# Patient Record
Sex: FEMALE | Race: WHITE | Employment: OTHER | URBAN - METROPOLITAN AREA
[De-identification: names, ages, dates, MRNs, and addresses within clinical notes are randomized per-mention and may not be internally consistent; named-entity substitution may affect disease eponyms.]

---

## 2017-01-10 RX ORDER — APIXABAN 2.5 MG/1
TABLET, FILM COATED ORAL
Qty: 60 TAB | Refills: 3 | Status: SHIPPED | OUTPATIENT
Start: 2017-01-10 | End: 2017-05-11 | Stop reason: SDUPTHER

## 2017-01-16 ENCOUNTER — OFFICE VISIT (OUTPATIENT)
Dept: INTERNAL MEDICINE CLINIC | Age: 72
End: 2017-01-16

## 2017-01-16 VITALS
HEIGHT: 68 IN | HEART RATE: 86 BPM | TEMPERATURE: 97.8 F | BODY MASS INDEX: 20.16 KG/M2 | WEIGHT: 133 LBS | SYSTOLIC BLOOD PRESSURE: 144 MMHG | OXYGEN SATURATION: 99 % | DIASTOLIC BLOOD PRESSURE: 64 MMHG

## 2017-01-16 DIAGNOSIS — F32.A DEPRESSION, UNSPECIFIED DEPRESSION TYPE: ICD-10-CM

## 2017-01-16 DIAGNOSIS — R09.89 BRUIT OF RIGHT CAROTID ARTERY: ICD-10-CM

## 2017-01-16 DIAGNOSIS — N18.4 CKD STAGE 4 SECONDARY TO HYPERTENSION (HCC): ICD-10-CM

## 2017-01-16 DIAGNOSIS — I10 ESSENTIAL HYPERTENSION: Primary | ICD-10-CM

## 2017-01-16 DIAGNOSIS — I12.9 CKD STAGE 4 SECONDARY TO HYPERTENSION (HCC): ICD-10-CM

## 2017-01-16 NOTE — PROGRESS NOTES
HISTORY OF PRESENT ILLNESS  Ashtyn Romero is a 66 y.o. female. HPI     F/u CKD 4/5, htn hld anemia, hx dvt/PE requiring chronic anticoagulation with clarence  Sees Dr Letha Garcia for renal dz  Last creatinine down to 3.2 and potassium wnl. No cp sob or swelling   Getting back to her usual self since surgery for colon perforation.      Patient Active Problem List    Diagnosis Date Noted    Electrolyte abnormality 03/04/2016    ALAYNA (acute kidney injury) ([address] 01/22/2016    Acute on chronic renal failure ([address] 01/22/2016     Class: Acute    Generalized rash 01/22/2016     Class: Present on Admission    Heme positive stool 01/22/2016     Class: Present on Admission    Hypertension, uncontrolled 12/21/2015    Pericarditis with effusion 12/18/2015    Diarrhea 12/17/2015     Class: Present on Admission    Moderate protein-calorie malnutrition ([address] 12/17/2015     Class: Chronic    History of ovarian cancer 12/16/2015     Class: Present on Admission    Pericardial effusion 12/16/2015     Class: Present on Admission    History of pulmonary embolism 11/16/2015    HTN (hypertension) 10/07/2015     Class: Chronic    History of peritonitis 10/07/2015     Class: Present on Admission    Physical debility 10/07/2015     Class: Present on Admission    Chronic anticoagulation 10/07/2015     Class: Chronic    SIRS (systemic inflammatory response syndrome) ([address] 09/14/2015    Anemia 08/21/2015    Acute renal failure with lesion of tubular necrosis (HCC) 08/03/2015    Small bowel perforation (HCC) 08/01/2015    Protein malnutrition ([address] 07/30/2015    Acute pancreatitis 07/29/2015    E-coli UTI 07/28/2015    Continuous severe abdominal pain 07/28/2015    Enteritis 07/28/2015    Adrenal hemorrhage ([address] 07/28/2015    Chronic renal insufficiency, stage IV (severe) ([address] 07/28/2015    Sepsis ([address] 07/28/2015    Abdominal pain 07/24/2015     Current Outpatient Prescriptions   Medication Sig Dispense Refill    ELIQUIS 2.5 mg tablet TAKE 1 TABLET BY MOUTH TWO (2) TIMES A DAY. 60 Tab 3    ondansetron (ZOFRAN ODT) 4 mg disintegrating tablet TAKE 1 TABLET BY MOUTH EVERY EIGHT (8) HOURS AS NEEDED FOR NAUSEA. 30 Tab 3    cloNIDine HCl (CATAPRES) 0.1 mg tablet TAKE 1 TABLET EVERY 12 HOURS 60 Tab 11    buPROPion (WELLBUTRIN) 100 mg tablet Take  by mouth two (2) times a day.  iron, carbonyl (FEOSOL) 45 mg tab Take 1 Tab by mouth two (2) times a day.  magnesium oxide (MAG-OX) 400 mg tablet Take 800 mg by mouth two (2) times a day. Indications: HYPOMAGNESEMIA      acetaminophen (TYLENOL) 500 mg tablet Take 1,000 mg by mouth every six (6) hours as needed for Pain.  calcitRIOL (ROCALTROL) 0.5 mcg capsule TAKE 1 TABLET EVERY DAY (Patient taking differently: daily (with lunch). ) 30 Cap 6    famotidine (PEPCID) 20 mg tablet TAKE 1 TABLET EVERY DAY 30 Tab 11    gabapentin (NEURONTIN) 100 mg capsule TAKE 1 CAPSULE AT BEDTIME 30 Cap 6    amLODIPine (NORVASC) 10 mg tablet Take 1 Tab by mouth daily. 30 Tab 11    diphenhydrAMINE-zinc acetate 2%-0.1% (BENADRYL EXTRA STRENGTH) 2-0.1 % topical cream Apply  to affected area two (2) times daily as needed for Itching. Dispense 1 tube 30 g 0    B.infantis-B.ani-B.long-B.bifi 10-15 mg TbEC Take 1 Tab by mouth daily.  multivitamin (ONE A DAY) tablet Take 1 Tab by mouth every morning.  sodium bicarbonate 650 mg tablet Take 650 mg by mouth two (2) times a day.  atorvastatin (LIPITOR) 40 mg tablet Take 40 mg by mouth nightly.  HYDROcodone-acetaminophen (NORCO) 7.5-325 mg per tablet Take 1 Tab by mouth every six (6) hours as needed for Pain. Max Daily Amount: 4 Tabs. 20 Tab 0    diphenoxylate-atropine (LOMOTIL) 2.5-0.025 mg per tablet TAKE 1 TABLET TWICE A DAY IF NEEDED FOR DIARRHEA OR CRAMPING 60 Tab 5    HYDROcodone-acetaminophen (NORCO) 5-325 mg per tablet Take 1 Tab by mouth every four (4) hours as needed.       calcium-vitamin D (OYSTER SHELL) 500 mg(1,250mg) -200 unit per tablet Take 1 Tab by mouth three (3) times daily. 90 Tab 0    cetirizine (ZYRTEC) 10 mg tablet Take 10 mg by mouth daily. Allergies   Allergen Reactions    Citrus And Derivatives Anaphylaxis     Patient and her  reported    Penicillin G Anaphylaxis    Orange Juice Not Reported This Time    Sulfa (Sulfonamide Antibiotics) Other (comments)     \"Dont remember\"    Vancomycin Hives      Lab Results  Component Value Date/Time   Hemoglobin A1c 5.8 08/22/2015 04:52 AM   Glucose 72 03/11/2016 03:48 AM   Glucose (POC) 76 08/01/2016 09:56 AM   Glucose (POC) 82 01/24/2016 08:47 AM   Microalbumin/Creat ratio (mg/g creat) 4648 03/07/2016 02:36 PM   Microalbumin,urine random 198.00 03/07/2016 02:36 PM   Creatinine (POC) 4.1 08/01/2016 09:56 AM   Creatinine 1.76 03/11/2016 03:48 AM      Lab Results  Component Value Date/Time   Triglyceride 219 07/30/2015 11:16 AM       Lab Results  Component Value Date/Time   GFR est AA 35 03/11/2016 03:48 AM   GFR est non-AA 28 03/11/2016 03:48 AM   Creatinine (POC) 4.1 08/01/2016 09:56 AM   Creatinine 1.76 03/11/2016 03:48 AM   BUN 12 03/11/2016 03:48 AM   BUN (POC) 65 08/01/2016 09:56 AM   Sodium (POC) 138 08/01/2016 09:56 AM   Sodium 143 03/11/2016 03:48 AM   Potassium 3.8 03/11/2016 03:48 AM   Potassium (POC) 5.7 08/01/2016 09:56 AM   Chloride (POC) 112 08/01/2016 09:56 AM   Chloride 110 03/11/2016 03:48 AM   CO2 25 03/11/2016 03:48 AM         ROS    Physical Exam   Constitutional: She appears well-developed and well-nourished. Appears stated age   Neck:   Carotid bruit on right> left   Cardiovascular: Normal rate, regular rhythm and normal heart sounds. Exam reveals no gallop and no friction rub. No murmur heard. Pulmonary/Chest: Effort normal and breath sounds normal. No respiratory distress. She has no wheezes. Abdominal: Soft. Bowel sounds are normal.   Musculoskeletal: She exhibits no edema. Neurological: She is alert.    Psychiatric: She has a normal mood and affect. Nursing note and vitals reviewed. ASSESSMENT and PLAN  Prudence Presume was seen today for hypertension. Diagnoses and all orders for this visit:    Essential hypertension   Reasonable control    Bruit of right carotid artery  -     DUPLEX CAROTID BILATERAL; Future    CKD stage 4 secondary to hypertension ([facility] [address]   Close f/u Dr. Letha britton    Depression, unspecified depression type   Controlled on wellbutrin--continue     Preventive   Pt has f/u Dr Anuj Liu for ovarian cancer hx, will get mammogram order from Dr Anuj Liu and will discuss dexa scan   Advised shingles vaccine  Follow-up Disposition:  Return in about 6 months (around 7/16/2017) for htn ckd .

## 2017-01-16 NOTE — ACP (ADVANCE CARE PLANNING)
initial discussion,  is SDM. They have a daughter who is a  and can help complete AMD forms.  Offered appt with [facility] for assistance if needed

## 2017-01-16 NOTE — MR AVS SNAPSHOT
Visit Information Date & Time Provider Department Dept. Phone Encounter #  
 1/16/2017  1:30 PM Citlalli Kaye, [address]  Follow-up Instructions Return in about 6 months (around 7/16/2017) for htn ckd . Upcoming Health Maintenance Date Due DTaP/Tdap/Td series (1 - Tdap) 10/6/1965 BREAST CANCER SCRN MAMMOGRAM 10/6/1994 ZOSTER VACCINE AGE 60> 10/6/2004 GLAUCOMA SCREENING Q2Y 10/6/2009 OSTEOPOROSIS SCREENING (DEXA) 10/6/2009 MEDICARE YEARLY EXAM 10/6/2009 FOBT Q 1 YEAR AGE 50-75 3/6/2017 Allergies as of 1/16/2017  Review Complete On: 1/16/2017 By: Milad Flores LPN Severity Noted Reaction Type Reactions Citrus And Derivatives High 08/24/2015    Anaphylaxis Patient and her  reported Penicillin G High 10/19/2014    Anaphylaxis [place] Juice  08/31/2015    Not Reported This Time  
 Sulfa (Sulfonamide Antibiotics)  07/23/2015    Other (comments) \"Dont remember\" Vancomycin  01/22/2016    Hives Current Immunizations  Reviewed on 3/10/2016 Name Date Influenza High Dose Vaccine PF 9/15/2016 Influenza Vaccine 10/20/2015 Pneumococcal Conjugate (PCV-13) 10/20/2015 Pneumococcal Polysaccharide (PPSV-23) 11/10/2012 Pneumococcal Vaccine (Unspecified Type) 1/1/2015 Not reviewed this visit You Were Diagnosed With   
  
 Codes Comments Essential hypertension    -  Primary ICD-10-CM: I10 
ICD-9-CM: 401.9 Bruit of right carotid artery     ICD-10-CM: R09.89 ICD-9-CM: 785.9 CKD stage 4 secondary to hypertension ([facility] [address]     ICD-10-CM: I12.9, N18.4 ICD-9-CM: 403.90, 585.4 Depression, unspecified depression type     ICD-10-CM: F32.9 ICD-9-CM: 198 Vitals BP Pulse Temp Height(growth percentile) Weight(growth percentile) SpO2  
 144/64 (BP 1 Location: Left arm, BP Patient Position: Sitting) 86 97.8 °F (36.6 °C) (Oral) 5' 8\" (1.727 m) 133 lb (60.3 kg) 99% BMI OB Status Smoking Status 20.22 kg/m2 Hysterectomy Current Every Day Smoker BMI and BSA Data Body Mass Index Body Surface Area  
 20.22 kg/m 2 1.7 m 2 Preferred Pharmacy [facility] Name [facility] [phone] Your Updated Medication List  
  
   
This list is accurate as of: 1/16/17  2:10 PM.  Always use your most recent med list.  
  
  
  
  
 acetaminophen 500 mg tablet Commonly known as:  TYLENOL Take 1,000 mg by mouth every six (6) hours as needed for Pain. amLODIPine 10 mg tablet Commonly known as:  [place] Take 1 Tab by mouth daily. atorvastatin 40 mg tablet Commonly known as:  LIPITOR Take 40 mg by mouth nightly. B.infantis-B.ani-B.long-B.bifi 10-15 mg Tbec Take 1 Tab by mouth daily. buPROPion 100 mg tablet Commonly known as:  [facility] Take  by mouth two (2) times a day. calcitRIOL 0.5 mcg capsule Commonly known as:  ROCALTROL  
TAKE 1 TABLET EVERY DAY  
  
 calcium-vitamin D 500 mg(1,250mg) -200 unit per tablet Commonly known as:  OYSTER SHELL Take 1 Tab by mouth three (3) times daily. cetirizine 10 mg tablet Commonly known as:  ZYRTEC Take 10 mg by mouth daily. cloNIDine HCl 0.1 mg tablet Commonly known as:  CATAPRES  
TAKE 1 TABLET EVERY 12 HOURS  
  
 diphenhydrAMINE-zinc acetate 2%-0.1% 2-0.1 % topical cream  
Commonly known as:  BENADRYL EXTRA STRENGTH Apply  to affected area two (2) times daily as needed for Itching. Dispense 1 tube  
  
 diphenoxylate-atropine 2.5-0.025 mg per tablet Commonly known as:  LOMOTIL  
TAKE 1 TABLET TWICE A DAY IF NEEDED FOR DIARRHEA OR CRAMPING  
  
 ELIQUIS 2.5 mg tablet Generic drug:  apixaban TAKE 1 TABLET BY MOUTH TWO (2) TIMES A DAY. famotidine 20 mg tablet Commonly known as:  PEPCID  
TAKE 1 TABLET EVERY DAY  
  
 gabapentin 100 mg capsule Commonly known as:  NEURONTIN  
TAKE 1 CAPSULE AT BEDTIME  
  
 * HYDROcodone-acetaminophen 5-325 mg per tablet Commonly known as:  Bertram Pock Take 1 Tab by mouth every four (4) hours as needed. * HYDROcodone-acetaminophen 7.5-325 mg per tablet Commonly known as:  Kayleena Pock Take 1 Tab by mouth every six (6) hours as needed for Pain. Max Daily Amount: 4 Tabs. iron, carbonyl 45 mg Tab Commonly known as:  Luis Salen Take 1 Tab by mouth two (2) times a day. magnesium oxide 400 mg tablet Commonly known as:  MAG-OX Take 800 mg by mouth two (2) times a day. Indications: HYPOMAGNESEMIA  
  
 multivitamin tablet Commonly known as:  ONE A DAY Take 1 Tab by mouth every morning. ondansetron 4 mg disintegrating tablet Commonly known as:  ZOFRAN ODT  
TAKE 1 TABLET BY MOUTH EVERY EIGHT (8) HOURS AS NEEDED FOR NAUSEA.  
  
 sodium bicarbonate 650 mg tablet Take 650 mg by mouth two (2) times a day. * Notice: This list has 2 medication(s) that are the same as other medications prescribed for you. Read the directions carefully, and ask your doctor or other care provider to review them with you. Follow-up Instructions Return in about 6 months (around 7/16/2017) for htn ckd . To-Do List   
 01/23/2017 Imaging:  DUPLEX CAROTID BILATERAL Introducing [facility]! Dear Melissa Sams: 
Thank you for requesting a [employer] account. Our records indicate that you already have an active [employer] account. You can access your account anytime at [email or web address] Did you know that you can access your hospital and ER discharge instructions at any time in [employer]? You can also review all of your test results from your hospital stay or ER visit. Additional Information If you have questions, please visit the Frequently Asked Questions section of the [employer] website at [email or web address] Remember, [employer] is NOT to be used for urgent needs. For medical emergencies, dial 911. Now available from your iPhone and Android! Please provide this summary of care documentation to your next provider. Your primary care clinician is listed as Yenny CHEN. If you have any questions after today's visit, please call [phone].

## 2017-01-18 ENCOUNTER — HOSPITAL ENCOUNTER (OUTPATIENT)
Dept: VASCULAR SURGERY | Age: 72
Discharge: HOME | End: 2017-01-18
Attending: INTERNAL MEDICINE
Payer: MEDICARE

## 2017-01-18 DIAGNOSIS — R09.89 BRUIT OF RIGHT CAROTID ARTERY: ICD-10-CM

## 2017-01-18 PROCEDURE — 93880 EXTRACRANIAL BILAT STUDY: CPT

## 2017-01-18 NOTE — PROGRESS NOTES
[facility]  Preliminary Report:  Carotid Duplex Scan    Right:  Mild plaque noted in the right carotid system. Right ICA velocities suggest less than 50% diameter reduction. Right vertebral artery flow is antegrade. Left:  Severe plaque noted in the left carotid system. Left ICA velocities suggest greater than or equal to 70% diameter reduction. (PICA peak systolic velocity = 783 cm/s, end diastolic velocity = 70 cm/s)  Left vertebral artery flow is antegrade. Final report to follow.

## 2017-01-18 NOTE — PROCEDURES
[facility]  *** FINAL REPORT ***    Name: Onel Quiroz  MRN: [id number]    Outpatient  : 06 Oct 1945  HIS Order #: [id number]  [id number] [place] [facility] Visit #: [id number]  Date: 2017    TYPE OF TEST: Cerebrovascular Duplex    REASON FOR TEST  Carotid bruit (right hemisphere)    Right Carotid:-             Proximal               Mid                 Distal  cm/s  Systolic  Diastolic  Systolic  Diastolic  Systolic  Diastolic  CCA:     56.2                                      74.0  Bulb:  ECA:     96.0  ICA:     71.0                 74.0                 59.0  ICA/CCA:  1.0    ICA Stenosis: <50%    Right Vertebral:-  Finding: Antegrade  Sys:       63.0  Joan:    Right Subclavian: Normal    Left Carotid:-            Proximal                Mid                 Distal  cm/s  Systolic  Diastolic  Systolic  Diastolic  Systolic  Diastolic  CCA:     54.0                                      66.0  Bulb:  ECA:     83.0  ICA:    274.0      70.0      133.0      28.0       88.0  ICA/CCA:  4.2    ICA Stenosis: 70% or greater    Left Vertebral:-  Finding: Antegrade  Sys:      137.0  Joan:    Left Subclavian: Normal    INTERPRETATION/FINDINGS  PROCEDURE:  Carotid Duplex Examination using B-mode, color and  spectral Doppler of the extracranial cerebrovascular arteries. 1. <50% stenosis in the right internal carotid artery. 2. 70% or greater stenosis in the left internal carotid artery. 3. No significant stenosis in the external carotid arteries  bilaterally. 4. Antegrade flow in both vertebral arteries. 5. Normal flow in both subclavian arteries. Plaque Morphology:  1. Calcific plaque in the bulb and right ICA. 2. Calcific plaque in the bulb and left ICA. ADDITIONAL COMMENTS    I have personally reviewed the data relevant to the interpretation of  this  study.     TECHNOLOGIST: Coco Hayes RVT  Signed: 2017 01:42 PM    PHYSICIAN: Claudene Cobia, MD  Signed: 2017 12:54 PM

## 2017-01-19 DIAGNOSIS — I65.23 BILATERAL CAROTID ARTERY STENOSIS: Primary | ICD-10-CM

## 2017-02-10 RX ORDER — ONDANSETRON 4 MG/1
TABLET, FILM COATED ORAL
Qty: 30 TAB | Refills: 3 | Status: SHIPPED | OUTPATIENT
Start: 2017-02-10 | End: 2017-05-01 | Stop reason: CLARIF

## 2017-03-29 ENCOUNTER — APPOINTMENT (OUTPATIENT)
Dept: GENERAL RADIOLOGY | Age: 72
DRG: 057 | End: 2017-03-29
Attending: EMERGENCY MEDICINE
Payer: MEDICARE

## 2017-03-29 ENCOUNTER — APPOINTMENT (OUTPATIENT)
Dept: CT IMAGING | Age: 72
DRG: 057 | End: 2017-03-29
Attending: FAMILY MEDICINE
Payer: MEDICARE

## 2017-03-29 ENCOUNTER — HOSPITAL ENCOUNTER (INPATIENT)
Age: 72
LOS: 1 days | Discharge: HOME | DRG: 057 | End: 2017-03-31
Attending: EMERGENCY MEDICINE | Admitting: INTERNAL MEDICINE
Payer: MEDICARE

## 2017-03-29 ENCOUNTER — APPOINTMENT (OUTPATIENT)
Dept: CT IMAGING | Age: 72
DRG: 057 | End: 2017-03-29
Attending: EMERGENCY MEDICINE
Payer: MEDICARE

## 2017-03-29 DIAGNOSIS — E87.5 ACUTE HYPERKALEMIA: ICD-10-CM

## 2017-03-29 DIAGNOSIS — R56.9 CONVULSIONS, UNSPECIFIED CONVULSION TYPE (HCC): ICD-10-CM

## 2017-03-29 DIAGNOSIS — I67.89 CEREBRAL MICROVASCULAR DISEASE: ICD-10-CM

## 2017-03-29 DIAGNOSIS — R42 DIZZINESS: ICD-10-CM

## 2017-03-29 DIAGNOSIS — R93.0 ABNORMAL MRI OF HEAD: ICD-10-CM

## 2017-03-29 DIAGNOSIS — F02.80 DEMENTIA OF THE ALZHEIMER'S TYPE WITH LATE ONSET WITHOUT BEHAVIORAL DISTURBANCE (HCC): ICD-10-CM

## 2017-03-29 DIAGNOSIS — I65.23 STENOSIS OF BOTH INTERNAL CAROTID ARTERIES: ICD-10-CM

## 2017-03-29 DIAGNOSIS — R41.82 ALTERED MENTAL STATUS, UNSPECIFIED: ICD-10-CM

## 2017-03-29 DIAGNOSIS — I65.23 BILATERAL CAROTID ARTERY STENOSIS: ICD-10-CM

## 2017-03-29 DIAGNOSIS — R47.02 EXPRESSIVE DYSPHASIA: ICD-10-CM

## 2017-03-29 DIAGNOSIS — G93.40 ACUTE ENCEPHALOPATHY: ICD-10-CM

## 2017-03-29 DIAGNOSIS — N17.9 ACUTE KIDNEY INJURY (HCC): Primary | ICD-10-CM

## 2017-03-29 DIAGNOSIS — G30.1 DEMENTIA OF THE ALZHEIMER'S TYPE WITH LATE ONSET WITHOUT BEHAVIORAL DISTURBANCE (HCC): ICD-10-CM

## 2017-03-29 DIAGNOSIS — I65.22 STENOSIS OF LEFT CAROTID ARTERY WITHOUT CEREBRAL INFARCTION: ICD-10-CM

## 2017-03-29 LAB
ALBUMIN SERPL BCP-MCNC: 3.1 G/DL (ref 3.5–5)
ALBUMIN/GLOB SERPL: 0.7 {RATIO} (ref 1.1–2.2)
ALP SERPL-CCNC: 63 U/L (ref 45–117)
ALT SERPL-CCNC: 14 U/L (ref 12–78)
ANION GAP BLD CALC-SCNC: 8 MMOL/L (ref 5–15)
APPEARANCE UR: ABNORMAL
AST SERPL W P-5'-P-CCNC: 12 U/L (ref 15–37)
BACTERIA URNS QL MICRO: ABNORMAL /HPF
BASOPHILS # BLD AUTO: 0.1 K/UL (ref 0–0.1)
BASOPHILS # BLD: 1 % (ref 0–1)
BILIRUB SERPL-MCNC: 0.3 MG/DL (ref 0.2–1)
BILIRUB UR QL: NEGATIVE
BUN SERPL-MCNC: 73 MG/DL (ref 6–20)
BUN/CREAT SERPL: 13 (ref 12–20)
CALCIUM SERPL-MCNC: 10.5 MG/DL (ref 8.5–10.1)
CHLORIDE SERPL-SCNC: 107 MMOL/L (ref 97–108)
CO2 SERPL-SCNC: 26 MMOL/L (ref 21–32)
COLOR UR: ABNORMAL
CREAT SERPL-MCNC: 5.71 MG/DL (ref 0.55–1.02)
EOSINOPHIL # BLD: 0.3 K/UL (ref 0–0.4)
EOSINOPHIL NFR BLD: 2 % (ref 0–7)
EPITH CASTS URNS QL MICRO: ABNORMAL /LPF
ERYTHROCYTE [DISTWIDTH] IN BLOOD BY AUTOMATED COUNT: 15.2 % (ref 11.5–14.5)
GLOBULIN SER CALC-MCNC: 4.3 G/DL (ref 2–4)
GLUCOSE SERPL-MCNC: 105 MG/DL (ref 65–100)
GLUCOSE UR STRIP.AUTO-MCNC: NEGATIVE MG/DL
HCT VFR BLD AUTO: 34.8 % (ref 35–47)
HGB BLD-MCNC: 10.8 G/DL (ref 11.5–16)
HGB UR QL STRIP: ABNORMAL
KETONES UR QL STRIP.AUTO: NEGATIVE MG/DL
LEUKOCYTE ESTERASE UR QL STRIP.AUTO: ABNORMAL
LYMPHOCYTES # BLD AUTO: 15 % (ref 12–49)
LYMPHOCYTES # BLD: 1.9 K/UL (ref 0.8–3.5)
MAGNESIUM SERPL-MCNC: 3.2 MG/DL (ref 1.6–2.4)
MCH RBC QN AUTO: 28.1 PG (ref 26–34)
MCHC RBC AUTO-ENTMCNC: 31 G/DL (ref 30–36.5)
MCV RBC AUTO: 90.4 FL (ref 80–99)
MONOCYTES # BLD: 1.3 K/UL (ref 0–1)
MONOCYTES NFR BLD AUTO: 10 % (ref 5–13)
NEUTS SEG # BLD: 9.3 K/UL (ref 1.8–8)
NEUTS SEG NFR BLD AUTO: 72 % (ref 32–75)
NITRITE UR QL STRIP.AUTO: NEGATIVE
PH UR STRIP: 7 [PH] (ref 5–8)
PLATELET # BLD AUTO: 208 K/UL (ref 150–400)
POTASSIUM SERPL-SCNC: 5.7 MMOL/L (ref 3.5–5.1)
PROT SERPL-MCNC: 7.4 G/DL (ref 6.4–8.2)
PROT UR STRIP-MCNC: 100 MG/DL
RBC # BLD AUTO: 3.85 M/UL (ref 3.8–5.2)
RBC #/AREA URNS HPF: ABNORMAL /HPF (ref 0–5)
SODIUM SERPL-SCNC: 141 MMOL/L (ref 136–145)
SP GR UR REFRACTOMETRY: 1.02 (ref 1–1.03)
UROBILINOGEN UR QL STRIP.AUTO: 0.2 EU/DL (ref 0.2–1)
WBC # BLD AUTO: 12.8 K/UL (ref 3.6–11)
WBC URNS QL MICRO: ABNORMAL /HPF (ref 0–4)

## 2017-03-29 PROCEDURE — 99218 HC RM OBSERVATION: CPT

## 2017-03-29 PROCEDURE — 74011636637 HC RX REV CODE- 636/637: Performed by: EMERGENCY MEDICINE

## 2017-03-29 PROCEDURE — 81001 URINALYSIS AUTO W/SCOPE: CPT | Performed by: EMERGENCY MEDICINE

## 2017-03-29 PROCEDURE — 36415 COLL VENOUS BLD VENIPUNCTURE: CPT | Performed by: EMERGENCY MEDICINE

## 2017-03-29 PROCEDURE — 96367 TX/PROPH/DG ADDL SEQ IV INF: CPT

## 2017-03-29 PROCEDURE — 74176 CT ABD & PELVIS W/O CONTRAST: CPT

## 2017-03-29 PROCEDURE — 83735 ASSAY OF MAGNESIUM: CPT | Performed by: INTERNAL MEDICINE

## 2017-03-29 PROCEDURE — 65660000000 HC RM CCU STEPDOWN

## 2017-03-29 PROCEDURE — 93005 ELECTROCARDIOGRAM TRACING: CPT

## 2017-03-29 PROCEDURE — 74011000258 HC RX REV CODE- 258: Performed by: EMERGENCY MEDICINE

## 2017-03-29 PROCEDURE — 70450 CT HEAD/BRAIN W/O DYE: CPT

## 2017-03-29 PROCEDURE — 74011250637 HC RX REV CODE- 250/637: Performed by: FAMILY MEDICINE

## 2017-03-29 PROCEDURE — 71010 XR CHEST PORT: CPT

## 2017-03-29 PROCEDURE — 85025 COMPLETE CBC W/AUTO DIFF WBC: CPT | Performed by: EMERGENCY MEDICINE

## 2017-03-29 PROCEDURE — 74011250636 HC RX REV CODE- 250/636: Performed by: FAMILY MEDICINE

## 2017-03-29 PROCEDURE — 74011000250 HC RX REV CODE- 250: Performed by: EMERGENCY MEDICINE

## 2017-03-29 PROCEDURE — 96365 THER/PROPH/DIAG IV INF INIT: CPT

## 2017-03-29 PROCEDURE — 74011250636 HC RX REV CODE- 250/636: Performed by: EMERGENCY MEDICINE

## 2017-03-29 PROCEDURE — 80053 COMPREHEN METABOLIC PANEL: CPT | Performed by: EMERGENCY MEDICINE

## 2017-03-29 PROCEDURE — 96375 TX/PRO/DX INJ NEW DRUG ADDON: CPT

## 2017-03-29 PROCEDURE — 99284 EMERGENCY DEPT VISIT MOD MDM: CPT

## 2017-03-29 RX ORDER — SODIUM CHLORIDE 0.9 % (FLUSH) 0.9 %
5-10 SYRINGE (ML) INJECTION AS NEEDED
Status: DISCONTINUED | OUTPATIENT
Start: 2017-03-29 | End: 2017-03-31 | Stop reason: HOSPADM

## 2017-03-29 RX ORDER — LEVOFLOXACIN 5 MG/ML
500 INJECTION, SOLUTION INTRAVENOUS EVERY 24 HOURS
Status: DISCONTINUED | OUTPATIENT
Start: 2017-03-29 | End: 2017-03-29

## 2017-03-29 RX ORDER — FERROUS SULFATE, DRIED 160(50) MG
1 TABLET, EXTENDED RELEASE ORAL DAILY
COMMUNITY
End: 2017-03-31

## 2017-03-29 RX ORDER — HYDRALAZINE HYDROCHLORIDE 20 MG/ML
10 INJECTION INTRAMUSCULAR; INTRAVENOUS
Status: DISCONTINUED | OUTPATIENT
Start: 2017-03-29 | End: 2017-03-31 | Stop reason: HOSPADM

## 2017-03-29 RX ORDER — OXYBUTYNIN CHLORIDE 5 MG/1
5 TABLET ORAL 2 TIMES DAILY
Status: DISCONTINUED | OUTPATIENT
Start: 2017-03-29 | End: 2017-03-31 | Stop reason: HOSPADM

## 2017-03-29 RX ORDER — THERA TABS 400 MCG
1 TAB ORAL DAILY
Status: DISCONTINUED | OUTPATIENT
Start: 2017-03-30 | End: 2017-03-31 | Stop reason: HOSPADM

## 2017-03-29 RX ORDER — BUPROPION HYDROCHLORIDE 100 MG/1
100 TABLET ORAL 2 TIMES DAILY
Status: DISCONTINUED | OUTPATIENT
Start: 2017-03-29 | End: 2017-03-31 | Stop reason: HOSPADM

## 2017-03-29 RX ORDER — LANOLIN ALCOHOL/MO/W.PET/CERES
325 CREAM (GRAM) TOPICAL 2 TIMES DAILY
Status: DISCONTINUED | OUTPATIENT
Start: 2017-03-29 | End: 2017-03-31 | Stop reason: HOSPADM

## 2017-03-29 RX ORDER — LEVOFLOXACIN 5 MG/ML
500 INJECTION, SOLUTION INTRAVENOUS
Status: DISCONTINUED | OUTPATIENT
Start: 2017-03-31 | End: 2017-03-31 | Stop reason: HOSPADM

## 2017-03-29 RX ORDER — SODIUM POLYSTYRENE SULFONATE 15 G/60ML
15 SUSPENSION ORAL; RECTAL EVERY 6 HOURS
Status: DISCONTINUED | OUTPATIENT
Start: 2017-03-30 | End: 2017-03-30

## 2017-03-29 RX ORDER — OXYCODONE AND ACETAMINOPHEN 5; 325 MG/1; MG/1
1 TABLET ORAL
Status: DISCONTINUED | OUTPATIENT
Start: 2017-03-29 | End: 2017-03-31 | Stop reason: HOSPADM

## 2017-03-29 RX ORDER — GABAPENTIN 100 MG/1
100 CAPSULE ORAL 2 TIMES DAILY
COMMUNITY
End: 2019-01-01 | Stop reason: SDUPTHER

## 2017-03-29 RX ORDER — LEVOFLOXACIN 5 MG/ML
750 INJECTION, SOLUTION INTRAVENOUS ONCE
Status: COMPLETED | OUTPATIENT
Start: 2017-03-29 | End: 2017-03-29

## 2017-03-29 RX ORDER — CLONIDINE HYDROCHLORIDE 0.1 MG/1
0.1 TABLET ORAL 2 TIMES DAILY
Status: DISCONTINUED | OUTPATIENT
Start: 2017-03-29 | End: 2017-03-30

## 2017-03-29 RX ORDER — SODIUM CHLORIDE 9 MG/ML
50 INJECTION, SOLUTION INTRAVENOUS CONTINUOUS
Status: DISPENSED | OUTPATIENT
Start: 2017-03-29 | End: 2017-03-30

## 2017-03-29 RX ORDER — FAMOTIDINE 20 MG/1
20 TABLET, FILM COATED ORAL DAILY
Status: DISCONTINUED | OUTPATIENT
Start: 2017-03-30 | End: 2017-03-31 | Stop reason: HOSPADM

## 2017-03-29 RX ORDER — OXYBUTYNIN CHLORIDE 5 MG/1
5 TABLET ORAL 2 TIMES DAILY
COMMUNITY
End: 2019-01-01 | Stop reason: SDUPTHER

## 2017-03-29 RX ORDER — OXYCODONE AND ACETAMINOPHEN 5; 325 MG/1; MG/1
1 TABLET ORAL
COMMUNITY
End: 2017-05-01 | Stop reason: CLARIF

## 2017-03-29 RX ORDER — SODIUM CHLORIDE 0.9 % (FLUSH) 0.9 %
5-10 SYRINGE (ML) INJECTION EVERY 8 HOURS
Status: DISCONTINUED | OUTPATIENT
Start: 2017-03-29 | End: 2017-03-31 | Stop reason: HOSPADM

## 2017-03-29 RX ORDER — GUAIFENESIN 100 MG/5ML
81 LIQUID (ML) ORAL DAILY
Status: DISCONTINUED | OUTPATIENT
Start: 2017-03-30 | End: 2017-03-31 | Stop reason: HOSPADM

## 2017-03-29 RX ORDER — GABAPENTIN 100 MG/1
100 CAPSULE ORAL 2 TIMES DAILY
Status: DISCONTINUED | OUTPATIENT
Start: 2017-03-29 | End: 2017-03-31 | Stop reason: HOSPADM

## 2017-03-29 RX ORDER — LANOLIN ALCOHOL/MO/W.PET/CERES
800 CREAM (GRAM) TOPICAL 2 TIMES DAILY
Status: DISCONTINUED | OUTPATIENT
Start: 2017-03-29 | End: 2017-03-31 | Stop reason: HOSPADM

## 2017-03-29 RX ORDER — AMLODIPINE BESYLATE 5 MG/1
10 TABLET ORAL DAILY
Status: DISCONTINUED | OUTPATIENT
Start: 2017-03-30 | End: 2017-03-31 | Stop reason: HOSPADM

## 2017-03-29 RX ORDER — CALCIUM GLUCONATE 94 MG/ML
1 INJECTION, SOLUTION INTRAVENOUS
Status: DISCONTINUED | OUTPATIENT
Start: 2017-03-29 | End: 2017-03-29 | Stop reason: SDUPTHER

## 2017-03-29 RX ORDER — SODIUM POLYSTYRENE SULFONATE 15 G/60ML
15 SUSPENSION ORAL; RECTAL
Status: DISPENSED | OUTPATIENT
Start: 2017-03-30 | End: 2017-03-30

## 2017-03-29 RX ORDER — SODIUM BICARBONATE 650 MG/1
650 TABLET ORAL 2 TIMES DAILY
Status: DISCONTINUED | OUTPATIENT
Start: 2017-03-29 | End: 2017-03-31 | Stop reason: HOSPADM

## 2017-03-29 RX ORDER — DEXTROSE 50 % IN WATER (D50W) INTRAVENOUS SYRINGE
25
Status: COMPLETED | OUTPATIENT
Start: 2017-03-29 | End: 2017-03-29

## 2017-03-29 RX ORDER — ATORVASTATIN CALCIUM 40 MG/1
40 TABLET, FILM COATED ORAL
Status: DISCONTINUED | OUTPATIENT
Start: 2017-03-29 | End: 2017-03-30

## 2017-03-29 RX ORDER — CALCITRIOL 0.5 UG/1
0.5 CAPSULE ORAL
COMMUNITY
End: 2017-03-31

## 2017-03-29 RX ORDER — CALCITRIOL 0.25 UG/1
0.5 CAPSULE ORAL
Status: DISCONTINUED | OUTPATIENT
Start: 2017-03-29 | End: 2017-03-31

## 2017-03-29 RX ADMIN — DEXTROSE MONOHYDRATE 25 G: 25 INJECTION, SOLUTION INTRAVENOUS at 16:51

## 2017-03-29 RX ADMIN — CALCIUM GLUCONATE 1 G: 94 INJECTION, SOLUTION INTRAVENOUS at 18:11

## 2017-03-29 RX ADMIN — BUPROPION HYDROCHLORIDE 100 MG: 100 TABLET, FILM COATED ORAL at 20:57

## 2017-03-29 RX ADMIN — INSULIN HUMAN 10 UNITS: 100 INJECTION, SOLUTION PARENTERAL at 16:55

## 2017-03-29 RX ADMIN — Medication 10 ML: at 21:41

## 2017-03-29 RX ADMIN — CALCITRIOL 0.5 MCG: 0.25 CAPSULE, LIQUID FILLED ORAL at 23:00

## 2017-03-29 RX ADMIN — GABAPENTIN 100 MG: 100 CAPSULE ORAL at 20:56

## 2017-03-29 RX ADMIN — HYDRALAZINE HYDROCHLORIDE 10 MG: 20 INJECTION INTRAMUSCULAR; INTRAVENOUS at 20:57

## 2017-03-29 RX ADMIN — Medication 800 MG: at 20:57

## 2017-03-29 RX ADMIN — LEVOFLOXACIN 750 MG: 5 INJECTION, SOLUTION INTRAVENOUS at 19:30

## 2017-03-29 RX ADMIN — SODIUM CHLORIDE 1000 ML: 900 INJECTION, SOLUTION INTRAVENOUS at 16:49

## 2017-03-29 RX ADMIN — SODIUM BICARBONATE 650 MG: 650 TABLET, ORALLY DISINTEGRATING ORAL at 20:57

## 2017-03-29 RX ADMIN — CLONIDINE HYDROCHLORIDE 0.1 MG: 0.1 TABLET ORAL at 20:57

## 2017-03-29 RX ADMIN — ATORVASTATIN CALCIUM 40 MG: 40 TABLET, FILM COATED ORAL at 21:40

## 2017-03-29 RX ADMIN — SODIUM CHLORIDE 75 ML/HR: 900 INJECTION, SOLUTION INTRAVENOUS at 21:46

## 2017-03-29 RX ADMIN — Medication 325 MG: at 20:57

## 2017-03-29 RX ADMIN — OXYBUTYNIN CHLORIDE 5 MG: 5 TABLET ORAL at 23:00

## 2017-03-29 NOTE — IP AVS SNAPSHOT
[address] [facility] 
[phone] Patient: Andreina Peña MRN: [id number] :1945 You are allergic to the following Allergen Reactions Citrus And Derivatives Anaphylaxis Patient and her  reported Penicillin G Anaphylaxis [place] Juice Not Reported This Time  
    
 Sulfa (Sulfonamide Antibiotics) Other (comments) \"Dont remember\" Vancomycin Hives Recent Documentation Height Weight BMI OB Status Smoking Status 1.727 m 61.1 kg 20.48 kg/m2 Hysterectomy Current Every Day Smoker Emergency Contacts Name Discharge Info Relation Home Work Mobile DgVinny DISCHARGE CAREGIVER [3] Spouse [3] [phone] Malika Ricardo DISCHARGE CAREGIVER [3] Child [2] [phone] About your hospitalization You were admitted on:  2017 You last received care in the:  [facility] 3 NEUROSCIENCE TELEMETRY You were discharged on:  2017 Unit phone number:  [phone] Why you were hospitalized Your primary diagnosis was:  Not on File Your diagnoses also included:  Acute Renal Failure (Arf) (Hcc), Chronic Renal Insufficiency, Stage V (Hcc), Anemia Of Renal Disease, Abnormal Mri Of Head, Stenosis Of Both Internal Carotid Arteries, Cerebral Microvascular Disease, Convulsion Disorder (Hcc), Altered Mental Status, Unspecified, Acute Encephalopathy Providers Seen During Your Hospitalizations Provider Role Specialty Primary office phone Efe Thomas MD Attending Provider Emergency Medicine [phone] Rebecca Birch MD Attending Provider Hospitalist [phone] Angie Gasca MD Attending Provider Internal Medicine [phone] Camilla Guevara MD Attending Provider Internal Medicine [phone] Your Primary Care Physician (PCP) Primary Care Physician Office Phone Office Fax Ferny Crane [phone] Follow-up Information Follow up With Details Comments Contact Info [address]  You need to call and set up an appointment for out patient physical therapy with [address]. Bring the Out Patient Referral from signed by your doctor to the appointment. [address] 
[phone] Sher Delong MD   [address] [facility] [address] [facility] 
[phone] Omari Doll MD Schedule an appointment as soon as possible for a visit in 2 weeks can see another neurologist but you need follow up from hsopital visit [address] [facility] 
[phone] Saadia Barnes MD Call call and find out when he wants appt with you [address] [facility] Corbin [facility] [address] [place] [address] 
[phone] Current Discharge Medication List  
  
CONTINUE these medications which have CHANGED Dose & Instructions Dispensing Information Comments Morning Noon Evening Bedtime  
 atorvastatin 20 mg tablet Commonly known as:  LIPITOR What changed:   
- medication strength 
- how much to take Your last dose was: Your next dose is:    
   
   
 Dose:  20 mg Take 1 Tab by mouth nightly. Quantity:  30 Tab Refills:  0 CONTINUE these medications which have NOT CHANGED Dose & Instructions Dispensing Information Comments Morning Noon Evening Bedtime  
 acetaminophen 500 mg tablet Commonly known as:  TYLENOL Your last dose was: Your next dose is:    
   
   
 Dose:  1000 mg Take 1,000 mg by mouth every six (6) hours as needed for Pain. Refills:  0  
     
   
   
   
  
 amLODIPine 10 mg tablet Commonly known as:  Marley Mera Your last dose was:     
   
Your next dose is:    
   
   
 Dose:  10 mg  
 Take 1 Tab by mouth daily. Quantity:  30 Tab Refills:  11 buPROPion 100 mg tablet Commonly known as:  [facility] Your last dose was: Your next dose is:    
   
   
 Dose:  100 mg Take 100 mg by mouth two (2) times a day. Refills:  0  
     
   
   
   
  
 cloNIDine HCl 0.1 mg tablet Commonly known as:  CATAPRES Your last dose was: Your next dose is: TAKE 1 TABLET EVERY 12 HOURS Quantity:  60 Tab Refills:  11 Generic For:CATAPRES 0.1MG   N O T I C E    PRESCRIPTION PREVIOUSLY AUTHORIZED BY DOCTOR:NALINI GREEN [phone]  
    
   
   
   
  
 diphenoxylate-atropine 2.5-0.025 mg per tablet Commonly known as:  LOMOTIL Your last dose was: Your next dose is: TAKE 1 TABLET TWICE A DAY IF NEEDED FOR DIARRHEA OR CRAMPING Quantity:  60 Tab Refills:  5 Generic For:LOMOTIL 2.5MG  
    
   
   
   
  
 ELIQUIS 2.5 mg tablet Generic drug:  apixaban Your last dose was: Your next dose is: TAKE 1 TABLET BY MOUTH TWO (2) TIMES A DAY. Quantity:  60 Tab Refills:  3  
     
   
   
   
  
 famotidine 20 mg tablet Commonly known as:  PEPCID Your last dose was: Your next dose is: TAKE 1 TABLET EVERY DAY Quantity:  30 Tab Refills:  11 Generic For:PEPCID 20MG  
    
   
   
   
  
 gabapentin 100 mg capsule Commonly known as:  NEURONTIN Your last dose was: Your next dose is:    
   
   
 Dose:  100 mg Take 100 mg by mouth two (2) times a day. Refills:  0  
     
   
   
   
  
 iron, carbonyl 45 mg Tab Commonly known as:  Alexia Patron Your last dose was: Your next dose is:    
   
   
 Dose:  1 Tab Take 1 Tab by mouth two (2) times a day. Refills:  0  
     
   
   
   
  
 L. acidoph & paracasei- S therm- Bifido 8 billion cell Cap cap Commonly known as:  TY-Q/RISAQUAD Your last dose was: Your next dose is:    
   
   
 Dose:  1 Cap Take 1 Cap by mouth daily. Refills:  0  
     
   
   
   
  
 linaclotide 290 mcg Cap capsule Commonly known as:  Perfecto Trey Your last dose was: Your next dose is:    
   
   
 Dose:  290 mcg Take 290 mcg by mouth daily as needed. Refills:  0  
     
   
   
   
  
 magnesium oxide 400 mg tablet Commonly known as:  MAG-OX Your last dose was: Your next dose is:    
   
   
 Dose:  800 mg Take 800 mg by mouth two (2) times a day. Indications: HYPOMAGNESEMIA Refills:  0  
     
   
   
   
  
 multivitamin tablet Commonly known as:  ONE A DAY Your last dose was: Your next dose is:    
   
   
 Dose:  1 Tab Take 1 Tab by mouth every morning. Refills:  0  
     
   
   
   
  
 ondansetron hcl 4 mg tablet Commonly known as:  Mauri Kruse Your last dose was: Your next dose is: TAKE 1 TABLET BY MOUTH EVERY EIGHT (8) HOURS AS NEEDED FOR NAUSEA. Quantity:  30 Tab Refills:  3 Generic For:ZOFRAN 4MG oxybutynin 5 mg tablet Commonly known as:  MKNZDXWP Your last dose was: Your next dose is:    
   
   
 Dose:  5 mg Take 5 mg by mouth two (2) times a day. Refills:  0 PERCOCET 5-325 mg per tablet Generic drug:  oxyCODONE-acetaminophen Your last dose was: Your next dose is:    
   
   
 Dose:  1 Tab Take 1 Tab by mouth every four (4) hours as needed for Pain. Refills:  0  
     
   
   
   
  
 sodium bicarbonate 650 mg tablet Your last dose was: Your next dose is:    
   
   
 Dose:  650 mg Take 650 mg by mouth two (2) times a day. Refills:  0 VELTASSA 16.8 gram Pwpk Generic drug:  patiromer calcium sorbitex Your last dose was: Your next dose is:    
   
   
 Dose:  1 Package Take 1 Package by mouth every evening. Takes daily between 5926-6355 Refills:  0 STOP taking these medications   
 calcitRIOL 0.5 mcg capsule Commonly known as:  ROCALTROL  
   
  
 OYSTER SHELL CALCIUM-VIT D3 500 mg(1,250mg) -200 unit per tablet Generic drug:  calcium-vitamin D Where to Get Your Medications Information on where to get these meds will be given to you by the nurse or doctor. ! Ask your nurse or doctor about these medications  
  atorvastatin 20 mg tablet Discharge Instructions [employer] Activation Thank you for requesting access to [email or web address]. Please follow the instructions below to securely access and download your online medical record. mon.ki allows you to send messages to your doctor, view your test results, renew your prescriptions, schedule appointments, and more. How Do I Sign Up? 1. In your internet browser, go to [email or web address] 
2. Click on the First Time User? Click Here link in the Sign In box. You will be redirect to the New Member Sign Up page. 3. Enter your mon.ki Access Code exactly as it appears below. You will not need to use this code after youve completed the sign-up process. If you do not sign up before the expiration date, you must request a new code. mon.ki Access Code: Activation code not generated Current mon.ki Status: Active (This is the date your mon.ki access code will ) 4. Enter the last four digits of your Social Security Number (xxxx) and Date of Birth (mm/dd/yyyy) as indicated and click Submit. You will be taken to the next sign-up page. 5. Create a mon.ki ID. This will be your mon.ki login ID and cannot be changed, so think of one that is secure and easy to remember. 6. Create a mon.ki password. You can change your password at any time. 7. Enter your Password Reset Question and Answer. This can be used at a later time if you forget your password. 8. Enter your e-mail address. You will receive e-mail notification when new information is available in [address] 9. Click Sign Up. You can now view and download portions of your medical record. 10. Click the Download Summary menu link to download a portable copy of your medical information. Additional Information If you have questions, please visit the Frequently Asked Questions section of the [employer] website at [email or web address] Remember, [email or web address] is NOT to be used for urgent needs. For medical emergencies, dial 911. HOSPITALIST DISCHARGE INSTRUCTIONS 
 
NAME: Tyrone Krishnamurthy :  1945 MRN:  [id number] Date/Time:  3/31/2017 3:50 PM 
 
ADMIT DATE: 3/29/2017 DISCHARGE DATE: 3/31/2017 Attending Physician: Jared Solomon MD 
 
DISCHARGE DIAGNOSIS: 
?TIA vs dementia with overlay of stress 
 acute on chronic renal failure Hyperkalemia HTN 
hyperlipidemia MEDICATIONS: 
See above · It is important that you take the medication exactly as they are prescribed. · Keep your medication in the bottles provided by the pharmacist and keep a list of the medication names, dosages, and times to be taken in your wallet. · Do not take other medications without consulting your doctor. Pain Management: per above medications What to do at [facility] Recommended diet:  Cardiac Diet and Renal Diet Recommended activity: Activity as tolerated Follow Up: Follow-up Information Follow up With Details Comments Contact Info [address]  You need to call and set up an appointment for out patient physical therapy with [address]. Bring the Out Patient Referral from signed by your doctor to the appointment. [address] [facility] [address] [facility] 
[phone] Janee Hardy MD   [facility] [address] [facility] 
[phone] Dara Stone MD Schedule an appointment as soon as possible for a visit in 2 weeks can see another neurologist but you need follow up from hsopital visit [address] [place] 
[phone] Ashley Euceda MD Call call and find out when he wants appt with you [address] Kennedy Wang MD Ltd [address] 
[phone] The neurologist said his office will also set up an outpatient EEG. If you have questions regarding the hospital related prescriptions or hospital related issues please call [facility] at . You can always direct your questions to your primary care doctor if you are unable to reach your hospital physician; your PCP works as an extension of your hospital doctor just like your hospital doctor is an extension of your PCP for your time at [facility]. If you experience any of the following symptoms then please call your primary care physician or return to the emergency room if you cannot get hold of your doctor: 
Fever, chills, nausea, vomiting, diarrhea, change in mentation, falling, bleeding, shortness of breath Additional Instructions: 
 
 
Bring these papers with you to your follow up appointments. The papers will help your doctors be sure to continue the care plan from the hospital. 
 
 
 
 
 
 
Information obtained by : 
I understand that if any problems occur once I am at home I am to contact my physician. I understand and acknowledge receipt of the instructions indicated above. Physician's or R.N.'s Signature                                                                  Date/Time Patient or Representative Signature                                                          Da 
 
Discharge Orders None Lift Announcement We are excited to announce that we are making your provider's discharge notes available to you in Lift. You will see these notes when they are completed and signed by the physician that discharged you from your recent hospital stay. If you have any questions or concerns about any information you see in Lift, please call the Health Information Department where you were seen or reach out to your Primary Care Provider for more information about your plan of care. Introducing [facility]! Dear Adela Erickson: 
Thank you for requesting a Lift account. Our records indicate that you already have an active Lift account. You can access your account anytime at [email or web address] Did you know that you can access your hospital and ER discharge instructions at any time in Lift? You can also review all of your test results from your hospital stay or ER visit. Additional Information If you have questions, please visit the Frequently Asked Questions section of the Lift website at [email or web address] Remember, Lift is NOT to be used for urgent needs. For medical emergencies, dial 911. Now available from your iPhone and Android! General Information Please provide this summary of care documentation to your next provider. Patient Signature:  ____________________________________________________________ Date:  ____________________________________________________________  
  
[facility] Provider Signature:  ____________________________________________________________ Date:  ____________________________________________________________

## 2017-03-29 NOTE — IP AVS SNAPSHOT
Current Discharge Medication List  
  
CONTINUE these medications which have CHANGED Dose & Instructions Dispensing Information Comments Morning Noon Evening Bedtime  
 atorvastatin 20 mg tablet Commonly known as:  LIPITOR What changed:   
- medication strength 
- how much to take Your last dose was: Your next dose is:    
   
   
 Dose:  20 mg Take 1 Tab by mouth nightly. Quantity:  30 Tab Refills:  0 CONTINUE these medications which have NOT CHANGED Dose & Instructions Dispensing Information Comments Morning Noon Evening Bedtime  
 acetaminophen 500 mg tablet Commonly known as:  TYLENOL Your last dose was: Your next dose is:    
   
   
 Dose:  1000 mg Take 1,000 mg by mouth every six (6) hours as needed for Pain. Refills:  0  
     
   
   
   
  
 amLODIPine 10 mg tablet Commonly known as:  Keyona Parham Your last dose was: Your next dose is:    
   
   
 Dose:  10 mg Take 1 Tab by mouth daily. Quantity:  30 Tab Refills:  11 buPROPion 100 mg tablet Commonly known as:  [facility] Your last dose was: Your next dose is:    
   
   
 Dose:  100 mg Take 100 mg by mouth two (2) times a day. Refills:  0  
     
   
   
   
  
 cloNIDine HCl 0.1 mg tablet Commonly known as:  CATAPRES Your last dose was: Your next dose is: TAKE 1 TABLET EVERY 12 HOURS Quantity:  60 Tab Refills:  11 Generic For:CATAPRES 0.1MG   N O T I C E    PRESCRIPTION PREVIOUSLY AUTHORIZED BY DOCTOR:NALINI GREEN [phone]  
    
   
   
   
  
 diphenoxylate-atropine 2.5-0.025 mg per tablet Commonly known as:  LOMOTIL Your last dose was: Your next dose is: TAKE 1 TABLET TWICE A DAY IF NEEDED FOR DIARRHEA OR CRAMPING Quantity:  60 Tab Refills:  5 Generic For:LOMOTIL 2.5MG ELIQUIS 2.5 mg tablet Generic drug:  apixaban Your last dose was: Your next dose is: TAKE 1 TABLET BY MOUTH TWO (2) TIMES A DAY. Quantity:  60 Tab Refills:  3  
     
   
   
   
  
 famotidine 20 mg tablet Commonly known as:  PEPCID Your last dose was: Your next dose is: TAKE 1 TABLET EVERY DAY Quantity:  30 Tab Refills:  11 Generic For:PEPCID 20MG  
    
   
   
   
  
 gabapentin 100 mg capsule Commonly known as:  NEURONTIN Your last dose was: Your next dose is:    
   
   
 Dose:  100 mg Take 100 mg by mouth two (2) times a day. Refills:  0  
     
   
   
   
  
 iron, carbonyl 45 mg Tab Commonly known as:  Aliya Resendez Your last dose was: Your next dose is:    
   
   
 Dose:  1 Tab Take 1 Tab by mouth two (2) times a day. Refills:  0  
     
   
   
   
  
 L. acidoph & paracasei- S therm- Bifido 8 billion cell Cap cap Commonly known as:  TY-Q/RISAQUAD Your last dose was: Your next dose is:    
   
   
 Dose:  1 Cap Take 1 Cap by mouth daily. Refills:  0  
     
   
   
   
  
 linaclotide 290 mcg Cap capsule Commonly known as:  Angelito Daquan Your last dose was: Your next dose is:    
   
   
 Dose:  290 mcg Take 290 mcg by mouth daily as needed. Refills:  0  
     
   
   
   
  
 magnesium oxide 400 mg tablet Commonly known as:  MAG-OX Your last dose was: Your next dose is:    
   
   
 Dose:  800 mg Take 800 mg by mouth two (2) times a day. Indications: HYPOMAGNESEMIA Refills:  0  
     
   
   
   
  
 multivitamin tablet Commonly known as:  ONE A DAY Your last dose was: Your next dose is:    
   
   
 Dose:  1 Tab Take 1 Tab by mouth every morning. Refills:  0  
     
   
   
   
  
 ondansetron hcl 4 mg tablet Commonly known as:  Sandra Mccauley Your last dose was: Your next dose is: TAKE 1 TABLET BY MOUTH EVERY EIGHT (8) HOURS AS NEEDED FOR NAUSEA. Quantity:  30 Tab Refills:  3 Generic For:ZOFRAN 4MG oxybutynin 5 mg tablet Commonly known as:  AUMPBMOP Your last dose was: Your next dose is:    
   
   
 Dose:  5 mg Take 5 mg by mouth two (2) times a day. Refills:  0 PERCOCET 5-325 mg per tablet Generic drug:  oxyCODONE-acetaminophen Your last dose was: Your next dose is:    
   
   
 Dose:  1 Tab Take 1 Tab by mouth every four (4) hours as needed for Pain. Refills:  0  
     
   
   
   
  
 sodium bicarbonate 650 mg tablet Your last dose was: Your next dose is:    
   
   
 Dose:  650 mg Take 650 mg by mouth two (2) times a day. Refills:  0 VELTASSA 16.8 gram Pwpk Generic drug:  patiromer calcium sorbitex Your last dose was: Your next dose is:    
   
   
 Dose:  1 Package Take 1 Package by mouth every evening. Takes daily between 9333-7873 Refills:  0 STOP taking these medications   
 calcitRIOL 0.5 mcg capsule Commonly known as:  ROCALTROL  
   
  
 OYSTER SHELL CALCIUM-VIT D3 500 mg(1,250mg) -200 unit per tablet Generic drug:  calcium-vitamin D Where to Get Your Medications Information on where to get these meds will be given to you by the nurse or doctor. ! Ask your nurse or doctor about these medications  
  atorvastatin 20 mg tablet

## 2017-03-29 NOTE — ED NOTES
Assumed care of patient who presents to ED with c/o being off balance and feeling dizzy. She denies spinning of the room. Patient also denies c/o pain, SOB, nausea, vomiting. Observed lab result of K = 5.7. 12 lead EKG completed and given to Dr. Karina Marks. Patient placed on cardiac monitor showing SR 67 bpm. SBP elevated. Patient in NAD. Call bell in reach and  at side. Will continue to monitor.

## 2017-03-29 NOTE — PROGRESS NOTES
Pharmacy Automatic Renal Dosing Protocol - Antimicrobials      Indication for Antimicrobials: UTI    Current Regimen of Each Antimicrobial (Start Day & Day of Therapy):  Levofloxacin 750 mg IV then 500 mg IV q48h (start 3/29, Day 1)    Significant cultures: none currently    CAPD, Intermittent Hemodialysis or Renal Replacement Therapy: n/a  Paralysis, amputations, malnutrition: n/a  Recent Labs      17   1503   CREA  5.71*   BUN  73*   WBC  12.8*     Temp (24hrs), Av.5 °F (36.9 °C), Min:98.5 °F (36.9 °C), Max:98.5 °F (36.9 °C)    Creatinine Clearance (ml/min): 9    Impression/Plan: Levofloxacin 750 mg IV then 500 mg IV q48h is appropriate for renal function        Pharmacy will follow daily and adjust doses of monitored medications as appropriate for renal function and/or serum levels.     Thank you,  Jadon Dumas, PHARMD

## 2017-03-29 NOTE — ED NOTES
Spoke with Gail Jeong, RN, on [facility] and was told RN in shift change report and unable to take ED report. Discussed with ED Charge Nurse, Parker Colon.

## 2017-03-29 NOTE — ED TRIAGE NOTES
I'm dizzy and I can't form my words. When I try and walk, I go in different directions. \" Symptoms began yesterday but improved, then returned today. Patient says she is having memory problems today as well.

## 2017-03-29 NOTE — PROGRESS NOTES
Was informed that patient appears to have a UTI, in light of elevated white count and subjective weakness, will start empirical abx until Urine Cx available.

## 2017-03-29 NOTE — PROGRESS NOTES
Pharmacy Medication Reconciliation     Recommendations/Findings: The following amendments were made to the patient's active medication list on file at [address]:   1) Additions: veltassa, leena-q, linzess    2) Deletions: zyrtec    3) Changes: gabapentin (100 mg twice daily)    4) has taken all morning medication today before coming to ED    -Clarified PTA med list with medication list provided by . PTA medication list was corrected to the following:     Prior to Admission Medications   Prescriptions Last Dose Informant Patient Reported? Taking? ELIQUIS 2.5 mg tablet 3/29/2017 at Unknown time  No Yes   Sig: TAKE 1 TABLET BY MOUTH TWO (2) TIMES A DAY. L. acidoph & paracasei- S therm- Bifido (LEENA-Q/RISAQUAD) 8 billion cell cap cap 3/29/2017 at Unknown time  Yes Yes   Sig: Take 1 Cap by mouth daily. acetaminophen (TYLENOL) 500 mg tablet   Yes Yes   Sig: Take 1,000 mg by mouth every six (6) hours as needed for Pain. amLODIPine (NORVASC) 10 mg tablet 3/29/2017 at Unknown time  No Yes   Sig: Take 1 Tab by mouth daily. atorvastatin (LIPITOR) 40 mg tablet 3/28/2017 at Unknown time  Yes Yes   Sig: Take 40 mg by mouth nightly. buPROPion (WELLBUTRIN) 100 mg tablet 3/29/2017 at Unknown time  Yes Yes   Sig: Take 100 mg by mouth two (2) times a day. calcitRIOL (ROCALTROL) 0.5 mcg capsule 3/28/2017 at Unknown time  Yes Yes   Sig: Take 0.5 mcg by mouth nightly. calcium-vitamin D (OYSTER SHELL CALCIUM-VIT D3) 500 mg(1,250mg) -200 unit per tablet 3/29/2017 at Unknown time  Yes Yes   Sig: Take 1 Tab by mouth daily.    cloNIDine HCl (CATAPRES) 0.1 mg tablet 3/29/2017 at Unknown time  No Yes   Sig: TAKE 1 TABLET EVERY 12 HOURS   diphenoxylate-atropine (LOMOTIL) 2.5-0.025 mg per tablet   No Yes   Sig: TAKE 1 TABLET TWICE A DAY IF NEEDED FOR DIARRHEA OR CRAMPING   famotidine (PEPCID) 20 mg tablet 3/29/2017 at Unknown time  No Yes   Sig: TAKE 1 TABLET EVERY DAY   gabapentin (NEURONTIN) 100 mg capsule 3/29/2017 at Unknown time  Yes Yes   Sig: Take 100 mg by mouth two (2) times a day. iron, carbonyl (FEOSOL) 45 mg tab 3/29/2017 at Unknown time  Yes Yes   Sig: Take 1 Tab by mouth two (2) times a day. linaclotide (LINZESS) 290 mcg cap capsule   Yes Yes   Sig: Take 290 mcg by mouth daily as needed. magnesium oxide (MAG-OX) 400 mg tablet 3/29/2017 at Unknown time  Yes Yes   Sig: Take 800 mg by mouth two (2) times a day. Indications: HYPOMAGNESEMIA   multivitamin (ONE A DAY) tablet 3/29/2017 at Unknown time  Yes Yes   Sig: Take 1 Tab by mouth every morning. ondansetron hcl (ZOFRAN) 4 mg tablet   No Yes   Sig: TAKE 1 TABLET BY MOUTH EVERY EIGHT (8) HOURS AS NEEDED FOR NAUSEA. oxyCODONE-acetaminophen (PERCOCET) 5-325 mg per tablet   Yes Yes   Sig: Take 1 Tab by mouth every four (4) hours as needed for Pain. oxybutynin (DITROPAN) 5 mg tablet 3/29/2017 at Unknown time  Yes Yes   Sig: Take 5 mg by mouth two (2) times a day. patiromer calcium sorbitex (VELTASSA) 16.8 gram pwpk 3/28/2017 at Unknown time  Yes Yes   Sig: Take 1 Package by mouth every evening. Takes daily between 9103-4795   sodium bicarbonate 650 mg tablet 3/29/2017 at Unknown time  Yes Yes   Sig: Take 650 mg by mouth two (2) times a day.       Facility-Administered Medications: None        Thank you,  Niesha Simms, PHARMD

## 2017-03-29 NOTE — ED NOTES
TRANSFER - OUT REPORT:    Verbal report given to Derek Heart RN (name) on Jt Schooling  being transferred to [facility] for routine progression of care       Report consisted of patients Situation, Background, Assessment and   Recommendations(SBAR). Information from the following report(s) SBAR, ED Summary, Procedure Summary, Intake/Output, MAR, Recent Results and Cardiac Rhythm NSR was reviewed with the receiving nurse. Lines:   Peripheral IV 03/29/17 Left Antecubital (Active)   Site Assessment Clean, dry, & intact 3/29/2017  4:46 PM   Phlebitis Assessment 0 3/29/2017  4:46 PM   Infiltration Assessment 0 3/29/2017  4:46 PM   Dressing Status Clean, dry, & intact 3/29/2017  4:46 PM   Dressing Type Tape;Transparent 3/29/2017  4:46 PM   Hub Color/Line Status Pink;Flushed;Patent 3/29/2017  4:46 PM        Opportunity for questions and clarification was provided.       Patient transported with:   Monitor  Registered Nurse

## 2017-03-29 NOTE — H&P
[facility], [address]   HISTORY AND PHYSICAL       Name:  Elliott Rosa   MR#:  [id number]   :  1945   Account #:  [de-identified]        Date of Adm:  2017       ATTENDING PHYSICIAN: Remigio Grigsby MD     CHIEF COMPLAINT: Dizziness. HISTORY OF PRESENT ILLNESS: The patient is a 76-year-old   female with past medical history of chronic kidney disease, history of   hypertension, GERD, and thromboembolism, who presents to the   hospital with the above mentioned symptoms. The patient reports that   her symptoms started this morning. She went to bed and was okay. This morning got up and started having some dizziness and felt like   \"she was going to pass out. \" The  who was present at the   bedside also reports that the patient was \"stumbling\". The patient also   reports that she had some trouble speaking and felt as if \"the words   were stuck in my head\" and reports that she could not \"make proper   sentences\". She also had some slurred speech and garbled speech   and felt weak all over and reports that the symptoms has been getting   worse since then. The patient reports that she had some minimal   symptoms starting yesterday, but today morning, symptoms got much   worse and she knew that she had to come to the hospital. The patient   reports that she had similar symptoms in the past secondary to   electrolyte abnormalities and feels that it is the same thing. Regardless, the patient reports that she has some chronic left lower   extremity weakness after being diagnosed with sepsis in . The   patient reports that last week her hemoglobin was low secondary to   chronic kidney disease, and she was given transfusion. She also   reports that she is currently not on dialysis.  The patient reports that she   gets some chest pain on and off, especially after waking up from sleep,   but there is no shortness of breath associated with the same. The   patient also reports that there is no exertional chest pain or dyspnea,   no radiation to the chest pain, or no other exacerbating or alleviating   factors. The patient denies any headache, blurry vision, sore throat,   trouble swallowing, trouble with speech currently. Denies any shortness   of breath, cough, fever, chills, abdominal pain, constipation, diarrhea,   urinary symptoms, focal or generalized neurological weakness, recent   travels or sick contacts. PAST MEDICAL HISTORY: See above. HOME MEDICATIONS    1. Probiotic. 2. Ditropan 5 mg b.i.d.   3.   mg b.i.d.    4. Rocaltrol 1.5 mg nightly. 5 1 packet every evening. 6.  .    7. Percocet 5/325 q.4h. as needed. 8. Linzess 290 mcg capsule daily as needed. 9. Zofran 4 mg every 8 hours as needed for nausea. 10. Lomotil 1 tablet twice a day as needed. 11.   mg t.i.d.   12. Wellbutrin 100 mg b.i.d.   13. Iron 45 mg b.i.d. 14. Mag-Ox 800 mg b.i.d.   15. Pepcid 20 mg daily. 16. Amlodipine 10 mg daily. 17. Multivitamin 1 tablet daily. 18.  .   19. Lipitor 40 mg daily. SOCIAL HISTORY: Current everyday smoker, smokes  pack per   day. Occasional alcohol. Denies IV drug abuse. ALLERGIES   1. CITRUS AND DERIVATIVES. 2. ORANGE JUICE. 3. PENICILLIN. 4. SULFA. 5. VANCOMYCIN. REVIEW OF SYSTEMS: A 10-point review of systems was done,   which is essentially negative except for the symptoms as mentioned   above. PHYSICAL EXAMINATION   VITAL SIGNS: Temperature 98.5, pulse 101, respiratory rate 22, blood   pressure 185/55, pulse oximetry 96% room air. GENERAL: Alert and oriented x3, awake, no acute distress, resting in   bed, pleasant female, appears to be stated age. HEENT: Pupils equal and reactive to light. Dry mucous membranes. Tympanic membranes clear. NECK: NECK: Supple. CHEST: Clear to auscultation bilaterally. CORONARY: S1, S2 were heard. ABDOMEN: Soft.  Tender to palpation in the epigastric region as well   as the left lower quadrant mildly. No rebound, no guarding. Bowel   sounds were hypoactive. EXTREMITIES: No clubbing, no cyanosis, no edema. NEUROLOGIC: Alert and oriented x3, awake, pleasant mood and   affect. Sensory grossly within normal limits. DTR 2+/4. Strength 5/5. Cranial nerves 2-12 grossly intact. SKIN: Warm. LABORATORY DATA: White count 12, hemoglobin 10.8, hematocrit   34.8, platelets 288. Urine shows large leukocyte esterase, large   amount of blood, no ketones, negative for nitrites. Sodium ,   potassium 5.7, chloride 107, bicarbonate 26, anion gap 8, BUN 73,   creatinine 5.71, calcium 10.5, bilirubin total 0.3, albumin 3.1, ALT 14,   AST 12, alkaline phosphatase 63. CT of the head shows no acute   intracranial abnormality. X-ray of the chest shows no acute   abnormality. EKG shows normal sinus rhythm, left ventricular   hypertrophy. ASSESSMENT AND PLAN   1. Transient ischemic attack. The patient will be admitted on a neuro-  telemetry bed. Will get an MRI, MRA of the brain. Start the patient on   aspirin. Continue statin. Neurovascular checks. Physical therapy,   occupational therapy, speech consult, neurology consult in the   morning. Further intervention will be per hospital course. Any changes   in mental status, will mandate imaging and intervention. Will continue   to monitor.  echocardiogram has been ordered as well as hemoglobin   A1c and cardiac enzymes have been ordered. Further intervention will   be per hospital course. 2. Acute on chronic renal failure . The patient appears to be heading   towards dialysis. Nephrology consultation will be requested. Provide   very gentle intravenous hydration, closely trend creatinine. Renally   dose all medications. The patient is on Eliquis at this time, but her GFR   is extremely low, will hold the Eliquis dose tonight. Will reassess in the   morning .    3. . The patient was treated with insulin, D50, calcium gluconate in   the emergency room. Will provide Kayexalate and repeat potassium   level in 48 hours. Will continue to closely monitor. The patient will be   on telemetry. Further intervention will be per hospital course. 4. Hypertension, suboptimally controlled. Put the patient on p.r.n.   medication. Continue to monitor. 5. Gastrointestinal/deep venous thrombosis prophylaxis. The patient is   on Eliquis which is held for tonight. Will put the patient on sequential   compression devices.         Km Cheney MD MM / PATRIZIA   D:  03/29/2017   18:25   T:  03/29/2017   19:51   Job #:  [id number]

## 2017-03-29 NOTE — ED NOTES
Patient medicated with Insulin and Dextrose for hyperkalemia. Calcium Gluconate to come from [facility]. Dr. Nina Given sin to update patient and  on plan of care.

## 2017-03-29 NOTE — ED PROVIDER NOTES
HPI Comments: Nicole Landaverde is a 66 y.o. Female tobacco user (.25ppd) who has a h/o CKD, HTN, GERD and thromboembolus present with a rather convoluted history, to ED [facility] ED ambulatory with cc dizziness described at [facility] like i'm spinning and sometimes feels like i'm gonna pass out\" x yesterday morning. The patient also reports associated symptoms of gait problems, which she describes as \"difficulty walking in a straight line\", as well as slurred speech, garbled speech, and extremity weakness, all of which she states began yesterday morning after waking up and has become considerably worse since then. The patient describes her garbled speech as \"feels like I know what I want to say but I cant get it out\" The patient states that the last time she had symptoms similar to her current ones, she was diagnosed with electrolyte abnormity and dehydration. Specifically in regards to the patients h/o weakness, she states that she has had some left lower extremity weakness that has been chronic since being diagnosed with sepsis in January 2016. The patient further states that she had a blood transfusion last week, related to her CKD, she is not currently on dialysis. The patient notes that her creatinine typically runs around 4.0. She denies all other symptoms at this time, including any chest pain sob, fevers, chills, numbness/tingling, syncope, nausea, vomiting or diarrhea. PCP: Jan Sparks MD    There are no other complaints changes or physical findings at this time  Written by GOYO Denise as dictated by Soha Martínez MD.    The history is provided by the patient.         Past Medical History:   Diagnosis Date    Chronic kidney disease     Stage 5 (7/18/16 BUN 79, Creatnine 4.53, K 6) Dr. Edin Blair [phone]    GERD (gastroesophageal reflux disease)     well controlled with Rx     High cholesterol     Hyperkalemia     Hypertension     Hypomagnesemia     on daily Magnesium    Neuropathy bilateral feet    Ovarian cancer ([facility] 2013    Hysterectomy with chemo, no radiation:  Dr. Nam Duarte [facility]) 9/2015    blood clot in lung 9/2015    Thromboembolus ([facility] 2004    in kidney \"many years ago\"       Past Surgical History:   Procedure Laterality Date    ABDOMEN SURGERY [address] UNLISTED  7/31/15    Lap exploratory small bowel obstruction  (ICU)    ABDOMEN SURGERY [address] UNLISTED  8/2/15    Abdmonial washout with wound vac (ICU)    ABDOMEN SURGERY PROC UNLISTED  8/18/15    Abdominal washout fascial closure (ICU)    ABDOMEN SURGERY PROC UNLISTED  11/11/15    Lap takedown of ileostomy     COLONOSCOPY N/A 8/1/2016    COLONOSCOPY performed by Reddy Feliciano [facility] at [facility] HX APPENDECTOMY  approx 2005    HX CASI AND BSO  2013    due to ovarian cancer    HX TONSILLECTOMY  age 10         Family History:   Problem Relation Age of Onset    Other Mother      peptic ulcer    Alzheimer Father        Social History     Social History    Marital status:      Spouse name: N/A    Number of children: N/A    Years of education: N/A     Occupational History    Not on file. Social History Main Topics    Smoking status: Current Every Day Smoker     Packs/day: 0.25     Last attempt to quit: 1/11/2012    Smokeless tobacco: Never Used    Alcohol use Yes      Comment: 2 martini weekly as of 7/26/16    Drug use: No    Sexual activity: Not on file     Other Topics Concern    Not on file     Social History Narrative         ALLERGIES: Citrus and derivatives; Penicillin g; Orange juice; Sulfa (sulfonamide antibiotics); and Vancomycin    Review of Systems   Constitutional: Negative for chills and fever. HENT: Negative for congestion, ear pain, rhinorrhea, sore throat and trouble swallowing. Eyes: Negative for visual disturbance. Respiratory: Negative for cough, chest tightness and shortness of breath. Cardiovascular: Negative for chest pain and palpitations. Gastrointestinal: Negative for abdominal pain, blood in stool, constipation, diarrhea, nausea and vomiting. Genitourinary: Negative for decreased urine volume, difficulty urinating, dysuria and frequency. Musculoskeletal: Positive for gait problem. Negative for back pain and neck pain. Skin: Negative for color change and rash. Neurological: Positive for dizziness, speech difficulty and weakness. Negative for light-headedness, numbness and headaches. All other systems reviewed and are negative. Vitals:    03/29/17 1453   BP: 171/84   Pulse: 85   Resp: 20   Temp: 98.5 °F (36.9 °C)   SpO2: 98%   Weight: 61.1 kg (134 lb 11.2 oz)   Height: 5' 8\" (1.727 m)            Physical Exam   Constitutional: She is oriented to person, place, and time. Vital signs are normal. She appears well-developed and well-nourished. She does not appear ill. No distress. HENT:   Mouth/Throat: Oropharynx is clear and moist.   Eyes: Conjunctivae are normal.   Neck: Neck supple. Cardiovascular: Normal rate and regular rhythm. Pulmonary/Chest: Effort normal and breath sounds normal. No accessory muscle usage. No respiratory distress. Abdominal: Soft. She exhibits no distension. There is no tenderness. Lymphadenopathy:     She has no cervical adenopathy. Neurological: She is alert and oriented to person, place, and time. She has normal strength. No cranial nerve deficit or sensory deficit. Coordination normal.   Skin: Skin is warm and dry. Nursing note and vitals reviewed.        MDM  Number of Diagnoses or Management Options  Acute hyperkalemia:   Acute kidney injury [facility]):   Dizziness:   Expressive dysphasia:   Diagnosis management comments: Ddx: CVA, ICH, Renal failure, Metabolic abnormality, UTI         Amount and/or Complexity of Data Reviewed  Clinical lab tests: ordered and reviewed  Tests in the radiology section of CPT®: ordered and reviewed  Decide to obtain previous medical records or to obtain history from someone other than the patient: yes  Obtain history from someone other than the patient: yes  Review and summarize past medical records: yes  Discuss the patient with other providers: yes (Hospitalist)    Critical Care  Total time providing critical care: 30-74 minutes    Patient Progress  Patient progress: stable    CRITICAL CARE NOTE :    5:03 PM  I have spent 30-74 minutes of critical care time involved in lab review, consultations with specialist, family decision- making, bedside attention and documentation. During this entire length of time I was immediately available to the patient . Dudley Self MD      ED Course       Procedures    EKG interpretation: (Preliminary) 1612  Rhythm: normal sinus rhythm; and regular . Rate (approx.): 67; Axis: normal; P wave: normal; QRS interval: normal ; ST/T wave: normal; Left ventricular hypertrophy. CONSULT NOTE:   4:15 PM  Dudley Self MD spoke with Ronak Buck,   Specialty: Hospitalist  Discussed pt's hx, disposition, and available diagnostic and imaging results. Reviewed care plans. Consultant will evaluate pt for admission. Written by Laurita Chapman ED Scribe, as dictated by Dudley Self MD.    LABORATORY TESTS:  Recent Results (from the past 12 hour(s))   CBC WITH AUTOMATED DIFF    Collection Time: 03/29/17  3:03 PM   Result Value Ref Range    WBC 12.8 (H) 3.6 - 11.0 K/uL    RBC 3.85 3.80 - 5.20 M/uL    HGB 10.8 (L) 11.5 - 16.0 g/dL    HCT 34.8 (L) 35.0 - 47.0 %    MCV 90.4 80.0 - 99.0 FL    MCH 28.1 26.0 - 34.0 PG    MCHC 31.0 30.0 - 36.5 g/dL    RDW 15.2 (H) 11.5 - 14.5 %    PLATELET 041 135 - 650 K/uL    NEUTROPHILS 72 32 - 75 %    LYMPHOCYTES 15 12 - 49 %    MONOCYTES 10 5 - 13 %    EOSINOPHILS 2 0 - 7 %    BASOPHILS 1 0 - 1 %    ABS. NEUTROPHILS 9.3 (H) 1.8 - 8.0 K/UL    ABS. LYMPHOCYTES 1.9 0.8 - 3.5 K/UL    ABS. MONOCYTES 1.3 (H) 0.0 - 1.0 K/UL    ABS. EOSINOPHILS 0.3 0.0 - 0.4 K/UL    ABS.  BASOPHILS 0.1 0.0 - 0.1 K/UL   METABOLIC PANEL, COMPREHENSIVE    Collection Time: 03/29/17  3:03 PM   Result Value Ref Range    Sodium 141 136 - 145 mmol/L    Potassium 5.7 (H) 3.5 - 5.1 mmol/L    Chloride 107 97 - 108 mmol/L    CO2 26 21 - 32 mmol/L    Anion gap 8 5 - 15 mmol/L    Glucose 105 (H) 65 - 100 mg/dL    BUN 73 (H) 6 - 20 MG/DL    Creatinine 5.71 (H) 0.55 - 1.02 MG/DL    BUN/Creatinine ratio 13 12 - 20      GFR est AA 9 (L) >60 ml/min/1.73m2    GFR est non-AA 7 (L) >60 ml/min/1.73m2    Calcium 10.5 (H) 8.5 - 10.1 MG/DL    Bilirubin, total 0.3 0.2 - 1.0 MG/DL    ALT (SGPT) 14 12 - 78 U/L    AST (SGOT) 12 (L) 15 - 37 U/L    Alk. phosphatase 63 45 - 117 U/L    Protein, total 7.4 6.4 - 8.2 g/dL    Albumin 3.1 (L) 3.5 - 5.0 g/dL    Globulin 4.3 (H) 2.0 - 4.0 g/dL    A-G Ratio 0.7 (L) 1.1 - 2.2     EKG, 12 LEAD, INITIAL    Collection Time: 03/29/17  4:12 PM   Result Value Ref Range    Ventricular Rate 67 BPM    Atrial Rate 67 BPM    P-R Interval 160 ms    QRS Duration 94 ms    Q-T Interval 380 ms    QTC Calculation (Bezet) 401 ms    Calculated P Axis 56 degrees    Calculated R Axis 9 degrees    Calculated T Axis 45 degrees    Diagnosis       Normal sinus rhythm  Possible Left atrial enlargement  Left ventricular hypertrophy  Abnormal ECG  When compared with ECG of 04-MAR-2016 15:26,  Non-specific change in ST segment in Lateral leads  Nonspecific T wave abnormality no longer evident in Lateral leads  QT has shortened         IMAGING RESULTS:  CT Results  (Last 48 hours)               03/29/17 1523  CT HEAD WO CONT Final result    Impression:  IMPRESSION: No acute intracranial abnormality. Narrative:  HEAD CT WITHOUT CONTRAST: 3/29/2017 3:23 PM       INDICATION: Abnormal gait (ataxia). HISTORY (per electronic medical record): Dizziness, difficulty with word   finding. Gait problems. Memory problem. Symptoms began yesterday and improved,   then returned today. COMPARISON: 1/21/2016, 10/7/2015.        PROCEDURE: Axial images of the head were obtained without contrast. Coronal and   sagittal reformats were performed. CT dose reduction was achieved through use of   a standardized protocol tailored for this examination and automatic exposure   control for dose modulation. FINDINGS: The ventricles and sulci are appropriate in size and configuration for   age. No loss of gray-white differentiation to suggest late acute or early   subacute infarction. No mass effect or intracranial hemorrhage. MEDICATIONS GIVEN:  Medications   calcium gluconate injection 1 g (not administered)   dextrose (D50W) injection syrg 25 g (not administered)   insulin regular (NOVOLIN R, HUMULIN R) injection 10 Units (not administered)   sodium chloride 0.9 % bolus infusion 1,000 mL (1,000 mL IntraVENous New Bag 3/29/17 9232)       IMPRESSION:  1. Acute kidney injury ([address]    2. Acute hyperkalemia    3. Expressive dysphasia    4. Dizziness        PLAN:  1. Admit to hospitalist  2. Calcium, dextrose, insulin, fluids to decrease potassium levels    4:42 PM  Patient is being admitted to the hospital by Dr. Sabino Perez. The results of their tests and reasons for their admission have been discussed with the patient and/or available family. They convey agreement and understanding for the need to be admitted and for their admission diagnosis. This note is prepared by Solange Colvin acting as Scribe for Mike Gloria MD.    Mike Gloria MD: The Scribe's documentation has been prepared under my direction and personally reviewed by me in its entirety. I confirm that the note above accurately reflects all work, treatment, procedures, and medical decision making performed by me.

## 2017-03-30 ENCOUNTER — APPOINTMENT (OUTPATIENT)
Dept: MRI IMAGING | Age: 72
DRG: 057 | End: 2017-03-30
Attending: FAMILY MEDICINE
Payer: MEDICARE

## 2017-03-30 PROBLEM — R56.9 CONVULSION DISORDER (HCC): Status: ACTIVE | Noted: 2017-03-30

## 2017-03-30 PROBLEM — R41.82 ALTERED MENTAL STATUS, UNSPECIFIED: Status: ACTIVE | Noted: 2017-03-30

## 2017-03-30 PROBLEM — R93.0 ABNORMAL MRI OF HEAD: Status: ACTIVE | Noted: 2017-03-30

## 2017-03-30 PROBLEM — N17.9 ACUTE RENAL FAILURE (ARF) (HCC): Status: ACTIVE | Noted: 2017-03-30

## 2017-03-30 PROBLEM — I65.23 STENOSIS OF BOTH INTERNAL CAROTID ARTERIES: Status: ACTIVE | Noted: 2017-03-30

## 2017-03-30 PROBLEM — I67.89 CEREBRAL MICROVASCULAR DISEASE: Status: ACTIVE | Noted: 2017-03-30

## 2017-03-30 PROBLEM — G93.40 ACUTE ENCEPHALOPATHY: Status: ACTIVE | Noted: 2017-03-30

## 2017-03-30 LAB
ANION GAP BLD CALC-SCNC: 11 MMOL/L (ref 5–15)
ATRIAL RATE: 67 BPM
BUN SERPL-MCNC: 71 MG/DL (ref 6–20)
BUN/CREAT SERPL: 14 (ref 12–20)
CALCIUM SERPL-MCNC: 10.2 MG/DL (ref 8.5–10.1)
CALCULATED P AXIS, ECG09: 56 DEGREES
CALCULATED R AXIS, ECG10: 9 DEGREES
CALCULATED T AXIS, ECG11: 45 DEGREES
CHLORIDE SERPL-SCNC: 110 MMOL/L (ref 97–108)
CHOLEST SERPL-MCNC: 116 MG/DL
CO2 SERPL-SCNC: 22 MMOL/L (ref 21–32)
CREAT SERPL-MCNC: 5.01 MG/DL (ref 0.55–1.02)
DIAGNOSIS, 93000: NORMAL
EST. AVERAGE GLUCOSE BLD GHB EST-MCNC: 97 MG/DL
GLUCOSE BLD STRIP.AUTO-MCNC: 131 MG/DL (ref 65–100)
GLUCOSE BLD STRIP.AUTO-MCNC: 94 MG/DL (ref 65–100)
GLUCOSE SERPL-MCNC: 87 MG/DL (ref 65–100)
HBA1C MFR BLD: 5 % (ref 4.2–6.3)
HDLC SERPL-MCNC: 55 MG/DL
HDLC SERPL: 2.1 {RATIO} (ref 0–5)
LDLC SERPL CALC-MCNC: 31.2 MG/DL (ref 0–100)
LIPID PROFILE,FLP: NORMAL
P-R INTERVAL, ECG05: 160 MS
POTASSIUM SERPL-SCNC: 5.2 MMOL/L (ref 3.5–5.1)
Q-T INTERVAL, ECG07: 380 MS
QRS DURATION, ECG06: 94 MS
QTC CALCULATION (BEZET), ECG08: 401 MS
SERVICE CMNT-IMP: ABNORMAL
SERVICE CMNT-IMP: NORMAL
SODIUM SERPL-SCNC: 143 MMOL/L (ref 136–145)
TRIGL SERPL-MCNC: 149 MG/DL (ref ?–150)
TROPONIN I SERPL-MCNC: <0.04 NG/ML
VENTRICULAR RATE, ECG03: 67 BPM
VLDLC SERPL CALC-MCNC: 29.8 MG/DL

## 2017-03-30 PROCEDURE — 74011250637 HC RX REV CODE- 250/637: Performed by: INTERNAL MEDICINE

## 2017-03-30 PROCEDURE — 84484 ASSAY OF TROPONIN QUANT: CPT | Performed by: FAMILY MEDICINE

## 2017-03-30 PROCEDURE — 93306 TTE W/DOPPLER COMPLETE: CPT

## 2017-03-30 PROCEDURE — G8989 SELF CARE D/C STATUS: HCPCS | Performed by: OCCUPATIONAL THERAPIST

## 2017-03-30 PROCEDURE — G8988 SELF CARE GOAL STATUS: HCPCS | Performed by: OCCUPATIONAL THERAPIST

## 2017-03-30 PROCEDURE — 97165 OT EVAL LOW COMPLEX 30 MIN: CPT | Performed by: OCCUPATIONAL THERAPIST

## 2017-03-30 PROCEDURE — 97166 OT EVAL MOD COMPLEX 45 MIN: CPT | Performed by: OCCUPATIONAL THERAPIST

## 2017-03-30 PROCEDURE — 74011250637 HC RX REV CODE- 250/637: Performed by: FAMILY MEDICINE

## 2017-03-30 PROCEDURE — 74011250636 HC RX REV CODE- 250/636: Performed by: FAMILY MEDICINE

## 2017-03-30 PROCEDURE — 97161 PT EVAL LOW COMPLEX 20 MIN: CPT

## 2017-03-30 PROCEDURE — 65660000000 HC RM CCU STEPDOWN

## 2017-03-30 PROCEDURE — 74011250637 HC RX REV CODE- 250/637: Performed by: HOSPITALIST

## 2017-03-30 PROCEDURE — 36415 COLL VENOUS BLD VENIPUNCTURE: CPT | Performed by: FAMILY MEDICINE

## 2017-03-30 PROCEDURE — 74011000258 HC RX REV CODE- 258: Performed by: INTERNAL MEDICINE

## 2017-03-30 PROCEDURE — 80048 BASIC METABOLIC PNL TOTAL CA: CPT | Performed by: FAMILY MEDICINE

## 2017-03-30 PROCEDURE — 97116 GAIT TRAINING THERAPY: CPT

## 2017-03-30 PROCEDURE — 97530 THERAPEUTIC ACTIVITIES: CPT | Performed by: OCCUPATIONAL THERAPIST

## 2017-03-30 PROCEDURE — G8987 SELF CARE CURRENT STATUS: HCPCS | Performed by: OCCUPATIONAL THERAPIST

## 2017-03-30 PROCEDURE — 80061 LIPID PANEL: CPT | Performed by: FAMILY MEDICINE

## 2017-03-30 PROCEDURE — 83036 HEMOGLOBIN GLYCOSYLATED A1C: CPT | Performed by: FAMILY MEDICINE

## 2017-03-30 PROCEDURE — 82962 GLUCOSE BLOOD TEST: CPT

## 2017-03-30 PROCEDURE — 99218 HC RM OBSERVATION: CPT

## 2017-03-30 PROCEDURE — 97535 SELF CARE MNGMENT TRAINING: CPT | Performed by: OCCUPATIONAL THERAPIST

## 2017-03-30 PROCEDURE — 70551 MRI BRAIN STEM W/O DYE: CPT

## 2017-03-30 PROCEDURE — 74011250636 HC RX REV CODE- 250/636: Performed by: INTERNAL MEDICINE

## 2017-03-30 PROCEDURE — 70544 MR ANGIOGRAPHY HEAD W/O DYE: CPT

## 2017-03-30 RX ORDER — ACETAMINOPHEN 325 MG/1
650 TABLET ORAL
Status: DISCONTINUED | OUTPATIENT
Start: 2017-03-30 | End: 2017-03-31 | Stop reason: HOSPADM

## 2017-03-30 RX ORDER — ONDANSETRON 4 MG/1
4 TABLET, ORALLY DISINTEGRATING ORAL
Status: DISCONTINUED | OUTPATIENT
Start: 2017-03-30 | End: 2017-03-30

## 2017-03-30 RX ORDER — ATORVASTATIN CALCIUM 40 MG/1
80 TABLET, FILM COATED ORAL
Status: DISCONTINUED | OUTPATIENT
Start: 2017-03-30 | End: 2017-03-31

## 2017-03-30 RX ORDER — SODIUM POLYSTYRENE SULFONATE 15 G/60ML
15 SUSPENSION ORAL; RECTAL EVERY 6 HOURS
Status: COMPLETED | OUTPATIENT
Start: 2017-03-30 | End: 2017-03-30

## 2017-03-30 RX ORDER — LORAZEPAM 2 MG/ML
1-2 INJECTION INTRAMUSCULAR
Status: DISCONTINUED | OUTPATIENT
Start: 2017-03-30 | End: 2017-03-31 | Stop reason: HOSPADM

## 2017-03-30 RX ADMIN — Medication 800 MG: at 09:09

## 2017-03-30 RX ADMIN — ACETAMINOPHEN 650 MG: 325 TABLET, FILM COATED ORAL at 02:11

## 2017-03-30 RX ADMIN — OXYCODONE HYDROCHLORIDE AND ACETAMINOPHEN 1 TABLET: 5; 325 TABLET ORAL at 15:52

## 2017-03-30 RX ADMIN — HYDRALAZINE HYDROCHLORIDE 10 MG: 20 INJECTION INTRAMUSCULAR; INTRAVENOUS at 15:57

## 2017-03-30 RX ADMIN — Medication 10 ML: at 02:18

## 2017-03-30 RX ADMIN — OXYBUTYNIN CHLORIDE 5 MG: 5 TABLET ORAL at 23:10

## 2017-03-30 RX ADMIN — SODIUM POLYSTYRENE SULFONATE 15 G: 15 SUSPENSION ORAL; RECTAL at 00:46

## 2017-03-30 RX ADMIN — SODIUM BICARBONATE 650 MG: 650 TABLET, ORALLY DISINTEGRATING ORAL at 09:09

## 2017-03-30 RX ADMIN — SODIUM BICARBONATE 650 MG: 650 TABLET, ORALLY DISINTEGRATING ORAL at 23:11

## 2017-03-30 RX ADMIN — SODIUM POLYSTYRENE SULFONATE 15 G: 15 SUSPENSION ORAL; RECTAL at 07:33

## 2017-03-30 RX ADMIN — Medication 800 MG: at 23:11

## 2017-03-30 RX ADMIN — Medication 10 ML: at 15:52

## 2017-03-30 RX ADMIN — GABAPENTIN 100 MG: 100 CAPSULE ORAL at 09:09

## 2017-03-30 RX ADMIN — BUPROPION HYDROCHLORIDE 100 MG: 100 TABLET, FILM COATED ORAL at 09:09

## 2017-03-30 RX ADMIN — Medication 10 ML: at 21:06

## 2017-03-30 RX ADMIN — GABAPENTIN 100 MG: 100 CAPSULE ORAL at 23:11

## 2017-03-30 RX ADMIN — Medication 325 MG: at 09:09

## 2017-03-30 RX ADMIN — BUPROPION HYDROCHLORIDE 100 MG: 100 TABLET, FILM COATED ORAL at 23:11

## 2017-03-30 RX ADMIN — APIXABAN 2.5 MG: 2.5 TABLET, FILM COATED ORAL at 23:10

## 2017-03-30 RX ADMIN — THERA TABS 1 TABLET: TAB at 09:09

## 2017-03-30 RX ADMIN — PROMETHAZINE HYDROCHLORIDE 12.5 MG: 25 INJECTION, SOLUTION INTRAMUSCULAR; INTRAVENOUS at 00:00

## 2017-03-30 RX ADMIN — Medication 10 ML: at 15:57

## 2017-03-30 RX ADMIN — OXYBUTYNIN CHLORIDE 5 MG: 5 TABLET ORAL at 09:09

## 2017-03-30 RX ADMIN — ATORVASTATIN CALCIUM 80 MG: 40 TABLET, FILM COATED ORAL at 23:07

## 2017-03-30 RX ADMIN — ONDANSETRON 4 MG: 4 TABLET, ORALLY DISINTEGRATING ORAL at 16:55

## 2017-03-30 RX ADMIN — Medication 325 MG: at 23:11

## 2017-03-30 RX ADMIN — FAMOTIDINE 20 MG: 20 TABLET ORAL at 09:09

## 2017-03-30 RX ADMIN — OXYCODONE HYDROCHLORIDE AND ACETAMINOPHEN 1 TABLET: 5; 325 TABLET ORAL at 07:33

## 2017-03-30 RX ADMIN — CALCITRIOL 0.5 MCG: 0.25 CAPSULE, LIQUID FILLED ORAL at 23:11

## 2017-03-30 RX ADMIN — CLONIDINE HYDROCHLORIDE 0.1 MG: 0.1 TABLET ORAL at 09:10

## 2017-03-30 RX ADMIN — AMLODIPINE BESYLATE 10 MG: 5 TABLET ORAL at 09:09

## 2017-03-30 RX ADMIN — ASPIRIN 81 MG CHEWABLE TABLET 81 MG: 81 TABLET CHEWABLE at 09:08

## 2017-03-30 NOTE — PROGRESS NOTES
Bedside and Verbal shift change report given to Tay Jacobson RN (oncoming nurse) by BRIE Callejas (offgoing nurse). Report included the following information SBAR, Kardex, ED Summary, Intake/Output, MAR and Recent Results.     Zone Phone: [phone]        Significant changes during shift:  Went to MRI - unable to complete.   Developed N/V this evening.          Patient Information     Jeremy Cortes  66 y.o.  3/29/2017 3:48 PM by Patrick Park MD. Jeremy Cortes was admitted from Home     Problem List           Patient Active Problem List     Diagnosis Date Noted    Bilateral carotid artery stenosis 01/19/2017    Electrolyte abnormality 03/04/2016    ALAYNA (acute kidney injury) ([address] 01/22/2016    Acute on chronic renal failure ([address] 01/22/2016       Class: Acute    Generalized rash 01/22/2016       Class: Present on Admission    Heme positive stool 01/22/2016       Class: Present on Admission    Hypertension, uncontrolled 12/21/2015    Pericarditis with effusion 12/18/2015    Diarrhea 12/17/2015       Class: Present on Admission    Moderate protein-calorie malnutrition ([address] 12/17/2015       Class: Chronic    History of ovarian cancer 12/16/2015       Class: Present on Admission    Pericardial effusion 12/16/2015       Class: Present on Admission    History of pulmonary embolism 11/16/2015    HTN (hypertension) 10/07/2015       Class: Chronic    History of peritonitis 10/07/2015       Class: Present on Admission    Physical debility 10/07/2015       Class: Present on Admission    Chronic anticoagulation 10/07/2015       Class: Chronic    SIRS (systemic inflammatory response syndrome) ([address] 09/14/2015    Anemia 08/21/2015    Acute renal failure with lesion of tubular necrosis ([address] 08/03/2015    Small bowel perforation ([address] 08/01/2015    Protein malnutrition ([address] 07/30/2015    Acute pancreatitis 07/29/2015    E-coli UTI 07/28/2015    Continuous severe abdominal pain 07/28/2015    Enteritis 07/28/2015    [facility] 07/28/2015    Chronic renal insufficiency, stage IV (severe) ([facility] 07/28/2015    Sepsis ([facility] 07/28/2015    Abdominal pain 07/24/2015           Past Medical History:   Diagnosis Date    Chronic kidney disease       Stage 5 (7/18/16 BUN 79, Creatnine 4.53, K 6) Dr. Florina Bashir [phone]    GERD (gastroesophageal reflux disease)       well controlled with Rx     High cholesterol      Hyperkalemia      Hypertension      Hypomagnesemia       on daily Magnesium    Neuropathy       bilateral feet    Ovarian cancer ([facility] 2013     Hysterectomy with chemo, no radiation: Dr. Mick Neville [facility]) 9/2015     blood clot in lung 9/2015    Thromboembolus ([facility] 2004     in kidney \"many years ago\"            Core Measures:     CVA: Yes Yes  CHF:No No  PNA:No No     Post Op Surgical (If Applicable):      Number times ambulated in hallway past shift: none  Number of times OOB to chair past shift: none  NG Tube: No  Incentive Spirometer: No  Drains: No   Dressing Present: No  Flatus: No     Activity Status:     OOB to Chair No  Ambulated this shift Yes   Bed Rest No     DVT prophylaxis:     DVT prophylaxis Med- No  DVT prophylaxis SCD or JORDANA- No      Wounds: (If Applicable)     Wounds- No     Patient Safety:     Falls Score Total Score: 2  Safety Level_______  Bed Alarm On? No  Sitter? No     Plan for upcoming shift:  Will need to complete MRI/MRA tomorrow            Discharge Plan: In progress     Active Consults:  IP CONSULT TO HOSPITALIST  IP CONSULT TO NEUROLOGY

## 2017-03-30 NOTE — PROGRESS NOTES
Stroke Education provided to patient and the following topics were discussed    1. Patients personal risk factors for stroke are hypertension and diabetes mellitus    2. Warning signs of Stroke:        * Sudden numbness or weakness of the face, arm or leg, especially on one side of          The body            * Sudden confusion, trouble speaking or understanding        * Sudden trouble seeing in one or both eyes        * Sudden trouble walking, dizziness, loss of balance or coordination        * Sudden severe headache with no known cause      3. Importance of activation Emergency Medical Services ( 9-1-1 ) immediately if experience any warning signs of stroke. 4. Be sure and schedule a follow-up appointment with your primary care doctor or any specialists as instructed. 5. You must take medicine every day to treat your risk factors for stroke. Be sure to take your medicines exactly as your doctor tells you: no more, no less. Know what your medicines are for , what they do. Anti-thrombotics /anticoagulants can help prevent strokes. You are taking the following medicine(s) Aspirin    6. Smoking and second-hand smoke greatly increase your risk of stroke, cardiovascular disease and death. Smoking history current    7. Information provided was [facility] Stroke Education Binder    8. Documentation of teaching completed in Patient Education Activity and on Care Plan with teaching response noted?   yes

## 2017-03-30 NOTE — CONSULTS
Acute Kidney Insufficiency Consult    Subjective:     Monique Corado is a 66 y.o.  female who has been admitted to the hospital for neurological symptoms suggestive of a TIA. [facility] for acute renal disease as evidenced by increased serum creatinine . The patient has a h/o CRI stage V followed by Dr. Aury eDnise. Her creat has been as high as 5.5 but is usually about 4.5. She does have a h/o hyperkalemia for which she is on chronic Veltassa. Dr. Aury Denise would normally give EPO for anemia, but the pt's gyn-oncologist requested the ANAHI's not be used. So the patient is occasionally given a transfusion. She was given 2 units PRBC's about 2 weeks ago. She has actually been doing relatively well recently. Her Bun/creat in Dr. Alis Vincent office a few weeks ago was 68/4.6 and K 5.5. She has been having headaches recently and came to the hospital feeling \"off\"  --not like herself. Her Bun/creat was 73/5.7 yesterday and is 71/5.0 today. Her K was 5.7 yesterday and 5.2 today.         Patient Active Problem List    Diagnosis Date Noted    Anemia of renal disease 08/21/2015     Priority: 1 - One    Acute renal failure with lesion of tubular necrosis ([address] 08/03/2015     Priority: 1 - One    Chronic renal insufficiency, stage V ([address] 07/28/2015     Priority: 1 - One    Abdominal pain 07/24/2015     Priority: 1 - One    Protein malnutrition ([address] 07/30/2015     Priority: 2 - Two    Acute renal failure (ARF) ([address] 03/30/2017    Bilateral carotid artery stenosis 01/19/2017    Electrolyte abnormality 03/04/2016    ALAYNA (acute kidney injury) ([address] 01/22/2016    Acute on chronic renal failure ([address] 01/22/2016     Class: Acute    Generalized rash 01/22/2016     Class: Present on Admission    Heme positive stool 01/22/2016     Class: Present on Admission    Hypertension, uncontrolled 12/21/2015    Pericarditis with effusion 12/18/2015    Diarrhea 12/17/2015 Class: Present on Admission    Moderate protein-calorie malnutrition ([address] 12/17/2015     Class: Chronic    History of ovarian cancer 12/16/2015     Class: Present on Admission    Pericardial effusion 12/16/2015     Class: Present on Admission    History of pulmonary embolism 11/16/2015    HTN (hypertension) 10/07/2015     Class: Chronic    History of peritonitis 10/07/2015     Class: Present on Admission    Physical debility 10/07/2015     Class: Present on Admission    Chronic anticoagulation 10/07/2015     Class: Chronic    SIRS (systemic inflammatory response syndrome) ([address] 09/14/2015    Small bowel perforation (HCC) 08/01/2015    Acute pancreatitis 07/29/2015    E-coli UTI 07/28/2015    Continuous severe abdominal pain 07/28/2015    Enteritis 07/28/2015    Adrenal hemorrhage ([address] 07/28/2015    Sepsis ([address] 07/28/2015     Past Medical History:   Diagnosis Date    Chronic kidney disease     Stage 5 (7/18/16 BUN 79, Creatnine 4.53, K 6) Dr. Toni Stiles [phone]    GERD (gastroesophageal reflux disease)     well controlled with Rx     High cholesterol     Hyperkalemia     Hypertension     Hypomagnesemia     on daily Magnesium    Neuropathy     bilateral feet    Ovarian cancer ([address] 2013    Hysterectomy with chemo, no radiation:  Dr. Alessandra Styles [facility]) 9/2015    blood clot in lung 9/2015    Thromboembolus ([address] 2004    in kidney \"many years ago\"      Past Surgical History:   Procedure Laterality Date    ABDOMEN SURGERY PROC UNLISTED  7/31/15    Lap exploratory small bowel obstruction  (ICU)    ABDOMEN SURGERY [address] UNLISTED  8/2/15    Abdmonial washout with wound vac (ICU)    ABDOMEN SURGERY PROC UNLISTED  8/18/15    Abdominal washout fascial closure (ICU)    ABDOMEN SURGERY PROC UNLISTED  11/11/15    Lap takedown of ileostomy     COLONOSCOPY N/A 8/1/2016    COL[facility]ip [facility] HX APPENDECTOMY  approx 2005    HX CASI AND BSO  2013    due to ovarian cancer    HX TONSILLECTOMY  age 10     Family History   Problem Relation Age of Onset    Other Mother      peptic ulcer    Alzheimer Father       Social History   Substance Use Topics    Smoking status: Current Every Day Smoker     Packs/day: 0.25     Last attempt to quit: 1/11/2012    Smokeless tobacco: Never Used    Alcohol use Yes      Comment: 2 martini weekly as of 7/26/16      Allergies   Allergen Reactions    Citrus And Derivatives Anaphylaxis     Patient and her  reported    Penicillin G Anaphylaxis    Orange Juice Not Reported This Time    Sulfa (Sulfonamide Antibiotics) Other (comments)     \"Dont remember\"    Vancomycin Hives      Prior to Admission medications    Medication Sig Start Date End Date Taking? Authorizing Provider   calcium-vitamin D (OYSTER SHELL CALCIUM-VIT D3) 500 mg(1,250mg) -200 unit per tablet Take 1 Tab by mouth daily. Yes Historical Provider   RACHELLE acidoph & paracasei- S therm- Bifido (TY-Q/RISAQUAD) 8 billion cell cap cap Take 1 Cap by mouth daily. Yes Historical Provider   oxybutynin (DITROPAN) 5 mg tablet Take 5 mg by mouth two (2) times a day. Yes Historical Provider   gabapentin (NEURONTIN) 100 mg capsule Take 100 mg by mouth two (2) times a day. Yes Historical Provider   calcitRIOL (ROCALTROL) 0.5 mcg capsule Take 0.5 mcg by mouth nightly. Yes Historical Provider   patiromer calcium sorbitex (VELTASSA) 16.8 gram pwpk Take 1 Package by mouth every evening. Takes daily between 8722-4579   Yes Historical Provider   oxyCODONE-acetaminophen (PERCOCET) 5-325 mg per tablet Take 1 Tab by mouth every four (4) hours as needed for Pain. Yes Historical Provider   linaclotide (LINZESS) 290 mcg cap capsule Take 290 mcg by mouth daily as needed. Yes Historical Provider   ondansetron hcl (ZOFRAN) 4 mg tablet TAKE 1 TABLET BY MOUTH EVERY EIGHT (8) HOURS AS NEEDED FOR NAUSEA.  2/10/17  Yes MD VAL Ng 2.5 mg tablet TAKE 1 TABLET BY MOUTH TWO (2) TIMES A DAY. 1/10/17  Yes Wander Bourne MD   diphenoxylate-atropine (LOMOTIL) 2.5-0.025 mg per tablet TAKE 1 TABLET TWICE A DAY IF NEEDED FOR DIARRHEA OR CRAMPING 10/20/16  Yes Wander Bourne MD   cloNIDine HCl (CATAPRES) 0.1 mg tablet TAKE 1 TABLET EVERY 12 HOURS 9/8/16  Yes Wander Bourne MD   buPROPion [facility]) 100 mg tablet Take 100 mg by mouth two (2) times a day. Yes Historical Provider   iron, carbonyl (FEOSOL) 45 mg tab Take 1 Tab by mouth two (2) times a day. Yes Historical Provider   magnesium oxide (MAG-OX) 400 mg tablet Take 800 mg by mouth two (2) times a day. Indications: HYPOMAGNESEMIA   Yes Historical Provider   acetaminophen (TYLENOL) 500 mg tablet Take 1,000 mg by mouth every six (6) hours as needed for Pain. Yes Historical Provider   famotidine (PEPCID) 20 mg tablet TAKE 1 TABLET EVERY DAY 4/18/16  Yes Wander Bourne MD   amLODIPine (NORVASC) 10 mg tablet Take 1 Tab by mouth daily. 4/1/16  Yes Wander Bourne MD   multivitamin (ONE A DAY) tablet Take 1 Tab by mouth every morning. Yes Historical Provider   sodium bicarbonate 650 mg tablet Take 650 mg by mouth two (2) times a day. Yes Jess Teresa MD   atorvastatin (LIPITOR) 40 mg tablet Take 40 mg by mouth nightly.  10/17/14  Yes Jess Teresa MD     Current Facility-Administered Medications   Medication Dose Route Frequency    acetaminophen (TYLENOL) tablet 650 mg  650 mg Oral Q6H PRN    apixaban (ELIQUIS) tablet 2.5 mg  2.5 mg Oral BID    atorvastatin (LIPITOR) tablet 80 mg  80 mg Oral QHS    ondansetron (ZOFRAN ODT) tablet 4 mg  4 mg Oral Q6H PRN    LORazepam (ATIVAN) injection 1-2 mg  1-2 mg IntraVENous Q6H PRN    amLODIPine (NORVASC) tablet 10 mg  10 mg Oral DAILY    buPROPion (WELLBUTRIN) tablet 100 mg  100 mg Oral BID    calcitRIOL (ROCALTROL) capsule 0.5 mcg  0.5 mcg Oral QHS    cloNIDine HCl (CATAPRES) tablet 0.1 mg  0.1 mg Oral BID    famotidine (PEPCID) tablet 20 mg  20 mg Oral DAILY    gabapentin (NEURONTIN) capsule 100 mg  100 mg Oral BID    ferrous sulfate tablet 325 mg  325 mg Oral BID    magnesium oxide (MAG-OX) tablet 800 mg  800 mg Oral BID    therapeutic multivitamin (THERAGRAN) tablet 1 Tab  1 Tab Oral DAILY    oxybutynin (DITROPAN) tablet 5 mg  5 mg Oral BID    oxyCODONE-acetaminophen (PERCOCET) 5-325 mg per tablet 1 Tab  1 Tab Oral Q4H PRN    patiromer calcium sorbitex pwpk 16.8 g  16.8 g Oral DAILY    sodium bicarbonate tablet 650 mg  650 mg Oral BID    sodium chloride (NS) flush 5-10 mL  5-10 mL IntraVENous Q8H    sodium chloride (NS) flush 5-10 mL  5-10 mL IntraVENous PRN    0.9% sodium chloride infusion  75 mL/hr IntraVENous CONTINUOUS    aspirin chewable tablet 81 mg  81 mg Oral DAILY    hydrALAZINE (APRESOLINE) 20 mg/mL injection 10 mg  10 mg IntraVENous Q6H PRN    [START ON 3/31/2017] levoFLOXacin (LEVAQUIN) 500 mg in D5W IVPB  500 mg IntraVENous Q48H       Review of Systems:  Constitutional: negative. Pt has had a good appetite and energy level  Ears, nose, mouth, throat, and face: negative  Respiratory: negative  Cardiovascular: negative  Gastrointestinal: negative  Musculoskeletal:negative  Neurological: positive for headaches, coordination problems and feeling \"off\" --not like herself. Feeling better now. Musculoskeletal: negative.     Objective:     Patient Vitals for the past 8 hrs:   BP Temp Pulse Resp SpO2   17 1553 176/65 98.1 °F (36.7 °C) 76 18 100 %   17 1404 187/65 97.8 °F (36.6 °C) 75 17 100 %      Temp (24hrs), Av.2 °F (36.8 °C), Min:97.8 °F (36.6 °C), Max:98.6 °F (37 °C)    Intake and Output:   1901 -  0700  In: 100 [I.V.:100]  Out: -        Physical Exam:  Visit Vitals    /65 (BP 1 Location: Left arm, BP Patient Position: Sitting)    Pulse 76    Temp 98.1 °F (36.7 °C)    Resp 18    Ht 5' 8\" (1.727 m)    Wt 61.1 kg (134 lb 11.2 oz)    SpO2 100%    BMI 20.48 kg/m2     General appearance: alert, cooperative, no distress  Head: Normocephalic, without obvious abnormality, atraumatic  Neck: supple, symmetrical, trachea midline and no JVD  Lungs: clear to auscultation bilaterally  Heart: regular rate and rhythm; no gallop or rub  Abdomen: soft, non-tender. No masses,  no organomegaly  Extremities: extremities normal, atraumatic, no cyanosis or edema  Skin: Skin color, texture, turgor normal. No rashes or lesions    Data Review:   CBC:   Lab Results   Component Value Date/Time    WBC 12.8 03/29/2017 03:03 PM    RBC 3.85 03/29/2017 03:03 PM    HGB 10.8 03/29/2017 03:03 PM    HCT 34.8 03/29/2017 03:03 PM    PLATELET 516 86/66/4221 03:03 PM   , CMP:   Lab Results   Component Value Date/Time    Glucose 87 03/30/2017 02:27 AM    Sodium 143 03/30/2017 02:27 AM    Potassium 5.2 03/30/2017 02:27 AM    Chloride 110 03/30/2017 02:27 AM    CO2 22 03/30/2017 02:27 AM    BUN 71 03/30/2017 02:27 AM    Creatinine 5.01 03/30/2017 02:27 AM    Calcium 10.2 03/30/2017 02:27 AM    Anion gap 11 03/30/2017 02:27 AM    BUN/Creatinine ratio 14 03/30/2017 02:27 AM    AST (SGOT) 12 03/29/2017 03:03 PM    Alk. phosphatase 63 03/29/2017 03:03 PM    Protein, total 7.4 03/29/2017 03:03 PM    Albumin 3.1 03/29/2017 03:03 PM    Globulin 4.3 03/29/2017 03:03 PM    A-G Ratio 0.7 03/29/2017 03:03 PM       Reviewed: labs    Assessment:     Mild ARF secondary to pre-renal azotemia. Chronic renal insufficiency, stage V.  Baseline creat ~4.5    Hyperkalemia  -- a chronic problem for which the patient is on Veltassa    Anemia  --not on ANAHI's per request of her gyn-onc. She gets periodic transfusions and received 2 units PRBC's about 2 wks ago. H/o thromboembolism  H/o ovarian cancer. S/p surgery and chemo but not XRT. GERD  small bowel obstruction and perforation requiring surgery with temporary ileostomy, MRSA bacteremia, pancreatitis,         Plan:     The patient's renal function is not much worse than her baseline.   Agree with IV fluids but have cut the rate.  Calcium is borderline-elevated, so have d/c'd the calcitriol. Low-K diet. Will recheck labs in AM and adjust the medications as needed. Above d/w the patient and her  at length. Have also d/w Dr. Fina Whitmore. Chart reviewed. Pertinent Notes Reviewed. Medications list Personally Reviewed. Harriet Barros, [address] [place] [address] [facility]  [facility]  [address]  [place], [address] [facility]  Phone - [phone]    Fax - [phone]  [email or web address]                                         [facility]   [address] [address] , [facility]  Phone - [phone]  Fax - [phone]. [employer]

## 2017-03-30 NOTE — PROGRESS NOTES
Bedside and Verbal shift change report given to Louetta Nyhan, RN (oncoming nurse) by Cecil Cummings RN (offgoing nurse). Report included the following information SBAR, Kardex, ED Summary, Intake/Output, MAR and Recent Results. Zone Phone:   [phone]      Significant changes during shift:  Admitted.         Patient Information    Steven Vieyra  66 y.o.  3/29/2017  3:48 PM by Azam Andino MD. Steven Vieyra was admitted from Home    Problem List    Patient Active Problem List    Diagnosis Date Noted    Bilateral carotid artery stenosis 01/19/2017    Electrolyte abnormality 03/04/2016    ALAYNA (acute kidney injury) ([address] 01/22/2016    Acute on chronic renal failure ([address] 01/22/2016     Class: Acute    Generalized rash 01/22/2016     Class: Present on Admission    Heme positive stool 01/22/2016     Class: Present on Admission    Hypertension, uncontrolled 12/21/2015    Pericarditis with effusion 12/18/2015    Diarrhea 12/17/2015     Class: Present on Admission    Moderate protein-calorie malnutrition ([address] 12/17/2015     Class: Chronic    History of ovarian cancer 12/16/2015     Class: Present on Admission    Pericardial effusion 12/16/2015     Class: Present on Admission    History of pulmonary embolism 11/16/2015    HTN (hypertension) 10/07/2015     Class: Chronic    History of peritonitis 10/07/2015     Class: Present on Admission    Physical debility 10/07/2015     Class: Present on Admission    Chronic anticoagulation 10/07/2015     Class: Chronic    SIRS (systemic inflammatory response syndrome) ([address] 09/14/2015    Anemia 08/21/2015    Acute renal failure with lesion of tubular necrosis ([address] 08/03/2015    Small bowel perforation ([address] 08/01/2015    Protein malnutrition ([address] 07/30/2015    Acute pancreatitis 07/29/2015    E-coli UTI 07/28/2015    Continuous severe abdominal pain 07/28/2015    Enteritis 07/28/2015    Adrenal hemorrhage ([address] 07/28/2015    Chronic renal insufficiency, stage IV (severe) ([facility] 07/28/2015    [facility] 07/28/2015    Abdominal pain 07/24/2015     Past Medical History:   Diagnosis Date    Chronic kidney disease     Stage 5 (7/18/16 BUN 79, Creatnine 4.53, K 6) Dr. Robert Gutierrez [phone]    GERD (gastroesophageal reflux disease)     well controlled with Rx     High cholesterol     Hyperkalemia     Hypertension     Hypomagnesemia     on daily Magnesium    Neuropathy     bilateral feet    Ovarian cancer ([facility] 2013    Hysterectomy with chemo, no radiation:  Dr. Felipe Nunez [facility]) 9/2015    blood clot in lung 9/2015    Thromboembolus ([facility] 2004    in kidney \"many years ago\"         Core Measures:    CVA: Yes Yes  CHF:No No  PNA:No No    Post Op Surgical (If Applicable):     Number times ambulated in hallway past shift:  none  Number of times OOB to chair past shift:   none  NG Tube: No  Incentive Spirometer: No  Drains: No    Dressing Present:  No  Flatus:  No    Activity Status:    OOB to Chair No  Ambulated this shift Yes   Bed Rest No    DVT prophylaxis:    DVT prophylaxis Med- No  DVT prophylaxis SCD or JRODANA- No     Wounds: (If Applicable)    Wounds- No    Patient Safety:    Falls Score Total Score: 2  Safety Level_______  Bed Alarm On? No  Sitter?  No    Plan for upcoming shift: 2D echo, MRI brain, neuro consult        Discharge Plan: No     Active Consults:  IP CONSULT TO HOSPITALIST  IP CONSULT TO NEUROLOGY

## 2017-03-30 NOTE — CONSULTS
[address]   [address] [place] [address]       Name:  Laura Lockwood   MR#:  [id number]   :  1945   Account #:  [de-identified]    Date of Consultation:  2017   Date of Adm:  2017       CHIEF COMPLAINT: Confusion. HISTORY OF PRESENT ILLNESS: The patient is a 79-year-old right-  handed  female we were asked to evaluate as a new patient   for episodes of confusion by Dr Tomas Lozano. The patient apparently gets very confused, if   things do not right go right sometimes, ever since she had a septic   episode back in January of last year. The patient said things have   been going pretty bad since her  had foot surgery. She had   some gait problems. Unsteady walking and difficulty walking a straight   line, had slurred speech, garbled speech and just felt weak all over. She felt much worse. She came to the hospital for a further evaluation. Initial head CT scan did not show much. She is admitted to rule out   TIA, rule out dehydration. She has severe renal disease, stage IV, with   a GFR of 10. They thought she might be a little dehydrated and maybe   some electrolyte disorder and dehydration. She had a blood   transfusion last week in addition. Dr. Shama Ellis is her renal doctor. She had no   focal weakness. She feels better now. MRI scan of the brain shows no   acute stroke, but did show questionable thalamic lesion that   recommended with contrast. I called radiologist, talked to them about   the case. They said we could go ahead and do the contrast scan   because it is needed in a case like this. The patient denies headache   now and feels like she is much better. PAST MEDICAL HISTORY: Pertinent that she has a history of chronic   kidney disease. She has gastroesophageal reflux disease, high   cholesterol, hyperkalemia, hypertension, hypomagnesemia,   neuropathy in her feet probably from diabetes. Ovarian cancer.  She has had a hysterectomy with chemo and no radiation. She had a   thromboembolic clot in her leg. She has thromboembolism of the   kidney. She has had a laparoscopic small-bowel obstruction. She has   had a wound infection. She has had laparoscopy and takedown of   ileostomy. She has had colonoscopies in the past. Appendectomy and   total abdominal hysterectomy and bilateral salpingo-oophorectomy for   the ovarian cancer, tonsillectomy. ALLERGIES   1. CITRUS. 2. PENICILLIN G.   3. ORANGE JUICE. 4. SULFA. 5. VANCOMYCIN. SOCIAL HISTORY:  She is , lives with her . The   patient is a current smoker, smokes a 1/4 pack a day. Drinks socially. MEDICATIONS PRIOR TO ADMISSION   1. Tylenol p.r.n.   2. Norvasc 10 mg a day. 3. Lipitor 40 mg a day. 4. Wellbutrin 100 mg twice a day. 5. Rocaltrol 0.5 mcg once a day. 6. Vitamin D and calcium pills twice a day. 7. Catapres 0.1 mg every 12 hours. 8. Lomotil p.r.n. for diarrhea. 9. Eliquis 2.5 mg twice a day. 10. Pepcid one 20 mg tablet every day. 11. Neurontin 100 mg twice a day. 12. Feosol 45 mg twice a day. 13. Probiotics once a day. 14. Linzess once a day. 15. Magnesium oxide 400 mg once a day. 16. Multivitamin once a day. 17. Zofran p.r.n. for nausea. 18. Ditropan 5 mg twice a day. 19. Percocet p.r.n. for pain. 20. Veltassa 16.8 gram pack every evening. 21.  Sodium bicarbonate mg twice a day. FAMILY HISTORY: Pertinent that she says she has no children. She   has no siblings. Her mother had peptic ulcer disease and colonic   perforation and pancreatic insufficiency and . Father had   Alzheimer disease. REVIEW OF SYSTEMS   NEUROLOGIC: She has had this confusion, but no focal deficits. HEAD, EARS, EYES, NOSE AND THROAT: She has no headache, no   vision problems. NECK: She has no neck pain or meningismus. CHEST: She has no cough or shortness of breath. CARDIAC: She has no chest pain or palpitations. ABDOMEN: She has no nausea or vomiting. BOWEL AND BLADDER: She has fairly good control of bowel or   bladder. MUSCULOSKELETAL: She has no unusual swollen joints or tender   muscles. SKIN: She has no skin breakdown or rashes. VASCULAR: She has no claudication symptoms or edema. LYMPHATICS: No lymphadenopathy or tender nodes. SYSTEMIC: No fever, no meningismus. NEUROLOGIC: She has the confusion, altered mental status, but is   doing much better now. She did have some memory impairment. Very   hard of hearing. PHYSICAL EXAMINATION   VITAL SIGNS: Now shows that she has a temperature of 98.1   degrees. Her pulse is 76. Her blood pressure was 176/65. Respirations   were 16. NEUROLOGIC: The patient is awake, alert, oriented to March 30, 2017, and the president, all with a little bit of difficulty. She did have a   slight memory problem. The patient does not appear to be particularly   depressed. She seems to have mild generalized weakness, but no   clear focal weakness, no arm drift, coordination is symmetric, finger-  nose-finger examination unremarkable. Rapid alternating movements   seem to be symmetric in all extremities. Her deep tendon reflexes were   hypoactive, but symmetric in all extremities. Plantar responses are   downgoing, no Babinski signs are present. Sensory examination to   temperature and vibration and pin are all decreased in the feet to about   mid calf level. No Babinski or clonus is present. Double simultaneous   stimulation is intact. Her visual fields are full to confrontation. Her   pupils are equal and reactive to light. Her extraocular motility is intact. Fundi are poorly seen. Hearing is mildly decreased on both sides. No   facial asymmetry is noted. Rest of the cranial nerves look intact. Bulbar   functions are all normal. Neck is supple without bruits. No signs of   meningismus. Temporal arteries are not tender or enlarged.  The   patient not ambulated now because she says she is tired. HEAD, EARS, EYES, NOSE AND THROAT: Shows no significant   lesions. NECK: Supple without bruits. CHEST: Sounds are essentially clear to auscultation. CARDIAC: Shows regular rhythm with soft systolic ejection murmur,   but distant heart sounds. ABDOMEN: Thin, status post surgery, but no masses or tenderness. BOWEL AND BLADDER: Seemed to have good control with no   diapers on. MUSCULOSKELETAL: She is thin, but no swollen or acute tender   joints. SKIN: She has no rashes. No neurocutaneous lesions seen. VASCULAR: Decreased pulses in both feet, but no significant edema. LYMPHATICS: No lymphadenopathy or tender nodes   SYSTEMIC: She is not febrile. No meningismus. NEUROPSYCHIATRIC: She is maybe a little slow on the memory, not   particularly depressed. IMPRESSION: Patient with abnormal MRI scan showing a   questionable lesion in the thalamus. Get MRI with contrast. She has no   acute stroke. Suspect this must be some kind of metabolic thing on top   of stress. Rule out any metastatic tumor, rule out primary tumor, rule   out other caudate lesion. Discussed with the patient and the . They agree to the plans as above. Will give sedation for the MRI scan. Await the results of her multiple medical tests in addition and for other   causes of her confusion. Check an EEG. Check carotid Dopplers. We   will follow closely with you.         MD Nahomy Stack / Chu Ramirez   D:  03/30/2017   16:27   T:  03/30/2017   17:36   Job #:  [id number]

## 2017-03-30 NOTE — PROGRESS NOTES
Problem: Mobility Impaired (Adult and Pediatric)  Goal: *Acute Goals and Plan of Care (Insert Text)  Physical Therapy Goals  Initiated 3/30/2017    1. Patient will perform sit to stand with independence within 7 day(s). 2. Patient will ambulate with modified independence for 200 feet with the least restrictive device within 7 day(s). 3. Patient will ascend/descend 3 stairs with single handrail(s) with supervision/set-up within 7 day(s). 4. Patient will improve Briceño Balance score by 7 points within 7 days. PHYSICAL THERAPY EVALUATION- NEURO POPULATION     Patient: Lucinda Melendez (23 y.o. female)  Date: 3/30/2017  Primary Diagnosis: TIA  Acute renal failure (ARF) ([facility] [address]        Precautions:   Fall      ASSESSMENT :  Based on the objective data described below, the patient presents with impaired balance, impaired gait s/p admission for TIA work-up. Patient received supine in bed and agreeable to therapy. Patient oriented x 4, however reported continued difficulty with word finding and forming sentences. Patient's spouse reported patient remains confused at times. Patient reported history of L sided weakness from prior prolonged hospital and rehab course in 2015. Patient has been ambulatory without assistive device and not needing assistance for cooking, cleaning, driving. Patient currently demonstrated good, equal strength bilaterally, normal tone and coordination. Patient demonstrated impaired gait and balance from baseline. Patient noted to have a cautious gait pattern, decreased arm swing, decreased trunk rotation; no overt LOB. Patient educated on BEFAST and recommendations of outpatient physical therapy to improve balance and gait. Patient will continue to benefit from 1-2 more sessions of PT during acute hospital stay to complete stair training and higher level balance tasks (unable to complete today due to patient being transferred off the floor for MRI/echo).  .     Patient will benefit from skilled intervention to address the above impairments. Patients rehabilitation potential is considered to be Good  Factors which may influence rehabilitation potential include:   [ ]           None noted  [ ]           Mental ability/status  [X]           Medical condition  [ ]           Home/family situation and support systems  [ ]           Safety awareness  [ ]           Pain tolerance/management  [ ]           Other:        PLAN :  Recommendations and Planned Interventions:  [ ]             Bed Mobility Training             [X]      Neuromuscular Re-Education  [ ]             Transfer Training                   [ ]      Orthotic/Prosthetic Training  [X]             Gait Training                         [ ]      Modalities  [X]             Therapeutic Exercises           [ ]      Edema Management/Control  [X]             Therapeutic Activities            [X]      Patient and Family Training/Education  [ ]             Other (comment):  Frequency/Duration: Patient will be followed by physical therapy 1-2 more times to address goals. Discharge Recommendations: Outpatient  Further Equipment Recommendations for Discharge: pt owns all equipment       SUBJECTIVE:   Patient stated I have just been careful walking lately.       OBJECTIVE DATA SUMMARY:   HISTORY:    Past Medical History:   Diagnosis Date    Chronic kidney disease       Stage 5 (7/18/16 BUN 79, Creatnine 4.53, K 6) Dr. Mitzi Vazquez [phone]    GERD (gastroesophageal reflux disease)       well controlled with Rx     High cholesterol      Hyperkalemia      Hypertension      Hypomagnesemia       on daily Magnesium    Neuropathy       bilateral feet    Ovarian cancer ([facility] [address] 2013     Hysterectomy with chemo, no radiation:  Dr. Deric Cazares [facility]) 9/2015     blood clot in lung 9/2015    Thromboembolus ([facility] [address] 2004     in kidney \"many years ago\"     Past Surgical History:   Procedure Laterality Date   [address] [facility] 7/31/15     Lap exploratory small bowel obstruction  (ICU)    ABDOMEN SURGERY PROC UNLISTED   8/2/15     Abdmonial washout with wound vac (ICU)    ABDOMEN SURGERY PROC UNLISTED   8/18/15     Abdominal washout fascial closure (ICU)    ABDOMEN SURGERY PROC UNLISTED   11/11/15     Lap takedown of ileostomy     COLONOSCOPY N/A 8/1/2016     COLONOSCOPY performed by Rachel Davis MD at [address] HX APPENDECTOMY   approx 2005    HX CASI AND BSO   2013     due to ovarian cancer    HX TONSILLECTOMY   age 10     Prior Level of Function/Home Situation: independent, ambulatory without assistive device. Patient lives with spouse in a 2 story home. Patient reports she uses a chair lift to access 2nd floor. Patient has a supportive spouse. Personal factors and/or comorbidities impacting plan of care:      Home Situation  Home Environment: Private residence  # Steps to Enter: 3  Rails to Enter: Yes  Hand Rails : Right  One/Two Story Residence: One story  Living Alone: No  Support Systems: Spouse/Significant Other/Partner  Patient Expects to be Discharged to[de-identified] Private residence  Current DME Used/Available at Home: Elizabeth Amend, straight, Walker, rolling, Lift chair, Raised toilet seat, Walker, rollator, Shower chair, Commode, bedside  Tub or Shower Type: Tub/Shower combination     EXAMINATION/PRESENTATION/DECISION MAKING:   Critical Behavior:  Neurologic State: Alert  Orientation Level: Oriented X4  Cognition: Appropriate decision making, Appropriate for age attention/concentration, Appropriate safety awareness, Follows commands  Safety/Judgement: Insight into deficits, Awareness of environment  Hearing: Auditory  Auditory Impairment: Hard of hearing, bilateral  Skin:  Intact to exposed skin  Edema: none noted  Range Of Motion:  AROM: Within functional limits                       Strength:    Strength:  Within functional limits                    Tone & Sensation:   Tone: Normal              Sensation: Impaired (Neuropathy in B feet)               Coordination:  Coordination: Within functional limits  Vision:   Tracking: Able to track stimulus in all quadrants w/o difficulty  Saccades: Within Defined Limits  Visual Fields:  (WNL)  Diplopia: No  Acuity: Within Defined Limits  Corrective Lenses: Glasses  Functional Mobility:  Bed Mobility:  Rolling: Independent  Supine to Sit: Independent     Scooting: Independent  Transfers:  Sit to Stand: Independent  Stand to Sit: Independent        Bed to Chair: Independent              Balance:   Sitting: Intact  Standing: Intact (standing ADLs, short distance ambulation on level surface)  Ambulation/Gait Training:  Distance (ft): 80 Feet (ft)  Assistive Device: Gait belt  Ambulation - Level of Assistance: Contact guard assistance;Stand-by asssistance        Gait Abnormalities:  (decreased arm swing, cautious gait pattern)        Base of Support: Narrowed     Speed/Rita: Pace decreased (<100 feet/min)  Step Length: Right shortened;Left shortened                               Functional Measure  Briceño Balance Test:      Sitting to Standing: 3  Standing Unsupported: 4  Sitting with Back Unsupported: 4  Standing to Sittin  Transfers: 4  Standing Unsupported with Eyes Closed: 4  Standing Unsupported with Feet Together: 2  Reach Forward with Outstretched Arm: 4   Object: 3  Turn to Look Over Shoulders: 4  Turn 360 Degrees: 4  Alternate Foot on Step/Stool: 3  Standing Unsupported One Foot in Front: 3  Stand on One Leg: 3  Total: 49             56=Maximum possible score;   0-20=High fall risk  21-40=Moderate fall risk   41-56=Low fall risk      Briceño Balance Test and G-code impairment scale:  Percentage of Impairment CH     0%    CI     1-19% CJ     20-39% CK     40-59% CL     60-79% CM     80-99% CN      100%   Briceño   Score 0-56 56 45-55 34-44 23-33 12-22 1-11 0         G codes:   In compliance with CMSs Claims Based Outcome Reporting, the following G-code set was chosen for this patient based on their primary functional limitation being treated: The outcome measure chosen to determine the severity of the functional limitation was the [employer] with a score of 49/56 which was correlated with the impairment scale. · Mobility - Walking and Moving Around:               - CURRENT STATUS:    CI - 1%-19% impaired, limited or restricted               - GOAL STATUS:           CH - 0% impaired, limited or restricted               - D/C STATUS:                       ---------------To be determined---------------       Physical Therapy Evaluation Charge Determination   History Examination Presentation Decision-Making   HIGH Complexity :3+ comorbidities / personal factors will impact the outcome/ POC  HIGH Complexity : 4+ Standardized tests and measures addressing body structure, function, activity limitation and / or participation in recreation  LOW Complexity : Stable, uncomplicated  Other outcome measures Briceño  LOW       Based on the above components, the patient evaluation is determined to be of the following complexity level: LOW      Therapeutic Exercises:      Pain:  Pain Scale 1: Numeric (0 - 10)  Pain Intensity 1: 7  Pain Location 1: Head           Activity Tolerance:   Good  Please refer to the flowsheet for vital signs taken during this treatment. After treatment:   [ ]     Patient left in no apparent distress sitting up in chair  [X]     Patient left in no apparent distress on stretcher with transport  [ ]     Call bell left within reach  [X]     Nursing notified  [ ]     Caregiver present  [ ]     Bed alarm activated      COMMUNICATION/EDUCATION:   The patients plan of care was discussed with: Occupational Therapist and Registered Nurse. Patient was educated regarding Her deficit(s) of impaired cognition/speech as this relates to Her diagnosis of possible TIA. She demonstrated Good understanding as evidenced by verbal read back.      Patient and/or family was verbally educated on the BE FAST acronym for signs/symptoms of CVA and TIA. BE FAST was written on patient's communication board  for visual education and reinforcement. All questions answered with patient indicating good understanding.      [X]  Fall prevention education was provided and the patient/caregiver indicated understanding. [X]  Patient/family have participated as able in goal setting and plan of care. [X]  Patient/family agree to work toward stated goals and plan of care. [ ]  Patient understands intent and goals of therapy, but is neutral about his/her participation. [ ]  Patient is unable to participate in goal setting and plan of care.      Thank you for this referral.  Carolina Deleon, PT, DPT   Time Calculation: 20 mins

## 2017-03-30 NOTE — PROGRESS NOTES
Occupational Therapy EVALUATION/discharge  Patient: Nicole Landaverde (85 y.o. female)  Date: 3/30/2017  Primary Diagnosis: TIA        Precautions:        ASSESSMENT:   Based on the objective data described below, the patient presents signs of expressive aphasias, but otherwise there were no other focal neurological deficits noted with formal testing. She is at her independent baseline for ADLs and functional mobility. PT evaluation may reveal some higher level balance deficits, and the patient will obviously benefit from SLP intervention. Further skilled acute occupational therapy is not indicated at this time.   Discharge Recommendations: None in terms of OT, defer further recommendations to PT and SLP  Further Equipment Recommendations for Discharge: none        OBJECTIVE DATA SUMMARY:   HISTORY:   Past Medical History:   Diagnosis Date    Chronic kidney disease     Stage 5 (7/18/16 BUN 79, Creatnine 4.53, K 6) Dr. Edin Blair [phone]    GERD (gastroesophageal reflux disease)     well controlled with Rx     High cholesterol     Hyperkalemia     Hypertension     Hypomagnesemia     on daily Magnesium    Neuropathy     bilateral feet    Ovarian cancer ([facility] [address] 2013    Hysterectomy with chemo, no radiation:  Dr. Marlon Mccracken [facility]) 9/2015    blood clot in lung 9/2015    Thromboembolus ([facility] [address] 2004    in kidney \"many years ago\"     Past Surgical History:   Procedure Laterality Date    ABDOMEN SURGERY [address] UNLISTED  7/31/15    Lap exploratory small bowel obstruction  (ICU)    ABDOMEN SURGERY [address] UNLISTED  8/2/15    Abdmonial washout with wound vac (ICU)    ABDOMEN SURGERY PROC UNLISTED  8/18/15    Abdominal washout fascial closure (ICU)    ABDOMEN SURGERY PROC UNLISTED  11/11/15    Lap takedown of ileostomy     COLONOSCOPY N/A 8/1/2016    COLONOSCOPY performed by Moshe Gloria MD at [facility] HX APPENDECTOMY  approx 2005    HX CASI AND BSO  2013    due to ovarian cancer  HX TONSILLECTOMY  age 10       Prior Level of Function/Home Situation: Independent with ADLs, IADLs, ambulation and driving. She lives with her  who is presently NWB on 1 foot per MD orders. Expanded or extensive additional review of patient history:     Home Situation  Home Environment: Private residence  One/Two Story Residence: Two story  Living Alone: No  Support Systems: Family member(s), Spouse/Significant Other/Partner  Patient Expects to be Discharged to[de-identified] Private residence  Current DME Used/Available at Home:  (handicapped height toilet)  [x]  Right hand dominant   []  Left hand dominant    EXAMINATION OF PERFORMANCE DEFICITS:  Cognitive/Behavioral Status:  Neurologic State: Alert  Orientation Level: Oriented X4  Cognition: Appropriate decision making; Appropriate for age attention/concentration; Appropriate safety awareness; Follows commands  Perception: Appears intact  Perseveration: No perseveration noted  Safety/Judgement: Insight into deficits; Awareness of environment   Intermittent mild word finding deficit, but demonstrate adequate communication skills for the participation in this evaluation. Hearing: Auditory  Auditory Impairment: Hard of hearing, bilateral    Vision/Perceptual:    Tracking: Able to track stimulus in all quadrants w/o difficulty    Saccades: Within Defined Limits         Visual Fields:  (WNL)  Diplopia: No    Acuity: Within Defined Limits    Corrective Lenses: Glasses    Range of Motion:  AROM: Within functional limits                         Strength:  Strength: Within functional limits                Coordination:  Coordination: Within functional limits  Fine Motor Skills-Upper: Left Intact; Right Intact    Gross Motor Skills-Upper: Left Intact; Right Intact    Tone & Sensation:  Tone: Normal  Sensation: Impaired (Neuropathy in B feet)                      Balance:  Sitting: Intact  Standing: Intact (standing ADLs, short distance ambulation on level surface)    Functional Mobility and Transfers for ADLs:  Bed Mobility:  Rolling: Independent  Supine to Sit: Independent  Scooting: Independent    Transfers:  Sit to Stand: Independent  Stand to Sit: Independent  Bed to Chair: Independent  Toilet Transfer : Modified independent    ADL Assessment:  Feeding: Independent    Oral Facial Hygiene/Grooming: Independent    Bathing: Independent    Upper Body Dressing: Independent    Lower Body Dressing: Independent    Toileting: Independent                ADL Intervention and task modifications:  Extensive stroke and safety education provided. Patient educated on BEFAST acronym for recognition of the stroke signs and symptoms. Patient verbalize full understanding. Functional Measure:  Fugl-Borrego Assessment of Motor Recovery after Stroke:     Reflex Activity  Flexors/Biceps/Fingers: Can be elicited  Extensors/Triceps: Can be elicited  Reflex Subtotal: 4    Volitional Movement Within Synergies  Shoulder Retraction: Full  Shoulder Elevation: Full  Shoulder Abduction (90 degrees): Full  Shoulder External Rotation: Full  Elbow Flexion: Full  Forearm Supination: Full  Shoulder Adduction/Internal Rotation: Full  Elbow Extension: Full  Forearm Pronation: Full  Subtotal: 18    Volitional Movement Mixing Synergies  Hand to Lumbar Spine: Full  Shoulder Flexion (0-90 degrees): Full  Pronation-Supination: Full  Subtotal: 6    Volitional Movement With Little or No Synergy  Shoulder Abduction (0-90 degrees): Full  Shoulder Flexion ( degrees): Full  Pronation/Supination: Full  Subtotal : 6    Normal Reflex Activity  Biceps, Triceps, Finger Flexors:  Full  Subtotal : 2    Upper Extremity Total   Upper Extremity Total: 36    Wrist  Stability at 15 Degree Dorsiflexion: Full  Repeated Dorsiflexion/ Volar Flexion: Full  Stability at 15 Degree Dorsiflexion: Full  Repeated Dorsiflexion/ Volar Flexion: Full  Circumduction: Full  Wrist Total: 10    Hand  Mass Flexion: Full  Mass Extension: Full  Grasp A: Full  Grasp B: Full  Grasp C: Full  Grasp D: Full  Grasp E: Full  Hand Total: 14    Coordination/Speed  Tremor: None  Dysmetria: None  Time: <1s  Coordination/Speed Total : 6    Total A-D  Total A-D (Motor Function): 66/66       Percentage of impairment CH  0% CI  1-19% CJ  20-39% CK  40-59% CL  60-79% CM  80-99% CN  100%   Fugl-Borrego score: 0-66 66 53-65 39-52 26-38 13-25 1-12   0      This is a reliable/valid measure of arm function after a neurological event. It has established value to characterize functional status and for measuring spontaneous and therapy-induced recovery; tests proximal and distal motor functions. Fugl-Borrego Assessment  UE scores recorded between five and 30 days post neurologic event can be used to predict UE recovery at six months post neurologic event. Severe = 0-21 points   Moderately Severe = 22-33 points   Moderate = 34-47 points   Mild = 48-66 points  MORGAN Yi, ELIZABETH Verdin, & PHILIP Hernandez (1992). Measurement of motor recovery after stroke: Outcome assessment and sample size requirements. Stroke, 23, pp. 6361-8656.   --------------------------------------------------------------------------------------------------------------------------------------------------------------------  MCID:  Stroke:   Kellen Thompson, 2001; n = 171; mean age 79 (5) years; assessed within 16 (12) days of stroke, Acute Stroke)  FMA Motor Scores from Admission to Discharge   10 point increase in FMA Upper Extremity = 1.5 change in discharge FIM   10 point increase in FMA Lower Extremity = 1.9 change in discharge FIM  MDC:   Stroke:   Anali Sarah et al, 2008, n = 14, mean age = 59.9 (14.6) years, assessed on average 14 (6.5) months post stroke, Chronic Stroke)   FMA = 5.2 points for the Upper Extremity portion of the assessment       G codes:   In compliance with CMSs Claims Based Outcome Reporting, the following G-code set was chosen for this patient based on their primary functional limitation being treated: The outcome measure chosen to determine the severity of the functional limitation was the Fugl-Borrego with a score of 66/66 which was correlated with the impairment scale. ? Self Care:     - CURRENT STATUS: CH - 0% impaired, limited or restricted    - GOAL STATUS: CH - 0% impaired, limited or restricted    - D/C STATUS:  CH - 0% impaired, limited or restricted     Occupational Therapy Evaluation Charge Determination   History Examination Decision-Making   MEDIUM Complexity : Expanded review of history including physical, cognitive and psychosocial  history  MEDIUM Complexity : 3-5 performance deficits relating to physical, cognitive , or psychosocial skils that result in activity limitations and / or participation restrictions MEDIUM Complexity : Patient may present with comorbidities that affect occupational performnce. Miniml to moderate modification of tasks or assistance (eg, physical or verbal ) with assesment(s) is necessary to enable patient to complete evaluation       Based on the above components, the patient evaluation is determined to be of the following complexity level: MEDIUM  Pain:  Pain Scale 1: Numeric (0 - 10)  Pain Intensity 1: 7  Pain Location 1: Head         After treatment:   [x]  Patient left in no apparent distress sitting up in chair  []  Patient left in no apparent distress in bed  [x]  Call bell left within reach  [x]  Nursing notified  []  Caregiver present  []  Bed alarm activated    COMMUNICATION/EDUCATION:   Communication/Collaboration:  [x]      Home safety education was provided and the patient/caregiver indicated understanding. [x]      Patient/family have participated as able and agree with findings and recommendations. []      Patient is unable to participate in plan of care at this time.   Findings and recommendations were discussed with: Registered Nurse    JORGE De León/L  Time Calculation: 43 mins

## 2017-03-30 NOTE — PROGRESS NOTES
Pt is a 65 y/o White woman who was admitted to the hospital for dizziness. Pt lives with her  in a two story home. She owns a walker, rollator, and crutches and she has an electric chair lift on her stair case at home. She normally drives herself, although she reported almost falling asleep while driving recently. Pt can also get rides to appts from her daughter or next door neighbor. Pt's daughter will transport her at discharge. Pt is not recommended for OT or SLP at discharge. Pt expressed anxiety about her  being home alone, because he recently had surgery on his foot. She has also had some on going health problems after a long hospital stay in 2015. Pt's daughter lives nearby, and pt identified her as her only support in the area. She can ask her for help,but worries about interrupting her work schedule. MSW intern ask pt if she would like a referral to the [employer] program to learn about how to get other resources for support in the community. The pt stated that she would think about it and let MSW intern know if she would like to use the resource. 2:05 pm    PT referred pt for out patient therapy at discharge. MSW intern spoke with pt and she would like to work with Jo. Pt will set her own appt, because she is uncertain about her schedule at this point. Care Management Interventions  PCP Verified by CM: Yes  Mode of Transport at Discharge:  Other (see comment) (Pt's daughter will transport at discharge)  Physical Therapy Consult: Yes  Occupational Therapy Consult: Yes  Speech Therapy Consult: Yes  Current Support Network: Lives with Spouse (Pt lives with her  in a two story house with 14 stpes inside; pt has a chair lift, a walker, rollator, crutches; pt's preferred pharmacy is [facility])  Confirm Follow Up Transport: Self (Pt drives herself and can get rides from her daughter or neighbor)    Daniel Banks MSW Intern    Judd Orta, MSW  [facility] Instructor  Ext [phone]

## 2017-03-30 NOTE — PROGRESS NOTES
Pharmacy Monitoring for Apixaban    Pharmacy review for this 66 y.o. female ordered Apixaban for Hx of DVT/PE in 2015? requiring chronic prevention  Major Interacting Medications: none  Platelet Inhibitors: ASA 81 daily    Recent Labs      03/30/17   0227  03/29/17   1503   HGB   --   10.8*   PLT   --   208   ALB   --   3.1*   SGOT   --   12*   TBILI   --   0.3   CREA  5.01*  5.71*   BUN  71*  73*       Impression/Plan: Consulted to restart and dose apixaban. Patient came from home on apixaban 2.5mg PO BID. Cont Apixaban 2.5mg PO BID .          Thanks    Emanuel Ricks, PHARMD

## 2017-03-30 NOTE — PROGRESS NOTES
TRANSFER - IN REPORT:    Verbal report received from Tamara Montenegro, [facility] (name) on Steven List  being received from ED(unit) for routine progression of care      Report consisted of patients Situation, Background, Assessment and   Recommendations(SBAR). Information from the following report(s) SBAR, Kardex, ED Summary, Intake/Output, MAR and Recent Results was reviewed with the receiving nurse. Opportunity for questions and clarification was provided. Assessment completed upon patients arrival to unit and care assumed.

## 2017-03-30 NOTE — PROGRESS NOTES
Hospitalist Progress Note    NAME: Monique Corado   :  1945   MRN:  [id number]   ASSESSMENT AND PLAN   1. Transient ischemic attack. MRI, MRA of the brain ordered. Cont aspirin (not on this at home). Continue statin. Physical therapy,   occupational therapy, speech consult, neurology consult       echocardiogram has been ordered as well as hemoglobin   A1c and cardiac enzymes. Head CT neg in ED. 2. Acute on chronic renal failure . The patient appears to be heading   towards dialysis. Nephrology consultation requested by me this AM. cont  very gentle intravenous hydration, closely trend creatinine. Renally   dose all medications. The patient is on Eliquis at this time, but her GFR   is extremely low,  Ask pharmacy to dose. On sodium bicarb at home and here. CT abd/pelvis:1. Bilateral nephroureteral stents with new right-sided hydronephrosis 2. Gallstones. No pericholecystic inflammatory change  3. hyperkalemia. The patient was treated with insulin, D50, calcium gluconate in   the emergency room. s/p Kayexalate and repeat potassium better. 4. Hypertension  p.r.n. medication. Continue to monitor. On norvasc and catapres. 5. Hyperlipidemia - increase statin     FULL CODE    Subjective: she is complaining of nausea after drinking nadia excelate     CHIEF COMPLAINT: Dizziness.     HISTORY OF PRESENT ILLNESS: The patient is a 76-year-old   female with past medical history of chronic kidney disease, history of   hypertension, GERD, and thromboembolism, who presents to the   hospital with the above mentioned symptoms. The patient reports that   her symptoms started this morning. She went to bed and was okay. This morning got up and started having some dizziness and felt like   \"she was going to pass out. \" The  who was present at the   bedside also reports that the patient was \"stumbling\".  The patient also   reports that she had some trouble speaking and felt as if \"the words   were stuck in my head\" and reports that she could not \"make proper   sentences\". She also had some slurred speech and garbled speech   and felt weak all over and reports that the symptoms has been getting   worse since then. The patient reports that she had some minimal   symptoms starting yesterday, but today morning, symptoms got much   worse and she knew that she had to come to the hospital. The patient   reports that she had similar symptoms in the past secondary to   electrolyte abnormalities and feels that it is the same thing. Regardless, the patient reports that she has some chronic left lower   extremity weakness after being diagnosed with sepsis in 2015. The   patient reports that last week her hemoglobin was low secondary to   chronic kidney disease, and she was given transfusion. She also   reports that she is currently not on dialysis. The patient reports that she   gets some chest pain on and off, especially after waking up from sleep,   but there is no shortness of breath associated with the same. The   patient also reports that there is no exertional chest pain or dyspnea,   no radiation to the chest pain, or no other exacerbating or alleviating   factors. The patient denies any headache, blurry vision, sore throat,   trouble swallowing, trouble with speech currently. Denies any shortness   of breath, cough, fever, chills, abdominal pain, constipation, diarrhea,   urinary symptoms, focal or generalized neurological weakness, recent   travels or sick contacts. Review of Systems:  Symptom Y/N Comments  Symptom Y/N Comments   Fever/Chills n   Chest Pain n    Poor Appetite    Edema     Cough    Abdominal Pain     Sputum    Joint Pain     SOB/MONTANEZ n   Pruritis/Rash     Nausea/vomit y   Tolerating PT/OT     Diarrhea    Tolerating Diet     Constipation    Other       Could NOT obtain due to:      Objective:     VITALS:   Last 24hrs VS reviewed since prior progress note.  Most recent are:  Patient Vitals for the past 24 hrs:   Temp Pulse Resp BP SpO2   03/30/17 0907 - - - 180/80 -   03/30/17 0739 98.6 °F (37 °C) 78 18 198/80 100 %   03/30/17 0400 98.4 °F (36.9 °C) 78 18 134/48 100 %   03/30/17 0044 98.2 °F (36.8 °C) 76 18 160/79 100 %   03/29/17 2053 - - - 196/80 -   03/29/17 2038 97.8 °F (36.6 °C) 71 18 (!) 213/91 100 %   03/29/17 1930 - 74 17 163/56 98 %   03/29/17 1830 - 87 18 174/51 95 %   03/29/17 1800 - (!) 101 22 185/55 96 %   03/29/17 1453 98.5 °F (36.9 °C) 85 20 171/84 98 %       Intake/Output Summary (Last 24 hours) at 03/30/17 0925  Last data filed at 03/29/17 1934   Gross per 24 hour   Intake              100 ml   Output                0 ml   Net              100 ml        PHYSICAL EXAM:  General: WD, WN. Alert, cooperative, no acute distress    EENT:  EOMI. Anicteric sclerae. MMM  Resp:  CTA bilaterally, no wheezing or rales. No accessory muscle use  CV:  Regular  rhythm,  No edema  GI:  Soft, Non distended, Non tender.  +Bowel sounds  Neurologic:  Alert and oriented X 3, normal speech, symmetric facial muscles  Psych:   Good insight. Not anxious nor agitated  Skin:     No jaundice    Reviewed most current lab test results and cultures  YES  Reviewed most current radiology test results   YES  Review and summation of old records today    NO  Reviewed patient's current orders and MAR    YES  PMH/ reviewed - no change compared to H&P  ________________________________________________________________________  Care Plan discussed with:    Comments   Patient x    Family      RN     Care Manager     Consultant                        Multidiciplinary team rounds were held today with , nursing, pharmacist and clinical coordinator. Patient's plan of care was discussed; medications were reviewed and discharge planning was addressed.      ________________________________________________________________________  Total NON critical care TIME:     Minutes    Total CRITICAL CARE TIME Spent:   Minutes non procedure based      Comments   >50% of visit spent in counseling and coordination of care     ________________________________________________________________________  Prince Lin MD     Procedures: see electronic medical records for all procedures/Xrays and details which were not copied into this note but were reviewed prior to creation of Plan. LABS:  I reviewed today's most current labs and imaging studies.   Pertinent labs include:  Recent Labs      03/29/17   1503   WBC  12.8*   HGB  10.8*   HCT  34.8*   PLT  208     Recent Labs      03/30/17   0227  03/29/17   1503   NA  143  141   K  5.2*  5.7*   CL  110*  107   CO2  22  26   GLU  87  105*   BUN  71*  73*   CREA  5.01*  5.71*   CA  10.2*  10.5*   MG   --   3.2*   ALB   --   3.1*   TBILI   --   0.3   SGOT   --   12*   ALT   --   14       Signed: Prince Lin MD

## 2017-03-30 NOTE — PROGRESS NOTES
Speech pathology  Orders received, chart reviewed and note patient arrived to ED with complaints of garbled speech and difficulty getting her words out. Patient with clear, fluent speech this morning. She was able to carry on a very lengthy conversation without word retrieval deficits. Patient passed her STAND and has been tolerating regular solids/ thin liquids. She reports baseline cognitive deficits since hospitalization in 2015. She went to [facility] at that time and received speech therapy to address those deficits. Formal speech and or swallow evaluation not indicated at this time. Will complete ST orders.    Thanks,   Elzbieta Jewell M.S. CCC-SLP  Time spent with patient: 11 minutes

## 2017-03-30 NOTE — INTERDISCIPLINARY ROUNDS
[facility] Telemetry Unit Interdisciplinary rounds were held today to discuss patient plan of care and outcomes. The interdisciplinary team collaborated to facilitate discharge planning needs. The following members were present; Nurse Manager, Magno Bird [id number], Pharmacist, Primary Nurse, , Physical Therapy,   Physician, and Case Management. PLAN:  MRi and other testing today, PT saw and eval done.  No recommendations at this

## 2017-03-31 ENCOUNTER — APPOINTMENT (OUTPATIENT)
Dept: MRI IMAGING | Age: 72
DRG: 057 | End: 2017-03-31
Attending: PSYCHIATRY & NEUROLOGY
Payer: MEDICARE

## 2017-03-31 VITALS
DIASTOLIC BLOOD PRESSURE: 63 MMHG | SYSTOLIC BLOOD PRESSURE: 155 MMHG | WEIGHT: 134.7 LBS | HEART RATE: 67 BPM | OXYGEN SATURATION: 100 % | TEMPERATURE: 97.9 F | HEIGHT: 68 IN | RESPIRATION RATE: 18 BRPM | BODY MASS INDEX: 20.41 KG/M2

## 2017-03-31 DIAGNOSIS — G30.1 DEMENTIA OF THE ALZHEIMER'S TYPE WITH LATE ONSET WITHOUT BEHAVIORAL DISTURBANCE (HCC): Primary | ICD-10-CM

## 2017-03-31 DIAGNOSIS — F02.80 DEMENTIA OF THE ALZHEIMER'S TYPE WITH LATE ONSET WITHOUT BEHAVIORAL DISTURBANCE (HCC): Primary | ICD-10-CM

## 2017-03-31 PROBLEM — I65.22 STENOSIS OF LEFT CAROTID ARTERY WITHOUT CEREBRAL INFARCTION: Status: ACTIVE | Noted: 2017-03-31

## 2017-03-31 LAB
ANION GAP BLD CALC-SCNC: 11 MMOL/L (ref 5–15)
BASOPHILS # BLD AUTO: 0.1 K/UL (ref 0–0.1)
BASOPHILS # BLD: 1 % (ref 0–1)
BUN SERPL-MCNC: 57 MG/DL (ref 6–20)
BUN/CREAT SERPL: 12 (ref 12–20)
CALCIUM SERPL-MCNC: 10.1 MG/DL (ref 8.5–10.1)
CHLORIDE SERPL-SCNC: 112 MMOL/L (ref 97–108)
CO2 SERPL-SCNC: 21 MMOL/L (ref 21–32)
CREAT SERPL-MCNC: 4.61 MG/DL (ref 0.55–1.02)
EOSINOPHIL # BLD: 0.2 K/UL (ref 0–0.4)
EOSINOPHIL NFR BLD: 2 % (ref 0–7)
ERYTHROCYTE [DISTWIDTH] IN BLOOD BY AUTOMATED COUNT: 15.3 % (ref 11.5–14.5)
GLUCOSE BLD STRIP.AUTO-MCNC: 111 MG/DL (ref 65–100)
GLUCOSE BLD STRIP.AUTO-MCNC: 116 MG/DL (ref 65–100)
GLUCOSE BLD STRIP.AUTO-MCNC: 93 MG/DL (ref 65–100)
GLUCOSE SERPL-MCNC: 87 MG/DL (ref 65–100)
HCT VFR BLD AUTO: 33.1 % (ref 35–47)
HGB BLD-MCNC: 10.2 G/DL (ref 11.5–16)
LYMPHOCYTES # BLD AUTO: 18 % (ref 12–49)
LYMPHOCYTES # BLD: 1.8 K/UL (ref 0.8–3.5)
MAGNESIUM SERPL-MCNC: 2.9 MG/DL (ref 1.6–2.4)
MCH RBC QN AUTO: 27.7 PG (ref 26–34)
MCHC RBC AUTO-ENTMCNC: 30.8 G/DL (ref 30–36.5)
MCV RBC AUTO: 89.9 FL (ref 80–99)
MONOCYTES # BLD: 1.2 K/UL (ref 0–1)
MONOCYTES NFR BLD AUTO: 12 % (ref 5–13)
NEUTS SEG # BLD: 6.9 K/UL (ref 1.8–8)
NEUTS SEG NFR BLD AUTO: 67 % (ref 32–75)
PHOSPHATE SERPL-MCNC: 4.3 MG/DL (ref 2.6–4.7)
PLATELET # BLD AUTO: 200 K/UL (ref 150–400)
POTASSIUM SERPL-SCNC: 4.2 MMOL/L (ref 3.5–5.1)
RBC # BLD AUTO: 3.68 M/UL (ref 3.8–5.2)
SERVICE CMNT-IMP: ABNORMAL
SERVICE CMNT-IMP: ABNORMAL
SERVICE CMNT-IMP: NORMAL
SODIUM SERPL-SCNC: 144 MMOL/L (ref 136–145)
TROPONIN I SERPL-MCNC: <0.04 NG/ML
WBC # BLD AUTO: 10.1 K/UL (ref 3.6–11)

## 2017-03-31 PROCEDURE — 74011000258 HC RX REV CODE- 258: Performed by: INTERNAL MEDICINE

## 2017-03-31 PROCEDURE — 74011250637 HC RX REV CODE- 250/637: Performed by: FAMILY MEDICINE

## 2017-03-31 PROCEDURE — 83735 ASSAY OF MAGNESIUM: CPT | Performed by: INTERNAL MEDICINE

## 2017-03-31 PROCEDURE — 84100 ASSAY OF PHOSPHORUS: CPT | Performed by: INTERNAL MEDICINE

## 2017-03-31 PROCEDURE — 74011250636 HC RX REV CODE- 250/636: Performed by: FAMILY MEDICINE

## 2017-03-31 PROCEDURE — 74011250637 HC RX REV CODE- 250/637: Performed by: INTERNAL MEDICINE

## 2017-03-31 PROCEDURE — 80048 BASIC METABOLIC PNL TOTAL CA: CPT | Performed by: INTERNAL MEDICINE

## 2017-03-31 PROCEDURE — 84484 ASSAY OF TROPONIN QUANT: CPT | Performed by: INTERNAL MEDICINE

## 2017-03-31 PROCEDURE — 82962 GLUCOSE BLOOD TEST: CPT

## 2017-03-31 PROCEDURE — 74011250636 HC RX REV CODE- 250/636: Performed by: INTERNAL MEDICINE

## 2017-03-31 PROCEDURE — 74011000250 HC RX REV CODE- 250: Performed by: INTERNAL MEDICINE

## 2017-03-31 PROCEDURE — 36415 COLL VENOUS BLD VENIPUNCTURE: CPT | Performed by: INTERNAL MEDICINE

## 2017-03-31 PROCEDURE — 85025 COMPLETE CBC W/AUTO DIFF WBC: CPT | Performed by: INTERNAL MEDICINE

## 2017-03-31 RX ORDER — ONDANSETRON 2 MG/ML
4 INJECTION INTRAMUSCULAR; INTRAVENOUS
Status: DISCONTINUED | OUTPATIENT
Start: 2017-03-31 | End: 2017-03-31 | Stop reason: HOSPADM

## 2017-03-31 RX ORDER — CLONIDINE HYDROCHLORIDE 0.1 MG/1
0.2 TABLET ORAL
Status: DISCONTINUED | OUTPATIENT
Start: 2017-03-31 | End: 2017-03-31 | Stop reason: HOSPADM

## 2017-03-31 RX ORDER — CLONIDINE HYDROCHLORIDE 0.1 MG/1
0.2 TABLET ORAL
Status: COMPLETED | OUTPATIENT
Start: 2017-03-31 | End: 2017-03-31

## 2017-03-31 RX ORDER — ATORVASTATIN CALCIUM 20 MG/1
20 TABLET, FILM COATED ORAL
Status: DISCONTINUED | OUTPATIENT
Start: 2017-03-31 | End: 2017-03-31 | Stop reason: HOSPADM

## 2017-03-31 RX ORDER — ATORVASTATIN CALCIUM 20 MG/1
20 TABLET, FILM COATED ORAL
Qty: 30 TAB | Refills: 0 | Status: SHIPPED | OUTPATIENT
Start: 2017-03-31

## 2017-03-31 RX ADMIN — GABAPENTIN 100 MG: 100 CAPSULE ORAL at 08:54

## 2017-03-31 RX ADMIN — FAMOTIDINE 20 MG: 20 TABLET ORAL at 08:52

## 2017-03-31 RX ADMIN — SODIUM BICARBONATE 650 MG: 650 TABLET, ORALLY DISINTEGRATING ORAL at 08:50

## 2017-03-31 RX ADMIN — Medication 800 MG: at 08:51

## 2017-03-31 RX ADMIN — BUPROPION HYDROCHLORIDE 100 MG: 100 TABLET, FILM COATED ORAL at 08:52

## 2017-03-31 RX ADMIN — Medication 10 ML: at 11:45

## 2017-03-31 RX ADMIN — Medication 10 ML: at 16:25

## 2017-03-31 RX ADMIN — Medication 10 ML: at 08:46

## 2017-03-31 RX ADMIN — AMLODIPINE BESYLATE 10 MG: 5 TABLET ORAL at 08:49

## 2017-03-31 RX ADMIN — OXYBUTYNIN CHLORIDE 5 MG: 5 TABLET ORAL at 09:00

## 2017-03-31 RX ADMIN — Medication 325 MG: at 08:49

## 2017-03-31 RX ADMIN — LEVOFLOXACIN 500 MG: 5 INJECTION, SOLUTION INTRAVENOUS at 16:24

## 2017-03-31 RX ADMIN — APIXABAN 2.5 MG: 2.5 TABLET, FILM COATED ORAL at 05:26

## 2017-03-31 RX ADMIN — ONDANSETRON HYDROCHLORIDE 4 MG: 2 INJECTION, SOLUTION INTRAMUSCULAR; INTRAVENOUS at 08:45

## 2017-03-31 RX ADMIN — CLONIDINE HYDROCHLORIDE 0.2 MG: 0.1 TABLET ORAL at 11:41

## 2017-03-31 RX ADMIN — SODIUM BICARBONATE: 84 INJECTION, SOLUTION INTRAVENOUS at 11:40

## 2017-03-31 RX ADMIN — Medication 10 ML: at 05:26

## 2017-03-31 RX ADMIN — THERA TABS 1 TABLET: TAB at 08:52

## 2017-03-31 NOTE — DISCHARGE INSTRUCTIONS
[employer] Activation    Thank you for requesting access to [employer]. Please follow the instructions below to securely access and download your online medical record. [employer] allows you to send messages to your doctor, view your test results, renew your prescriptions, schedule appointments, and more. How Do I Sign Up? 1. In your internet browser, go to [email or web address]  2. Click on the First Time User? Click Here link in the Sign In box. You will be redirect to the New Member Sign Up page. 3. Enter your [employer] Access Code exactly as it appears below. You will not need to use this code after youve completed the sign-up process. If you do not sign up before the expiration date, you must request a new code. [employer] Access Code: Activation code not generated  Current [employer] Status: Active (This is the date your [employer] access code will )    4. Enter the last four digits of your Social Security Number (xxxx) and Date of Birth (mm/dd/yyyy) as indicated and click Submit. You will be taken to the next sign-up page. 5. Create a [employer] ID. This will be your [employer] login ID and cannot be changed, so think of one that is secure and easy to remember. 6. Create a [employer] password. You can change your password at any time. 7. Enter your Password Reset Question and Answer. This can be used at a later time if you forget your password. 8. Enter your e-mail address. You will receive e-mail notification when new information is available in [address] 9. Click Sign Up. You can now view and download portions of your medical record. 10. Click the Download Summary menu link to download a portable copy of your medical information. Additional Information    If you have questions, please visit the Frequently Asked Questions section of the [employer] website at [email or web address] "[email or web address][email or web address] Remember, [employer] is NOT to be used for urgent needs. For medical emergencies, dial 911. HOSPITALIST DISCHARGE INSTRUCTIONS    NAME: Heriberto Carmona   :  1945   MRN:  [id number]     Date/Time:  3/31/2017 3:50 PM    ADMIT DATE: 3/29/2017   DISCHARGE DATE: 3/31/2017     Attending Physician: Brian Llamas MD    DISCHARGE DIAGNOSIS:  ?TIA vs dementia with overlay of stress   acute on chronic renal failure  Hyperkalemia  HTN  hyperlipidemia     MEDICATIONS:  See above    · It is important that you take the medication exactly as they are prescribed. · Keep your medication in the bottles provided by the pharmacist and keep a list of the medication names, dosages, and times to be taken in your wallet. · Do not take other medications without consulting your doctor. Pain Management: per above medications    What to do at Home    Recommended diet:  Cardiac Diet and Renal Diet    Recommended activity: Activity as tolerated    Follow Up: Follow-up Information     Follow up With Details Comments [facility]  You need to call and set up an appointment for out patient physical therapy with [facility]. Bring the Out Patient Referral from signed by your doctor to the appointment. [address] [facility], [address] [facility] [address]  [address] [place] [address] Burak Herr MD Schedule an appointment as soon as possible for a visit in 2 weeks can see another neurologist but you need follow up from hsopital visit Nell [address] Cm Goddard MD Call call and find out when he wants appt with you [address]  [phone]          The neurologist said his office will also set up an outpatient EEG.     If you have questions regarding the hospital related prescriptions or hospital related issues please call [facility] at . You can always direct your questions to your primary care doctor if you are unable to reach your hospital physician; your PCP works as an extension of your hospital doctor just like your hospital doctor is an extension of your PCP for your time at [facility]. If you experience any of the following symptoms then please call your primary care physician or return to the emergency room if you cannot get hold of your doctor:  Fever, chills, nausea, vomiting, diarrhea, change in mentation, falling, bleeding, shortness of breath    Additional Instructions:      Bring these papers with you to your follow up appointments. The papers will help your doctors be sure to continue the care plan from the hospital.              Information obtained by :  I understand that if any problems occur once I am at home I am to contact my physician. I understand and acknowledge receipt of the instructions indicated above.                                                                                                                                            Physician's or R.N.'s Signature                                                                  Date/Time                                                                                                                                              Patient or Representative Signature                                                          Da

## 2017-03-31 NOTE — PROGRESS NOTES
Nurse called on call MD, Dr. Maksim Sprague, related to Patient progressively getting more confused. MD informed the patient received Phenergan. She stated to continue to monitor.

## 2017-03-31 NOTE — CDMP QUERY
2 OF 3:  Dr. De Anda Diver :  Please clarify if this patient is being treated/managed for:    =>Metabolic Encephalopathy poa in setting of dizziness/confusion, acute/chronic renal failure, UA+, hyperkalemia; hypercalcemia, neuro noted maybe metabolic cause with questionable lesion in the thalamus. =>Other Explanation of clinical findings  =>Unable to Determine (no explanation of clinical findings)    The medical record reflects the following clinical findings, treatment, and risk factors:    Risk Factors: age  Clinical Indicators: dizziness/confusion, acute/chronic renal failure, UA+, hyperkalemia; hypercalcemia   Treatment: NaBicarb Gtt, 1 liter bolus NS, Angelito Gluconate iv, Levaquin, Catapres,     Please clarify and document your clinical opinion in the progress notes and discharge summary including the definitive and/or presumptive diagnosis, (suspected or probable), related to the above clinical findings. Please include clinical findings supporting your diagnosis. Thank Kati Latif  [address]; IUN;0862

## 2017-03-31 NOTE — PROGRESS NOTES
Nephrology Progress Note     Patrice Marion       www. [facility]                   Phone - [phone]   Patient: Giselle Loya  YOB: 1945     Date- 3/31/2017     CC: Follow up for arf          Subjective: Interval History:   -   Cr. Improving  k stable  She is off ivf at presetn  bp stable  No c/o sob, fever. No c/o nausea or vomiting  No c/o chest pain  Calcium improving  Corrected calcium still high     Assessment:    ARF secondary to pre-renal azotemia.     Chronic renal insufficiency, stage V. Baseline creat ~4.5     Hyperkalemia -- a chronic problem for which the patient is on Veltassa     Hypercalcemia    Anemia --not on ANAHI's per request of her gyn-onc. She gets periodic transfusions and received 2 units PRBC's about 2 wks ago.     H/o thromboembolism  H/o ovarian cancer. S/p surgery and chemo but not XRT. GERD  small bowel obstruction and perforation requiring surgery with temporary ileostomy, MRSA bacteremia, pancreatitis     Plan:   Continue ivf  Add bicarb to ivf  Continue po bicarb  Stop calcitriol due to hypercalcemia  No dialysis indicated  Daily labs  Add prn clonidine for high bp  Continue norvasc  Lower lipitor dose. Her total cholesterol is 116    Care Plan discussed with: pt and   [] High complexity decision making was performed  [] Patient is at high-risk of decompensation with multiple organ involvement  Review of Systems: Pertinent items are noted in HPI.   Objective:   Vitals:    03/30/17 1928 03/31/17 0014 03/31/17 0412 03/31/17 0739   BP: 186/65 169/68 175/62 167/82   Pulse: 87 90 89 96   Resp: 18 18 18 18   Temp: 97.8 °F (36.6 °C) 98.5 °F (36.9 °C) 98.2 °F (36.8 °C) 98.1 °F (36.7 °C)   SpO2: 100% 100% 100% 100%   Weight:       Height:         No intake or output data in the 24 hours ending 03/31/17 1013  Physical Exam:   GEN: NAD  NECK- Supple, no thyromegaly  RESP: Clear b/l, no wheezing  CVS: RRR,S1,S2   NEURO: non focal, normal speech  SKIN: No Rash  ABDO: soft , non tender, No hepatosplenomegaly  EXT: No Edema     Chart reviewed. Pertinent Notes reviewed.    Medications list  reviewed     Current Facility-Administered Medications   Medication    ondansetron (ZOFRAN) injection 4 mg    acetaminophen (TYLENOL) tablet 650 mg    apixaban (ELIQUIS) tablet 2.5 mg    atorvastatin (LIPITOR) tablet 80 mg    LORazepam (ATIVAN) injection 1-2 mg    amLODIPine (NORVASC) tablet 10 mg    buPROPion (WELLBUTRIN) tablet 100 mg    calcitRIOL (ROCALTROL) capsule 0.5 mcg    famotidine (PEPCID) tablet 20 mg    gabapentin (NEURONTIN) capsule 100 mg    ferrous sulfate tablet 325 mg    magnesium oxide (MAG-OX) tablet 800 mg    therapeutic multivitamin (THERAGRAN) tablet 1 Tab    oxybutynin (DITROPAN) tablet 5 mg    oxyCODONE-acetaminophen (PERCOCET) 5-325 mg per tablet 1 Tab    patiromer calcium sorbitex pwpk 16.8 g    sodium bicarbonate tablet 650 mg    sodium chloride (NS) flush 5-10 mL    sodium chloride (NS) flush 5-10 mL    aspirin chewable tablet 81 mg    hydrALAZINE (APRESOLINE) 20 mg/mL injection 10 mg    levoFLOXacin (LEVAQUIN) 500 mg in D5W IVPB              Data Review :  Recent Labs      03/31/17   0526  03/30/17 0227  03/29/17   1503   NA  144  143  141   K  4.2  5.2*  5.7*   CL  112*  110*  107   CO2  21  22  26   GLU  87  87  105*   BUN  57*  71*  73*   CREA  4.61*  5.01*  5.71*   CA  10.1  10.2*  10.5*   MG   --    --   3.2*   ALB   --    --   3.1*   SGOT   --    --   12*   ALT   --    --   14     Recent Labs      03/31/17   0526  03/29/17   1503   WBC  10.1  12.8*   HGB  10.2*  10.8*   HCT  33.1*  34.8*   PLT  200  208     Lab Results   Component Value Date/Time    Culture result: MRSA NOT PRESENT 03/08/2016 12:17 PM    Culture result:  03/08/2016 12:17 PM         Screening of patient nares for MRSA is for surveillance purposes and, if positive, to facilitate isolation considerations in high risk settings. It is not intended for automatic decolonization interventions per se as regimens are not sufficiently effective to warrant routine use. Culture result: ESCHERICHIA COLI 03/04/2016 09:00 PM    Culture result: NO GROWTH 5 DAYS 03/04/2016 03:20 PM    Culture result:  01/27/2016 05:03 PM     NO ROUTINE ENTERIC PATHOGENS ISOLATED INCLUDING SALMONELLA, SHIGELLA, YERSINIA, VIBRIO OR E. COLI 0157:H7    Culture result: NO COLIFORMS ISOLATED 01/27/2016 05:03 PM    Culture result: MODERATE  GRAM POSITIVE TY ISOLATED   01/27/2016 05:03 PM    Culture result: MODERATE  YEAST   01/27/2016 05:03 PM     No results found for: [facility], [address] [place] [address] [facility]  [facility] SYSTM FRANCISCAN [facility] [address], Padmini Sasha  [place], [address] [facility]  Phone - [phone]         Fax - [phone]  [facility]  [address] [place] [address]  Phone - [phone]        Fax - [phone]    www. [facility] Center[facility]ineticscom

## 2017-03-31 NOTE — PROGRESS NOTES
Patient stated that scheduled Zofran was ineffective. RN notified MD, Dr. Fantasma Lara, and he stated that he would order Phenergan. Nurse will watch for order, give to patient, and continue to monitor patient.

## 2017-03-31 NOTE — PROGRESS NOTES
MSW intern spoke with pt. Pt needs to do another MRI today before she leaves the hospital. MSW intern brought pt information on [employer]. Pt will consider the resource and let MSW intern know if she would like a referral.     11:23 am    Dr. Barr Friday signed the [facility] out patient referral form for pt. MSW intern faxed a copy to [address] [place] [address] (fax: [phone]) and placed a copy of the referral on pt's bedside nursing chart. 11:59 am    MSW intern delivered the signed out patient referral form to pt. Pt will call and schedule her appt and bring the referral when she goes. MSW intern entered the information on pt's AVS.     Care Management Interventions  PCP Verified by CM: Yes  Mode of Transport at Discharge: Other (see comment) (Pt's daughter will transport at discharge)  Transition of Care Consult (CM Consult):  Other (Pt will go home and attend outpatient PT at [place] [address])  Physical Therapy Consult: Yes  Occupational Therapy Consult: Yes  Speech Therapy Consult: Yes  Current Support Network: Lives with Spouse (Pt lives with her  in a two story house with 14 stpes inside; pt has a chair lift, a walker, rollator, crutches; pt's preferred pharmacy is [facility])  Confirm Follow Up Transport: Self (Pt drives herself and can get rides from her daughter or neighbor)  Freedom of Choice Offered: Yes  Discharge Location  Discharge Placement: Home with family assistance (Pt will go home and attend outpatient PT at [place] [address])    Daniel Bernabe MSKAYLEE Andrade  [facility]   Ext [phone]

## 2017-03-31 NOTE — CDMP QUERY
3 of 3:  Dr. Donte Gifford :  Please clarify if this patient is being treated/managed for:    =>UTI POA in setting of cloudy; large blood; large leukocytes esterase ; wbc 20-50; rbc ; bacteria 4+; tx; levaquin. =>Other Explanation of clinical findings  =>Unable to Determine (no explanation of clinical findings)    The medical record reflects the following clinical findings, treatment, and risk factors:    Risk Factors:   Clinical Indicators: ua: cloudy; large blood; large leukocytes esterase ; [id number]; rbc ; bacteria 4+  Treatment: 1 liter NS, Levaquin,     Please clarify and document your clinical opinion in the progress notes and discharge summary including the definitive and/or presumptive diagnosis, (suspected or probable), related to the above clinical findings. Please include clinical findings supporting your diagnosis. Thank Chas Jackson RN [address] [place] [address]; [facility];1378

## 2017-03-31 NOTE — CDMP QUERY
1 OF 3:  Dr. 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 =>Other Explanation of clinical findings  =>Unable to Determine (no explanation of clinical findings)    The medical record reflects the following clinical findings, treatment, and risk factors:    Risk Factors: age, history of ovarian cancer,  Clinical Indicators: neuro noted  'abnormal MRI scan showing a questionable lesion in the thalamus.'  Treatment: MRI/CTs,     Please clarify and document your clinical opinion in the progress notes and discharge summary including the definitive and/or presumptive diagnosis, (suspected or probable), related to the above clinical findings. Please include clinical findings supporting your diagnosis. Thank Kati Latif  [address]; JAMES;6948

## 2017-03-31 NOTE — DISCHARGE SUMMARY
Hospitalist Discharge Summary     Patient ID:  Tyrone Krishnamurthy  [id number]  65 y.o.  1945    PCP on record: Janee Hardy MD    Admit date: 3/29/2017  Discharge date and time: 3/31/2017      DISCHARGE DIAGNOSIS:  ?TIA vs dementia with overlay of stress   acute on chronic renal failure  Hyperkalemia  HTN  hyperlipidemia    CONSULTATIONS:  IP CONSULT TO HOSPITALIST  IP CONSULT TO NEUROLOGY  IP CONSULT TO NEPHROLOGY    Excerpted HPI from H&P of Jared Solomon MD:  The patient is a 65-year-old   female with past medical history of chronic kidney disease, history of   hypertension, GERD, and thromboembolism, who presents to the   hospital with the above mentioned symptoms. The patient reports that   her symptoms started this morning. She went to bed and was okay. This morning got up and started having some dizziness and felt like   \"she was going to pass out. \" The  who was present at the   bedside also reports that the patient was \"stumbling\". The patient also   reports that she had some trouble speaking and felt as if \"the words   were stuck in my head\" and reports that she could not \"make proper   sentences\". She also had some slurred speech and garbled speech   and felt weak all over and reports that the symptoms has been getting   worse since then. The patient reports that she had some minimal   symptoms starting yesterday, but today morning, symptoms got much   worse and she knew that she had to come to the hospital. The patient   reports that she had similar symptoms in the past secondary to   electrolyte abnormalities and feels that it is the same thing. Regardless, the patient reports that she has some chronic left lower   extremity weakness after being diagnosed with sepsis in 2015. The   patient reports that last week her hemoglobin was low secondary to   chronic kidney disease, and she was given transfusion. She also   reports that she is currently not on dialysis.  The patient reports that she   gets some chest pain on and off, especially after waking up from sleep,   but there is no shortness of breath associated with the same. The   patient also reports that there is no exertional chest pain or dyspnea,   no radiation to the chest pain, or no other exacerbating or alleviating   factors. The patient denies any headache, blurry vision, sore throat,   trouble swallowing, trouble with speech currently. Denies any shortness   of breath, cough, fever, chills, abdominal pain, constipation, diarrhea,   urinary symptoms, focal or generalized neurological weakness, recent   travels or sick contacts.        ______________________________________________________________________  DISCHARGE SUMMARY/HOSPITAL COURSE:  for full details see H&P, daily progress notes, labs, consult notes. 1. Transient ischemic attack? No.  Pt has memory loss that is most consistent with early to mild Alzheimer's disease  Pt needs neuropsych testing as outpatient. Was on aspirin (not on this at home) in house as well as statin.   echocardiogram SUMMARY:  Left ventricle: Systolic function was normal. Ejection fraction was  estimated to be 70 %. There were no regional wall motion abnormalities. Head CT neg in ED. 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 Buffy Galdamez 2 months ago, Dr. Buffy Galdamez said no surgery needed  2. Acute on chronic renal failure . The patient appears to be heading   towards dialysis. Nephrology consultation requested. arf thought to be due to pre-renal azotemia  very gentle intravenous hydration. On sodium bicarb at home and here. CT abd/pelvis:1. Bilateral nephroureteral stents with new right-sided hydronephrosis 2. Gallstones. No pericholecystic inflammatory change  3. hyperkalemia. The patient was treated with insulin, D50, calcium gluconate in   the emergency room. s/p Kayexalate and repeat potassium better. Chronically on valtessa      4. Hypertension  p.r.n. medication. Continue to monitor. On norvasc and catapres.     5. Hyperlipidemia - increase statin  Hypercalcemia - stopped calcitriol    MRI, MRA of the brain    IMPRESSION:     No evidence for acute infarction.     Increased soft tissue fullness with nodularity and minimal increased signal in  the inferior and posterior aspect of the right thalamus. Finding is nonspecific. Would recommend postcontrast imaging to exclude low-grade glioma or other  pathology.     Unremarkable MRA of the brain  D/W neuro - pt not to go home on ASA  _______________________________________________________________________  Patient seen and examined by me on discharge day. Pertinent Findings:  General: WD, WN. Alert, cooperative, no acute distress    EENT:  EOMI. Anicteric sclerae. MMM  Resp:  CTA bilaterally, no wheezing or rales. No accessory muscle use  CV:  Regular rhythm,  No edema  GI:  Soft, Non distended, Non tender.  +Bowel sounds  Neurologic:  Alert and oriented X 3, normal speech, symmetric facial muscles and strength 5/5 UE and LE  Psych:   Good insight. Not anxious nor agitated  Skin:   No jaundice  _______________________________________________________________________  DISCHARGE MEDICATIONS:   Current Discharge Medication List      CONTINUE these medications which have CHANGED    Details   atorvastatin (LIPITOR) 20 mg tablet Take 1 Tab by mouth nightly. Qty: 30 Tab, Refills: 0         CONTINUE these medications which have NOT CHANGED    Details   L. acidoph & paracasei- S therm- Bifido (TY-Q/RISAQUAD) 8 billion cell cap cap Take 1 Cap by mouth daily. oxybutynin (DITROPAN) 5 mg tablet Take 5 mg by mouth two (2) times a day.      gabapentin (NEURONTIN) 100 mg capsule Take 100 mg by mouth two (2) times a day. patiromer calcium sorbitex (VELTASSA) 16.8 gram pwpk Take 1 Package by mouth every evening. Takes daily between 5399-7931      oxyCODONE-acetaminophen (PERCOCET) 5-325 mg per tablet Take 1 Tab by mouth every four (4) hours as needed for Pain.      linaclotide (LINZESS) 290 mcg cap capsule Take 290 mcg by mouth daily as needed. ondansetron hcl (ZOFRAN) 4 mg tablet TAKE 1 TABLET BY MOUTH EVERY EIGHT (8) HOURS AS NEEDED FOR NAUSEA. Qty: 30 Tab, Refills: 3    Comments: Generic For:ZOFRAN 4MG      ELIQUIS 2.5 mg tablet TAKE 1 TABLET BY MOUTH TWO (2) TIMES A DAY. Qty: 60 Tab, Refills: 3      diphenoxylate-atropine (LOMOTIL) 2.5-0.025 mg per tablet TAKE 1 TABLET TWICE A DAY IF NEEDED FOR DIARRHEA OR CRAMPING  Qty: 60 Tab, Refills: 5    Comments: Generic For:LOMOTIL 2.5MG      cloNIDine HCl (CATAPRES) 0.1 mg tablet TAKE 1 TABLET EVERY 12 HOURS  Qty: 60 Tab, Refills: 11    Comments: Generic For:CATAPRES 0.1MG   N O T I C E    PRESCRIPTION PREVIOUSLY AUTHORIZED BY DOCTOR:NALINI GREEN [phone]      buPROPion (WELLBUTRIN) 100 mg tablet Take 100 mg by mouth two (2) times a day. iron, carbonyl (FEOSOL) 45 mg tab Take 1 Tab by mouth two (2) times a day. magnesium oxide (MAG-OX) 400 mg tablet Take 800 mg by mouth two (2) times a day. Indications: HYPOMAGNESEMIA      acetaminophen (TYLENOL) 500 mg tablet Take 1,000 mg by mouth every six (6) hours as needed for Pain.      famotidine (PEPCID) 20 mg tablet TAKE 1 TABLET EVERY DAY  Qty: 30 Tab, Refills: 11    Comments: Generic For:PEPCID 20MG      amLODIPine (NORVASC) 10 mg tablet Take 1 Tab by mouth daily. Qty: 30 Tab, Refills: 11      multivitamin (ONE A DAY) tablet Take 1 Tab by mouth every morning.      sodium bicarbonate 650 mg tablet Take 650 mg by mouth two (2) times a day.          STOP taking these medications       calcium-vitamin D (OYSTER SHELL CALCIUM-VIT D3) 500 mg(1,250mg) -200 unit per tablet Comments:   Reason for Stopping:         calcitRIOL (ROCALTROL) 0.5 mcg capsule Comments:   Reason for Stopping:               My Recommended Diet, Activity, Wound Care, and follow-up labs are listed in the patient's Discharge Insturctions which I have personally completed and reviewed. _______________________________________________________________________  DISPOSITION:    Home with Family: x   Home with HH/PT/OT/RN: x   SNF/LTC:    WILLOW:    OTHER:        Condition at Discharge:  Stable  _______________________________________________________________________  Follow up with:   PCP : Rad Grace MD  Follow-up Information     Follow up With Details Comments [facility]  You need to call and set up an appointment for out patient physical therapy with [address]. Bring the Out Patient Referral from signed by your doctor to the appointment.   [address] [facility], [address] [address]  [phone]                Total time in minutes spent coordinating this discharge (includes going over instructions, follow-up, prescriptions, and preparing report for sign off to her PCP) :  34 minutes    Signed:  Brian Llamas MD

## 2017-03-31 NOTE — PROGRESS NOTES
Report given to Fatou Frausto RN at shift change. Patient's condition and treatments discussed at bedside with oncoming nurse. Patient questions were answered. Today goals were discussed and written on the eraser board.

## 2017-04-01 NOTE — PROGRESS NOTES
Neurology Progress Note    Patient ID:  Kellie Cortes  [id number]  17 y.o.  1945      CHIEF COMPLAINT: Dizziness and memory loss    Subjective:      Patient has complaints memory loss for recent memory that is worse at nighttime when she comes into the hospital or when she is under stress. She most likely has mild dementia and the  agrees, we will set up neuropsych testing as an outpatient and probably start cognitive enhancing agents if her neuropsych testing does indeed suggest memory loss in excess of normal aging. 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 No need for MRA of the brain in view of her renal failure. We discussed the condition with the patient and her  at length and they understand and agree with canceling the MRA to help protect her kidneys. They also agreed to neuropsych testing and further evaluation for her memory loss and dementia. Patient stable neurologically and has no recurrent neurological symptoms    No current facility-administered medications for this encounter. Current Outpatient Prescriptions   Medication Sig    atorvastatin (LIPITOR) 20 mg tablet Take 1 Tab by mouth nightly.  L. acidoph & paracasei- S therm- Bifido (TY-Q/RISAQUAD) 8 billion cell cap cap Take 1 Cap by mouth daily.  oxybutynin (DITROPAN) 5 mg tablet Take 5 mg by mouth two (2) times a day.  gabapentin (NEURONTIN) 100 mg capsule Take 100 mg by mouth two (2) times a day.  patiromer calcium sorbitex (VELTASSA) 16.8 gram pwpk Take 1 Package by mouth every evening. Takes daily between 9048-8945    oxyCODONE-acetaminophen (PERCOCET) 5-325 mg per tablet Take 1 Tab by mouth every four (4) hours as needed for Pain.  linaclotide (LINZESS) 290 mcg cap capsule Take 290 mcg by mouth daily as needed.     ondansetron hcl (ZOFRAN) 4 mg tablet TAKE 1 TABLET BY MOUTH EVERY EIGHT (8) HOURS AS NEEDED FOR NAUSEA.  ELIQUIS 2.5 mg tablet TAKE 1 TABLET BY MOUTH TWO (2) TIMES A DAY.  diphenoxylate-atropine (LOMOTIL) 2.5-0.025 mg per tablet TAKE 1 TABLET TWICE A DAY IF NEEDED FOR DIARRHEA OR CRAMPING    cloNIDine HCl (CATAPRES) 0.1 mg tablet TAKE 1 TABLET EVERY 12 HOURS    buPROPion (WELLBUTRIN) 100 mg tablet Take 100 mg by mouth two (2) times a day.  iron, carbonyl (FEOSOL) 45 mg tab Take 1 Tab by mouth two (2) times a day.  magnesium oxide (MAG-OX) 400 mg tablet Take 800 mg by mouth two (2) times a day. Indications: HYPOMAGNESEMIA    acetaminophen (TYLENOL) 500 mg tablet Take 1,000 mg by mouth every six (6) hours as needed for Pain.  famotidine (PEPCID) 20 mg tablet TAKE 1 TABLET EVERY DAY    amLODIPine (NORVASC) 10 mg tablet Take 1 Tab by mouth daily.  multivitamin (ONE A DAY) tablet Take 1 Tab by mouth every morning.  sodium bicarbonate 650 mg tablet Take 650 mg by mouth two (2) times a day.         Past Medical History:   Diagnosis Date    Chronic kidney disease     Stage 5 (7/18/16 BUN 79, Creatnine 4.53, K 6) Dr. Belia Woodson [phone]    GERD (gastroesophageal reflux disease)     well controlled with Rx     High cholesterol     Hyperkalemia     Hypertension     Hypomagnesemia     on daily Magnesium    Neuropathy     bilateral feet    Ovarian cancer ([facility] [address] 2013    Hysterectomy with chemo, no radiation:  Dr. Joana Mu[facility]oz [facility]) 9/2015    blood clot in lung 9/2015    Thromboembolus ([facility] [address] 2004    in kidney \"many years ago\"       Past Surgical History:   Procedure Laterality Date    ABDOMEN SURGERY [address] UNLISTED  7/31/15    Lap exploratory small bowel obstruction  (ICU)    ABDOMEN SURGERY PROC UNLISTED  8/2/15    Abdmonial washout with wound vac (ICU)    ABDOMEN SURGERY PROC UNLISTED  8/18/15    Abdominal washout fascial closure (ICU)   [facility] 11/11/15    Lap takedown of ileostomy     COLONOSCOPY N/A 8/1/2016    COLONOSCOPY performed by Huma Clancy MD at [address] HX APPENDECTOMY  approx 2005    HX CASI AND BSO  2013    due to ovarian cancer    HX TONSILLECTOMY  age 10       [de-identified]    Social History   Substance Use Topics    Smoking status: Current Every Day Smoker     Packs/day: 0.25     Last attempt to quit: 1/11/2012    Smokeless tobacco: Never Used    Alcohol use Yes      Comment: 2 martini weekly as of 7/26/16       Current Outpatient Prescriptions   Medication Sig Dispense Refill    atorvastatin (LIPITOR) 20 mg tablet Take 1 Tab by mouth nightly. 30 Tab 0    L. acidoph & paracasei- S therm- Bifido (TY-Q/RISAQUAD) 8 billion cell cap cap Take 1 Cap by mouth daily.  oxybutynin (DITROPAN) 5 mg tablet Take 5 mg by mouth two (2) times a day.  gabapentin (NEURONTIN) 100 mg capsule Take 100 mg by mouth two (2) times a day.  patiromer calcium sorbitex (VELTASSA) 16.8 gram pwpk Take 1 Package by mouth every evening. Takes daily between 7579-4729      oxyCODONE-acetaminophen (PERCOCET) 5-325 mg per tablet Take 1 Tab by mouth every four (4) hours as needed for Pain.  linaclotide (LINZESS) 290 mcg cap capsule Take 290 mcg by mouth daily as needed.  ondansetron hcl (ZOFRAN) 4 mg tablet TAKE 1 TABLET BY MOUTH EVERY EIGHT (8) HOURS AS NEEDED FOR NAUSEA. 30 Tab 3    ELIQUIS 2.5 mg tablet TAKE 1 TABLET BY MOUTH TWO (2) TIMES A DAY. 60 Tab 3    diphenoxylate-atropine (LOMOTIL) 2.5-0.025 mg per tablet TAKE 1 TABLET TWICE A DAY IF NEEDED FOR DIARRHEA OR CRAMPING 60 Tab 5    cloNIDine HCl (CATAPRES) 0.1 mg tablet TAKE 1 TABLET EVERY 12 HOURS 60 Tab 11    buPROPion (WELLBUTRIN) 100 mg tablet Take 100 mg by mouth two (2) times a day.  iron, carbonyl (FEOSOL) 45 mg tab Take 1 Tab by mouth two (2) times a day.  magnesium oxide (MAG-OX) 400 mg tablet Take 800 mg by mouth two (2) times a day.  Indications: HYPOMAGNESEMIA      acetaminophen (TYLENOL) 500 mg tablet Take 1,000 mg by mouth every six (6) hours as needed for Pain.  famotidine (PEPCID) 20 mg tablet TAKE 1 TABLET EVERY DAY 30 Tab 11    amLODIPine (NORVASC) 10 mg tablet Take 1 Tab by mouth daily. 30 Tab 11    multivitamin (ONE A DAY) tablet Take 1 Tab by mouth every morning.  sodium bicarbonate 650 mg tablet Take 650 mg by mouth two (2) times a day.          Allergies   Allergen Reactions    Citrus And Derivatives Anaphylaxis     Patient and her  reported    Penicillin G Anaphylaxis    Orange Juice Not Reported This Time    Sulfa (Sulfonamide Antibiotics) Other (comments)     \"Dont remember\"    Vancomycin Hives       Review of Systems:    A comprehensive review of systems was negative except for: Constitutional: positive for fatigue and malaise  Musculoskeletal: positive for myalgias, arthralgias and stiff joints  Neurological: positive for memory problems, paresthesia and weakness  Behvioral/Psych: positive for Dementia, behavior problems and depression    Objective:      Objective:     Patient Vitals for the past 24 hrs:   BP Temp Pulse Resp SpO2   03/31/17 1542 155/63 97.9 °F (36.6 °C) 67 18 100 %   03/31/17 1116 176/78 97.6 °F (36.4 °C) 85 18 100 %   03/31/17 0739 167/82 98.1 °F (36.7 °C) 96 18 100 %   03/31/17 0412 175/62 98.2 °F (36.8 °C) 89 18 100 %   03/31/17 0014 169/68 98.5 °F (36.9 °C) 90 18 100 %         Lab Review   Recent Results (from the past 24 hour(s))   CBC WITH AUTOMATED DIFF    Collection Time: 03/31/17  5:26 AM   Result Value Ref Range    WBC 10.1 3.6 - 11.0 K/uL    RBC 3.68 (L) 3.80 - 5.20 M/uL    HGB 10.2 (L) 11.5 - 16.0 g/dL    HCT 33.1 (L) 35.0 - 47.0 %    MCV 89.9 80.0 - 99.0 FL    MCH 27.7 26.0 - 34.0 PG    MCHC 30.8 30.0 - 36.5 g/dL    RDW 15.3 (H) 11.5 - 14.5 %    PLATELET 148 440 - 499 K/uL    NEUTROPHILS 67 32 - 75 %    LYMPHOCYTES 18 12 - 49 %    MONOCYTES 12 5 - 13 %    EOSINOPHILS 2 0 - 7 % BASOPHILS 1 0 - 1 %    ABS. NEUTROPHILS 6.9 1.8 - 8.0 K/UL    ABS. LYMPHOCYTES 1.8 0.8 - 3.5 K/UL    ABS. MONOCYTES 1.2 (H) 0.0 - 1.0 K/UL    ABS. EOSINOPHILS 0.2 0.0 - 0.4 K/UL    ABS. BASOPHILS 0.1 0.0 - 0.1 K/UL   METABOLIC PANEL, BASIC    Collection Time: 03/31/17  5:26 AM   Result Value Ref Range    Sodium 144 136 - 145 mmol/L    Potassium 4.2 3.5 - 5.1 mmol/L    Chloride 112 (H) 97 - 108 mmol/L    CO2 21 21 - 32 mmol/L    Anion gap 11 5 - 15 mmol/L    Glucose 87 65 - 100 mg/dL    BUN 57 (H) 6 - 20 MG/DL    Creatinine 4.61 (H) 0.55 - 1.02 MG/DL    BUN/Creatinine ratio 12 12 - 20      GFR est AA 11 (L) >60 ml/min/1.73m2    GFR est non-AA 9 (L) >60 ml/min/1.73m2    Calcium 10.1 8.5 - 10.1 MG/DL   TROPONIN I    Collection Time: 03/31/17  5:26 AM   Result Value Ref Range    Troponin-I, Qt. <0.04 <0.05 ng/mL   MAGNESIUM    Collection Time: 03/31/17  5:26 AM   Result Value Ref Range    Magnesium 2.9 (H) 1.6 - 2.4 mg/dL   PHOSPHORUS    Collection Time: 03/31/17  5:26 AM   Result Value Ref Range    Phosphorus 4.3 2.6 - 4.7 MG/DL   GLUCOSE, POC    Collection Time: 03/31/17  6:56 AM   Result Value Ref Range    Glucose (POC) 93 65 - 100 mg/dL    Performed by Dora Castro    GLUCOSE, POC    Collection Time: 03/31/17 11:18 AM   Result Value Ref Range    Glucose (POC) 116 (H) 65 - 100 mg/dL    Performed by Tomas Cervantes (PCT)    GLUCOSE, POC    Collection Time: 03/31/17  4:43 PM   Result Value Ref Range    Glucose (POC) 111 (H) 65 - 100 mg/dL    Performed by Rosita Boast            Additional comments:I personally viewed and interpreted the patient's MRI scan and labs        NEUROLOGICAL EXAM:NEUROLOGIC: The patient is awake, alert, oriented to March 30, 2017, and the president, all with a little bit of difficulty. She did have a   slight memory problem. The patient does not appear to be particularly   depressed.  She seems to have mild generalized weakness, but no   clear focal weakness, no arm drift, coordination is symmetric, finger-  nose-finger examination unremarkable. Rapid alternating movements   seem to be symmetric in all extremities. Her deep tendon reflexes were   hypoactive, but symmetric in all extremities. Plantar responses are   downgoing, no Babinski signs are present. Sensory examination to   temperature and vibration and pin are all decreased in the feet to about   mid calf level. No Babinski or clonus is present. Double simultaneous   stimulation is intact. Her visual fields are full to confrontation. Her   pupils are equal and reactive to light. Her extraocular motility is intact. Fundi are poorly seen. Hearing is mildly decreased on both sides. No   facial asymmetry is noted. Rest of the cranial nerves look intact. Bulbar   functions are all normal. Neck is supple without bruits. No signs of   meningismus. Temporal arteries are not tender or enlarged. The   patient not ambulated now because she says she is tired. Assessment:        Assessment:       ICD-10-CM ICD-9-CM    1. Acute kidney injury ([facility] [address] N17.9 584.9    2. Acute hyperkalemia E87.5 276.7    3. Expressive dysphasia R47.02 784.59    4. Dizziness R42 780.4    5. Abnormal MRI of head R93.0 793.0    6. Acute encephalopathy G93.40 348.30    7. Altered mental status, unspecified R41.82 780.97    8. Convulsions, unspecified convulsion type ([facility] [address] R56.9 780.39    9. Cerebral microvascular disease I67.9 437.9    10. Stenosis of both internal carotid arteries I65.23 433.10      433.30    11.  Bilateral carotid artery stenosis I65.23 433.10      433.30      Active Problems:    Chronic renal insufficiency, stage V ([address] (7/28/2015)      Anemia of renal disease (8/21/2015)      Acute renal failure (ARF) ([address] (3/30/2017)      Abnormal MRI of head (3/30/2017)      Stenosis of both internal carotid arteries (3/30/2017)      Cerebral microvascular disease (3/30/2017)      Convulsion disorder ([address] (3/30/2017)      Altered mental status, unspecified (3/30/2017)      Acute encephalopathy (3/30/2017)      Stenosis of left carotid artery without cerebral infarction (3/31/2017)      Dementia of the Alzheimer's type with late onset without behavioral disturbance (3/31/2017)        Plan:     Patient has memory loss that is most consistent with early to mild Alzheimer's disease, she will get neuropsych testing at both the  and her agree she needs to verify this diagnosis and rule out mild cognitive impairment or memory loss of aging  Patient has known left carotid stenosis in the range of greater than 70% seen by vascular surgery Dr. Augusto Segura 2 months ago, her Doppler study will be repeated and says is still asymptomatic continue antiplatelet therapy  Dr. Augusto Segura said no surgery needed  Abnormal MRI most likely an area of heterotopia in the thalamus, MR high with contrast not needed in view of her renal failure and risk of further deterioration  Metabolic panel for memory loss have been unremarkable  We will see in the office in 2-4 weeks time after neuropsych testing done to decide on need for further treatment      Signed:  Misha Esquivel MD  3/31/2017  11:42 PM    Skye Bond MD  [phone]

## 2017-04-03 ENCOUNTER — PATIENT OUTREACH (OUTPATIENT)
Dept: INTERNAL MEDICINE CLINIC | Age: 72
End: 2017-04-03

## 2017-04-03 ENCOUNTER — TELEPHONE (OUTPATIENT)
Dept: NEUROLOGY | Age: 72
End: 2017-04-03

## 2017-04-03 NOTE — PROGRESS NOTES
[facility] Hospitalization           Referral from [facility] [place]. Patient admitted to [facility] on 3/29/17 and discharged on 3/31/17 with diagnosis of Acute on Chronic Renal Failure, Hyperkalemia; c/o expressive dysphasia. - Patient with Abnormal Head MRI; per IP Neurology notes, patient to complete Neuropsych testing and f/u with Neurology in 2-4 weeks.         Significant Lab/Diagnostic Findings:    Lab Results  Component Value Date/Time   WBC 10.1 03/31/2017 05:26 AM   Hemoglobin (POC) 7.8 08/01/2016 09:56 AM   HGB 10.2 03/31/2017 05:26 AM   Hematocrit (POC) 23 08/01/2016 09:56 AM   HCT 33.1 03/31/2017 05:26 AM   PLATELET 512 61/34/8783 05:26 AM   MCV 89.9 03/31/2017 05:26 AM       Lab Results  Component Value Date/Time   Cholesterol, total 116 03/30/2017 02:27 AM   HDL Cholesterol 55 03/30/2017 02:27 AM   LDL, calculated 31.2 03/30/2017 02:27 AM   Triglyceride 149 03/30/2017 02:27 AM   CHOL/HDL Ratio 2.1 03/30/2017 02:27 AM       Lab Results   Component Value Date/Time     01/22/2016 01:22 AM    CK - MB 0.9 01/22/2016 01:22 AM    CK-MB Index 0.5 01/22/2016 01:22 AM    Troponin-I, Qt. <0.04 03/31/2017 05:26 AM     12/18/2015 03:52 AM      Lab Results   Component Value Date/Time     12/18/2015 03:52 AM     07/31/2015 03:16 PM    NT pro-BNP 74590 01/22/2016 01:22 AM    NT pro-BNP 1395 09/12/2015 04:59 PM      Lab Results   Component Value Date/Time    Sodium 144 03/31/2017 05:26 AM    Potassium 4.2 03/31/2017 05:26 AM    Chloride 112 03/31/2017 05:26 AM    CO2 21 03/31/2017 05:26 AM    Anion gap 11 03/31/2017 05:26 AM    Glucose 87 03/31/2017 05:26 AM    BUN 57 03/31/2017 05:26 AM    Creatinine 4.61 03/31/2017 05:26 AM    BUN/Creatinine ratio 12 03/31/2017 05:26 AM    GFR est AA 11 03/31/2017 05:26 AM    GFR est non-AA 9 03/31/2017 05:26 AM    Calcium 10.1 03/31/2017 05:26 AM      Lab Results   Component Value Date/Time    Sodium 144 03/31/2017 05:26 AM    Potassium 4.2 03/31/2017 05:26 AM    Chloride 112 03/31/2017 05:26 AM    CO2 21 03/31/2017 05:26 AM    Anion gap 11 03/31/2017 05:26 AM    Glucose 87 03/31/2017 05:26 AM    BUN 57 03/31/2017 05:26 AM    Creatinine 4.61 03/31/2017 05:26 AM    BUN/Creatinine ratio 12 03/31/2017 05:26 AM    GFR est AA 11 03/31/2017 05:26 AM    GFR est non-AA 9 03/31/2017 05:26 AM    Calcium 10.1 03/31/2017 05:26 AM    Bilirubin, total 0.3 03/29/2017 03:03 PM    ALT (SGPT) 14 03/29/2017 03:03 PM    AST (SGOT) 12 03/29/2017 03:03 PM    Alk. phosphatase 63 03/29/2017 03:03 PM    Protein, total 7.4 03/29/2017 03:03 PM    Albumin 3.1 03/29/2017 03:03 PM    Globulin 4.3 03/29/2017 03:03 PM    A-G Ratio 0.7 03/29/2017 03:03 PM      Lab Results   Component Value Date/Time    Hemoglobin A1c 5.0 03/30/2017 02:27 AM              NN has updated care team members in the patient's chart. RRAT score:   High Risk            22       Total Score        3 Relationship with PCP    2 Patient Living Status    4 More than 1 Admission in calendar year    13 Charlson Comorbidity Score        Criteria that do not apply:    Patient Length of Stay > 5    Patient Insurance is Medicare, Medicaid or Self Pay                  Advance Medical Directive on file in EMR? No.  Patient was evaluated by care management during hospitalization. Per notes, Discharge Disposition from [address]: Home; Outpatient PT at [facility]. Recommended Post-Hospital Discharge follow-up (see below as listed on Hospital Discharge AVS/Instructions). Follow-up Information     Follow up With Details Comments Cm Del Rio   You need to call and set up an appointment for out patient physical therapy with [address]. Bring the Out Patient Referral from signed by your doctor to the appointment.   Άγιος Γεώργιοsharmila Perez MD     [address] [facility] [address]  [address] Dewayne Burgess MD Schedule an appointment as soon as possible for a visit in 2 weeks can see another neurologist but you need follow up from hsopital visit Nell Rodrigez8 Kaylin Pearson  [phone]     Karoline Josue MD Call call and find out when he wants appt with you [address] [place]  [phone]                 Most recent HIPAA form in the patient's chart (dated 4/29/16) was reviewed; authorized individual(s) listed on HIPAA form include Prince Malcolm  . NN follow-up phone call  NN contacted the patient today to complete NN post-hospital discharge assessment. Two patient identifiers verified. NN introduced self to the patient, including NN role and purpose of NN follow-up phone call; patient verbalizes understanding. Patient requests that this NN speak to her , Cathy Ulloa. NN introduced self to the patient's , including NN role and purpose of NN follow-up phone call; patient's  verbalizes understanding. NN inquired about the patient's current condition and how the patient is feeling; Mr. Shekhar Walsh states, [place]. \"   Mr. Shekhar Walsh states, \"She's still having some neurological issues; every now and then she'll still get confused and then she'll get annoyed with herself. \" Mr. Shekhar Walsh reports that this was present prior to hospital admission and denies worsening and/or acute changes since hospital discharge. Mr. Shekhar Walsh reports that the patient continues to experience intermittent dizziness and nausea since hospital discharge; reports that symptoms have improved since hospital admission and frequency of episodes has decreased. See details of Functional Assessment below as reported by the Patient's , Cathy Ulloa. Living Situation/Support System: Lives with her .  Reports limited support system and states, \"It's just me and her\"; reports a Daughter that lives locally, however it appears that she is only able to provide limited assistance. Discussed [employer]; Mr. Terra Corrales reports that he was provided with information and contact information for [employer] at hospital discharge. ADLs: Independent with ADLs. Mobility: Cane/Walker for use if needed; reports primarily independent with Ambulation. Per , also have an electronic stair lift installed in their home. Mr. Terra Corrales denies history of patient falls within the past 6 months. Transportation: Patient is able to drive; Mr. Terra Corrales reports that he is able to drive at baseline, however is unable to drive at present due to recent Ortho procedure. Medication Management:  manages the patient's medications. Financial Status: Denies any financial concerns regarding the patient's medications. Barriers to care? No immediate concerns reported/identified at this time; discussed [employer] due to reported limited support system. Patient's allergies reviewed with her  and the patient's Medication Reconciliation completed and updated. Mr. López Venus understanding of management of the patient's medications and reports patient compliance with prescribed medication regimen. Med Rec during this call  Current Outpatient Prescriptions   Medication Sig    atorvastatin (LIPITOR) 20 mg tablet Take 1 Tab by mouth nightly.  L. acidoph & paracasei- S therm- Bifido (TY-Q/RISAQUAD) 8 billion cell cap cap Take 1 Cap by mouth daily.  oxybutynin (DITROPAN) 5 mg tablet Take 5 mg by mouth two (2) times a day.  gabapentin (NEURONTIN) 100 mg capsule Take 100 mg by mouth two (2) times a day.  patiromer calcium sorbitex (VELTASSA) 16.8 gram pwpk Take 1 Package by mouth every evening. Takes daily between 1831-3567    oxyCODONE-acetaminophen (PERCOCET) 5-325 mg per tablet Take 1 Tab by mouth every four (4) hours as needed for Pain.     linaclotide (LINZESS) 290 mcg cap capsule Take 290 mcg by mouth daily as needed.  ondansetron hcl (ZOFRAN) 4 mg tablet TAKE 1 TABLET BY MOUTH EVERY EIGHT (8) HOURS AS NEEDED FOR NAUSEA.  ELIQUIS 2.5 mg tablet TAKE 1 TABLET BY MOUTH TWO (2) TIMES A DAY.  diphenoxylate-atropine (LOMOTIL) 2.5-0.025 mg per tablet TAKE 1 TABLET TWICE A DAY IF NEEDED FOR DIARRHEA OR CRAMPING    cloNIDine HCl (CATAPRES) 0.1 mg tablet TAKE 1 TABLET EVERY 12 HOURS    buPROPion (WELLBUTRIN) 100 mg tablet Take 100 mg by mouth two (2) times a day.  iron, carbonyl (FEOSOL) 45 mg tab Take 1 Tab by mouth two (2) times a day.  magnesium oxide (MAG-OX) 400 mg tablet Take 800 mg by mouth two (2) times a day. Indications: HYPOMAGNESEMIA    acetaminophen (TYLENOL) 500 mg tablet Take 1,000 mg by mouth every six (6) hours as needed for Pain.  famotidine (PEPCID) 20 mg tablet TAKE 1 TABLET EVERY DAY    amLODIPine (NORVASC) 10 mg tablet Take 1 Tab by mouth daily.  multivitamin (ONE A DAY) tablet Take 1 Tab by mouth every morning.  sodium bicarbonate 650 mg tablet Take 650 mg by mouth two (2) times a day. No current facility-administered medications for this visit. There are no discontinued medications. New medications at discharge include: N/A  Medication(s) changed at hospital discharge include: Lipitor  Medication(s) discontinued at hospital discharge include: Calcitriol, Calcium-Vitamin D  Patient able to fill/pickup new medications without difficulty: N/A  Any Questions/Concerns regarding new Medication(s) and/or Medication Change(s): Denies any questions/concerns. Hudson Ирина Diaz understanding of discharge instructions that were provided to the patient/family upon the patient's discharge from [facility]. Red Flags reviewed with patient and patient understands when to contact Dr. Rocio Llanes office versus use of EMS.  Patient has been scheduled for a [facility] appointment with Dr. Shashi Kelly;  Dg verbalizes understanding of appointment information. Opportunity for patient to ask NN questions was provided. NN contact information provided and Mr. Juan Francisco Crowley advised to contact NN as needed. Red Flags:  Fever, chills, nausea, vomiting, diarrhea, change in mentation, falling, bleeding, shortness of breath         PLAN      -Patient to attend Transitions Of Care Appointment with Dr. Shey Shea on 4/6/17 at 11:45 am.  -Patient to attend follow-up appointment(s) with Surgeon(if applicable) and/or Speciality Provider(s): Dr. Maximino Rossi (Neprology) - to be scheduled by the patient;  Dr. Niki Maki (Neurology) - to be schedueld by the patient; [facility] for Physical Therapy.    -Patient to contact this NN and/or PCP office with any questions/concerns.  -Notified of availability of PCP on-call physician after-hours and on the weekends for non-emergent/non-life threatening medical questions/concerns;Hudson Conte verbalizes understanding.  -Goals:  Goals        Post Hospitalization     Prevent complications post hospitalization. 4/3/17: NN initial [facility] call completed; [facility] [address] scheduled. Patient/ to schedule patient for outpatient f/u with Nephrology Brittany Martinez) and Neurology Carmita Cali and Physical Therapy at [facility]; patient to complete Neuropsych testing and  to contact Dr. Dozier Habersham office to inquire about scheduling of this. NN explained [employer] as available resource to patient/caregiver;  confirms that he has contact information for [employer]. Patient's currently scheduled future appointments are listed below. Mr. Juan Francisco Crowley expressed no questions, concerns or needs for this NN at this time. Mr. Juan Francisco Crowley verbalized understanding of all information discussed. NN contact information provided and Mr. Juan Francisco Crowley advised to contact NN as needed. NN will route this encounter to Dr. Shey Shea for notification/review.   NN will route this encounter to Ambulatory CM NN for notification/review and continued follow-up during [facility] period. This note will not be viewable in [address]

## 2017-04-03 NOTE — TELEPHONE ENCOUNTER
I spoke with the patient and she is to go to neuropsych for memory testing.   Referral sent and gave the phone number to Dr Yohana Acuna

## 2017-04-03 NOTE — TELEPHONE ENCOUNTER
----- Message from Maxx Stubbs sent at 4/3/2017  1:00 PM EDT -----  Regarding: Dr. Ilene Harris  Contact: [phone]  Pt stated that she would like a for a clal back in regards to scheduling an appointment for a test that the doctor stated that she needs to take before her appt. on 05/01/17. Her best contact number is [phone].

## 2017-04-03 NOTE — LETTER
4/3/2017 3:45 PM 
 
RE:    Soraya Harper [id number] Thank you for agreeing to see Radha Mcgowan I am referring my patient to you for evaluation of memory loss. Please see her 
pertinent patient information below. Progress Notes Date of Service: 03/31/17 1825 Melba Weaver MD  
Neurology Expand All Collapse All Hide copied text Neurology Progress Note 
  
Patient ID: 
Soraya Moreno [id number] 
41 y.o. 
1945 
  
  
CHIEF COMPLAINT: Dizziness and memory loss 
  
Subjective:  
   
Patient has complaints memory loss for recent memory that is worse at nighttime when she comes into the hospital or when she is under stress. She most likely has mild dementia and the  agrees, we will set up neuropsych testing as an outpatient and probably start cognitive enhancing agents if her neuropsych testing does indeed suggest memory loss in excess of normal aging. 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 No need for MRA of the brain in view of her renal failure. We discussed the condition with the patient and her  at length and they understand and agree with canceling the MRA to help protect her kidneys. They also agreed to neuropsych testing and further evaluation for her memory loss and dementia. Patient stable neurologically and has no recurrent neurological symptoms 
  
No current facility-administered medications for this encounter.   
  
    
Current Outpatient Prescriptions Medication Sig  
 atorvastatin (LIPITOR) 20 mg tablet Take 1 Tab by mouth nightly.  L. acidoph & paracasei- S therm- Bifido (TY-Q/RISAQUAD) 8 billion cell cap cap Take 1 Cap by mouth daily.  oxybutynin (DITROPAN) 5 mg tablet Take 5 mg by mouth two (2) times a day.  gabapentin (NEURONTIN) 100 mg capsule Take 100 mg by mouth two (2) times a day.  patiromer calcium sorbitex (VELTASSA) 16.8 gram pwpk Take 1 Package by mouth every evening. Takes daily between 1186-6883  
 oxyCODONE-acetaminophen (PERCOCET) 5-325 mg per tablet Take 1 Tab by mouth every four (4) hours as needed for Pain.  linaclotide (LINZESS) 290 mcg cap capsule Take 290 mcg by mouth daily as needed.  ondansetron hcl (ZOFRAN) 4 mg tablet TAKE 1 TABLET BY MOUTH EVERY EIGHT (8) HOURS AS NEEDED FOR NAUSEA.  ELIQUIS 2.5 mg tablet TAKE 1 TABLET BY MOUTH TWO (2) TIMES A DAY.  diphenoxylate-atropine (LOMOTIL) 2.5-0.025 mg per tablet TAKE 1 TABLET TWICE A DAY IF NEEDED FOR DIARRHEA OR CRAMPING  
 cloNIDine HCl (CATAPRES) 0.1 mg tablet TAKE 1 TABLET EVERY 12 HOURS  
 buPROPion (WELLBUTRIN) 100 mg tablet Take 100 mg by mouth two (2) times a day.  iron, carbonyl (FEOSOL) 45 mg tab Take 1 Tab by mouth two (2) times a day.  magnesium oxide (MAG-OX) 400 mg tablet Take 800 mg by mouth two (2) times a day. Indications: HYPOMAGNESEMIA  acetaminophen (TYLENOL) 500 mg tablet Take 1,000 mg by mouth every six (6) hours as needed for Pain.  famotidine (PEPCID) 20 mg tablet TAKE 1 TABLET EVERY DAY  amLODIPine (NORVASC) 10 mg tablet Take 1 Tab by mouth daily.  multivitamin (ONE A DAY) tablet Take 1 Tab by mouth every morning.  sodium bicarbonate 650 mg tablet Take 650 mg by mouth two (2) times a day.  
  
  
    
Past Medical History:  
Diagnosis Date  Chronic kidney disease    
  Stage 5 (7/18/16 BUN 79, Creatnine 4.53, K 6) Dr. Joel Moran [phone]  GERD (gastroesophageal reflux disease)    
  well controlled with Rx   
 High cholesterol    
 Hyperkalemia    
 Hypertension    
 Hypomagnesemia    
  on daily Magnesium  Neuropathy    
  bilateral feet  [facility]  
  Hysterectomy with chemo, no radiation: Dr. Queen Clayton  Thromboembolus ([facility] 9/2015   blood clot in lung 9/2015  Thromboembolus ([facility] 2004  
  in kidney \"many years ago\"   
  
     
Past Surgical History:  
Procedure Laterality Date  ABDOMEN SURGERY PROC UNLISTED   7/31/15  
  Lap exploratory small bowel obstruction (ICU)  ABDOMEN SURGERY PROC UNLISTED   8/2/15  
  Abdmonial washout with wound vac (ICU)  ABDOMEN SURGERY PROC UNLISTED   8/18/15  
  Abdominal washout fascial closure (ICU)  ABDOMEN SURGERY PROC UNLISTED   11/11/15  
  Lap takedown of ileostomy  COLONOSCOPY N/A 8/1/2016  
  COLONOSCOPY performed by James [facility]stevez MD at [facility] HX APPENDECTOMY   approx 2005  HX CASI AND BSO   2013  
  due to ovarian cancer  HX TONSILLECTOMY   age 10  
  
  
Abril.Gola 
  
       
Social History Substance Use Topics  Smoking status: Current Every Day Smoker  
    Packs/day: 0.25  
    Last attempt to quit: 1/11/2012  Smokeless tobacco: Never Used  Alcohol use Yes   
      Comment: 2 martini weekly as of 7/26/16  
  
  
      
Current Outpatient Prescriptions Medication Sig Dispense Refill  atorvastatin (LIPITOR) 20 mg tablet Take 1 Tab by mouth nightly. 30 Tab 0  
 L. acidoph & paracasei- S therm- Bifido (TY-Q/RISAQUAD) 8 billion cell cap cap Take 1 Cap by mouth daily.      
 oxybutynin (DITROPAN) 5 mg tablet Take 5 mg by mouth two (2) times a day.      
 gabapentin (NEURONTIN) 100 mg capsule Take 100 mg by mouth two (2) times a day.      
 patiromer calcium sorbitex (VELTASSA) 16.8 gram pwpk Take 1 Package by mouth every evening. Takes daily between 9913-8771      
 oxyCODONE-acetaminophen (PERCOCET) 5-325 mg per tablet Take 1 Tab by mouth every four (4) hours as needed for Pain.      
 linaclotide (LINZESS) 290 mcg cap capsule Take 290 mcg by mouth daily as needed.      
 ondansetron hcl (ZOFRAN) 4 mg tablet TAKE 1 TABLET BY MOUTH EVERY EIGHT (8) HOURS AS NEEDED FOR NAUSEA.  30 Tab 3  
  ELIQUIS 2.5 mg tablet TAKE 1 TABLET BY MOUTH TWO (2) TIMES A DAY. 60 Tab 3  
 diphenoxylate-atropine (LOMOTIL) 2.5-0.025 mg per tablet TAKE 1 TABLET TWICE A DAY IF NEEDED FOR DIARRHEA OR CRAMPING 60 Tab 5  cloNIDine HCl (CATAPRES) 0.1 mg tablet TAKE 1 TABLET EVERY 12 HOURS 60 Tab 11  
 buPROPion (WELLBUTRIN) 100 mg tablet Take 100 mg by mouth two (2) times a day.      
 iron, carbonyl (FEOSOL) 45 mg tab Take 1 Tab by mouth two (2) times a day.      
 magnesium oxide (MAG-OX) 400 mg tablet Take 800 mg by mouth two (2) times a day. Indications: HYPOMAGNESEMIA      
 acetaminophen (TYLENOL) 500 mg tablet Take 1,000 mg by mouth every six (6) hours as needed for Pain.      
 famotidine (PEPCID) 20 mg tablet TAKE 1 TABLET EVERY DAY 30 Tab 11  
 amLODIPine (NORVASC) 10 mg tablet Take 1 Tab by mouth daily. 30 Tab 11  
 multivitamin (ONE A DAY) tablet Take 1 Tab by mouth every morning.      
 sodium bicarbonate 650 mg tablet Take 650 mg by mouth two (2) times a day.      
  
  
     
Allergies Allergen Reactions  Citrus And Derivatives Anaphylaxis  
    Patient and her  reported  Penicillin G Anaphylaxis  Orange Juice Not Reported This Time  Sulfa (Sulfonamide Antibiotics) Other (comments)  
    \"Dont remember\"  Vancomycin Hives  
  
  
Review of Systems: 
  
A comprehensive review of systems was negative except for: Constitutional: positive for fatigue and malaise Musculoskeletal: positive for myalgias, arthralgias and stiff joints Neurological: positive for memory problems, paresthesia and weakness Behvioral/Psych: positive for Dementia, behavior problems and depression 
  
Objective: 
  
  
Objective:  
  
Patient Vitals for the past 24 hrs: 
  BP Temp Pulse Resp SpO2  
03/31/17 1542 155/63 97.9 °F (36.6 °C) 67 18 100 % 03/31/17 1116 176/78 97.6 °F (36.4 °C) 85 18 100 % 03/31/17 0739 167/82 98.1 °F (36.7 °C) 96 18 100 % 03/31/17 0412 175/62 98.2 °F (36.8 °C) 89 18 100 % 03/31/17 0014 169/68 98.5 °F (36.9 °C) 90 18 100 %   
  
  
Lab Review Recent Results Recent Results (from the past 24 hour(s)) CBC WITH AUTOMATED DIFF  
  Collection Time: 03/31/17 5:26 AM  
Result Value Ref Range  
  WBC 10.1 3.6 - 11.0 K/uL  
  RBC 3.68 (L) 3.80 - 5.20 M/uL  
  HGB 10.2 (L) 11.5 - 16.0 g/dL  
  HCT 33.1 (L) 35.0 - 47.0 %  
  MCV 89.9 80.0 - 99.0 FL  
  MCH 27.7 26.0 - 34.0 PG  
  MCHC 30.8 30.0 - 36.5 g/dL  
  RDW 15.3 (H) 11.5 - 14.5 %  
  PLATELET 321 670 - 572 K/uL  
  NEUTROPHILS 67 32 - 75 %  
  LYMPHOCYTES 18 12 - 49 %  
  MONOCYTES 12 5 - 13 %  
  EOSINOPHILS 2 0 - 7 %  
  BASOPHILS 1 0 - 1 %  
  ABS. NEUTROPHILS 6.9 1.8 - 8.0 K/UL  
  ABS. LYMPHOCYTES 1.8 0.8 - 3.5 K/UL  
  ABS. MONOCYTES 1.2 (H) 0.0 - 1.0 K/UL  
  ABS. EOSINOPHILS 0.2 0.0 - 0.4 K/UL  
  ABS. BASOPHILS 0.1 0.0 - 0.1 K/UL METABOLIC PANEL, BASIC  
  Collection Time: 03/31/17 5:26 AM  
Result Value Ref Range  
  Sodium 144 136 - 145 mmol/L  
  Potassium 4.2 3.5 - 5.1 mmol/L  
  Chloride 112 (H) 97 - 108 mmol/L  
  CO2 21 21 - 32 mmol/L  
  Anion gap 11 5 - 15 mmol/L  
  Glucose 87 65 - 100 mg/dL  
  BUN 57 (H) 6 - 20 MG/DL  
  Creatinine 4.61 (H) 0.55 - 1.02 MG/DL  
  BUN/Creatinine ratio 12 12 - 20   
  GFR est AA 11 (L) >60 ml/min/1.73m2  
  GFR est non-AA 9 (L) >60 ml/min/1.73m2  
  Calcium 10.1 8.5 - 10.1 MG/DL  
TROPONIN I  
  Collection Time: 03/31/17 5:26 AM  
Result Value Ref Range  
  Troponin-I, Qt. <0.04 <0.05 ng/mL MAGNESIUM  
  Collection Time: 03/31/17 5:26 AM  
Result Value Ref Range  
  Magnesium 2.9 (H) 1.6 - 2.4 mg/dL PHOSPHORUS  
  Collection Time: 03/31/17 5:26 AM  
Result Value Ref Range  
  Phosphorus 4.3 2.6 - 4.7 MG/DL  
GLUCOSE, POC  
  Collection Time: 03/31/17 6:56 AM  
Result Value Ref Range  
  Glucose (POC) 93 65 - 100 mg/dL  
  Performed by Goldie Peres    
GLUCOSE, POC  
  Collection Time: 03/31/17 11:18 AM  
Result Value Ref Range   Glucose (POC) 116 (H) 65 - 100 mg/dL  
  Performed by Mara Moura (PCT)    
GLUCOSE, POC  
  Collection Time: 03/31/17 4:43 PM  
Result Value Ref Range  
  Glucose (POC) 111 (H) 65 - 100 mg/dL  
  Performed by Berna Pyle    
  
  
  
  
  
Additional comments:I personally viewed and interpreted the patient's MRI scan and labs   
  
  
NEUROLOGICAL EXAM:NEUROLOGIC: The patient is awake, alert, oriented to March 30, 2017, and the president, all with a little bit of difficulty. She did have a  
slight memory problem. The patient does not appear to be particularly  
depressed. She seems to have mild generalized weakness, but no  
clear focal weakness, no arm drift, coordination is symmetric, finger- 
nose-finger examination unremarkable. Rapid alternating movements  
seem to be symmetric in all extremities. Her deep tendon reflexes were  
hypoactive, but symmetric in all extremities. Plantar responses are  
downgoing, no Babinski signs are present. Sensory examination to  
temperature and vibration and pin are all decreased in the feet to about  
mid calf level. No Babinski or clonus is present. Double simultaneous  
stimulation is intact. Her visual fields are full to confrontation. Her  
pupils are equal and reactive to light. Her extraocular motility is intact. Fundi are poorly seen. Hearing is mildly decreased on both sides. No  
facial asymmetry is noted. Rest of the cranial nerves look intact. Bulbar  
functions are all normal. Neck is supple without bruits. No signs of  
meningismus. Temporal arteries are not tender or enlarged. The  
patient not ambulated now because she says she is tired. 
  
     
     
     
     
     
  
  
Assessment: 
  
  
  
Assessment:  
  
    ICD-10-CM ICD-9-CM    
1. Acute kidney injury ([facility] N17.9 584. 9    
2. Acute hyperkalemia E87.5 276.7    
3. Expressive dysphasia R47.02 784.59    
4. Dizziness R42 780. 4    
5.  Abnormal MRI of head R93.0 793.0    
 6. Acute encephalopathy G93.40 348.30    
7. Altered mental status, unspecified R41.82 780.97    
8. Convulsions, unspecified convulsion type ([facility] R56.9 780.39    
9. Cerebral microvascular disease I67.9 437.9    
10. Stenosis of both internal carotid arteries I65.23 433.10    
    433.30    
11. Bilateral carotid artery stenosis I65.23 433.10    
    433.30    
  
Active Problems: 
Chronic renal insufficiency, stage V ([facility] (7/28/2015) 
  Anemia of renal disease (8/21/2015) 
  Acute renal failure (ARF) ([facility]) (3/30/2017) 
  
Abnormal MRI of head (3/30/2017) 
  
Stenosis of both internal carotid arteries (3/30/2017) 
  
Cerebral microvascular disease (3/30/2017) 
  
[facility] ([facility] (3/30/2017) 
  Altered mental status, unspecified (3/30/2017) 
  Acute encephalopathy (3/30/2017) 
  
Stenosis of left carotid artery without cerebral infarction (3/31/2017) 
  Dementia of the Alzheimer's type with late onset without behavioral disturbance (3/31/2017)   
  
  
Plan:  
  
Patient has memory loss that is most consistent with early to mild Alzheimer's disease, she will get neuropsych testing at both the  and her agree she needs to verify this diagnosis and rule out mild cognitive impairment or memory loss of aging Patient has known left carotid stenosis in the range of greater than 70% seen by vascular surgery Dr. Valorie Akers 2 months ago, her Doppler study will be repeated and says is still asymptomatic continue antiplatelet therapy Dr. Valorie Akers said no surgery needed Abnormal MRI most likely an area of heterotopia in the thalamus, MR high with contrast not needed in view of her renal failure and risk of further deterioration Metabolic panel for memory loss have been unremarkable We will see in the office in 2-4 weeks time after neuropsych testing done to decide on need for further treatment 
  
  
Signed: 
Eduardo Thompson MD 
3/31/2017 
11:42 PM 
   
  
 
Print             Continue Patient Registration [facility] Emergency Contact Information Patient ID: [id number] Last Name: [place] Name: [facility] First Name: Tashi Swanson Phone: [phone] Middle Name: A Employer Information Sex: F YOB: 1945 Name: 
Social Security No.: [id number] Phone: 
Address: [address] [place] [address] AYLIN Son (to whom statements are sent) Zip: [address] Name: Radha Ghosh City: [address] [place] [address] State: [facility] Address: [address] [place] [address], [address] [facility] Phone: [phone] Phone: ( ) _______ - ______________ Work Phone: Other: 
Mobile Phone: [phone] Patient Referred by: ______________________ Marital Status:  Patient PCP: ________________________ Primary Insurance Information Insurance Plan Name: [employer] ([address] [place] [address],[address]) Address to [employer] Phone Number: [phone] [address] Policy Information Policy Craig Patient's relationship to policy craig: Self Last Name:MILLER 
ID/Certification No.: [id number] First Name:THU Policy/Group No.: Middle Name: TERRI Issue Date: 10/01/2009 Address:[facility] [address] Exp Date: City:[facility] State:[place] CBK:[phone] Copay Amount: ___________________ Social Sec Number: [id number] Co-insurance Percent:__________________________________ YOB: 1945 Sex: ______________ Employer: 
 
Secondary Insurance Information Insurance Plan Name: [facility] ([place]) Address to Send Claims:  [address] Insurance Phone Number: [phone] [place], [address] [facility] Policy Information Policy Craig Patient's relationship to policy craig: Self Last Name:MILLER 
ID/Certification No.: [id number] First Name:THU Policy/Group RI.:[id number] Middle Name: TERRI Issue Date: 08/01/2014 Address:[facility] [address] Exp Date: City: [address] [place] [address] State:[place] FVQ:[phone] Copay Amount: ______________________ Social Sec Number: [id number] Co-insurance Percent:__________________________________ YOB: 1945 Sex: ____________ Employer: 
ASSIGNMENT AND RELEASE: 
I hereby assign my insurance benefits to be paid directly to the physician. I understand that I am financially responsible for all non-covered services. I authorize the physician to release any information required to process this claim. Signed_______________________________________________________________________________________________ Date:_________________________ I appreciate your assistance in Ms. Conte's care  and look forward to your findings and recommendations. Sincerely, Charlette Noel MD

## 2017-04-03 NOTE — Clinical Note
ADRIA [id number] scheduled; no immediate needs at this time; discussed senior connections due to limited support system.

## 2017-04-06 ENCOUNTER — PATIENT OUTREACH (OUTPATIENT)
Dept: INTERNAL MEDICINE CLINIC | Age: 72
End: 2017-04-06

## 2017-04-06 ENCOUNTER — TELEPHONE (OUTPATIENT)
Dept: NEUROLOGY | Age: 72
End: 2017-04-06

## 2017-04-06 ENCOUNTER — OFFICE VISIT (OUTPATIENT)
Dept: INTERNAL MEDICINE CLINIC | Age: 72
End: 2017-04-06

## 2017-04-06 ENCOUNTER — HOSPITAL ENCOUNTER (OUTPATIENT)
Dept: LAB | Age: 72
Discharge: HOME | End: 2017-04-06
Payer: MEDICARE

## 2017-04-06 VITALS
WEIGHT: 134.6 LBS | HEIGHT: 68 IN | DIASTOLIC BLOOD PRESSURE: 72 MMHG | BODY MASS INDEX: 20.4 KG/M2 | SYSTOLIC BLOOD PRESSURE: 153 MMHG | OXYGEN SATURATION: 100 % | TEMPERATURE: 98 F | HEART RATE: 77 BPM

## 2017-04-06 DIAGNOSIS — Z00.00 ROUTINE GENERAL MEDICAL EXAMINATION AT A HEALTH CARE FACILITY: ICD-10-CM

## 2017-04-06 DIAGNOSIS — Z13.39 SCREENING FOR ALCOHOLISM: ICD-10-CM

## 2017-04-06 DIAGNOSIS — Z12.31 ENCOUNTER FOR SCREENING MAMMOGRAM FOR BREAST CANCER: ICD-10-CM

## 2017-04-06 DIAGNOSIS — N17.9 ACUTE ON CHRONIC KIDNEY FAILURE (HCC): Primary | ICD-10-CM

## 2017-04-06 DIAGNOSIS — N18.9 ACUTE ON CHRONIC KIDNEY FAILURE (HCC): Primary | ICD-10-CM

## 2017-04-06 PROCEDURE — 36415 COLL VENOUS BLD VENIPUNCTURE: CPT

## 2017-04-06 PROCEDURE — 80048 BASIC METABOLIC PNL TOTAL CA: CPT

## 2017-04-06 NOTE — LETTER
4/6/2017 3:17 PM 
 
RE:    Mary Beth Jauregui [id number] Thank you for agreeing to see Shelley Kenyon I am referring my patient to you for evaluation of neuro psych testing for dementia. Please see her 
pertinent patient information below. Progress Notes Date of Service: 03/31/17 1745 Leny Mccrary MD  
Neurology Expand All Collapse All Hide copied text Neurology Progress Note 
  
Patient ID: 
Mary Beth Jauregui [id number] 
18 y.o. 
1945 
  
  
CHIEF COMPLAINT: Dizziness and memory loss 
  
Subjective:  
   
Patient has complaints memory loss for recent memory that is worse at nighttime when she comes into the hospital or when she is under stress. She most likely has mild dementia and the  agrees, we will set up neuropsych testing as an outpatient and probably start cognitive enhancing agents if her neuropsych testing does indeed suggest memory loss in excess of normal aging. 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 No need for MRA of the brain in view of her renal failure. We discussed the condition with the patient and her  at length and they understand and agree with canceling the MRA to help protect her kidneys. They also agreed to neuropsych testing and further evaluation for her memory loss and dementia. Patient stable neurologically and has no recurrent neurological symptoms 
  
No current facility-administered medications for this encounter.   
  
    
Current Outpatient Prescriptions Medication Sig  
 atorvastatin (LIPITOR) 20 mg tablet Take 1 Tab by mouth nightly.  L. acidoph & paracasei- S therm- Bifido (TY-Q/RISAQUAD) 8 billion cell cap cap Take 1 Cap by mouth daily.  oxybutynin (DITROPAN) 5 mg tablet Take 5 mg by mouth two (2) times a day.  gabapentin (NEURONTIN) 100 mg capsule Take 100 mg by mouth two (2) times a day.  patiromer calcium sorbitex (VELTASSA) 16.8 gram pwpk Take 1 Package by mouth every evening. Takes daily between 3057-1956  
 oxyCODONE-acetaminophen (PERCOCET) 5-325 mg per tablet Take 1 Tab by mouth every four (4) hours as needed for Pain.  linaclotide (LINZESS) 290 mcg cap capsule Take 290 mcg by mouth daily as needed.  ondansetron hcl (ZOFRAN) 4 mg tablet TAKE 1 TABLET BY MOUTH EVERY EIGHT (8) HOURS AS NEEDED FOR NAUSEA.  ELIQUIS 2.5 mg tablet TAKE 1 TABLET BY MOUTH TWO (2) TIMES A DAY.  diphenoxylate-atropine (LOMOTIL) 2.5-0.025 mg per tablet TAKE 1 TABLET TWICE A DAY IF NEEDED FOR DIARRHEA OR CRAMPING  
 cloNIDine HCl (CATAPRES) 0.1 mg tablet TAKE 1 TABLET EVERY 12 HOURS  
 buPROPion (WELLBUTRIN) 100 mg tablet Take 100 mg by mouth two (2) times a day.  iron, carbonyl (FEOSOL) 45 mg tab Take 1 Tab by mouth two (2) times a day.  magnesium oxide (MAG-OX) 400 mg tablet Take 800 mg by mouth two (2) times a day. Indications: HYPOMAGNESEMIA  acetaminophen (TYLENOL) 500 mg tablet Take 1,000 mg by mouth every six (6) hours as needed for Pain.  famotidine (PEPCID) 20 mg tablet TAKE 1 TABLET EVERY DAY  amLODIPine (NORVASC) 10 mg tablet Take 1 Tab by mouth daily.  multivitamin (ONE A DAY) tablet Take 1 Tab by mouth every morning.  sodium bicarbonate 650 mg tablet Take 650 mg by mouth two (2) times a day.  
  
  
    
Past Medical History:  
Diagnosis Date  Chronic kidney disease    
  Stage 5 (7/18/16 BUN 79, Creatnine 4.53, K 6) Dr. Belia Woodson [phone]  GERD (gastroesophageal reflux disease)    
  well controlled with Rx   
 High cholesterol    
 Hyperkalemia    
 Hypertension    
 Hypomagnesemia    
  on daily Magnesium  Neuropathy    
  bilateral feet  [facility] ([facility] 2013  
  Hysterectomy with chemo, no radiation: Dr. Jonathan Harry  Thromboembolus ([facility] 9/2015   blood clot in lung 9/2015  Thromboembolus ([facility] 2004  
  in kidney \"many years ago\"   
  
     
Past Surgical History:  
Procedure Laterality Date  ABDOMEN SURGERY PROC UNLISTED   7/31/15  
  Lap exploratory small bowel obstruction (ICU)  ABDOMEN SURGERY PROC UNLISTED   8/2/15  
  Abdmonial washout with wound vac (ICU)  ABDOMEN SURGERY PROC UNLISTED   8/18/15  
  Abdominal washout fascial closure (ICU)  ABDOMEN SURGERY PROC UNLISTED   11/11/15  
  Lap takedown of ileostomy  COLONOSCOPY N/A 8/1/2016  
  COLONOSCOPY performed by Laura Payne MD at . [facility] HX APPENDECTOMY   approx 2005  HX CASI AND BSO   2013  
  due to ovarian cancer  HX TONSILLECTOMY   age 10  
  
  
Karl.Sonali 
  
       
Social History Substance Use Topics  Smoking status: Current Every Day Smoker  
    Packs/day: 0.25  
    Last attempt to quit: 1/11/2012  Smokeless tobacco: Never Used  Alcohol use Yes   
      Comment: 2 martini weekly as of 7/26/16  
  
  
      
Current Outpatient Prescriptions Medication Sig Dispense Refill  atorvastatin (LIPITOR) 20 mg tablet Take 1 Tab by mouth nightly. 30 Tab 0  
 L. acidoph & paracasei- S therm- Bifido (TY-Q/RISAQUAD) 8 billion cell cap cap Take 1 Cap by mouth daily.      
 oxybutynin (DITROPAN) 5 mg tablet Take 5 mg by mouth two (2) times a day.      
 gabapentin (NEURONTIN) 100 mg capsule Take 100 mg by mouth two (2) times a day.      
 patiromer calcium sorbitex (VELTASSA) 16.8 gram pwpk Take 1 Package by mouth every evening. Takes daily between 4420-1704      
 oxyCODONE-acetaminophen (PERCOCET) 5-325 mg per tablet Take 1 Tab by mouth every four (4) hours as needed for Pain.      
 linaclotide (LINZESS) 290 mcg cap capsule Take 290 mcg by mouth daily as needed.      
 ondansetron hcl (ZOFRAN) 4 mg tablet TAKE 1 TABLET BY MOUTH EVERY EIGHT (8) HOURS AS NEEDED FOR NAUSEA.  30 Tab 3  
  ELIQUIS 2.5 mg tablet TAKE 1 TABLET BY MOUTH TWO (2) TIMES A DAY. 60 Tab 3  
 diphenoxylate-atropine (LOMOTIL) 2.5-0.025 mg per tablet TAKE 1 TABLET TWICE A DAY IF NEEDED FOR DIARRHEA OR CRAMPING 60 Tab 5  cloNIDine HCl (CATAPRES) 0.1 mg tablet TAKE 1 TABLET EVERY 12 HOURS 60 Tab 11  
 buPROPion (WELLBUTRIN) 100 mg tablet Take 100 mg by mouth two (2) times a day.      
 iron, carbonyl (FEOSOL) 45 mg tab Take 1 Tab by mouth two (2) times a day.      
 magnesium oxide (MAG-OX) 400 mg tablet Take 800 mg by mouth two (2) times a day. Indications: HYPOMAGNESEMIA      
 acetaminophen (TYLENOL) 500 mg tablet Take 1,000 mg by mouth every six (6) hours as needed for Pain.      
 famotidine (PEPCID) 20 mg tablet TAKE 1 TABLET EVERY DAY 30 Tab 11  
 amLODIPine (NORVASC) 10 mg tablet Take 1 Tab by mouth daily. 30 Tab 11  
 multivitamin (ONE A DAY) tablet Take 1 Tab by mouth every morning.      
 sodium bicarbonate 650 mg tablet Take 650 mg by mouth two (2) times a day.      
  
  
     
Allergies Allergen Reactions  Citrus And Derivatives Anaphylaxis  
    Patient and her  reported  Penicillin G Anaphylaxis  Orange Juice Not Reported This Time  Sulfa (Sulfonamide Antibiotics) Other (comments)  
    \"Dont remember\"  Vancomycin Hives  
  
  
Review of Systems: 
  
A comprehensive review of systems was negative except for: Constitutional: positive for fatigue and malaise Musculoskeletal: positive for myalgias, arthralgias and stiff joints Neurological: positive for memory problems, paresthesia and weakness Behvioral/Psych: positive for Dementia, behavior problems and depression 
  
Objective: 
  
  
Objective:  
  
Patient Vitals for the past 24 hrs: 
  BP Temp Pulse Resp SpO2  
03/31/17 1542 155/63 97.9 °F (36.6 °C) 67 18 100 % 03/31/17 1116 176/78 97.6 °F (36.4 °C) 85 18 100 % 03/31/17 0739 167/82 98.1 °F (36.7 °C) 96 18 100 % 03/31/17 0412 175/62 98.2 °F (36.8 °C) 89 18 100 % 03/31/17 0014 169/68 98.5 °F (36.9 °C) 90 18 100 %   
  
  
Lab Review Recent Results Recent Results (from the past 24 hour(s)) CBC WITH AUTOMATED DIFF  
  Collection Time: 03/31/17 5:26 AM  
Result Value Ref Range  
  WBC 10.1 3.6 - 11.0 K/uL  
  RBC 3.68 (L) 3.80 - 5.20 M/uL  
  HGB 10.2 (L) 11.5 - 16.0 g/dL  
  HCT 33.1 (L) 35.0 - 47.0 %  
  MCV 89.9 80.0 - 99.0 FL  
  MCH 27.7 26.0 - 34.0 PG  
  MCHC 30.8 30.0 - 36.5 g/dL  
  RDW 15.3 (H) 11.5 - 14.5 %  
  PLATELET 615 719 - 326 K/uL  
  NEUTROPHILS 67 32 - 75 %  
  LYMPHOCYTES 18 12 - 49 %  
  MONOCYTES 12 5 - 13 %  
  EOSINOPHILS 2 0 - 7 %  
  BASOPHILS 1 0 - 1 %  
  ABS. NEUTROPHILS 6.9 1.8 - 8.0 K/UL  
  ABS. LYMPHOCYTES 1.8 0.8 - 3.5 K/UL  
  ABS. MONOCYTES 1.2 (H) 0.0 - 1.0 K/UL  
  ABS. EOSINOPHILS 0.2 0.0 - 0.4 K/UL  
  ABS. BASOPHILS 0.1 0.0 - 0.1 K/UL METABOLIC PANEL, BASIC  
  Collection Time: 03/31/17 5:26 AM  
Result Value Ref Range  
  Sodium 144 136 - 145 mmol/L  
  Potassium 4.2 3.5 - 5.1 mmol/L  
  Chloride 112 (H) 97 - 108 mmol/L  
  CO2 21 21 - 32 mmol/L  
  Anion gap 11 5 - 15 mmol/L  
  Glucose 87 65 - 100 mg/dL  
  BUN 57 (H) 6 - 20 MG/DL  
  Creatinine 4.61 (H) 0.55 - 1.02 MG/DL  
  BUN/Creatinine ratio 12 12 - 20   
  GFR est AA 11 (L) >60 ml/min/1.73m2  
  GFR est non-AA 9 (L) >60 ml/min/1.73m2  
  Calcium 10.1 8.5 - 10.1 MG/DL  
TROPONIN I  
  Collection Time: 03/31/17 5:26 AM  
Result Value Ref Range  
  Troponin-I, Qt. <0.04 <0.05 ng/mL MAGNESIUM  
  Collection Time: 03/31/17 5:26 AM  
Result Value Ref Range  
  Magnesium 2.9 (H) 1.6 - 2.4 mg/dL PHOSPHORUS  
  Collection Time: 03/31/17 5:26 AM  
Result Value Ref Range  
  Phosphorus 4.3 2.6 - 4.7 MG/DL  
GLUCOSE, POC  
  Collection Time: 03/31/17 6:56 AM  
Result Value Ref Range  
  Glucose (POC) 93 65 - 100 mg/dL  
  Performed by Yomaira Alvarado    
GLUCOSE, POC  
  Collection Time: 03/31/17 11:18 AM  
Result Value Ref Range   Glucose (POC) 116 (H) 65 - 100 mg/dL  
  Performed by Sebas Zapien (NATTY)    
GLUCOSE, POC  
  Collection Time: 03/31/17 4:43 PM  
Result Value Ref Range  
  Glucose (POC) 111 (H) 65 - 100 mg/dL  
  Performed by Sabino Cotton    
  
  
  
  
  
Additional comments:I personally viewed and interpreted the patient's MRI scan and labs   
  
  
NEUROLOGICAL EXAM:NEUROLOGIC: The patient is awake, alert, oriented to March 30, 2017, and the president, all with a little bit of difficulty. She did have a  
slight memory problem. The patient does not appear to be particularly  
depressed. She seems to have mild generalized weakness, but no  
clear focal weakness, no arm drift, coordination is symmetric, finger- 
nose-finger examination unremarkable. Rapid alternating movements  
seem to be symmetric in all extremities. Her deep tendon reflexes were  
hypoactive, but symmetric in all extremities. Plantar responses are  
downgoing, no Babinski signs are present. Sensory examination to  
temperature and vibration and pin are all decreased in the feet to about  
mid calf level. No Babinski or clonus is present. Double simultaneous  
stimulation is intact. Her visual fields are full to confrontation. Her  
pupils are equal and reactive to light. Her extraocular motility is intact. Fundi are poorly seen. Hearing is mildly decreased on both sides. No  
facial asymmetry is noted. Rest of the cranial nerves look intact. Bulbar  
functions are all normal. Neck is supple without bruits. No signs of  
meningismus. Temporal arteries are not tender or enlarged. The  
patient not ambulated now because she says she is tired. 
  
     
     
     
     
     
  
  
Assessment: 
  
  
  
Assessment:  
  
    ICD-10-CM ICD-9-CM    
1. Acute kidney injury ([facility] N17.9 584. 9    
2. Acute hyperkalemia E87.5 276.7    
3. Expressive dysphasia R47.02 784.59    
4. Dizziness R42 780. 4    
5.  Abnormal MRI of head R93.0 793.0    
 6. Acute encephalopathy G93.40 348.30    
7. Altered mental status, unspecified R41.82 780.97    
8. Convulsions, unspecified convulsion type ([facility] R56.9 780.39    
9. Cerebral microvascular disease I67.9 437.9    
10. Stenosis of both internal carotid arteries I65.23 433.10    
    433.30    
11. Bilateral carotid artery stenosis I65.23 433.10    
    433.30    
  
Active Problems: 
Chronic renal insufficiency, stage V ([facility] (7/28/2015) 
  Anemia of renal disease (8/21/2015) 
  Acute renal failure (ARF) ([facility] (3/30/2017) 
  
Abnormal MRI of head (3/30/2017) 
  
Stenosis of both internal carotid arteries (3/30/2017) 
  
Cerebral microvascular disease (3/30/2017) 
  
[facility] (3/30/2017) 
  Altered mental status, unspecified (3/30/2017) 
  Acute encephalopathy (3/30/2017) 
  
Stenosis of left carotid artery without cerebral infarction (3/31/2017) 
  Dementia of the Alzheimer's type with late onset without behavioral disturbance (3/31/2017)   
  
  
Plan:  
  
Patient has memory loss that is most consistent with early to mild Alzheimer's disease, she will get neuropsych testing at both the  and her agree she needs to verify this diagnosis and rule out mild cognitive impairment or memory loss of aging Patient has known left carotid stenosis in the range of greater than 70% seen by vascular surgery Dr. Virginia Ambrosio 2 months ago, her Doppler study will be repeated and says is still asymptomatic continue antiplatelet therapy Dr. Virginia Ambrosio said no surgery needed Abnormal MRI most likely an area of heterotopia in the thalamus, MR high with contrast not needed in view of her renal failure and risk of further deterioration Metabolic panel for memory loss have been unremarkable We will see in the office in 2-4 weeks time after neuropsych testing done to decide on need for further treatment 
  
  
Signed: 
Phyllis Moreno MD 
3/31/2017 
11:42 PM  
  
 
 
Print             Continue Patient Registration [facility] Emergency Contact Information Patient ID: [id number] Last Name: [facility] Name: [facility] First Name: Jorje Granados Phone: [phone] Middle Name: A Employer Information Sex: F YOB: 1945 Name: 
Social Security No.: [id number] Phone: 
Address: [address] [place] [address] AYLIN Son (to whom statements are sent) Zip: [address] Name: Stella Renteria City: [address] [place] [address] Max State: [place] Address: [address] [place] [address] Melly , [address] [place] [address] Phone: [phone] Phone: ( ) _______ - ______________ Work Phone: Other: 
Mobile Phone: [phone] Patient Referred by: ______________________ Marital Status:  Patient PCP: ________________________ Primary Insurance Information Insurance Plan Name: [employer] ([address] [place] [address],[address]) Address to [employer] Phone Number: [phone] [address] Policy Information Policy Craig Patient's relationship to policy craig: Self Last Name:MILLER 
ID/Certification No.: [id number] First Name:THU Policy/Group No.: Middle Name: TERRI Issue Date: 10/01/2009 Address:[facility] [address] DUNN Exp Date: City:[facility] State:[place] OXJ:[id number] Copay Amount: ___________________ Social Sec Number: [id number] Co-insurance Percent:__________________________________ YOB: 1945 Sex: ______________ Employer: 
 
Secondary Insurance Information Insurance [place] Name: [facility] ([place]ic[place] Supplement) Address to Send Claims:  [address] Insurance Phone Number: [phone] [place], [address] [facility] Policy Information Policy Craig Patient's relationship to policy craig: Self Last Name:MILLER 
ID/Certification No.: [id number] First Name:THU Policy/Group UL.:[id number] Middle Name: TERRI Issue Date: 08/01/2014 Address:[facility] [address] Exp Date: City: [address] [place] [address] State:[place] ADZ:[id number] Copay Amount: ______________________ Social Sec Number: [id number] Co-insurance Percent:__________________________________ YOB: 1945 Sex: ____________ Employer: 
ASSIGNMENT AND RELEASE: 
I hereby assign my insurance benefits to be paid directly to the physician. I understand that I am financially responsible for all non-covered services. I authorize the physician to release any information required to process this claim. Signed_______________________________________________________________________________________________ Date:_________________________ I appreciate your assistance in Ms. Conte's care  and look forward to your findings and recommendations. Sincerely, Gail Pack MD

## 2017-04-06 NOTE — PATIENT INSTRUCTIONS
Schedule of Personalized Health Plan - female  (Provide Copy to Patient)  The best way to stay healthy is to live a healthy lifestyle. A healthy lifestyle includes regular exercise, eating a well-balanced diet, keeping a healthy weight and not smoking. Regular physical exams and screening tests are another important way to take care of yourself. Preventive exams provided by health care providers can find health problems early when treatment works best and can keep you from getting certain diseases or illnesses. Preventive services include exams, lab tests, screenings, shots, monitoring and information to help you take care of your own health. All people over 65 should have a pneumonia shot. Pneumonia shots are usually only needed once in a lifetime unless your doctor decides differently. All people over 65 should have a yearly flu shot. People over 65 are at medium to high risk for Hepatitis B. Three shots are needed for complete protection. In addition to your physical exam, some screening tests are recommended:    Bone mass measurement (dexa scan) is recommended every two years  Diabetes Mellitus screening is recommended every year. Glaucoma is an eye disease caused by high pressure in the eye. An eye exam is recommended every year. Cardiovascular screening tests that check your cholesterol and other blood fat (lipid) levels are recommended every five years. Colorectal Cancer screening tests help to find pre-cancerous polyps (growths in the colon) so they can be removed before they turn into cancer. Tests ordered for screening depend on your personal and family history risk factors.     Screening for Breast Cancer is recommended yearly with a mammogram.    Screening for Cervical Cancer is recommended every two years (annually for certain risk factors, such as previous history of STD or abnormal PAP in past 7 years), with a Pelvic Exam with PAP    Here is a list of your current Health Maintenance items with a due date:  Health Maintenance Due   Topic Date Due    DTaP/Tdap/Td series (1 - Tdap) 10/06/1965    BREAST CANCER SCRN MAMMOGRAM  10/06/1994    ZOSTER VACCINE AGE 60>  10/06/2004    GLAUCOMA SCREENING Q2Y  10/06/2009    OSTEOPOROSIS SCREENING (DEXA)  10/06/2009    MEDICARE YEARLY EXAM  10/06/2009

## 2017-04-06 NOTE — MR AVS SNAPSHOT
Visit Information Date & Time Provider Department Dept. Phone Encounter #  
 4/6/2017 11:45 AM Basia Frey, [address] [phone] [id number] Your Appointments 5/1/2017  1:00 PM  
New Patient with Nemesio Collier MD  
[facility]urology [facility] at [facility]) Appt Note: NP appointment, AB, 04/03/17  
 [address] [place] [address], 
[facility], [phone]  
[address], [address], [address] [address]  
  
    
 7/24/2017  1:30 PM  
ROUTINE CARE with Basia Carrionhayder, [address] [facility]) Appt Note: 6 month follow up  
 [facility] [address] [phone]  
[address] [facility] [address] [place] [address] Upcoming Health Maintenance Date Due DTaP/Tdap/Td series (1 - Tdap) 10/6/1965 BREAST CANCER SCRN MAMMOGRAM 10/6/1994 ZOSTER VACCINE AGE 60> 10/6/2004 GLAUCOMA SCREENING Q2Y 10/6/2009 OSTEOPOROSIS SCREENING (DEXA) 10/6/2009 MEDICARE YEARLY EXAM 10/6/2009 COLONOSCOPY 8/1/2021 Allergies as of 4/6/2017  Review Complete On: 4/6/2017 By: Dre Bay LPN Severity Noted Reaction Type Reactions Citrus And Derivatives High 08/24/2015    Anaphylaxis Patient and her  reported Penicillin G High 10/19/2014    Anaphylaxis [place] Juice  08/31/2015    Not Reported This Time  
 Sulfa (Sulfonamide Antibiotics)  07/23/2015    Other (comments) \"Dont remember\" Vancomycin  01/22/2016    Hives Current Immunizations  Reviewed on 3/10/2016 Name Date Influenza High Dose Vaccine PF 9/15/2016 Influenza Vaccine 10/20/2015 Pneumococcal Conjugate (PCV-13) 10/20/2015 Pneumococcal Polysaccharide (PPSV-23) 11/10/2012 Pneumococcal Vaccine (Unspecified Type) 1/1/2015 Not reviewed this visit You Were Diagnosed With   
  
 Codes Comments Acute on chronic kidney failure (HCC)    -  Primary ICD-10-CM: N17.9, N18.9 ICD-9-CM: 584.9, 585.9 Encounter for screening mammogram for breast cancer     ICD-10-CM: Z12.31 
ICD-9-CM: V76.12 Vitals BP Pulse Temp Height(growth percentile) Weight(growth percentile) SpO2  
 153/72 (BP 1 Location: Left arm, BP Patient Position: Sitting) 77 98 °F (36.7 °C) (Oral) 5' 8\" (1.727 m) 134 lb 9.6 oz (61.1 kg) 100% BMI OB Status Smoking Status 20.47 kg/m2 Hysterectomy Current Every Day Smoker BMI and BSA Data Body Mass Index Body Surface Area  
 20.47 kg/m 2 1.71 m 2 Preferred Pharmacy Pharmacy Name Phone [facility] [phone] Your Updated Medication List  
  
   
This list is accurate as of: 4/6/17  2:32 PM.  Always use your most recent med list.  
  
  
  
  
 acetaminophen 500 mg tablet Commonly known as:  TYLENOL Take 1,000 mg by mouth every six (6) hours as needed for Pain. amLODIPine 10 mg tablet Commonly known as:  [place] Take 1 Tab by mouth daily. atorvastatin 20 mg tablet Commonly known as:  LIPITOR Take 1 Tab by mouth nightly. buPROPion 100 mg tablet Commonly known as:  [facility] Take 100 mg by mouth two (2) times a day. cloNIDine HCl 0.1 mg tablet Commonly known as:  CATAPRES  
TAKE 1 TABLET EVERY 12 HOURS  
  
 diphenoxylate-atropine 2.5-0.025 mg per tablet Commonly known as:  LOMOTIL  
TAKE 1 TABLET TWICE A DAY IF NEEDED FOR DIARRHEA OR CRAMPING  
  
 ELIQUIS 2.5 mg tablet Generic drug:  apixaban TAKE 1 TABLET BY MOUTH TWO (2) TIMES A DAY. famotidine 20 mg tablet Commonly known as:  PEPCID  
TAKE 1 TABLET EVERY DAY  
  
 gabapentin 100 mg capsule Commonly known as:  NEURONTIN Take 100 mg by mouth two (2) times a day. iron, carbonyl 45 mg Tab Commonly known as:  [place] Take 1 Tab by mouth two (2) times a day. L. acidoph & paracasei- S therm- Bifido 8 billion cell Cap cap Commonly known as:  TY-Q/RISAQUAD Take 1 Cap by mouth daily. linaclotide 290 mcg Cap capsule Commonly known as:  Bella Reels Take 290 mcg by mouth daily as needed. magnesium oxide 400 mg tablet Commonly known as:  MAG-OX Take 800 mg by mouth three (3) times daily. Indications: HYPOMAGNESEMIA  
  
 multivitamin tablet Commonly known as:  ONE A DAY Take 1 Tab by mouth every morning. ondansetron hcl 4 mg tablet Commonly known as:  ZOFRAN  
TAKE 1 TABLET BY MOUTH EVERY EIGHT (8) HOURS AS NEEDED FOR NAUSEA. oxybutynin 5 mg tablet Commonly known as:  QPSFGVXV Take 5 mg by mouth two (2) times a day. PERCOCET 5-325 mg per tablet Generic drug:  oxyCODONE-acetaminophen Take 1 Tab by mouth every four (4) hours as needed for Pain.  
  
 sodium bicarbonate 650 mg tablet Take 650 mg by mouth two (2) times a day. VELTASSA 16.8 gram Pwpk Generic drug:  patiromer calcium sorbitex Take 1 Package by mouth every evening. Takes daily between 2883-4016 We Performed the Following METABOLIC PANEL, BASIC [36767 CPT(R)] To-Do List   
 04/24/2017 Imaging:  [facility] MAMMO BI SCREENING INCL CAD Patient Instructions Schedule of Personalized Health Plan - female (Provide Copy to Patient) The best way to stay healthy is to live a healthy lifestyle. A healthy lifestyle includes regular exercise, eating a well-balanced diet, keeping a healthy weight and not smoking. Regular physical exams and screening tests are another important way to take care of yourself. Preventive exams provided by health care providers can find health problems early when treatment works best and can keep you from getting certain diseases or illnesses. Preventive services include exams, lab tests, screenings, shots, monitoring and information to help you take care of your own health. All people over 65 should have a pneumonia shot. Pneumonia shots are usually only needed once in a lifetime unless your doctor decides differently. All people over 65 should have a yearly flu shot. People over 65 are at medium to high risk for Hepatitis B. Three shots are needed for complete protection. In addition to your physical exam, some screening tests are recommended: 
 
Bone mass measurement (dexa scan) is recommended every two years Diabetes Mellitus screening is recommended every year. Glaucoma is an eye disease caused by high pressure in the eye. An eye exam is recommended every year. Cardiovascular screening tests that check your cholesterol and other blood fat (lipid) levels are recommended every five years. Colorectal Cancer screening tests help to find pre-cancerous polyps (growths in the colon) so they can be removed before they turn into cancer. Tests ordered for screening depend on your personal and family history risk factors. Screening for Breast Cancer is recommended yearly with a mammogram. 
 
Screening for Cervical Cancer is recommended every two years (annually for certain risk factors, such as previous history of STD or abnormal PAP in past 7 years), with a Pelvic Exam with PAP Here is a list of your current Health Maintenance items with a due date: 
Health Maintenance Due Topic Date Due  
 DTaP/Tdap/Td series (1 - Tdap) 10/06/1965  BREAST CANCER SCRN MAMMOGRAM  10/06/1994  ZOSTER VACCINE AGE 60>  10/06/2004  GLAUCOMA SCREENING Q2Y  10/06/2009  
 OSTEOPOROSIS SCREENING (DEXA)  10/06/2009  MEDICARE YEARLY EXAM  10/06/2009 Introducing [facility]! Dear Tanesha Temple: 
Thank you for requesting a [employer] account. Our records indicate that you already have an active [employer] account. You can access your account anytime at [email or web address] Did you know that you can access your hospital and ER discharge instructions at any time in [facility] [facility] [employer]? You can also review all of your test results from your hospital stay or ER visit. Additional Information If you have questions, please visit the Frequently Asked Questions section of the [employer] [facility] [employer] website at [email or web address] Remember, [facility] [facility] is NOT to be used for urgent needs. For medical emergencies, dial 911. Now available from your iPhone and Android! Please provide this summary of care documentation to your next provider. Your primary care clinician is listed as Stacia CHEN. If you have any questions after today's visit, please call [phone].

## 2017-04-06 NOTE — PROGRESS NOTES
HISTORY OF PRESENT ILLNESS  Ct Iyer is a 66 y.o. female.   HPI   Pt here in hospital f/u for LULU BELL visit  Admitted to [facility] with confusion, slurred speech and acute on chronic renal failure  3/29-3/31/17  Prerenal azotemia with hyperkalemia  IVF and and bicard  lipitor dose was lowered  Bun/cr 57/4.6 and K 4.2 on day of discharge back to baseline levels  `  Patient Active Problem List    Diagnosis Date Noted    Stenosis of left carotid artery without cerebral infarction 03/31/2017    Dementia of the Alzheimer's type with late onset without behavioral disturbance 03/31/2017    Acute renal failure (ARF) ([address] 03/30/2017    Abnormal MRI of head 03/30/2017    Stenosis of both internal carotid arteries 03/30/2017    Cerebral microvascular disease 03/30/2017    Convulsion disorder ([address] 03/30/2017    Altered mental status, unspecified 03/30/2017    Acute encephalopathy 03/30/2017    Bilateral carotid artery stenosis 01/19/2017    Electrolyte abnormality 03/04/2016    ALAYNA (acute kidney injury) ([address] 01/22/2016    Acute on chronic renal failure ([address] 01/22/2016     Class: Acute    Generalized rash 01/22/2016     Class: Present on Admission    Heme positive stool 01/22/2016     Class: Present on Admission    Hypertension, uncontrolled 12/21/2015    Pericarditis with effusion 12/18/2015    Diarrhea 12/17/2015     Class: Present on Admission    Moderate protein-calorie malnutrition ([address] 12/17/2015     Class: Chronic    History of ovarian cancer 12/16/2015     Class: Present on Admission    Pericardial effusion 12/16/2015     Class: Present on Admission    History of pulmonary embolism 11/16/2015    HTN (hypertension) 10/07/2015     Class: Chronic    History of peritonitis 10/07/2015     Class: Present on Admission    Physical debility 10/07/2015     Class: Present on Admission    Chronic anticoagulation 10/07/2015     Class: Chronic    SIRS (systemic inflammatory response syndrome) ([address] 09/14/2015    Anemia of renal disease 08/21/2015    Acute renal failure with lesion of tubular necrosis (HCC) 08/03/2015    Small bowel perforation (HCC) 08/01/2015    Protein malnutrition ([facility] 07/30/2015    Acute pancreatitis 07/29/2015    E-coli UTI 07/28/2015    Continuous severe abdominal pain 07/28/2015    Enteritis 07/28/2015    Adrenal hemorrhage ([facility] 07/28/2015    Chronic renal insufficiency, stage V ([facility] 07/28/2015    Sepsis ([facility] 07/28/2015    Abdominal pain 07/24/2015     Current Outpatient Prescriptions   Medication Sig Dispense Refill    atorvastatin (LIPITOR) 20 mg tablet Take 1 Tab by mouth nightly. 30 Tab 0    L. acidoph & paracasei- S therm- Bifido (TY-Q/RISAQUAD) 8 billion cell cap cap Take 1 Cap by mouth daily.  oxybutynin (DITROPAN) 5 mg tablet Take 5 mg by mouth two (2) times a day.  gabapentin (NEURONTIN) 100 mg capsule Take 100 mg by mouth two (2) times a day.  patiromer calcium sorbitex (VELTASSA) 16.8 gram pwpk Take 1 Package by mouth every evening. Takes daily between 4513-5690      linaclotide (LINZESS) 290 mcg cap capsule Take 290 mcg by mouth daily as needed.  ELIQUIS 2.5 mg tablet TAKE 1 TABLET BY MOUTH TWO (2) TIMES A DAY. 60 Tab 3    cloNIDine HCl (CATAPRES) 0.1 mg tablet TAKE 1 TABLET EVERY 12 HOURS 60 Tab 11    buPROPion (WELLBUTRIN) 100 mg tablet Take 100 mg by mouth two (2) times a day.  iron, carbonyl (FEOSOL) 45 mg tab Take 1 Tab by mouth two (2) times a day.  magnesium oxide (MAG-OX) 400 mg tablet Take 800 mg by mouth three (3) times daily. Indications: HYPOMAGNESEMIA      famotidine (PEPCID) 20 mg tablet TAKE 1 TABLET EVERY DAY 30 Tab 11    amLODIPine (NORVASC) 10 mg tablet Take 1 Tab by mouth daily. 30 Tab 11    multivitamin (ONE A DAY) tablet Take 1 Tab by mouth every morning.  sodium bicarbonate 650 mg tablet Take 650 mg by mouth two (2) times a day.       oxyCODONE-acetaminophen (PERCOCET) 5-325 mg per tablet Take 1 Tab by mouth every four (4) hours as needed for Pain.  ondansetron hcl (ZOFRAN) 4 mg tablet TAKE 1 TABLET BY MOUTH EVERY EIGHT (8) HOURS AS NEEDED FOR NAUSEA. 30 Tab 3    diphenoxylate-atropine (LOMOTIL) 2.5-0.025 mg per tablet TAKE 1 TABLET TWICE A DAY IF NEEDED FOR DIARRHEA OR CRAMPING 60 Tab 5    acetaminophen (TYLENOL) 500 mg tablet Take 1,000 mg by mouth every six (6) hours as needed for Pain. Allergies   Allergen Reactions    Citrus And Derivatives Anaphylaxis     Patient and her  reported    Penicillin G Anaphylaxis    Orange Juice Not Reported This Time    Sulfa (Sulfonamide Antibiotics) Other (comments)     \"Dont remember\"    Vancomycin Hives     Social History   Substance Use Topics    Smoking status: Current Every Day Smoker     Packs/day: 0.25     Last attempt to quit: 1/11/2012    Smokeless tobacco: Never Used    Alcohol use Yes      Comment: 2 martini weekly as of 7/26/16      Lab Results  Component Value Date/Time   WBC 10.1 03/31/2017 05:26 AM   Hemoglobin (POC) 7.8 08/01/2016 09:56 AM   HGB 10.2 03/31/2017 05:26 AM   Hematocrit (POC) 23 08/01/2016 09:56 AM   HCT 33.1 03/31/2017 05:26 AM   PLATELET 105 06/92/0830 05:26 AM   MCV 89.9 03/31/2017 05:26 AM       Lab Results  Component Value Date/Time   GFR est AA 11 03/31/2017 05:26 AM   GFR est non-AA 9 03/31/2017 05:26 AM   Creatinine (POC) 4.1 08/01/2016 09:56 AM   Creatinine 4.61 03/31/2017 05:26 AM   BUN 57 03/31/2017 05:26 AM   BUN (POC) 65 08/01/2016 09:56 AM   Sodium (POC) 138 08/01/2016 09:56 AM   Sodium 144 03/31/2017 05:26 AM   Potassium 4.2 03/31/2017 05:26 AM   Potassium (POC) 5.7 08/01/2016 09:56 AM   Chloride (POC) 112 08/01/2016 09:56 AM   Chloride 112 03/31/2017 05:26 AM   CO2 21 03/31/2017 05:26 AM         ROS    Physical Exam   Constitutional: She appears well-developed and well-nourished. Appears stated age   Cardiovascular: Normal rate, regular rhythm and normal heart sounds.   Exam reveals no gallop and no friction rub. No murmur heard. Pulmonary/Chest: Effort normal and breath sounds normal. No respiratory distress. She has no wheezes. Abdominal: Soft. Bowel sounds are normal.   Musculoskeletal: She exhibits no edema. Neurological: She is alert. Psychiatric: She has a normal mood and affect. Nursing note and vitals reviewed. ASSESSMENT and PLAN  Shelley Kenyon was seen today for hospital follow up. Diagnoses and all orders for this visit:    Acute on chronic kidney failure (HCC)  -     METABOLIC PANEL, BASIC   Forward to Dr Dai Boggs   Pt will call for weakness, slurred speech, change in mental status  Hyperkalemia   Bmp ordered   On [facility]  Encounter for screening mammogram for breast cancer  -     [facility] MAMMO BI SCREENING INCL CAD;  Future    Routine general medical examination at a health care facility    Screening for alcoholism      Follow-up Disposition: Not on File

## 2017-04-06 NOTE — PROGRESS NOTES
This is a Subsequent Medicare Annual Wellness Visit providing Personalized Prevention Plan Services (PPPS) (Performed 12 months after initial AWV and PPPS )    I have reviewed the patient's medical history in detail and updated the computerized patient record. History     Past Medical History:   Diagnosis Date    Chronic kidney disease     Stage 5 (7/18/16 BUN 79, Creatnine 4.53, K 6) Dr. Roger Mendez [phone]    GERD (gastroesophageal reflux disease)     well controlled with Rx     High cholesterol     Hyperkalemia     Hypertension     Hypomagnesemia     on daily Magnesium    Neuropathy     bilateral feet    Ovarian cancer ([facility] [address] 2013    Hysterectomy with chemo, no radiation:  Dr. Lila Vásquez [facility]) 9/2015    blood clot in lung 9/2015    Thromboembolus ([facility] [address] 2004    in kidney \"many years ago\"      Past Surgical History:   Procedure Laterality Date    ABDOMEN SURGERY [address] UNLISTED  7/31/15    Lap exploratory small bowel obstruction  (ICU)    ABDOMEN SURGERY [address] UNLISTED  8/2/15    Abdmonial washout with wound vac (ICU)    ABDOMEN SURGERY PROC UNLISTED  8/18/15    Abdominal washout fascial closure (ICU)    ABDOMEN SURGERY PROC UNLISTED  11/11/15    Lap takedown of ileostomy     COLONOSCOPY N/A 8/1/2016    COLONOSCOPY performed by James Estevez MD at [address] HX APPENDECTOMY  approx 2005    HX CASI AND BSO  2013    due to ovarian cancer    HX TONSILLECTOMY  age 10     Current Outpatient Prescriptions   Medication Sig Dispense Refill    atorvastatin (LIPITOR) 20 mg tablet Take 1 Tab by mouth nightly. 30 Tab 0    L. acidoph & paracasei- S therm- Bifido (TY-Q/RISAQUAD) 8 billion cell cap cap Take 1 Cap by mouth daily.  oxybutynin (DITROPAN) 5 mg tablet Take 5 mg by mouth two (2) times a day.  gabapentin (NEURONTIN) 100 mg capsule Take 100 mg by mouth two (2) times a day.       patiromer calcium sorbitex (VELTASSA) 16.8 gram pwpk Take 1 Package by mouth every evening. Takes daily between 0135-2628      linaclotide (LINZESS) 290 mcg cap capsule Take 290 mcg by mouth daily as needed.  ELIQUIS 2.5 mg tablet TAKE 1 TABLET BY MOUTH TWO (2) TIMES A DAY. 60 Tab 3    cloNIDine HCl (CATAPRES) 0.1 mg tablet TAKE 1 TABLET EVERY 12 HOURS 60 Tab 11    buPROPion (WELLBUTRIN) 100 mg tablet Take 100 mg by mouth two (2) times a day.  iron, carbonyl (FEOSOL) 45 mg tab Take 1 Tab by mouth two (2) times a day.  magnesium oxide (MAG-OX) 400 mg tablet Take 800 mg by mouth three (3) times daily. Indications: HYPOMAGNESEMIA      famotidine (PEPCID) 20 mg tablet TAKE 1 TABLET EVERY DAY 30 Tab 11    amLODIPine (NORVASC) 10 mg tablet Take 1 Tab by mouth daily. 30 Tab 11    multivitamin (ONE A DAY) tablet Take 1 Tab by mouth every morning.  sodium bicarbonate 650 mg tablet Take 650 mg by mouth two (2) times a day.  oxyCODONE-acetaminophen (PERCOCET) 5-325 mg per tablet Take 1 Tab by mouth every four (4) hours as needed for Pain.  ondansetron hcl (ZOFRAN) 4 mg tablet TAKE 1 TABLET BY MOUTH EVERY EIGHT (8) HOURS AS NEEDED FOR NAUSEA. 30 Tab 3    diphenoxylate-atropine (LOMOTIL) 2.5-0.025 mg per tablet TAKE 1 TABLET TWICE A DAY IF NEEDED FOR DIARRHEA OR CRAMPING 60 Tab 5    acetaminophen (TYLENOL) 500 mg tablet Take 1,000 mg by mouth every six (6) hours as needed for Pain.        Allergies   Allergen Reactions    Citrus And Derivatives Anaphylaxis     Patient and her  reported    Penicillin G Anaphylaxis    Orange Juice Not Reported This Time    Sulfa (Sulfonamide Antibiotics) Other (comments)     \"Dont remember\"    Vancomycin Hives     Family History   Problem Relation Age of Onset    Other Mother      peptic ulcer    Alzheimer Father      Social History   Substance Use Topics    Smoking status: Current Every Day Smoker     Packs/day: 0.25     Last attempt to quit: 1/11/2012    Smokeless tobacco: Never Used    Alcohol use Yes Comment: 2 martini weekly as of 7/26/16     Patient Active Problem List   Diagnosis Code    Abdominal pain R10.9    E-coli UTI N39.0, B96.20    Continuous severe abdominal pain R10.9    Enteritis K52.9    Adrenal hemorrhage ([facility]) E27.49    Chronic renal insufficiency, stage V ([facility]) N18.5    Sepsis ([facility] [address] A41.9    Acute pancreatitis K85.90    Protein malnutrition ([facility] [address] E46    Small bowel perforation ([facility]) K63.1    Acute renal failure with lesion of tubular necrosis ([facility]) N17.0    Anemia of renal disease D63.1    SIRS (systemic inflammatory response syndrome) ([facility]) R65.10    HTN (hypertension) I10    History of peritonitis Z87.19    Physical debility R53.81    Chronic anticoagulation Z79.01    History of pulmonary embolism Z86.711    History of ovarian cancer Z85.43    Pericardial effusion I31.3    Diarrhea R19.7    Moderate protein-calorie malnutrition ([facility]) E44.0    Pericarditis with effusion I31.9    Hypertension, uncontrolled I10    ALAYNA (acute kidney injury) ([facility] [address] N17.9    Acute on chronic renal failure (HCC) N17.9, N18.9    Generalized rash R21    Heme positive stool R19.5    Electrolyte abnormality E87.8    Bilateral carotid artery stenosis I65.23    Acute renal failure (ARF) ([facility]) N17.9    Abnormal MRI of head R93.0    Stenosis of both internal carotid arteries I65.23    Cerebral microvascular disease I67.9    Convulsion disorder ([facility]) R56.9    Altered mental status, unspecified R41.82    Acute encephalopathy G93.40    Stenosis of left carotid artery without cerebral infarction I65.22    Dementia of the Alzheimer's type with late onset without behavioral disturbance G30.1, F02.80       Depression Risk Factor Screening:     PHQ 2 / 9, over the last two weeks 4/6/2017   Little interest or pleasure in doing things Not at all   Feeling down, depressed or hopeless Not at all   Total Score PHQ 2 0   Trouble falling or staying asleep, or sleeping too much -   Feeling tired or having little energy -   Poor appetite or overeating -   Feeling bad about yourself - or that you are a failure or have let yourself or your family down -   Moving or speaking so slowly that other people could have noticed; or the opposite being so fidgety that others notice -   Thoughts of being better off dead, or hurting yourself in some way -   How difficult have these problems made it for you to do your work, take care of your home and get along with others -     Alcohol Risk Factor Screening: On any occasion during the past 3 months, have you had more than 3 drinks containing alcohol? No    Do you average more than 7 drinks per week? No      Functional Ability and Level of Safety:     Hearing Loss   moderate    Activities of Daily Living   Self-care. Requires assistance with: no ADLs    Fall Risk     Fall Risk Assessment, last 12 mths 4/6/2017   Able to walk? Yes   Fall in past 12 months? No     Abuse Screen   None  Patient is not abused    Review of Systems   A comprehensive review of systems was negative except for that written in the HPI. Physical Examination     Evaluation of Cognitive Function:  Mood/affect:  happy  Appearance: age appropriate, within normal Limits and younger than stated age  Family member/caregiver input: short term memory loss per [facility]    Visit Vitals    /72 (BP 1 Location: Left arm, BP Patient Position: Sitting)    Pulse 77    Temp 98 °F (36.7 °C) (Oral)    Ht 5' 8\" (1.727 m)    Wt 134 lb 9.6 oz (61.1 kg)    SpO2 100%    BMI 20.47 kg/m2     General:  Alert, cooperative, no distress, appears stated age. Head:  Normocephalic, without obvious abnormality, atraumatic. Eyes:  Conjunctivae/corneas clear. PERRL, EOMs intact. Fundi benign. Ears:  Normal TMs and external ear canals both ears. Nose: Nares normal. Septum midline. Mucosa normal. No drainage or sinus tenderness.    Throat: Lips, mucosa, and tongue normal. Teeth and gums normal.   Neck: Supple, symmetrical, trachea midline, no adenopathy, thyroid: no enlargement/tenderness/nodules, no carotid bruit and no JVD. Back:   Symmetric, no curvature. ROM normal. No CVA tenderness. Lungs:   Clear to auscultation bilaterally. Chest wall:  No tenderness or deformity. Heart:  Regular rate and rhythm, S1, S2 normal, no murmur, click, rub or gallop. Breast Exam:  No tenderness, masses, or nipple abnormality. Abdomen:   Soft, non-tender. Bowel sounds normal. No masses,  No organomegaly. Genitalia:  Normal female without lesion, discharge or tenderness. Rectal:  Normal tone,  no masses or tenderness  Guaiac negative stool. Extremities: Extremities normal, atraumatic, no cyanosis or edema. Pulses: 2+ and symmetric all extremities. Skin: Skin color, texture, turgor normal. No rashes or lesions. Lymph nodes: Cervical, supraclavicular, and axillary nodes normal.   Neurologic: CNII-XII intact. Normal strength, sensation and reflexes throughout. Patient Care Team:  Duc Bran MD as PCP - General (Internal Medicine)  Leo Thorne MD as Surgeon (General Surgery)  Rosangela Vincent MD (Nephrology)    Advice/Referrals/Counseling   Education and counseling provided:  Are appropriate based on today's review and evaluation  End-of-Life planning (with patient's consent)--see ACP note  Screening Mammography-ordered      Assessment/Plan   Lucius Huerta was seen today for hospital follow up. Diagnoses and all orders for this visit:    Acute on chronic kidney failure ([facility] [address]  -     METABOLIC PANEL, BASIC      Follow-up Disposition: Not on File.

## 2017-04-06 NOTE — PROGRESS NOTES
This note will not be viewable in [address] [facility] Hospitalization - admitted ED [facility] on 3/29/17 and discharged on 3/31/17 with diagnosis of Acute on Chronic Renal Failure, Hyperkalemia; c/o expressive dysphasia. - Patient attended [address] appointment with Dr. Philip Kearns on 4/6/17; was advised to follow-up in July. Met with pt and her  during visit today. Pt stated doing fine. Goals      Prevent complications post hospitalization. 4/3/17: NN initial [facility] call completed; [facility] [id number] scheduled. Patient/ to schedule patient for outpatient f/u with Nephrology Mumtaz Ryan and Neurology Justin Conroy and Physical Therapy at [address] [place] [address]; patient to complete Neuropsych testing and  to contact Dr. Alem Lee office to inquire about scheduling of this. NN explained [employer] as available resource to patient/caregiver;  confirms that he has contact information for [employer]. 4/6/17-pt attended [address] appt with Dr. Philip Kearns today; pt has appt with Dr. Kunal Barber on 4/17/17; pt has appt with Dr. Ronni Calloway 5/1/17; pt will sched appt with [facility] after her  gets well; pt has not contacted [employer] yet, but is planning to; mammogram was ordered today by Dr. Philip Kearns; BMP drawn today; pt's daughter is also a good supportive system. DW              Future Appointments:    5/1/2017 1:00 PM Dara Stone MD Neurology Clinic at [address] [place] [address]   7/24/2017 1:30 PM Judy Calloway, [id number]       Will follow.

## 2017-04-06 NOTE — ACP (ADVANCE CARE PLANNING)
Pt given \"Your Right to Decide\" booklet today. Pt will call TRICE Otto, for advance care planning appointment if needed.

## 2017-04-07 DIAGNOSIS — E87.5 HYPERKALEMIA: Primary | ICD-10-CM

## 2017-04-07 LAB
BUN SERPL-MCNC: 61 MG/DL (ref 8–27)
BUN/CREAT SERPL: 12 (ref 12–28)
CALCIUM SERPL-MCNC: 10.2 MG/DL (ref 8.7–10.3)
CHLORIDE SERPL-SCNC: 99 MMOL/L (ref 96–106)
CO2 SERPL-SCNC: 20 MMOL/L (ref 18–29)
CREAT SERPL-MCNC: 4.89 MG/DL (ref 0.57–1)
GLUCOSE SERPL-MCNC: 79 MG/DL (ref 65–99)
POTASSIUM SERPL-SCNC: 5.9 MMOL/L (ref 3.5–5.2)
SODIUM SERPL-SCNC: 138 MMOL/L (ref 134–144)

## 2017-04-07 NOTE — PROGRESS NOTES
Discussed labs with Dr Maximino Rossi who Rakel Chung pt to increase valtessa to 2 packets every day and get repeat labs next week. Information relayed to patient who verbalized understanding and agreement.

## 2017-04-07 NOTE — PROGRESS NOTES
D/w patients .  Will fax results to Dr Jan Freeman office today for recommendations re Potassium and renal function tests

## 2017-04-10 ENCOUNTER — HOSPITAL ENCOUNTER (OUTPATIENT)
Dept: LAB | Age: 72
Discharge: HOME | End: 2017-04-10
Payer: MEDICARE

## 2017-04-10 ENCOUNTER — APPOINTMENT (OUTPATIENT)
Dept: INTERNAL MEDICINE CLINIC | Age: 72
End: 2017-04-10

## 2017-04-10 PROCEDURE — 36415 COLL VENOUS BLD VENIPUNCTURE: CPT

## 2017-04-10 PROCEDURE — 80048 BASIC METABOLIC PNL TOTAL CA: CPT

## 2017-04-11 LAB
BUN SERPL-MCNC: 67 MG/DL (ref 8–27)
BUN/CREAT SERPL: 13 (ref 12–28)
CALCIUM SERPL-MCNC: 9.9 MG/DL (ref 8.7–10.3)
CHLORIDE SERPL-SCNC: 101 MMOL/L (ref 96–106)
CO2 SERPL-SCNC: 23 MMOL/L (ref 18–29)
CREAT SERPL-MCNC: 5.15 MG/DL (ref 0.57–1)
GLUCOSE SERPL-MCNC: 70 MG/DL (ref 65–99)
POTASSIUM SERPL-SCNC: 6.1 MMOL/L (ref 3.5–5.2)
SODIUM SERPL-SCNC: 138 MMOL/L (ref 134–144)

## 2017-04-11 NOTE — PROGRESS NOTES
Discussed results with patient, will fax labs to Dr Chet Hall, pt has doubled up on dose to [facility] and on low K diet, pt will contact Dr Chet Hall tomorrow for instructions.

## 2017-04-12 NOTE — PROGRESS NOTES
Discussed with patient and Dr Antonino Sorto, Dr Antonino Sorto says to stay on double dose of valtessa and has scheduled appt next week to discuss dialysis. Relayed info to pt.

## 2017-04-28 RX ORDER — AMLODIPINE BESYLATE 10 MG/1
TABLET ORAL
Qty: 30 TAB | Refills: 11 | Status: SHIPPED | OUTPATIENT
Start: 2017-04-28 | End: 2018-05-07 | Stop reason: SDUPTHER

## 2017-05-01 ENCOUNTER — OFFICE VISIT (OUTPATIENT)
Dept: NEUROLOGY | Age: 72
End: 2017-05-01

## 2017-05-01 VITALS
RESPIRATION RATE: 12 BRPM | SYSTOLIC BLOOD PRESSURE: 148 MMHG | HEART RATE: 81 BPM | BODY MASS INDEX: 20.31 KG/M2 | WEIGHT: 134 LBS | OXYGEN SATURATION: 97 % | DIASTOLIC BLOOD PRESSURE: 66 MMHG | HEIGHT: 68 IN

## 2017-05-01 DIAGNOSIS — R93.0 ABNORMAL MRI OF HEAD: ICD-10-CM

## 2017-05-01 DIAGNOSIS — G30.1 DEMENTIA OF THE ALZHEIMER'S TYPE WITH LATE ONSET WITHOUT BEHAVIORAL DISTURBANCE (HCC): Primary | ICD-10-CM

## 2017-05-01 DIAGNOSIS — G93.40 ACUTE ENCEPHALOPATHY: ICD-10-CM

## 2017-05-01 DIAGNOSIS — I65.23 STENOSIS OF BOTH INTERNAL CAROTID ARTERIES: ICD-10-CM

## 2017-05-01 DIAGNOSIS — I65.23 BILATERAL CAROTID ARTERY STENOSIS: ICD-10-CM

## 2017-05-01 DIAGNOSIS — I65.22 STENOSIS OF LEFT CAROTID ARTERY WITHOUT CEREBRAL INFARCTION: ICD-10-CM

## 2017-05-01 DIAGNOSIS — I67.89 CEREBRAL MICROVASCULAR DISEASE: ICD-10-CM

## 2017-05-01 DIAGNOSIS — F02.80 DEMENTIA OF THE ALZHEIMER'S TYPE WITH LATE ONSET WITHOUT BEHAVIORAL DISTURBANCE (HCC): Primary | ICD-10-CM

## 2017-05-01 NOTE — PATIENT INSTRUCTIONS

## 2017-05-01 NOTE — LETTER
5/1/2017 8:56 PM 
 
Patient:  Monique Corado YOB: 1945 Date of Visit: 5/1/2017 Dear No Recipients: Thank you for referring Ms. Veronica Bates to me for evaluation/treatment. Below are the relevant portions of my assessment and plan of care. Neurology Progress Note Patient ID: 
Monique Corado [id number] 
70 y.o. 
1945 CHIEF COMPLAINT: Memory loss and dizziness Subjective:  
  
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 She was acutely confused and in retrospect this seems to be more related to a progressive memory loss probably related to dementia. She has neuropsych testing scheduled in June, and we will have her follow-up after that to decide on need for cognitive enhancing agents. She has a known left carotid stenosis followed by her vascular surgeon on a routine basis. All of the other causes for memory loss including metabolic studies were negative in the hospital.  The patient has done well since her discharge, and had no new neurological symptoms. She is on chronic anticoagulation for recurring blood clots. She has renal insufficiency, and no CTA was done. She continues to try to remain mentally and physically active and take her vitamins, and exercise regularly. She has had no more recurring episodes of vertigo. She has had no other new focal weakness sensory loss or focal neurological deficits. Current Outpatient Prescriptions Medication Sig  
 amLODIPine (NORVASC) 10 mg tablet TAKE 1 TABLET EVERY DAY  atorvastatin (LIPITOR) 20 mg tablet Take 1 Tab by mouth nightly.   
 L. acidoph & paracasei- S therm- Bifido (TY-Q/RISAQUAD) 8 billion cell cap cap Take 1 Cap by mouth daily.  oxybutynin (DITROPAN) 5 mg tablet Take 5 mg by mouth two (2) times a day.  gabapentin (NEURONTIN) 100 mg capsule Take 100 mg by mouth two (2) times a day.  patiromer calcium sorbitex (VELTASSA) 16.8 gram pwpk Take 1 Package by mouth every evening. Takes daily between 8997-5072  
 linaclotide (LINZESS) 290 mcg cap capsule Take 290 mcg by mouth daily as needed.  ELIQUIS 2.5 mg tablet TAKE 1 TABLET BY MOUTH TWO (2) TIMES A DAY.  cloNIDine HCl (CATAPRES) 0.1 mg tablet TAKE 1 TABLET EVERY 12 HOURS  
 buPROPion (WELLBUTRIN) 100 mg tablet Take 100 mg by mouth two (2) times a day.  iron, carbonyl (FEOSOL) 45 mg tab Take 1 Tab by mouth two (2) times a day.  magnesium oxide (MAG-OX) 400 mg tablet Take 800 mg by mouth three (3) times daily. Indications: HYPOMAGNESEMIA  acetaminophen (TYLENOL) 500 mg tablet Take 1,000 mg by mouth every six (6) hours as needed for Pain.  multivitamin (ONE A DAY) tablet Take 1 Tab by mouth every morning.  sodium bicarbonate 650 mg tablet Take 650 mg by mouth two (2) times a day.  diphenoxylate-atropine (LOMOTIL) 2.5-0.025 mg per tablet TAKE 1 TABLET TWICE A DAY IF NEEDED FOR DIARRHEA OR CRAMPING No current facility-administered medications for this visit. Past Medical History:  
Diagnosis Date  Chronic kidney disease Stage 5 (7/18/16 BUN 79, Creatnine 4.53, K 6) Dr. Ohara Conquest [phone]  GERD (gastroesophageal reflux disease)   
 well controlled with Rx   
 High cholesterol  Hyperkalemia  Hypertension  Hypomagnesemia   
 on daily Magnesium  Neuropathy   
 bilateral feet  Ovarian cancer ([facility] [address] 2013 Hysterectomy with chemo, no radiation:  Dr. Emani Cotton  Thromboembolus ([facility] [address] 9/2015  
 blood clot in lung 9/2015  Thromboembolus ([facility] [address] 2004  
 in kidney \"many years ago\" Past Surgical History:  
Procedure Laterality Date  ABDOMEN SURGERY PROC UNLISTED  7/31/15 Lap exploratory small bowel obstruction  (ICU)  ABDOMEN SURGERY PROC UNLISTED  8/2/15 Abdmonial washout with wound vac (ICU)  ABDOMEN SURGERY PROC UNLISTED  8/18/15 Abdominal washout fascial closure (ICU)  ABDOMEN SURGERY PROC UNLISTED  11/11/15 Lap takedown of ileostomy  COLONOSCOPY N/A 8/1/2016 COLONOSCOPY performed by Thu Johnson MD at . [address] HX APPENDECTOMY  approx 2005  HX CASI AND BSO  2013  
 due to ovarian cancer [place] Thu TONSILLECTOMY  age 7  
 
 
[unfilled] Social History Substance Use Topics  Smoking status: Current Every Day Smoker Packs/day: 0.25 Last attempt to quit: 1/11/2012  Smokeless tobacco: Never Used  Alcohol use Yes Comment: 2 martini weekly as of 7/26/16 Current Outpatient Prescriptions Medication Sig Dispense Refill  amLODIPine (NORVASC) 10 mg tablet TAKE 1 TABLET EVERY DAY 30 Tab 11  
 atorvastatin (LIPITOR) 20 mg tablet Take 1 Tab by mouth nightly. 30 Tab 0  
 L. acidoph & paracasei- S therm- Bifido (TY-Q/RISAQUAD) 8 billion cell cap cap Take 1 Cap by mouth daily.  oxybutynin (DITROPAN) 5 mg tablet Take 5 mg by mouth two (2) times a day.  gabapentin (NEURONTIN) 100 mg capsule Take 100 mg by mouth two (2) times a day.  patiromer calcium sorbitex (VELTASSA) 16.8 gram pwpk Take 1 Package by mouth every evening. Takes daily between 5814-3054    
 linaclotide (LINZESS) 290 mcg cap capsule Take 290 mcg by mouth daily as needed.  ELIQUIS 2.5 mg tablet TAKE 1 TABLET BY MOUTH TWO (2) TIMES A DAY. 60 Tab 3  cloNIDine HCl (CATAPRES) 0.1 mg tablet TAKE 1 TABLET EVERY 12 HOURS 60 Tab 11  
 buPROPion (WELLBUTRIN) 100 mg tablet Take 100 mg by mouth two (2) times a day.  iron, carbonyl (FEOSOL) 45 mg tab Take 1 Tab by mouth two (2) times a day.  magnesium oxide (MAG-OX) 400 mg tablet Take 800 mg by mouth three (3) times daily. Indications: HYPOMAGNESEMIA  acetaminophen (TYLENOL) 500 mg tablet Take 1,000 mg by mouth every six (6) hours as needed for Pain.  multivitamin (ONE A DAY) tablet Take 1 Tab by mouth every morning.  sodium bicarbonate 650 mg tablet Take 650 mg by mouth two (2) times a day.  diphenoxylate-atropine (LOMOTIL) 2.5-0.025 mg per tablet TAKE 1 TABLET TWICE A DAY IF NEEDED FOR DIARRHEA OR CRAMPING 60 Tab 5 Allergies Allergen Reactions  Citrus And Derivatives Anaphylaxis Patient and her  reported  Penicillin G Anaphylaxis  Orange Juice Not Reported This Time  Sulfa (Sulfonamide Antibiotics) Other (comments) \"Dont remember\"  Vancomycin Hives Review of Systems: A comprehensive review of systems was negative except for: Constitutional: positive for fatigue and malaise Ears, nose, mouth, throat, and face: positive for hearing loss Cardiovascular: positive for chest pressure/discomfort, irregular heart beats Musculoskeletal: positive for myalgias, arthralgias and stiff joints Neurological: positive for dizziness, memory problems and weakness Behvioral/Psych: positive for anxiety and depression Objective: 
 
 
Objective:  
 
@IPVITALS(24:)@ Lab Review No results found for this or any previous visit (from the past 24 hour(s)). Additional comments:I personally viewed and interpreted the patient's CT scan and MRI scan and labs NEUROLOGICAL EXAM: 
 
Appearance: The patient is well developed, well nourished, provides a fair history and is in no acute distress. Mental Status: Oriented to place and person and the president, has a little difficulty with the date, cognitive function and fund of knowledge is abnormal. Speech is fluent without aphasia or dysarthria. Mood and affect appropriate, but looks depressed . Cranial Nerves:   Intact visual fields. Fundi are benign. NIKITA, EOM's full, no nystagmus, no ptosis.  Facial sensation is normal. Corneal reflexes are not tested. Facial movement is symmetric. Hearing is abnormal bilaterally. Palate is midline with normal sternocleidomastoid and trapezius muscles are normal. Tongue is midline. Neck without meningismus or bruits Motor:  4/5 strength in upper and lower proximal and distal muscles. Normal tone but reducible generally. No fasciculations. Reflexes:   Deep tendon reflexes 1+/4 and symmetrical. 
No babinski or clonus present Sensory:   Normal to touch, pinprick and temperature and vibration. DSS is intact Gait:  Normal gait though patient does move a little slowly due to her age . Tremor:   No tremor noted. Cerebellar:  No cerebellar signs present. Neurovascular:  Normal heart sounds and regular rhythm, peripheral pulses decreased, and no carotid bruits. Assessment: 
 
 
 
Assessment: ICD-10-CM ICD-9-CM 1. Dementia of the Alzheimer's type with late onset without behavioral disturbance G30.1 331.0 F02.80 294.10 2. Stenosis of both internal carotid arteries I65.23 433.10   
  433.30 3. Cerebral microvascular disease I67.9 437.9 4. Acute encephalopathy G93.40 348.30   
5. Bilateral carotid artery stenosis I65.23 433.10   
  433.30   
6. Stenosis of left carotid artery without cerebral infarction I65.22 433.10   
7. Abnormal MRI of head R93.0 793.0 Plan:  
 
Patient will get neuropsych testing for her memory loss and most likely she has mild dementia, but rule out depression or pseudodementia or mild cognitive impairment Her dizziness is better now, and her confusion better and she most likely had a decompensated stress reaction on top of dementia She is going to increased stress due to her 's recovery from foot surgery Overall she is doing well and we have encouraged her to remain mentally and physically active and take her medications regularly and her vitamins and vitamin D Follow-up in 6 months time or earlier if needed and she will call after her neuropsych testing Signed: 
Odilia Evans MD 
5/1/2017 
1:42 PM 
 
Fred Chandler MD 
[phone] This note will not be viewable in [address] If you have questions, please do not hesitate to call me. I look forward to following Ms. Conte along with you. Sincerely, Odilia Evans MD

## 2017-05-01 NOTE — MR AVS SNAPSHOT
Visit Information Date & Time Provider Department Dept. Phone Encounter #  
 5/1/2017  1:00 PM Cheri Reilly MD Neurology [facility] at [facility] [phone] [id number] Follow-up Instructions Return in about 6 months (around 11/1/2017). Your Appointments 7/24/2017  1:30 PM  
ROUTINE CARE with Ulises Langford, [address] [place] [address],[address]) Appt Note: 6 month follow up  
 [address] [address]  
[address] [facility] [address] Upcoming Health Maintenance Date Due DTaP/Tdap/Td series (1 - Tdap) 10/6/1965 BREAST CANCER SCRN MAMMOGRAM 10/6/1994 ZOSTER VACCINE AGE 60> 10/6/2004 GLAUCOMA SCREENING Q2Y 10/6/2009 OSTEOPOROSIS SCREENING (DEXA) 10/6/2009 INFLUENZA AGE 9 TO ADULT 8/1/2017 MEDICARE YEARLY EXAM 4/7/2018 COLONOSCOPY 8/1/2021 Allergies as of 5/1/2017  Review Complete On: 5/1/2017 By: Cheri Reilly MD  
  
 Severity Noted Reaction Type Reactions Citrus And Derivatives High 08/24/2015    Anaphylaxis Patient and her  reported Penicillin G High 10/19/2014    Anaphylaxis [place] Juice  08/31/2015    Not Reported This Time  
 Sulfa (Sulfonamide Antibiotics)  07/23/2015    Other (comments) \"Dont remember\" Vancomycin  01/22/2016    Hives Current Immunizations  Reviewed on 3/10/2016 Name Date Influenza High Dose Vaccine PF 9/15/2016 Influenza Vaccine 10/20/2015 Pneumococcal Conjugate (PCV-13) 10/20/2015 Pneumococcal Polysaccharide (PPSV-23) 11/10/2012 Pneumococcal Vaccine (Unspecified Type) 1/1/2015 Not reviewed this visit Vitals BP Pulse Resp Height(growth percentile) Weight(growth percentile) SpO2  
 148/66 81 12 5' 8\" (1.727 m) 134 lb (60.8 kg) 97% BMI OB Status Smoking Status 20.37 kg/m2 Hysterectomy Current Every Day Smoker Vitals History BMI and BSA Data Body Mass Index Body Surface Area  
 20.37 kg/m 2 1.71 m 2 Preferred Pharmacy Pharmacy Name Phone [facility] [phone] Your Updated Medication List  
  
   
This list is accurate as of: 5/1/17  1:38 PM.  Always use your most recent med list.  
  
  
  
  
 acetaminophen 500 mg tablet Commonly known as:  TYLENOL Take 1,000 mg by mouth every six (6) hours as needed for Pain. amLODIPine 10 mg tablet Commonly known as:  Lenny Jay TAKE 1 TABLET EVERY DAY  
  
 atorvastatin 20 mg tablet Commonly known as:  LIPITOR Take 1 Tab by mouth nightly. buPROPion 100 mg tablet Commonly known as:  [facility] Take 100 mg by mouth two (2) times a day. cloNIDine HCl 0.1 mg tablet Commonly known as:  CATAPRES  
TAKE 1 TABLET EVERY 12 HOURS  
  
 diphenoxylate-atropine 2.5-0.025 mg per tablet Commonly known as:  LOMOTIL  
TAKE 1 TABLET TWICE A DAY IF NEEDED FOR DIARRHEA OR CRAMPING  
  
 ELIQUIS 2.5 mg tablet Generic drug:  apixaban TAKE 1 TABLET BY MOUTH TWO (2) TIMES A DAY. gabapentin 100 mg capsule Commonly known as:  NEURONTIN Take 100 mg by mouth two (2) times a day. iron, carbonyl 45 mg Tab Commonly known as:  Ochoa Zuniga Take 1 Tab by mouth two (2) times a day. L. acidoph & paracasei- S therm- Bifido 8 billion cell Cap cap Commonly known as:  TY-Q/RISAQUAD Take 1 Cap by mouth daily. linaclotide 290 mcg Cap capsule Commonly known as:  Paticia Newcomer Take 290 mcg by mouth daily as needed. magnesium oxide 400 mg tablet Commonly known as:  MAG-OX Take 800 mg by mouth three (3) times daily. Indications: HYPOMAGNESEMIA  
  
 multivitamin tablet Commonly known as:  ONE A DAY Take 1 Tab by mouth every morning. oxybutynin 5 mg tablet Commonly known as:  RKOLPMVH Take 5 mg by mouth two (2) times a day. sodium bicarbonate 650 mg tablet Take 650 mg by mouth two (2) times a day. VELTASSA 16.8 gram Pwpk Generic drug:  patiromer calcium sorbitex Take 1 Package by mouth every evening. Takes daily between 7927-1329 Follow-up Instructions Return in about 6 months (around 11/1/2017). To-Do List   
 06/13/2017 1:00 PM  
  Appointment with [facility] [address] at [address] [place] [address] ([phone]) Shower or bathe using soap and water. Do not use deodorant, powder, perfumes, or lotion the day of your exam.  If your prior mammograms were not performed at [facility] 6 please bring films with you or forward prior images 2 days before your procedure. Check in at registration 15min before your appointment time unless you were instructed to do otherwise. A script is not necessary, but if you have one, please bring it on the day of the mammogram or have it faxed to the department. [facility] [phone] [facility]  [phone] [address]  [phone] Critical access hospital [phone] [place] [address] [facility] [phone] Patient Instructions A Healthy Lifestyle: Care Instructions Your Care Instructions A healthy lifestyle can help you feel good, stay at a healthy weight, and have plenty of energy for both work and play. A healthy lifestyle is something you can share with your whole family. A healthy lifestyle also can lower your risk for serious health problems, such as high blood pressure, heart disease, and diabetes. You can follow a few steps listed below to improve your health and the health of your family. Follow-up care is a key part of your treatment and safety. Be sure to make and go to all appointments, and call your doctor if you are having problems. Its also a good idea to know your test results and keep a list of the medicines you take. How can you care for yourself at home? · Do not eat too much sugar, fat, or fast foods.  You can still have dessert and treats now and then. The goal is moderation. · Start small to improve your eating habits. Pay attention to portion sizes, drink less juice and soda pop, and eat more fruits and vegetables. ¨ Eat a healthy amount of food. A 3-ounce serving of meat, for example, is about the size of a deck of cards. Fill the rest of your plate with vegetables and whole grains. ¨ Limit the amount of soda and sports drinks you have every day. Drink more water when you are thirsty. ¨ Eat at least 5 servings of fruits and vegetables every day. It may seem like a lot, but it is not hard to reach this goal. A serving or helping is 1 piece of fruit, 1 cup of vegetables, or 2 cups of leafy, raw vegetables. Have an apple or some carrot sticks as an afternoon snack instead of a candy bar. Try to have fruits and/or vegetables at every meal. 
· Make exercise part of your daily routine. You may want to start with simple activities, such as walking, bicycling, or slow swimming. Try to be active 30 to 60 minutes every day. You do not need to do all 30 to 60 minutes all at once. For example, you can exercise 3 times a day for 10 or 20 minutes. Moderate exercise is safe for most people, but it is always a good idea to talk to your doctor before starting an exercise program. 
· Keep moving. [place] the lawn, work in the garden, or [employer]. Take the stairs instead of the elevator at work. · If you smoke, quit. People who smoke have an increased risk for heart attack, stroke, cancer, and other lung illnesses. Quitting is hard, but there are ways to boost your chance of quitting tobacco for good. ¨ Use nicotine gum, patches, or lozenges. ¨ Ask your doctor about stop-smoking programs and medicines. ¨ Keep trying.  
In addition to reducing your risk of diseases in the future, you will notice some benefits soon after you stop using tobacco. If you have shortness of breath or asthma symptoms, they will likely get better within a few weeks after you quit. · Limit how much alcohol you drink. Moderate amounts of alcohol (up to 2 drinks a day for men, 1 drink a day for women) are okay. But drinking too much can lead to liver problems, high blood pressure, and other health problems. Family health If you have a family, there are many things you can do together to improve your health. · Eat meals together as a family as often as possible. · Eat healthy foods. This includes fruits, vegetables, lean meats and dairy, and whole grains. · Include your family in your fitness plan. Most people think of activities such as jogging or tennis as the way to fitness, but there are many ways you and your family can be more active. Anything that makes you breathe hard and gets your heart pumping is exercise. Here are some tips: 
¨ Walk to do errands or to take your child to school or the bus. ¨ Go for a family bike ride after dinner instead of watching TV. Where can you learn more? Go to http://myriam-lorena.info/. Enter X709 in the search box to learn more about \"A Healthy Lifestyle: Care Instructions. \" Current as of: July 26, 2016 Content Version: 11.2 © 4275-6184 [employer]. Care instructions adapted under license by [employer] (which disclaims liability or warranty for this information). If you have questions about a medical condition or this instruction, always ask your healthcare professional. [place] [address] any warranty or liability for your use of this information. Introducing [facility]! Dear Ed Russo: 
Thank you for requesting a Machine Talker account. Our records indicate that you already have an active Machine Talker account. You can access your account anytime at [email or web address]. [email or web address] Did you know that you can access your hospital and ER discharge instructions at any time in Machine Talker?   You can also review all of your test results from your hospital stay or ER visit. Additional Information If you have questions, please visit the Frequently Asked Questions section of the Nipendo website at [email or web address] [email or web address]. Intuitive User Interfaces/mychart/. Remember, Nipendo is NOT to be used for urgent needs. For medical emergencies, dial 911. Now available from your iPhone and Android! Please provide this summary of care documentation to your next provider. Your primary care clinician is listed as Sung CHEN. If you have any questions after today's visit, please call [phone].

## 2017-05-01 NOTE — PROGRESS NOTES
Neurology Progress Note    Patient ID:  Lisa Munoz  [id number]  89 y.o.  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 She was acutely confused and in retrospect this seems to be more related to a progressive memory loss probably related to dementia. She has neuropsych testing scheduled in June, and we will have her follow-up after that to decide on need for cognitive enhancing agents. She has a known left carotid stenosis followed by her vascular surgeon on a routine basis. All of the other causes for memory loss including metabolic studies were negative in the hospital.  The patient has done well since her discharge, and had no new neurological symptoms. She is on chronic anticoagulation for recurring blood clots. She has renal insufficiency, and no CTA was done. She continues to try to remain mentally and physically active and take her vitamins, and exercise regularly. She has had no more recurring episodes of vertigo. She has had no other new focal weakness sensory loss or focal neurological deficits. Current Outpatient Prescriptions   Medication Sig    amLODIPine (NORVASC) 10 mg tablet TAKE 1 TABLET EVERY DAY    atorvastatin (LIPITOR) 20 mg tablet Take 1 Tab by mouth nightly.  L. acidoph & paracasei- S therm- Bifido (TY-Q/RISAQUAD) 8 billion cell cap cap Take 1 Cap by mouth daily.  oxybutynin (DITROPAN) 5 mg tablet Take 5 mg by mouth two (2) times a day.  gabapentin (NEURONTIN) 100 mg capsule Take 100 mg by mouth two (2) times a day.     patiromer calcium sorbitex (VELTASSA) 16.8 gram pwpk Take 1 Package by mouth every evening. Takes daily between 5105-8342    linaclotide (LINZESS) 290 mcg cap capsule Take 290 mcg by mouth daily as needed.  ELIQUIS 2.5 mg tablet TAKE 1 TABLET BY MOUTH TWO (2) TIMES A DAY.  cloNIDine HCl (CATAPRES) 0.1 mg tablet TAKE 1 TABLET EVERY 12 HOURS    buPROPion (WELLBUTRIN) 100 mg tablet Take 100 mg by mouth two (2) times a day.  iron, carbonyl (FEOSOL) 45 mg tab Take 1 Tab by mouth two (2) times a day.  magnesium oxide (MAG-OX) 400 mg tablet Take 800 mg by mouth three (3) times daily. Indications: HYPOMAGNESEMIA    acetaminophen (TYLENOL) 500 mg tablet Take 1,000 mg by mouth every six (6) hours as needed for Pain.  multivitamin (ONE A DAY) tablet Take 1 Tab by mouth every morning.  sodium bicarbonate 650 mg tablet Take 650 mg by mouth two (2) times a day.  diphenoxylate-atropine (LOMOTIL) 2.5-0.025 mg per tablet TAKE 1 TABLET TWICE A DAY IF NEEDED FOR DIARRHEA OR CRAMPING     No current facility-administered medications for this visit.          Past Medical History:   Diagnosis Date    Chronic kidney disease     Stage 5 (7/18/16 BUN 79, Creatnine 4.53, K 6) Dr. Toni Stiles [phone]    GERD (gastroesophageal reflux disease)     well controlled with Rx     High cholesterol     Hyperkalemia     Hypertension     Hypomagnesemia     on daily Magnesium    Neuropathy     bilateral feet    Ovarian cancer ([facility] 2013    Hysterectomy with chemo, no radiation:  Dr. Alessandra Styles [facility]) 9/2015    blood clot in lung 9/2015    Thromboembolus ([facility] [address] [address] 2004    in kidney \"many years ago\"       Past Surgical History:   Procedure Laterality Date    ABDOMEN SURGERY [address] UNLISTED  7/31/15    Lap exploratory small bowel obstruction  (ICU)    ABDOMEN SURGERY PROC UNLISTED  8/2/15    Abdmonial washout with wound vac (ICU)    ABDOMEN SURGERY PROC UNLISTED  8/18/15    Abdominal washout fascial closure (ICU)    ABDOMEN SURGERY PROC UNLISTED  11/11/15    Lap takedown of ileostomy  COLONOSCOPY N/A 8/1/2016    COLONOSCOPY performed by Dennise Wolff [facility] at [facility] HX APPENDECTOMY  approx 2005    HX CASI AND BSO  2013    due to ovarian cancer    HX TONSILLECTOMY  age 10       [de-identified]    Social History   Substance Use Topics    Smoking status: Current Every Day Smoker     Packs/day: 0.25     Last attempt to quit: 1/11/2012    Smokeless tobacco: Never Used    Alcohol use Yes      Comment: 2 martini weekly as of 7/26/16       Current Outpatient Prescriptions   Medication Sig Dispense Refill    amLODIPine (NORVASC) 10 mg tablet TAKE 1 TABLET EVERY DAY 30 Tab 11    atorvastatin (LIPITOR) 20 mg tablet Take 1 Tab by mouth nightly. 30 Tab 0    L. acidoph & paracasei- S therm- Bifido (TY-Q/RISAQUAD) 8 billion cell cap cap Take 1 Cap by mouth daily.  oxybutynin (DITROPAN) 5 mg tablet Take 5 mg by mouth two (2) times a day.  gabapentin (NEURONTIN) 100 mg capsule Take 100 mg by mouth two (2) times a day.  patiromer calcium sorbitex (VELTASSA) 16.8 gram pwpk Take 1 Package by mouth every evening. Takes daily between 8204-0852      linaclotide (LINZESS) 290 mcg cap capsule Take 290 mcg by mouth daily as needed.  ELIQUIS 2.5 mg tablet TAKE 1 TABLET BY MOUTH TWO (2) TIMES A DAY. 60 Tab 3    cloNIDine HCl (CATAPRES) 0.1 mg tablet TAKE 1 TABLET EVERY 12 HOURS 60 Tab 11    buPROPion (WELLBUTRIN) 100 mg tablet Take 100 mg by mouth two (2) times a day.  iron, carbonyl (FEOSOL) 45 mg tab Take 1 Tab by mouth two (2) times a day.  magnesium oxide (MAG-OX) 400 mg tablet Take 800 mg by mouth three (3) times daily. Indications: HYPOMAGNESEMIA      acetaminophen (TYLENOL) 500 mg tablet Take 1,000 mg by mouth every six (6) hours as needed for Pain.  multivitamin (ONE A DAY) tablet Take 1 Tab by mouth every morning.  sodium bicarbonate 650 mg tablet Take 650 mg by mouth two (2) times a day.       diphenoxylate-atropine (LOMOTIL) 2.5-0.025 mg per tablet TAKE 1 TABLET TWICE A DAY IF NEEDED FOR DIARRHEA OR CRAMPING 60 Tab 5       Allergies   Allergen Reactions    Citrus And Derivatives Anaphylaxis     Patient and her  reported    Penicillin G Anaphylaxis    Orange Juice Not Reported This Time    Sulfa (Sulfonamide Antibiotics) Other (comments)     \"Dont remember\"    Vancomycin Hives       Review of Systems:    A comprehensive review of systems was negative except for: Constitutional: positive for fatigue and malaise  Ears, nose, mouth, throat, and face: positive for hearing loss  Cardiovascular: positive for chest pressure/discomfort, irregular heart beats  Musculoskeletal: positive for myalgias, arthralgias and stiff joints  Neurological: positive for dizziness, memory problems and weakness  Behvioral/Psych: positive for anxiety and depression    Objective:      Objective:     @IPVITALS(24:)@      Lab Review No results found for this or any previous visit (from the past 24 hour(s)). Additional comments:I personally viewed and interpreted the patient's CT scan and MRI scan and labs        NEUROLOGICAL EXAM:    Appearance: The patient is well developed, well nourished, provides a fair history and is in no acute distress. Mental Status: Oriented to place and person and the president, has a little difficulty with the date, cognitive function and fund of knowledge is abnormal. Speech is fluent without aphasia or dysarthria. Mood and affect appropriate, but looks depressed . Cranial Nerves:   Intact visual fields. Fundi are benign. NIKITA, EOM's full, no nystagmus, no ptosis. Facial sensation is normal. Corneal reflexes are not tested. Facial movement is symmetric. Hearing is abnormal bilaterally. Palate is midline with normal sternocleidomastoid and trapezius muscles are normal. Tongue is midline. Neck without meningismus or bruits   Motor:  4/5 strength in upper and lower proximal and distal muscles. Normal tone but reducible generally.  No fasciculations. Reflexes:   Deep tendon reflexes 1+/4 and symmetrical.  No babinski or clonus present   Sensory:   Normal to touch, pinprick and temperature and vibration. DSS is intact   Gait:  Normal gait though patient does move a little slowly due to her age . Tremor:   No tremor noted. Cerebellar:  No cerebellar signs present. Neurovascular:  Normal heart sounds and regular rhythm, peripheral pulses decreased, and no carotid bruits. Assessment:        Assessment:       ICD-10-CM ICD-9-CM    1. 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 Stenosis of both internal carotid arteries I65.23 433.10      433.30    3. Cerebral microvascular disease I67.9 437.9    4. Acute encephalopathy G93.40 348.30    5. Bilateral carotid artery stenosis I65.23 433.10      433.30    6. Stenosis of left carotid artery without cerebral infarction I65.22 433.10    7. Abnormal MRI of head R93.0 793.0          Plan:     Patient will get neuropsych testing for her memory loss and most likely she has mild dementia, but rule out depression or pseudodementia or mild cognitive impairment  Her dizziness is better now, and her confusion better and she most likely had a decompensated stress reaction on top of dementia  She is going to increased stress due to her 's recovery from foot surgery  Overall she is doing well and we have encouraged her to remain mentally and physically active and take her medications regularly and her vitamins and vitamin D  Follow-up in 6 months time or earlier if needed and she will call after her neuropsych testing      Signed:  Cheri Reilly MD  5/1/2017  1:42 PM    Ulises Langford MD  [phone]    This note will not be viewable in [address]

## 2017-05-01 NOTE — LETTER
5/1/2017 8:54 PM 
 
Patient:  Bonnie Clay YOB: 1945 Date of Visit: 5/1/2017 Dear No Recipients: Thank you for referring Ms. Yessi Sanders to me for evaluation/treatment. Below are the relevant portions of my assessment and plan of care. Neurology Progress Note Patient ID: 
Bonnie Clay [id number] 
85 y.o. 
1945 CHIEF COMPLAINT: Memory loss and dizziness Subjective:  
  
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 She was acutely confused and in retrospect this seems to be more related to a progressive memory loss probably related to dementia. She has neuropsych testing scheduled in June, and we will have her follow-up after that to decide on need for cognitive enhancing agents. She has a known left carotid stenosis followed by her vascular surgeon on a routine basis. All of the other causes for memory loss including metabolic studies were negative in the hospital.  The patient has done well since her discharge, and had no new neurological symptoms. She is on chronic anticoagulation for recurring blood clots. She has renal insufficiency, and no CTA was done. She continues to try to remain mentally and physically active and take her vitamins, and exercise regularly. She has had no more recurring episodes of vertigo. She has had no other new focal weakness sensory loss or focal neurological deficits. Current Outpatient Prescriptions Medication Sig  
 amLODIPine (NORVASC) 10 mg tablet TAKE 1 TABLET EVERY DAY  atorvastatin (LIPITOR) 20 mg tablet Take 1 Tab by mouth nightly.   
 L. acidoph & paracasei- S therm- Bifido (TY-Q/RISAQUAD) 8 billion cell cap cap Take 1 Cap by mouth daily.  oxybutynin (DITROPAN) 5 mg tablet Take 5 mg by mouth two (2) times a day.  gabapentin (NEURONTIN) 100 mg capsule Take 100 mg by mouth two (2) times a day.  patiromer calcium sorbitex (VELTASSA) 16.8 gram pwpk Take 1 Package by mouth every evening. Takes daily between 2489-8245  
 linaclotide (LINZESS) 290 mcg cap capsule Take 290 mcg by mouth daily as needed.  ELIQUIS 2.5 mg tablet TAKE 1 TABLET BY MOUTH TWO (2) TIMES A DAY.  cloNIDine HCl (CATAPRES) 0.1 mg tablet TAKE 1 TABLET EVERY 12 HOURS  
 buPROPion (WELLBUTRIN) 100 mg tablet Take 100 mg by mouth two (2) times a day.  iron, carbonyl (FEOSOL) 45 mg tab Take 1 Tab by mouth two (2) times a day.  magnesium oxide (MAG-OX) 400 mg tablet Take 800 mg by mouth three (3) times daily. Indications: HYPOMAGNESEMIA  acetaminophen (TYLENOL) 500 mg tablet Take 1,000 mg by mouth every six (6) hours as needed for Pain.  multivitamin (ONE A DAY) tablet Take 1 Tab by mouth every morning.  sodium bicarbonate 650 mg tablet Take 650 mg by mouth two (2) times a day.  diphenoxylate-atropine (LOMOTIL) 2.5-0.025 mg per tablet TAKE 1 TABLET TWICE A DAY IF NEEDED FOR DIARRHEA OR CRAMPING No current facility-administered medications for this visit. Past Medical History:  
Diagnosis Date  Chronic kidney disease Stage 5 (7/18/16 BUN 79, Creatnine 4.53, K 6) Dr. Shweta Obregon [phone]  GERD (gastroesophageal reflux disease)   
 well controlled with Rx   
 High cholesterol  Hyperkalemia  Hypertension  Hypomagnesemia   
 on daily Magnesium  Neuropathy   
 bilateral feet  Ovarian cancer ([facility] [address] 2013 Hysterectomy with chemo, no radiation:  Dr. Ly Tabor  Thromboembolus ([facility] [address] 9/2015  
 blood clot in lung 9/2015  Thromboembolus ([facility] [address] 2004  
 in kidney \"many years ago\" Past Surgical History:  
Procedure Laterality Date  ABDOMEN SURGERY PROC UNLISTED  7/31/15 Lap exploratory small bowel obstruction  (ICU)  ABDOMEN SURGERY PROC UNLISTED  8/2/15 Abdmonial washout with wound vac (ICU)  ABDOMEN SURGERY PROC UNLISTED  8/18/15 Abdominal washout fascial closure (ICU)  ABDOMEN SURGERY PROC UNLISTED  11/11/15 Lap takedown of ileostomy  COLONOSCOPY N/A 8/1/2016 COLONOSCOPY performed by Octavio Garcia MD at . [facility] HX APPENDECTOMY  approx 2005  HX CASI AND BSO  2013  
 due to ovarian cancer Isreal Fourniers TONSILLECTOMY  age 7  
 
 
[unfilled] Social History Substance Use Topics  Smoking status: Current Every Day Smoker Packs/day: 0.25 Last attempt to quit: 1/11/2012  Smokeless tobacco: Never Used  Alcohol use Yes Comment: 2 martini weekly as of 7/26/16 Current Outpatient Prescriptions Medication Sig Dispense Refill  amLODIPine (NORVASC) 10 mg tablet TAKE 1 TABLET EVERY DAY 30 Tab 11  
 atorvastatin (LIPITOR) 20 mg tablet Take 1 Tab by mouth nightly. 30 Tab 0  
 L. acidoph & paracasei- S therm- Bifido (TY-Q/RISAQUAD) 8 billion cell cap cap Take 1 Cap by mouth daily.  oxybutynin (DITROPAN) 5 mg tablet Take 5 mg by mouth two (2) times a day.  gabapentin (NEURONTIN) 100 mg capsule Take 100 mg by mouth two (2) times a day.  patiromer calcium sorbitex (VELTASSA) 16.8 gram pwpk Take 1 Package by mouth every evening. Takes daily between 2970-6566    
 linaclotide (LINZESS) 290 mcg cap capsule Take 290 mcg by mouth daily as needed.  ELIQUIS 2.5 mg tablet TAKE 1 TABLET BY MOUTH TWO (2) TIMES A DAY. 60 Tab 3  cloNIDine HCl (CATAPRES) 0.1 mg tablet TAKE 1 TABLET EVERY 12 HOURS 60 Tab 11  
 buPROPion (WELLBUTRIN) 100 mg tablet Take 100 mg by mouth two (2) times a day.  iron, carbonyl (FEOSOL) 45 mg tab Take 1 Tab by mouth two (2) times a day.  magnesium oxide (MAG-OX) 400 mg tablet Take 800 mg by mouth three (3) times daily. Indications: HYPOMAGNESEMIA  acetaminophen (TYLENOL) 500 mg tablet Take 1,000 mg by mouth every six (6) hours as needed for Pain.  multivitamin (ONE A DAY) tablet Take 1 Tab by mouth every morning.  sodium bicarbonate 650 mg tablet Take 650 mg by mouth two (2) times a day.  diphenoxylate-atropine (LOMOTIL) 2.5-0.025 mg per tablet TAKE 1 TABLET TWICE A DAY IF NEEDED FOR DIARRHEA OR CRAMPING 60 Tab 5 Allergies Allergen Reactions  Citrus And Derivatives Anaphylaxis Patient and her  reported  Penicillin G Anaphylaxis  Orange Juice Not Reported This Time  Sulfa (Sulfonamide Antibiotics) Other (comments) \"Dont remember\"  Vancomycin Hives Review of Systems: A comprehensive review of systems was negative except for: Constitutional: positive for fatigue and malaise Ears, nose, mouth, throat, and face: positive for hearing loss Cardiovascular: positive for chest pressure/discomfort, irregular heart beats Musculoskeletal: positive for myalgias, arthralgias and stiff joints Neurological: positive for dizziness, memory problems and weakness Behvioral/Psych: positive for anxiety and depression Objective: 
 
 
Objective:  
 
@IPVITALS(24:)@ Lab Review No results found for this or any previous visit (from the past 24 hour(s)). Additional comments:I personally viewed and interpreted the patient's CT scan and MRI scan and labs NEUROLOGICAL EXAM: 
 
Appearance: The patient is well developed, well nourished, provides a fair history and is in no acute distress. Mental Status: Oriented to place and person and the president, has a little difficulty with the date, cognitive function and fund of knowledge is abnormal. Speech is fluent without aphasia or dysarthria. Mood and affect appropriate, but looks depressed . Cranial Nerves:   Intact visual fields. Fundi are benign. NIKITA, EOM's full, no nystagmus, no ptosis.  Facial sensation is normal. Corneal reflexes are not tested. Facial movement is symmetric. Hearing is abnormal bilaterally. Palate is midline with normal sternocleidomastoid and trapezius muscles are normal. Tongue is midline. Neck without meningismus or bruits Motor:  4/5 strength in upper and lower proximal and distal muscles. Normal tone but reducible generally. No fasciculations. Reflexes:   Deep tendon reflexes 1+/4 and symmetrical. 
No babinski or clonus present Sensory:   Normal to touch, pinprick and temperature and vibration. DSS is intact Gait:  Normal gait though patient does move a little slowly due to her age . Tremor:   No tremor noted. Cerebellar:  No cerebellar signs present. Neurovascular:  Normal heart sounds and regular rhythm, peripheral pulses decreased, and no carotid bruits. Assessment: 
 
 
 
Assessment:  
{No Diagnosis Found} Plan:  
 
Patient will get neuropsych testing for her memory loss and most likely she has mild dementia, but rule out depression or pseudodementia or mild cognitive impairment Her dizziness is better now, and her confusion better and she most likely had a decompensated stress reaction on top of dementia She is going to increased stress due to her 's recovery from foot surgery Overall she is doing well and we have encouraged her to remain mentally and physically active and take her medications regularly and her vitamins and vitamin D Follow-up in 6 months time or earlier if needed and she will call after her neuropsych testing Signed: 
Migdalia Cummins MD 
5/1/2017 
1:42 PM 
 
Rosetta Santillan MD 
[phone] This note will not be viewable in [address] If you have questions, please do not hesitate to call me. I look forward to following Ms. Conte along with you. Sincerely, Migdalia Cummins MD

## 2017-05-02 ENCOUNTER — PATIENT OUTREACH (OUTPATIENT)
Dept: INTERNAL MEDICINE CLINIC | Age: 72
End: 2017-05-02

## 2017-05-02 NOTE — PROGRESS NOTES
NNTOCIP Post Hospitalization -[facility] 3/29-3/31/17-renal failure; miguel k; expressive dysphasia       - Patient attended [facility] appointment with Dr. Carol Chambers on 4/6/17; was advised to follow-up in 3 months. To the best of this NN's knowledge, this patient had no additional ED visits or Hospital Admissions during 30 day [facility] period following admission to [facility]. [facility] period has ended. Post-Hospitalization Episode Resolved. Patient has NN contact information if any questions/concerns arise in the future.       Future Appointments:  6/13/2017 1:00 PM [facility]   7/24/2017 1:30 PM Sher Delong, [id number]   11/13/2017 1:20 PM Omari Doll MD Neurology Clinic at [facility]

## 2017-05-11 RX ORDER — APIXABAN 2.5 MG/1
TABLET, FILM COATED ORAL
Qty: 60 TAB | Refills: 3 | Status: SHIPPED | OUTPATIENT
Start: 2017-05-11 | End: 2017-09-10 | Stop reason: SDUPTHER

## 2017-05-16 RX ORDER — FAMOTIDINE 20 MG/1
TABLET, FILM COATED ORAL
Qty: 30 TAB | Refills: 11 | Status: SHIPPED | OUTPATIENT
Start: 2017-05-16 | End: 2018-04-23 | Stop reason: SDUPTHER

## 2017-06-05 ENCOUNTER — HOSPITAL ENCOUNTER (OUTPATIENT)
Dept: MAMMOGRAPHY | Age: 72
Discharge: HOME | End: 2017-06-05
Attending: INTERNAL MEDICINE
Payer: MEDICARE

## 2017-06-05 DIAGNOSIS — Z12.31 ENCOUNTER FOR SCREENING MAMMOGRAM FOR BREAST CANCER: ICD-10-CM

## 2017-06-05 PROCEDURE — 77067 SCR MAMMO BI INCL CAD: CPT

## 2017-06-22 RX ORDER — ONDANSETRON 4 MG/1
TABLET, FILM COATED ORAL
Qty: 30 TAB | Refills: 3 | Status: SHIPPED | OUTPATIENT
Start: 2017-06-22 | End: 2017-10-19 | Stop reason: SDUPTHER

## 2017-06-24 RX ORDER — BUPROPION HYDROCHLORIDE 100 MG/1
TABLET ORAL
Qty: 90 TAB | Refills: 11 | Status: SHIPPED | OUTPATIENT
Start: 2017-06-24 | End: 2018-07-15 | Stop reason: SDUPTHER

## 2017-07-10 RX ORDER — CALCITRIOL 0.5 UG/1
CAPSULE ORAL
Qty: 30 CAP | Refills: 6 | Status: SHIPPED | OUTPATIENT
Start: 2017-07-10 | End: 2018-08-27 | Stop reason: CLARIF

## 2017-07-14 ENCOUNTER — OFFICE VISIT (OUTPATIENT)
Dept: NEUROLOGY | Age: 72
End: 2017-07-14

## 2017-07-14 VITALS
HEIGHT: 68 IN | SYSTOLIC BLOOD PRESSURE: 142 MMHG | HEART RATE: 79 BPM | WEIGHT: 140 LBS | DIASTOLIC BLOOD PRESSURE: 60 MMHG | OXYGEN SATURATION: 97 % | BODY MASS INDEX: 21.22 KG/M2

## 2017-07-14 DIAGNOSIS — G31.84 MCI (MILD COGNITIVE IMPAIRMENT): Primary | ICD-10-CM

## 2017-07-14 PROBLEM — F02.80 DEMENTIA OF THE ALZHEIMER'S TYPE WITH LATE ONSET WITHOUT BEHAVIORAL DISTURBANCE (HCC): Status: RESOLVED | Noted: 2017-03-31 | Resolved: 2017-07-14

## 2017-07-14 PROBLEM — G30.1 DEMENTIA OF THE ALZHEIMER'S TYPE WITH LATE ONSET WITHOUT BEHAVIORAL DISTURBANCE (HCC): Status: RESOLVED | Noted: 2017-03-31 | Resolved: 2017-07-14

## 2017-07-14 RX ORDER — CIPROFLOXACIN 500 MG/1
TABLET ORAL
Refills: 0 | COMMUNITY
Start: 2017-07-12 | End: 2018-01-25 | Stop reason: ALTCHOICE

## 2017-07-14 NOTE — PROGRESS NOTES
Date:             2017    Name:  Yayo Query  :  1945  MRN:  [id number]     PCP:  Jacquie Landry MD    Chief Complaint   Patient presents with    Follow-up    Dementia    Results     Neuropsych         HISTORY OF PRESENT ILLNESS:  Tamiko Pena is a 66 y.o., female who presents today for follow up for memory concerns. She was in the hospital in March for memory problems, worse at night or when she is under a lot of stress. She will often wake up in the middle the night confused, get very upset before she can be reoriented. She is easily thrown by things outside of her normal routine. She is getting lost while driving. Her father's had Alzheimer's, so she has been very concerned about her memory. She noticed changes in his memory in his late 's. She had neuropsychological testing done that was suggestive of MCI. She is sleeping well, she has to get up frequently to go to the bathroom but goes right back to sleep. She sometimes feels rested when she wakes up, sometimes does not. Bizarre dreams do not coincide with her need to go to the bathroom. She will wake up and ask her  where her grown children or  parents are. Her  thinks that memory issues all started after sepsis in , she was in the ICU in a coma and did extensive rehab before being sent home. She did recover dramatically after rehab, never got back to 100% in terms of her memory but then started declining again. She ended up in the ER 4 times in a small amount of time, medications were finally adjusted by a pharmacist and electrolytes and spells of AMS improved. She is doing all the driving, her  thinks that she is ok to drive to familiar places on her own but otherwise prefers her to drive with him in the car. She is on Wellbutrin for depression, admits that she has days where she has is depressed or barker but overall thinks her mood is well controlled.  She thinks that she has about 90% good days, 10% bad days. She does not see a counselor, but does speak with a friend who is a counselor every week for two hours. She admits that she is having a lot of trouble dealing with her illnesses and her 's health, they are both under a lot of stress. Current Outpatient Prescriptions   Medication Sig    ciprofloxacin HCl (CIPRO) 500 mg tablet TAKE 1 TABLET BY MOUTH EVERY DAY FOR 14 DAYS. SEPARATE DOSE FROM VITAMINS CONTAINING CALCIUM, IRON, OR MAGNESIUM, OR FROM TUMS OR DAIRY BY 2    calcitRIOL (ROCALTROL) 0.5 mcg capsule TAKE 1 CAPSULE EVERY DAY    buPROPion (WELLBUTRIN) 100 mg tablet TAKE 1 TABLET THREE TIMES DAILY    ondansetron hcl (ZOFRAN) 4 mg tablet TAKE 1 TABLET BY MOUTH EVERY EIGHT (8) HOURS AS NEEDED FOR NAUSEA.  famotidine (PEPCID) 20 mg tablet TAKE 1 TABLET EVERY DAY    ELIQUIS 2.5 mg tablet TAKE 1 TABLET BY MOUTH TWO (2) TIMES A DAY. TO PREVENT CLOTS    amLODIPine (NORVASC) 10 mg tablet TAKE 1 TABLET EVERY DAY    atorvastatin (LIPITOR) 20 mg tablet Take 1 Tab by mouth nightly.  L. acidoph & paracasei- S therm- Bifido (TY-Q/RISAQUAD) 8 billion cell cap cap Take 1 Cap by mouth daily.  oxybutynin (DITROPAN) 5 mg tablet Take 5 mg by mouth two (2) times a day.  gabapentin (NEURONTIN) 100 mg capsule Take 100 mg by mouth two (2) times a day.  patiromer calcium sorbitex (VELTASSA) 16.8 gram pwpk Take 1 Package by mouth every evening. Takes daily between 3585-2086    linaclotide (LINZESS) 290 mcg cap capsule Take 290 mcg by mouth daily as needed.  diphenoxylate-atropine (LOMOTIL) 2.5-0.025 mg per tablet TAKE 1 TABLET TWICE A DAY IF NEEDED FOR DIARRHEA OR CRAMPING    cloNIDine HCl (CATAPRES) 0.1 mg tablet TAKE 1 TABLET EVERY 12 HOURS    buPROPion (WELLBUTRIN) 100 mg tablet Take 100 mg by mouth two (2) times a day.  iron, carbonyl (FEOSOL) 45 mg tab Take 1 Tab by mouth two (2) times a day.     magnesium oxide (MAG-OX) 400 mg tablet Take 800 mg by mouth three (3) times daily. Indications: HYPOMAGNESEMIA    acetaminophen (TYLENOL) 500 mg tablet Take 1,000 mg by mouth every six (6) hours as needed for Pain.  multivitamin (ONE A DAY) tablet Take 1 Tab by mouth every morning.  sodium bicarbonate 650 mg tablet Take 650 mg by mouth two (2) times a day. No current facility-administered medications for this visit.       Allergies   Allergen Reactions    Citrus And Derivatives Anaphylaxis     Patient and her  reported    Penicillin G Anaphylaxis    Orange Juice Not Reported This Time    Sulfa (Sulfonamide Antibiotics) Other (comments)     \"Dont remember\"    Vancomycin Hives     Past Medical History:   Diagnosis Date    Chronic kidney disease     Stage 5 (7/18/16 BUN 79, Creatnine 4.53, K 6) Dr. Mando Sanders [phone]    GERD (gastroesophageal reflux disease)     well controlled with Rx     High cholesterol     Hyperkalemia     Hypertension     Hypomagnesemia     on daily Magnesium    Neuropathy ([address]     bilateral feet    Ovarian cancer ([address] 2013    Hysterectomy with chemo, no radiation:  Dr. Cha Class [facility]) 9/2015    blood clot in lung 9/2015    Thromboembolus ([address] 2004    in kidney \"many years ago\"     Past Surgical History:   Procedure Laterality Date    ABDOMEN SURGERY [address] UNLISTED  7/31/15    Lap exploratory small bowel obstruction  (ICU)    ABDOMEN SURGERY [address] UNLISTED  8/2/15    Abdmonial washout with wound vac (ICU)    ABDOMEN SURGERY PROC UNLISTED  8/18/15    Abdominal washout fascial closure (ICU)    ABDOMEN SURGERY PROC UNLISTED  11/11/15    Lap takedown of ileostomy     COLONOSCOPY N/A 8/1/2016    COLONOSCOPY performed by Carlos Doss MD at [address] HX APPENDECTOMY  approx 2005    HX CASI AND BSO  2013    due to ovarian cancer    HX TONSILLECTOMY  age 10     Social History     Social History    Marital status:      Spouse name: N/A    Number of children: N/A    Years of education: N/A     Occupational History    Not on file. Social History Main Topics    Smoking status: Current Every Day Smoker     Packs/day: 1.00     Last attempt to quit: 1/11/2012    Smokeless tobacco: Never Used    Alcohol use No    Drug use: Yes     Special: Prescription, OTC    Sexual activity: Not on file     Other Topics Concern    Not on file     Social History Narrative     Family History   Problem Relation Age of Onset    Other Mother      peptic ulcer    Alzheimer Father          PHYSICAL EXAMINATION:    Visit Vitals    /60    Pulse 79    Ht 5' 8\" (1.727 m)    Wt 140 lb (63.5 kg)    SpO2 97%    BMI 21.29 kg/m2     General:  Well defined, nourished, and groomed individual in no acute distress. Neck: Supple, nontender, no bruits, no pain with resistance to active range of motion. Heart: Regular rate and rhythm, no murmurs, rub, or gallop. Normal S1S2. Lungs:  Clear to auscultation bilaterally with equal chest expansion, no cough, no wheeze  Musculoskeletal:  Extremities revealed no edema and had full range of motion of joints. Psych:  Good mood and bright affect    NEUROLOGICAL EXAMINATION:     Mental Status:   Alert and oriented to year, location, president with recent and remote memory intact. Attention span and concentration are normal. Speech is fluent with a full fund of knowledge. Cranial Nerves:    II, III, IV, VI:  Visual acuity grossly intact. Pupils are equal, round, and reactive to light. Extra-ocular movements are full and fluid. No ptosis or nystagmus. V-XII: Hearing is grossly intact. Facial features are symmetric, with normal sensation and strength. The palate rises symmetrically and the tongue protrudes midline. Sternocleidomastoids 5/5. Motor Examination: Normal tone, bulk, and strength, 5/5 muscle strength throughout. Coordination:  Finger to nose testing was normal.   No resting or intention tremor  Gait and Station:  Steady while walking. Normal arm swing. No pronator drift. No muscle wasting or fasciculations noted. ASSESSMENT AND PLAN    ICD-10-CM ICD-9-CM    1. MCI (mild cognitive impairment) G31.84 331.83      79-year-old female seen in follow-up for memory complaints, recent neuropsychological testing did not identify dementia but did suggest MCI. She denies significant issues with mood, feels that she has some depression but that is well controlled. She wakes up frequently at night as a result of vivid dreams, will be very confused and she wakes. Otherwise, no functional issues day-to-day. She occasionally gets lost going to familiar places, but her  is not overly concerned about driving. 1.  Lengthy discussion of patient's neuropsychological testing results, including her increased likelihood of progressing further and cognitive decline but that this is not necessarily going to happen. 2.  Discussed adding Aricept, she declined as her father had significant side effects that medication  3. Provided information on the drug, patient will call she would like to try prescription  4. Okay to continue driving, cooking, managing medications and finances with oversight from her   5. May need to repeat neuropsychological testing in about 12-18 months  6. Advised patient and her  to do advance care planning, she has some very specific wishes that she would like to spell out    Follow-up in 6 months, call sooner with concerns    25+ minutes, >50% discussing above/answering questions/formulating plan      Bekah Shields NP    This note was created using voice recognition software. Despite editing, there may be syntax errors.

## 2017-07-14 NOTE — MR AVS SNAPSHOT
Visit Information Date & Time Provider Department Dept. Phone Encounter #  
 7/14/2017 11:30 AM Gita Fuller NP [facility] [phone] [id number] Your Appointments 7/24/2017  1:30 PM  
ROUTINE CARE with Fredy Griffin, [address] [place] [address],[address] Floor [facility]) Appt Note: 6 month follow up  
 [facility] [address] [facility]  
[phone]  
  
   
 [facility] [address] [facility] [address] [facility]  
  
    
 11/13/2017  1:20 PM  
Follow Up with Cline Nyhan, MD  
[facility] Appt Note: f/u memory and cognitive concerns, jrb 5/1/17  
 [facility], 
[address] [place] [address], [phone]  
[address] [facility], [address] [place] [address], [address] [place] [address] [phone] Upcoming Health Maintenance Date Due DTaP/Tdap/Td series (1 - Tdap) 10/6/1965 ZOSTER VACCINE AGE 60> 10/6/2004 GLAUCOMA SCREENING Q2Y 10/6/2009 OSTEOPOROSIS SCREENING (DEXA) 10/6/2009 INFLUENZA AGE 9 TO ADULT 8/1/2017 MEDICARE YEARLY EXAM 4/7/2018 BREAST CANCER SCRN MAMMOGRAM 6/5/2019 COLONOSCOPY 8/1/2021 Allergies as of 7/14/2017  Review Complete On: 7/14/2017 By: Tesha Khanna LPN Severity Noted Reaction Type Reactions Citrus And Derivatives High 08/24/2015    Anaphylaxis Patient and her  reported Penicillin G High 10/19/2014    Anaphylaxis [place] Juice  08/31/2015    Not Reported This Time  
 Sulfa (Sulfonamide Antibiotics)  07/23/2015    Other (comments) \"Dont remember\" Vancomycin  01/22/2016    Hives Current Immunizations  Reviewed on 3/10/2016 Name Date Influenza High Dose Vaccine PF 9/15/2016 Influenza Vaccine 10/20/2015 Pneumococcal Conjugate (PCV-13) 10/20/2015 Pneumococcal Polysaccharide (PPSV-23) 11/10/2012 Pneumococcal Vaccine (Unspecified Type) 1/1/2015 Not reviewed this visit You Were Diagnosed With   
  
 Codes Comments MCI (mild cognitive impairment)    -  Primary ICD-10-CM: G31.84 ICD-9-CM: 331.83 Vitals BP Pulse Height(growth percentile) Weight(growth percentile) SpO2 BMI  
 142/60 79 5' 8\" (1.727 m) 140 lb (63.5 kg) 97% 21.29 kg/m2 OB Status Smoking Status Hysterectomy Current Every Day Smoker Vitals History BMI and BSA Data Body Mass Index Body Surface Area  
 21.29 kg/m 2 1.75 m 2 Preferred Pharmacy Pharmacy Name Phone [facility] [phone] Your Updated Medication List  
  
   
This list is accurate as of: 7/14/17 12:14 PM.  Always use your most recent med list.  
  
  
  
  
 acetaminophen 500 mg tablet Commonly known as:  TYLENOL Take 1,000 mg by mouth every six (6) hours as needed for Pain. amLODIPine 10 mg tablet Commonly known as:  Harlon Doyne TAKE 1 TABLET EVERY DAY  
  
 atorvastatin 20 mg tablet Commonly known as:  LIPITOR Take 1 Tab by mouth nightly. * buPROPion 100 mg tablet Commonly known as:  [facility] Take 100 mg by mouth two (2) times a day. * buPROPion 100 mg tablet Commonly known as:  WELLBUTRIN  
TAKE 1 TABLET THREE TIMES DAILY  
  
 calcitRIOL 0.5 mcg capsule Commonly known as:  ROCALTROL  
TAKE 1 CAPSULE EVERY DAY  
  
 ciprofloxacin HCl 500 mg tablet Commonly known as:  CIPRO TAKE 1 TABLET BY MOUTH EVERY DAY FOR 14 DAYS. SEPARATE DOSE FROM VITAMINS CONTAINING CALCIUM, IRON, OR MAGNESIUM, OR FROM TUMS OR DAIRY BY 2  
  
 cloNIDine HCl 0.1 mg tablet Commonly known as:  CATAPRES  
TAKE 1 TABLET EVERY 12 HOURS  
  
 diphenoxylate-atropine 2.5-0.025 mg per tablet Commonly known as:  LOMOTIL  
TAKE 1 TABLET TWICE A DAY IF NEEDED FOR DIARRHEA OR CRAMPING  
  
 ELIQUIS 2.5 mg tablet Generic drug:  apixaban TAKE 1 TABLET BY MOUTH TWO (2) TIMES A DAY. TO PREVENT CLOTS  
  
 famotidine 20 mg tablet Commonly known as:  PEPCID  
TAKE 1 TABLET EVERY DAY  
  
 gabapentin 100 mg capsule Commonly known as:  NEURONTIN Take 100 mg by mouth two (2) times a day. iron, carbonyl 45 mg Tab Commonly known as:  Heath  Take 1 Tab by mouth two (2) times a day. L. acidoph & paracasei- S therm- Bifido 8 billion cell Cap cap Commonly known as:  TY-Q/RISAQUAD Take 1 Cap by mouth daily. linaclotide 290 mcg Cap capsule Commonly known as:  Mariela Peralta Take 290 mcg by mouth daily as needed. magnesium oxide 400 mg tablet Commonly known as:  MAG-OX Take 800 mg by mouth three (3) times daily. Indications: HYPOMAGNESEMIA  
  
 multivitamin tablet Commonly known as:  ONE A DAY Take 1 Tab by mouth every morning. ondansetron hcl 4 mg tablet Commonly known as:  ZOFRAN  
TAKE 1 TABLET BY MOUTH EVERY EIGHT (8) HOURS AS NEEDED FOR NAUSEA. oxybutynin 5 mg tablet Commonly known as:  XYSIWCFE Take 5 mg by mouth two (2) times a day. sodium bicarbonate 650 mg tablet Take 650 mg by mouth two (2) times a day. VELTASSA 16.8 gram Pwpk Generic drug:  patiromer calcium sorbitex Take 1 Package by mouth every evening. Takes daily between 3688-8027 * Notice: This list has 2 medication(s) that are the same as other medications prescribed for you. Read the directions carefully, and ask your doctor or other care provider to review them with you. Patient Instructions Call us if you would like to consider going onto Aricept PRESCRIPTION REFILL POLICY [facility] Statement to Patients April 1, 2014 In an effort to ensure the large volume of patient prescription refills is processed in the most efficient and expeditious manner, we are asking our patients to assist us by calling your Pharmacy for all prescription refills, this will include also your  Mail Order Pharmacy. The pharmacy will contact our office electronically to continue the refill process. Please do not wait until the last minute to call your pharmacy. We need at least 48 hours (2days) to fill prescriptions. We also encourage you to call your pharmacy before going to  your prescription to make sure it is ready. With regard to controlled substance prescription refill requests (narcotic refills) that need to be picked up at our office, we ask your cooperation by providing us with at least 72 hours (3days) notice that you will need a refill. We will not refill narcotic prescription refill requests after 4:00pm on any weekday, Monday through Thursday, or after 2:00pm on Fridays, or on the weekends. We encourage everyone to explore another way of getting your prescription refill request processed using [email or web address], our patient web portal through our electronic medical record system. [email or web address] is an efficient and effective way to communicate your medication request directly to the office and  downloadable as an clint on your smart phone . [email or web address] also features a review functionality that allows you to view your medication list as well as leave messages for your physician. Are you ready to get connected? If so please review the attatched instructions or speak to any of our staff to get you set up right away! Thank you so much for your cooperation. Should you have any questions please contact our Practice Administrator. The Physicians and Staff,  [facility] A Healthy Lifestyle: Care Instructions Your Care Instructions A healthy lifestyle can help you feel good, stay at a healthy weight, and have plenty of energy for both work and play. A healthy lifestyle is something you can share with your whole family.  
A healthy lifestyle also can lower your risk for serious health problems, such as high blood pressure, heart disease, and diabetes. You can follow a few steps listed below to improve your health and the health of your family. Follow-up care is a key part of your treatment and safety. Be sure to make and go to all appointments, and call your doctor if you are having problems. Its also a good idea to know your test results and keep a list of the medicines you take. How can you care for yourself at home? · Do not eat too much sugar, fat, or fast foods. You can still have dessert and treats now and then. The goal is moderation. · Start small to improve your eating habits. Pay attention to portion sizes, drink less juice and soda pop, and eat more fruits and vegetables. ¨ Eat a healthy amount of food. A 3-ounce serving of meat, for example, is about the size of a deck of cards. Fill the rest of your plate with vegetables and whole grains. ¨ Limit the amount of soda and sports drinks you have every day. Drink more water when you are thirsty. ¨ Eat at least 5 servings of fruits and vegetables every day. It may seem like a lot, but it is not hard to reach this goal. A serving or helping is 1 piece of fruit, 1 cup of vegetables, or 2 cups of leafy, raw vegetables. Have an apple or some carrot sticks as an afternoon snack instead of a candy bar. Try to have fruits and/or vegetables at every meal. 
· Make exercise part of your daily routine. You may want to start with simple activities, such as walking, bicycling, or slow swimming. Try to be active 30 to 60 minutes every day. You do not need to do all 30 to 60 minutes all at once. For example, you can exercise 3 times a day for 10 or 20 minutes. Moderate exercise is safe for most people, but it is always a good idea to talk to your doctor before starting an exercise program. 
· Keep moving. Minda Noriega the lawn, work in the garden, or [employer]. Take the stairs instead of the elevator at work. · If you smoke, quit. People who smoke have an increased risk for heart attack, stroke, cancer, and other lung illnesses. Quitting is hard, but there are ways to boost your chance of quitting tobacco for good. ¨ Use nicotine gum, patches, or lozenges. ¨ Ask your doctor about stop-smoking programs and medicines. ¨ Keep trying. In addition to reducing your risk of diseases in the future, you will notice some benefits soon after you stop using tobacco. If you have shortness of breath or asthma symptoms, they will likely get better within a few weeks after you quit. · Limit how much alcohol you drink. Moderate amounts of alcohol (up to 2 drinks a day for men, 1 drink a day for women) are okay. But drinking too much can lead to liver problems, high blood pressure, and other health problems. Family health If you have a family, there are many things you can do together to improve your health. · Eat meals together as a family as often as possible. · Eat healthy foods. This includes fruits, vegetables, lean meats and dairy, and whole grains. · Include your family in your fitness plan. Most people think of activities such as jogging or tennis as the way to fitness, but there are many ways you and your family can be more active. Anything that makes you breathe hard and gets your heart pumping is exercise. Here are some tips: 
¨ Walk to do errands or to take your child to school or the bus. ¨ Go for a family bike ride after dinner instead of watching TV. Where can you learn more? Go to http://myriam-lorena.info/. Enter H454 in the search box to learn more about \"A Healthy Lifestyle: Care Instructions. \" Current as of: July 26, 2016 Content Version: 11.3 © 5246-2727 [employer]. Care instructions adapted under license by [employer] (which disclaims liability or warranty for this information).  If you have questions about a medical condition or this instruction, always ask your healthcare professional. [address] any warranty or liability for your use of this information. Donepezil (By mouth) Donepezil ([id number]) Treats Alzheimer disease. Brand Name(s): Aricept There may be other brand names for this medicine. When This Medicine Should Not Be Used: This medicine is not right for everyone. Do not use it if you had an allergic reaction to donepezil or to medicines that contain piperidine. How to Use This Medicine:  
Tablet, Dissolving Tablet · Your doctor will tell you how much medicine to use. Do not use more than directed. · Read and follow the patient instructions that come with this medicine. Talk to your doctor or pharmacist if you have any questions. · Tablet: Swallow the 23-mg tablet whole. Do not crush, break, or chew it. · Disintegrating tablet:Make sure your hands are dry before you handle the disintegrating tablet. Peel back the foil from the blister pack, then remove the tablet. Do not push the tablet through the foil. Place the tablet in your mouth. After it has melted, swallow or take a drink of water. · Missed dose: Skip the missed dose and take your next dose at the regular time. Do not take extra medicine to make up for a missed dose. · Store the medicine in a closed container at room temperature, away from heat, moisture, and direct light. Drugs and Foods to Avoid: Ask your doctor or pharmacist before using any other medicine, including over-the-counter medicines, vitamins, and herbal products. · Some medicines can affect how donepezil works. Tell your doctor if you are using any of the following: ¨ Bethanechol, carbamazepine, dexamethasone, ketoconazole, phenobarbital, phenytoin, quinidine, or rifampin ¨ NSAID pain or arthritis medicine (such as aspirin, diclofenac, ibuprofen, naproxen) Warnings While Using This Medicine: · Tell your doctor if you are pregnant or breastfeeding, or if you have heart disease, lung disease, asthma or other breathing problems, stomach ulcers or bleeding, or trouble urinating. · This medicine may cause the following problems: ¨ Slow heartbeat ¨ Stomach or bowel bleeding ¨ Seizures · Tell any doctor or dentist who treats you that you are using this medicine. You may need to stop using this medicine several days before you have surgery or medical tests. · Your doctor will check your progress and the effects of this medicine at regular visits. Keep all appointments. · Keep all medicine out of the reach of children. Never share your medicine with anyone. Possible Side Effects While Using This Medicine:  
Call your doctor right away if you notice any of these side effects: · Allergic reaction: Itching or hives, swelling in your face or hands, swelling or tingling in your mouth or throat, chest tightness, trouble breathing · Bloody or black, tarry stools · Change in how much or how often you urinate · Chest pain, slow or uneven heartbeat, trouble breathing · Lightheadedness, dizziness, fainting · Seizures · Severe stomach pain · Unusual bleeding, bruising, or weakness · Vomiting of blood or material that looks like coffee grounds If you notice these less serious side effects, talk with your doctor: · Mild nausea, vomiting, or diarrhea · Weight loss If you notice other side effects that you think are caused by this medicine, tell your doctor. Call your doctor for medical advice about side effects. You may report side effects to FDA at 6-046-FDA-4162 © 2017 [address] Information is for End User's use only and may not be sold, redistributed or otherwise used for commercial purposes. The above information is an  only. It is not intended as medical advice for individual conditions or treatments.  Talk to your doctor, nurse or pharmacist before following any medical regimen to see if it is safe and effective for you. Introducing [facility]! Dear Mayra Valverde: 
Thank you for requesting a [employer] account. Our records indicate that you already have an active [employer] account. You can access your account anytime at [email or web address] Did you know that you can access your hospital and ER discharge instructions at any time in [employer]? You can also review all of your test results from your hospital stay or ER visit. Additional Information If you have questions, please visit the Frequently Asked Questions section of the [employer] website at [email or web address] Remember, [employer] is NOT to be used for urgent needs. For medical emergencies, dial 911. Now available from your iPhone and Android! Please provide this summary of care documentation to your next provider. Your primary care clinician is listed as Beverly CHEN. If you have any questions after today's visit, please call [phone].

## 2017-07-14 NOTE — PATIENT INSTRUCTIONS
Call us if you would like to consider going onto [address] [place] [address] [facility]   Statement to Patients  April 1, 2014      In an effort to ensure the large volume of patient prescription refills is processed in the most efficient and expeditious manner, we are asking our patients to assist us by calling your Pharmacy for all prescription refills, this will include also your  Mail Order Pharmacy. The pharmacy will contact our office electronically to continue the refill process. Please do not wait until the last minute to call your pharmacy. We need at least 48 hours (2days) to fill prescriptions. We also encourage you to call your pharmacy before going to  your prescription to make sure it is ready. With regard to controlled substance prescription refill requests (narcotic refills) that need to be picked up at our office, we ask your cooperation by providing us with at least 72 hours (3days) notice that you will need a refill. We will not refill narcotic prescription refill requests after 4:00pm on any weekday, Monday through Thursday, or after 2:00pm on Fridays, or on the weekends. We encourage everyone to explore another way of getting your prescription refill request processed using HunterOn, our patient web portal through our electronic medical record system. HunterOn is an efficient and effective way to communicate your medication request directly to the office and  downloadable as an clint on your smart phone . HunterOn also features a review functionality that allows you to view your medication list as well as leave messages for your physician. Are you ready to get connected? If so please review the attatched instructions or speak to any of our staff to get you set up right away! Thank you so much for your cooperation. Should you have any questions please contact our Practice Administrator.     The Physicians and Staff,  [facility]          A Healthy Lifestyle: Care Instructions  Your Care Instructions  A healthy lifestyle can help you feel good, stay at a healthy weight, and have plenty of energy for both work and play. A healthy lifestyle is something you can share with your whole family. A healthy lifestyle also can lower your risk for serious health problems, such as high blood pressure, heart disease, and diabetes. You can follow a few steps listed below to improve your health and the health of your family. Follow-up care is a key part of your treatment and safety. Be sure to make and go to all appointments, and call your doctor if you are having problems. Its also a good idea to know your test results and keep a list of the medicines you take. How can you care for yourself at home? · Do not eat too much sugar, fat, or fast foods. You can still have dessert and treats now and then. The goal is moderation. · Start small to improve your eating habits. Pay attention to portion sizes, drink less juice and soda pop, and eat more fruits and vegetables. ¨ Eat a healthy amount of food. A 3-ounce serving of meat, for example, is about the size of a deck of cards. Fill the rest of your plate with vegetables and whole grains. ¨ Limit the amount of soda and sports drinks you have every day. Drink more water when you are thirsty. ¨ Eat at least 5 servings of fruits and vegetables every day. It may seem like a lot, but it is not hard to reach this goal. A serving or helping is 1 piece of fruit, 1 cup of vegetables, or 2 cups of leafy, raw vegetables. Have an apple or some carrot sticks as an afternoon snack instead of a candy bar. Try to have fruits and/or vegetables at every meal.  · Make exercise part of your daily routine. You may want to start with simple activities, such as walking, bicycling, or slow swimming. Try to be active 30 to 60 minutes every day. You do not need to do all 30 to 60 minutes all at once.  For example, you can exercise 3 times a day for 10 or 20 minutes. Moderate exercise is safe for most people, but it is always a good idea to talk to your doctor before starting an exercise program.  · Keep moving. [place] the lawn, work in the garden, or MyNextRun. Take the stairs instead of the elevator at work. · If you smoke, quit. People who smoke have an increased risk for heart attack, stroke, cancer, and other lung illnesses. Quitting is hard, but there are ways to boost your chance of quitting tobacco for good. ¨ Use nicotine gum, patches, or lozenges. ¨ Ask your doctor about stop-smoking programs and medicines. ¨ Keep trying. In addition to reducing your risk of diseases in the future, you will notice some benefits soon after you stop using tobacco. If you have shortness of breath or asthma symptoms, they will likely get better within a few weeks after you quit. · Limit how much alcohol you drink. Moderate amounts of alcohol (up to 2 drinks a day for men, 1 drink a day for women) are okay. But drinking too much can lead to liver problems, high blood pressure, and other health problems. Family health  If you have a family, there are many things you can do together to improve your health. · Eat meals together as a family as often as possible. · Eat healthy foods. This includes fruits, vegetables, lean meats and dairy, and whole grains. · Include your family in your fitness plan. Most people think of activities such as jogging or tennis as the way to fitness, but there are many ways you and your family can be more active. Anything that makes you breathe hard and gets your heart pumping is exercise. Here are some tips:  ¨ Walk to do errands or to take your child to school or the bus. ¨ Go for a family bike ride after dinner instead of watching TV. Where can you learn more? Go to http://myriam-lorena.info/.   Enter M370 in the search box to learn more about \"A Healthy Lifestyle: Care Instructions. \"  Current as of: July 26, 2016  Content Version: 11.3  © 2604-2409 [employer]. Care instructions adapted under license by [employer] (which disclaims liability or warranty for this information). If you have questions about a medical condition or this instruction, always ask your healthcare professional. [address] any warranty or liability for your use of this information. Donepezil (By mouth)   Donepezil (ciy-HTN-m-zil)  Treats Alzheimer disease. Brand Name(s): Aricept   There may be other brand names for this medicine. When This Medicine Should Not Be Used: This medicine is not right for everyone. Do not use it if you had an allergic reaction to donepezil or to medicines that contain piperidine. How to Use This Medicine:   Tablet, Dissolving Tablet  · Your doctor will tell you how much medicine to use. Do not use more than directed. · Read and follow the patient instructions that come with this medicine. Talk to your doctor or pharmacist if you have any questions. · Tablet: Swallow the 23-mg tablet whole. Do not crush, break, or chew it. · Disintegrating tablet:Make sure your hands are dry before you handle the disintegrating tablet. Peel back the foil from the blister pack, then remove the tablet. Do not push the tablet through the foil. Place the tablet in your mouth. After it has melted, swallow or take a drink of water. · Missed dose: Skip the missed dose and take your next dose at the regular time. Do not take extra medicine to make up for a missed dose. · Store the medicine in a closed container at room temperature, away from heat, moisture, and direct light. Drugs and Foods to Avoid:   Ask your doctor or pharmacist before using any other medicine, including over-the-counter medicines, vitamins, and herbal products. · Some medicines can affect how donepezil works.  Tell your doctor if you are using any of the following: ¨ Bethanechol, carbamazepine, dexamethasone, ketoconazole, phenobarbital, phenytoin, quinidine, or rifampin  ¨ NSAID pain or arthritis medicine (such as aspirin, diclofenac, ibuprofen, naproxen)  Warnings While Using This Medicine:   · Tell your doctor if you are pregnant or breastfeeding, or if you have heart disease, lung disease, asthma or other breathing problems, stomach ulcers or bleeding, or trouble urinating. · This medicine may cause the following problems:   ¨ Slow heartbeat  ¨ Stomach or bowel bleeding  ¨ Seizures  · Tell any doctor or dentist who treats you that you are using this medicine. You may need to stop using this medicine several days before you have surgery or medical tests. · Your doctor will check your progress and the effects of this medicine at regular visits. Keep all appointments. · Keep all medicine out of the reach of children. Never share your medicine with anyone. Possible Side Effects While Using This Medicine:   Call your doctor right away if you notice any of these side effects:  · Allergic reaction: Itching or hives, swelling in your face or hands, swelling or tingling in your mouth or throat, chest tightness, trouble breathing  · Bloody or black, tarry stools  · Change in how much or how often you urinate  · Chest pain, slow or uneven heartbeat, trouble breathing  · Lightheadedness, dizziness, fainting  · Seizures  · Severe stomach pain  · Unusual bleeding, bruising, or weakness  · Vomiting of blood or material that looks like coffee grounds  If you notice these less serious side effects, talk with your doctor:   · Mild nausea, vomiting, or diarrhea  · Weight loss  If you notice other side effects that you think are caused by this medicine, tell your doctor. Call your doctor for medical advice about side effects.  You may report side effects to FDA at 8-403-FDA-8525  © 2017 [facility] Information is for End User's use only and may not be sold, redistributed or otherwise used for commercial purposes. The above information is an  only. It is not intended as medical advice for individual conditions or treatments. Talk to your doctor, nurse or pharmacist before following any medical regimen to see if it is safe and effective for you.

## 2017-09-05 RX ORDER — CLONIDINE HYDROCHLORIDE 0.1 MG/1
TABLET ORAL
Qty: 60 TAB | Refills: 11 | Status: SHIPPED | OUTPATIENT
Start: 2017-09-05 | End: 2018-08-27 | Stop reason: CLARIF

## 2017-09-10 RX ORDER — APIXABAN 2.5 MG/1
TABLET, FILM COATED ORAL
Qty: 60 TAB | Refills: 3 | Status: SHIPPED | OUTPATIENT
Start: 2017-09-10 | End: 2018-01-22 | Stop reason: SDUPTHER

## 2017-10-19 RX ORDER — ONDANSETRON 4 MG/1
TABLET, FILM COATED ORAL
Qty: 30 TAB | Refills: 3 | Status: SHIPPED | OUTPATIENT
Start: 2017-10-19 | End: 2018-02-05 | Stop reason: SDUPTHER

## 2017-11-20 ENCOUNTER — TELEPHONE (OUTPATIENT)
Dept: INTERNAL MEDICINE CLINIC | Age: 72
End: 2017-11-20

## 2017-11-20 NOTE — TELEPHONE ENCOUNTER
Pt stated she is having dental work on December 4th and is on Eliquis and would like to know how long she needs to be off the medication before the procedure.  Best contact number [phone].        Message received & copied from [facility]

## 2017-11-22 NOTE — TELEPHONE ENCOUNTER
Spoke with patient after 2 patient identifiers being note and advised per Dr. Fay Baxter pt she needs to hold eliquis at least 48 hrs prior to sx, can resume the day after surgery. Patient expressed understanding and has no further questions at this time.

## 2018-01-01 DIAGNOSIS — I67.89 CEREBRAL MICROVASCULAR DISEASE: ICD-10-CM

## 2018-01-01 DIAGNOSIS — R93.0 ABNORMAL MRI OF HEAD: ICD-10-CM

## 2018-01-01 DIAGNOSIS — I65.23 STENOSIS OF BOTH INTERNAL CAROTID ARTERIES: ICD-10-CM

## 2018-01-01 DIAGNOSIS — I65.23 BILATERAL CAROTID ARTERY STENOSIS: ICD-10-CM

## 2018-01-01 DIAGNOSIS — R41.3 DISTURBANCE OF MEMORY: ICD-10-CM

## 2018-01-01 DIAGNOSIS — R41.82 ALTERED MENTAL STATUS, UNSPECIFIED ALTERED MENTAL STATUS TYPE: ICD-10-CM

## 2018-01-01 RX ORDER — LOSARTAN POTASSIUM 100 MG/1
TABLET ORAL
Qty: 30 TAB | Refills: 6 | Status: SHIPPED | OUTPATIENT
Start: 2018-01-01 | End: 2019-01-01 | Stop reason: SDUPTHER

## 2018-01-22 RX ORDER — APIXABAN 2.5 MG/1
TABLET, FILM COATED ORAL
Qty: 60 TAB | Refills: 3 | Status: SHIPPED | OUTPATIENT
Start: 2018-01-22 | End: 2018-06-14 | Stop reason: SDUPTHER

## 2018-01-25 ENCOUNTER — OFFICE VISIT (OUTPATIENT)
Dept: INTERNAL MEDICINE CLINIC | Age: 73
End: 2018-01-25

## 2018-01-25 VITALS
TEMPERATURE: 97.8 F | SYSTOLIC BLOOD PRESSURE: 176 MMHG | DIASTOLIC BLOOD PRESSURE: 65 MMHG | OXYGEN SATURATION: 100 % | HEIGHT: 68 IN | HEART RATE: 80 BPM | BODY MASS INDEX: 22.43 KG/M2 | WEIGHT: 148 LBS

## 2018-01-25 DIAGNOSIS — H91.93 BILATERAL HEARING LOSS, UNSPECIFIED HEARING LOSS TYPE: ICD-10-CM

## 2018-01-25 DIAGNOSIS — F41.9 ANXIETY: ICD-10-CM

## 2018-01-25 DIAGNOSIS — E78.5 HYPERLIPIDEMIA, UNSPECIFIED HYPERLIPIDEMIA TYPE: ICD-10-CM

## 2018-01-25 DIAGNOSIS — N18.5 CHRONIC RENAL INSUFFICIENCY, STAGE V (HCC): Primary | Chronic | ICD-10-CM

## 2018-01-25 DIAGNOSIS — I10 ESSENTIAL HYPERTENSION: ICD-10-CM

## 2018-01-25 DIAGNOSIS — Z79.01 CHRONIC ANTICOAGULATION: ICD-10-CM

## 2018-01-25 NOTE — MR AVS SNAPSHOT
[address] [facility] 
[phone] Patient: Junior Batista MRN: [id number] :1945 Visit Information Date & Time Provider Department Dept. Phone Encounter #  
 2018  9:15 AM Adele Sorto, [address] [phone] [id number] Follow-up Instructions Return in about 6 months (around 2018) for hearing loss . ckd carotod stenosos. Your Appointments 2018 11:40 AM  
Follow Up with Blanca Collins MD  
Neurology Clinic at [facility] [address]) Appt Note: Follow up $0CP tdb 17  
 [address] [place] [address], 
[address] [facility], [address] [address]  
[address] [facility], [address] [facility], [address] [address] Upcoming Health Maintenance Date Due DTaP/Tdap/Td series (1 - Tdap) 10/6/1965 ZOSTER VACCINE AGE 60> 2004 GLAUCOMA SCREENING Q2Y 10/6/2009 OSTEOPOROSIS SCREENING (DEXA) 10/6/2009 MEDICARE YEARLY EXAM 2018 BREAST CANCER SCRN MAMMOGRAM 2019 COLONOSCOPY 2021 Allergies as of 2018  Review Complete On: 2018 By: Wyatt Watkins LPN Severity Noted Reaction Type Reactions Citrus And Derivatives High 2015    Anaphylaxis Patient and her  reported Penicillin G High 10/19/2014    Anaphylaxis Pound Juice  2015    Not Reported This Time  
 Sulfa (Sulfonamide Antibiotics)  2015    Other (comments) \"Dont remember\" Vancomycin  2016    Hives Current Immunizations  Reviewed on 3/10/2016 Name Date Influenza High Dose Vaccine PF 10/17/2017, 9/15/2016 Influenza Vaccine 10/20/2015 Pneumococcal Conjugate (PCV-13) 10/20/2015 Pneumococcal Polysaccharide (PPSV-23) 11/10/2012 Pneumococcal Vaccine (Unspecified Type) 2015 Not reviewed this visit You Were Diagnosed With   
  
 Codes Comments Chronic renal insufficiency, stage V (HCC)    -  Primary ICD-10-CM: N18.5 ICD-9-CM: 585.5 Essential hypertension     ICD-10-CM: I10 
ICD-9-CM: 401.9 Hyperlipidemia, unspecified hyperlipidemia type     ICD-10-CM: E78.5 ICD-9-CM: 272.4 Chronic anticoagulation     ICD-10-CM: Z79.01 
ICD-9-CM: V58.61 Anxiety     ICD-10-CM: F41.9 ICD-9-CM: 300.00 Bilateral hearing loss, unspecified hearing loss type     ICD-10-CM: H91.93 
ICD-9-CM: 389. 9 Vitals BP Pulse Temp Height(growth percentile) Weight(growth percentile) SpO2  
 176/65 (BP 1 Location: Left arm, BP Patient Position: Sitting) 80 97.8 °F (36.6 °C) (Oral) 5' 8\" (1.727 m) 148 lb (67.1 kg) 100% BMI OB Status Smoking Status 22.5 kg/m2 Hysterectomy Current Every Day Smoker BMI and BSA Data Body Mass Index Body Surface Area  
 22.5 kg/m 2 1.79 m 2 Preferred Pharmacy Pharmacy Name Phone [facility] [phone] Your Updated Medication List  
  
   
This list is accurate as of: 1/25/18 10:11 AM.  Always use your most recent med list.  
  
  
  
  
 acetaminophen 500 mg tablet Commonly known as:  TYLENOL Take 1,000 mg by mouth every six (6) hours as needed for Pain. amLODIPine 10 mg tablet Commonly known as:  Lynnell Manual TAKE 1 TABLET EVERY DAY  
  
 atorvastatin 20 mg tablet Commonly known as:  LIPITOR Take 1 Tab by mouth nightly. buPROPion 100 mg tablet Commonly known as:  WELLBUTRIN  
TAKE 1 TABLET THREE TIMES DAILY  
  
 calcitRIOL 0.5 mcg capsule Commonly known as:  ROCALTROL  
TAKE 1 CAPSULE EVERY DAY  
  
 cloNIDine HCl 0.1 mg tablet Commonly known as:  CATAPRES  
TAKE 1 TABLET EVERY 12 HOURS  
  
 diphenoxylate-atropine 2.5-0.025 mg per tablet Commonly known as:  LOMOTIL  
TAKE 1 TABLET TWICE A DAY IF NEEDED FOR DIARRHEA OR CRAMPING  
  
 ELIQUIS 2.5 mg tablet Generic drug:  apixaban TAKE 1 TABLET BY MOUTH TWO (2) TIMES A DAY TO PREVENT BLOOD CLOTS  
  
 famotidine 20 mg tablet Commonly known as:  PEPCID  
TAKE 1 TABLET EVERY DAY  
  
 gabapentin 100 mg capsule Commonly known as:  NEURONTIN Take 100 mg by mouth two (2) times a day. iron, carbonyl 45 mg Tab Commonly known as:  Laverna Bill Take 1 Tab by mouth two (2) times a day. L. acidoph & paracasei- S therm- Bifido 8 billion cell Cap cap Commonly known as:  TY-Q/RISAQUAD Take 1 Cap by mouth daily. linaclotide 290 mcg Cap capsule Commonly known as:  Winston Malaika Take 290 mcg by mouth daily as needed. magnesium oxide 400 mg tablet Commonly known as:  MAG-OX Take 800 mg by mouth three (3) times daily. Indications: HYPOMAGNESEMIA  
  
 multivitamin tablet Commonly known as:  ONE A DAY Take 1 Tab by mouth every morning. ondansetron hcl 4 mg tablet Commonly known as:  ZOFRAN  
TAKE 1 TABLET BY MOUTH EVERY EIGHT (8) HOURS AS NEEDED FOR NAUSEA. oxybutynin 5 mg tablet Commonly known as:  DHDYHPEW Take 5 mg by mouth two (2) times a day. PROCRIT 10,000 unit/mL injection Generic drug:  epoetin roverto  
by SubCUTAneous route once.  
  
 sodium bicarbonate 650 mg tablet Take 650 mg by mouth two (2) times a day. VELTASSA 16.8 gram powder Generic drug:  patiromer calcium sorbitex Take 1 Package by mouth every evening. Takes daily between 3393-9302 We Performed the Following REFERRAL TO ENT-OTOLARYNGOLOGY [UXT19 Custom] Follow-up Instructions Return in about 6 months (around 7/25/2018) for hearing loss . ckd carotod stenosos. Referral Information Referral ID Referred By Referred To  
  
 [id number] [facility] [address], [address] Visits Status Start Date End Date 1 New Request 1/25/18 1/25/19 If your referral has a status of pending review or denied, additional information will be sent to support the outcome of this decision. Introducing [facility] SERVICES! Dear James Skinner: 
Thank you for requesting a [employer] account. Our records indicate that you already have an active [employer] App account. You can access your account anytime at [email or web address] Did you know that you can access your hospital and ER discharge instructions at any time in [employer]ius App? You can also review all of your test results from your hospital stay or ER visit. Additional Information If you have questions, please visit the Frequently Asked Questions section of the [employer]ius App website at [email or web address] Remember, [employer]ius App is NOT to be used for urgent needs. For medical emergencies, dial 911. Now available from your iPhone and Android! Please provide this summary of care documentation to your next provider. Your primary care clinician is listed as Carlos CHEN. If you have any questions after today's visit, please call [phone].

## 2018-01-25 NOTE — PROGRESS NOTES
HISTORY OF PRESENT ILLNESS  Ana Willett is a 67 y.o. female. HPI   F/u ckd 5, htn hld anemia , hyperkalemia, hx dvt/PE  Requiring eliquis. Saw Dr Leeroy Patel last week--creatinine and K were up--sees MD every few weeks for monitoring and management  On iron medication for anemia and procrit  Has stable carotid stenosis--f/u Dr Donna Robles wellbutrin  Tid for anxiety-effective  Last visits:  Pt here in hospital f/u for [facility] BELL visit  Admitted to [facility] with confusion, slurred speech and acute on chronic renal failure  3/29-3/31/17  Prerenal azotemia with hyperkalemia  IVF and and bicarb  lipitor dose was lowered  Bun/cr 57/4.6 and K 4.2 on day of discharge back to baseline levels  `t  F/u CKD 4/5, htn hld anemia, hx dvt/PE requiring chronic anticoagulation with eliquis  Sees Dr Leeroy Patel for renal dz  Last creatinine down to 3.2 and potassium wnl. No cp sob or swelling   Getting back to her usual self since surgery for colon perforation.      Patient Active Problem List    Diagnosis Date Noted    Stenosis of left carotid artery without cerebral infarction 03/31/2017    Acute renal failure (ARF) ([address] 03/30/2017    Abnormal MRI of head 03/30/2017    Stenosis of both internal carotid arteries 03/30/2017    Cerebral microvascular disease 03/30/2017    Convulsion disorder ([address] 03/30/2017    Altered mental status, unspecified 03/30/2017    Acute encephalopathy 03/30/2017    Bilateral carotid artery stenosis 01/19/2017    Electrolyte abnormality 03/04/2016    ALAYNA (acute kidney injury) ([address] 01/22/2016    Acute on chronic renal failure ([address] 01/22/2016     Class: Acute    Generalized rash 01/22/2016     Class: Present on Admission    Heme positive stool 01/22/2016     Class: Present on Admission    Hypertension, uncontrolled 12/21/2015    Pericarditis with effusion 12/18/2015    Diarrhea 12/17/2015     Class: Present on Admission    Moderate protein-calorie malnutrition ([address] 12/17/2015     Class: Chronic    History of ovarian cancer 12/16/2015     Class: Present on Admission    Pericardial effusion 12/16/2015     Class: Present on Admission    History of pulmonary embolism 11/16/2015    HTN (hypertension) 10/07/2015     Class: Chronic    History of peritonitis 10/07/2015     Class: Present on Admission    Physical debility 10/07/2015     Class: Present on Admission    Chronic anticoagulation 10/07/2015     Class: Chronic    SIRS (systemic inflammatory response syndrome) ([facility] 09/14/2015    Anemia of renal disease 08/21/2015    Acute renal failure with lesion of tubular necrosis ([facility] 08/03/2015    Small bowel perforation (HCC) 08/01/2015    Protein malnutrition ([facility] 07/30/2015    Acute pancreatitis 07/29/2015    E-coli UTI 07/28/2015    Continuous severe abdominal pain 07/28/2015    Enteritis 07/28/2015    Adrenal hemorrhage ([facility] 07/28/2015    Chronic renal insufficiency, stage V (HCC) 07/28/2015    Sepsis ([facility] 07/28/2015    Abdominal pain 07/24/2015     Current Outpatient Prescriptions   Medication Sig Dispense Refill    ELIQUIS 2.5 mg tablet TAKE 1 TABLET BY MOUTH TWO (2) TIMES A DAY TO PREVENT BLOOD CLOTS 60 Tab 3    ondansetron hcl (ZOFRAN) 4 mg tablet TAKE 1 TABLET BY MOUTH EVERY EIGHT (8) HOURS AS NEEDED FOR NAUSEA. 30 Tab 3    atorvastatin (LIPITOR) 40 mg tablet TAKE 1 TABLET BY MOUTH EVERY EVENING FOR CHOLESTEROL 30 Tab 11    cloNIDine HCl (CATAPRES) 0.1 mg tablet TAKE 1 TABLET EVERY 12 HOURS 60 Tab 11    ciprofloxacin HCl (CIPRO) 500 mg tablet TAKE 1 TABLET BY MOUTH EVERY DAY FOR 14 DAYS.  SEPARATE DOSE FROM VITAMINS CONTAINING CALCIUM, IRON, OR MAGNESIUM, OR FROM TUMS OR DAIRY BY 2  0    calcitRIOL (ROCALTROL) 0.5 mcg capsule TAKE 1 CAPSULE EVERY DAY 30 Cap 6    buPROPion (WELLBUTRIN) 100 mg tablet TAKE 1 TABLET THREE TIMES DAILY 90 Tab 11    famotidine (PEPCID) 20 mg tablet TAKE 1 TABLET EVERY DAY 30 Tab 11    amLODIPine (NORVASC) 10 mg tablet TAKE 1 TABLET EVERY DAY 30 Tab 11    atorvastatin (LIPITOR) 20 mg tablet Take 1 Tab by mouth nightly. 30 Tab 0    L. acidoph & paracasei- S therm- Bifido (TY-Q/RISAQUAD) 8 billion cell cap cap Take 1 Cap by mouth daily.  oxybutynin (DITROPAN) 5 mg tablet Take 5 mg by mouth two (2) times a day.  gabapentin (NEURONTIN) 100 mg capsule Take 100 mg by mouth two (2) times a day.  patiromer calcium sorbitex (VELTASSA) 16.8 gram pwpk Take 1 Package by mouth every evening. Takes daily between 2912-5503      linaclotide (LINZESS) 290 mcg cap capsule Take 290 mcg by mouth daily as needed.  diphenoxylate-atropine (LOMOTIL) 2.5-0.025 mg per tablet TAKE 1 TABLET TWICE A DAY IF NEEDED FOR DIARRHEA OR CRAMPING 60 Tab 5    buPROPion (WELLBUTRIN) 100 mg tablet Take 100 mg by mouth two (2) times a day.  iron, carbonyl (FEOSOL) 45 mg tab Take 1 Tab by mouth two (2) times a day.  magnesium oxide (MAG-OX) 400 mg tablet Take 800 mg by mouth three (3) times daily. Indications: HYPOMAGNESEMIA      acetaminophen (TYLENOL) 500 mg tablet Take 1,000 mg by mouth every six (6) hours as needed for Pain.  multivitamin (ONE A DAY) tablet Take 1 Tab by mouth every morning.  sodium bicarbonate 650 mg tablet Take 650 mg by mouth two (2) times a day.        Allergies   Allergen Reactions    Citrus And Derivatives Anaphylaxis     Patient and her  reported    Penicillin G Anaphylaxis    Orange Juice Not Reported This Time    Sulfa (Sulfonamide Antibiotics) Other (comments)     \"Dont remember\"    Vancomycin Hives     Social History   Substance Use Topics    Smoking status: Current Every Day Smoker     Packs/day: 1.00     Last attempt to quit: 1/11/2012    Smokeless tobacco: Never Used    Alcohol use No      Lab Results  Component Value Date/Time   WBC 10.1 03/31/2017 05:26 AM   HGB 10.2 03/31/2017 05:26 AM   Hemoglobin (POC) 7.8 08/01/2016 09:56 AM   HCT 33.1 03/31/2017 05:26 AM   Hematocrit (POC) 23 08/01/2016 09:56 AM   PLATELET 361 44/59/0676 05:26 AM   MCV 89.9 03/31/2017 05:26 AM     Lab Results  Component Value Date/Time   Hemoglobin A1c 5.0 03/30/2017 02:27 AM   Hemoglobin A1c 5.8 08/22/2015 04:52 AM   Glucose 70 04/10/2017 12:00 AM   Glucose (POC) 111 03/31/2017 04:43 PM   Microalbumin/Creat ratio (mg/g creat) 4648 03/07/2016 02:36 PM   Microalbumin,urine random 198.00 03/07/2016 02:36 PM   LDL, calculated 31.2 03/30/2017 02:27 AM   Creatinine (POC) 4.1 08/01/2016 09:56 AM   Creatinine 5.15 04/10/2017 12:00 AM      Lab Results  Component Value Date/Time   GFR est non-AA 8 04/10/2017 12:00 AM   GFRNA, POC 11 08/01/2016 09:56 AM   GFR est AA 9 04/10/2017 12:00 AM   GFRAA, POC 13 08/01/2016 09:56 AM   Creatinine 5.15 04/10/2017 12:00 AM   Creatinine (POC) 4.1 08/01/2016 09:56 AM   BUN 67 04/10/2017 12:00 AM   BUN (POC) 65 08/01/2016 09:56 AM   Sodium 138 04/10/2017 12:00 AM   Sodium (POC) 138 08/01/2016 09:56 AM   Potassium 6.1 04/10/2017 12:00 AM   Potassium (POC) 5.7 08/01/2016 09:56 AM   Chloride 101 04/10/2017 12:00 AM   Chloride (POC) 112 08/01/2016 09:56 AM   CO2 23 04/10/2017 12:00 AM   Magnesium 2.9 03/31/2017 05:26 AM   Phosphorus 4.3 03/31/2017 05:26 AM   PTH, Intact 484.6 03/07/2016 12:51 PM        ROS    Physical Exam   Constitutional: She appears well-developed and well-nourished. Appears stated age   Cardiovascular: Normal rate, regular rhythm and normal heart sounds. Exam reveals no gallop and no friction rub. No murmur heard. Pulmonary/Chest: Effort normal and breath sounds normal. No respiratory distress. She has no wheezes. Abdominal: Soft. Bowel sounds are normal.   Musculoskeletal: She exhibits no edema. Neurological: She is alert. Psychiatric: She has a normal mood and affect. Nursing note and vitals reviewed. ASSESSMENT and PLAN  Diagnoses and all orders for this visit:    1. Chronic renal insufficiency, stage V (HCC)   F/u closely with Dr Madai Dorsey  2.  Essential hypertension   Will defer to renal MD  3. Hyperlipidemia, unspecified hyperlipidemia type   Well controlled on llipitor -LDL 30     4. Chronic anticoagulation   Continue eliquis recurrent DVT  5. Anxiety   Controlled on wellbutrin  6. Bilateral hearing loss, unspecified hearing loss type  -     REFERRAL TO ENT-OTOLARYNGOLOGY  7. Carotid stenosis   Medical management and f/u Dr Kimberly Hopkins    Follow-up Disposition:  Return in about 6 months (around 7/25/2018) for hearing loss . ckd carotod stenosos.

## 2018-02-05 ENCOUNTER — OFFICE VISIT (OUTPATIENT)
Dept: NEUROLOGY | Age: 73
End: 2018-02-05

## 2018-02-05 VITALS
SYSTOLIC BLOOD PRESSURE: 128 MMHG | DIASTOLIC BLOOD PRESSURE: 50 MMHG | HEART RATE: 50 BPM | WEIGHT: 148 LBS | HEIGHT: 68 IN | BODY MASS INDEX: 22.43 KG/M2

## 2018-02-05 DIAGNOSIS — I65.23 BILATERAL CAROTID ARTERY STENOSIS: Primary | ICD-10-CM

## 2018-02-05 DIAGNOSIS — I67.89 CEREBRAL MICROVASCULAR DISEASE: ICD-10-CM

## 2018-02-05 DIAGNOSIS — I65.23 STENOSIS OF BOTH INTERNAL CAROTID ARTERIES: ICD-10-CM

## 2018-02-05 DIAGNOSIS — R41.82 ALTERED MENTAL STATUS, UNSPECIFIED ALTERED MENTAL STATUS TYPE: ICD-10-CM

## 2018-02-05 DIAGNOSIS — R41.3 DISTURBANCE OF MEMORY: ICD-10-CM

## 2018-02-05 RX ORDER — MEMANTINE HYDROCHLORIDE 5 MG-10 MG
KIT ORAL
Qty: 1 TAB | Refills: 0 | Status: SHIPPED | OUTPATIENT
Start: 2018-02-05 | End: 2018-08-27 | Stop reason: CLARIF

## 2018-02-05 RX ORDER — ONDANSETRON 4 MG/1
TABLET, FILM COATED ORAL
Qty: 30 TAB | Refills: 3 | Status: SHIPPED | OUTPATIENT
Start: 2018-02-05 | End: 2018-06-23 | Stop reason: SDUPTHER

## 2018-02-05 RX ORDER — MEMANTINE HYDROCHLORIDE 10 MG/1
10 TABLET ORAL 2 TIMES DAILY
Qty: 60 TAB | Refills: 11 | Status: SHIPPED | OUTPATIENT
Start: 2018-02-05 | End: 2019-01-01 | Stop reason: SDUPTHER

## 2018-02-05 NOTE — LETTER
2/5/2018 12:11 PM 
 
Patient:  Jazmine Dee YOB: 1945 Date of Visit: 2/5/2018 Dear No Recipients: Thank you for referring Ms. Germain Adame to me for evaluation/treatment. Below are the relevant portions of my assessment and plan of care. Neurology Progress Note Patient ID: 
Jazmine Dee [id number] 
57 y.o. 
1945 CHIEF COMPLAINT: Memory loss and dizziness Subjective:  
  
Patient has complaints of memory loss which the  says is getting worse slowly with time and mainly involves recent memory, and she has a difficult time following her calendar of events, and was seeing her at the request of Dr. Marcelo Singh. 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 She had a repeat carotid Doppler study done by her vascular surgeon in September 2017, and seems to be getting that at least once a year with them. She has neuropsych testing scheduled in June, and it showed clear memory loss, but psychiatry was leaning more toward mild cognitive impairment, but not sure. She has a known left carotid stenosis followed by her vascular surgeon on a routine basis. All of the other causes for memory loss including metabolic studies were negative in the hospital.  She is on chronic anticoagulation for recurring blood clots. She has renal insufficiency, and no CTA was done. She continues to try to remain mentally and physically active and take her vitamins, and exercise regularly. She has had no more recurring episodes of vertigo. She has had no other new focal weakness sensory loss or focal neurological deficits.   Her father had bad reaction on Aricept, and she does not want to try that medication at this time. She is willing to try something else we will give her Namenda because she clearly seem to be progressing, and is having difficulty handling her ADLs. She has had no headache, no fever, no meningismus, no focal weakness, no sensory loss, no other cause for her memory loss. She denies any increased stress tension or anxiety. Current Outpatient Prescriptions Medication Sig  
 ondansetron hcl (ZOFRAN) 4 mg tablet TAKE 1 TABLET BY MOUTH EVERY EIGHT (8) HOURS AS NEEDED FOR NAUSEA.  memantine (NAMENDA TITRATION KARLA) 5-10 mg DsPk Use as directed  memantine (NAMENDA) 10 mg tablet Take 1 Tab by mouth two (2) times a day.  epoetin roverto (PROCRIT) 10,000 unit/mL injection by SubCUTAneous route once.  ELIQUIS 2.5 mg tablet TAKE 1 TABLET BY MOUTH TWO (2) TIMES A DAY TO PREVENT BLOOD CLOTS  cloNIDine HCl (CATAPRES) 0.1 mg tablet TAKE 1 TABLET EVERY 12 HOURS  calcitRIOL (ROCALTROL) 0.5 mcg capsule TAKE 1 CAPSULE EVERY DAY  buPROPion (WELLBUTRIN) 100 mg tablet TAKE 1 TABLET THREE TIMES DAILY  famotidine (PEPCID) 20 mg tablet TAKE 1 TABLET EVERY DAY  amLODIPine (NORVASC) 10 mg tablet TAKE 1 TABLET EVERY DAY  atorvastatin (LIPITOR) 20 mg tablet Take 1 Tab by mouth nightly.  L. acidoph & paracasei- S therm- Bifido (TY-Q/RISAQUAD) 8 billion cell cap cap Take 1 Cap by mouth daily.  oxybutynin (DITROPAN) 5 mg tablet Take 5 mg by mouth two (2) times a day.  gabapentin (NEURONTIN) 100 mg capsule Take 100 mg by mouth two (2) times a day.  patiromer calcium sorbitex (VELTASSA) 16.8 gram pwpk Take 1 Package by mouth every evening. Takes daily between 5951-2102  
 linaclotide (LINZESS) 290 mcg cap capsule Take 290 mcg by mouth daily as needed.  diphenoxylate-atropine (LOMOTIL) 2.5-0.025 mg per tablet TAKE 1 TABLET TWICE A DAY IF NEEDED FOR DIARRHEA OR CRAMPING  
 iron, carbonyl (FEOSOL) 45 mg tab Take 1 Tab by mouth two (2) times a day.  magnesium oxide (MAG-OX) 400 mg tablet Take 800 mg by mouth three (3) times daily. Indications: HYPOMAGNESEMIA  acetaminophen (TYLENOL) 500 mg tablet Take 1,000 mg by mouth every six (6) hours as needed for Pain.  multivitamin (ONE A DAY) tablet Take 1 Tab by mouth every morning.  sodium bicarbonate 650 mg tablet Take 650 mg by mouth two (2) times a day. No current facility-administered medications for this visit. Past Medical History:  
Diagnosis Date  Chronic kidney disease Stage 5 (7/18/16 BUN 79, Creatnine 4.53, K 6) Dr. Danilo Hernandez [phone]  GERD (gastroesophageal reflux disease)   
 well controlled with Rx   
 High cholesterol  Hyperkalemia  Hypertension  Hypomagnesemia   
 on daily Magnesium  Neuropathy   
 bilateral feet  Ovarian cancer ([facility] 2013 Hysterectomy with chemo, no radiation:  Dr. Angelica Blanc  Thromboembolus ([facility] 9/2015  
 blood clot in lung 9/2015  Thromboembolus ([facility] 2004  
 in kidney \"many years ago\" Past Surgical History:  
Procedure Laterality Date  ABDOMEN SURGERY PROC UNLISTED  7/31/15 Lap exploratory small bowel obstruction  (ICU)  ABDOMEN SURGERY PROC UNLISTED  8/2/15 Abdmonial washout with wound vac (ICU)  ABDOMEN SURGERY PROC UNLISTED  8/18/15 Abdominal washout fascial closure (ICU)  ABDOMEN SURGERY PROC UNLISTED  11/11/15 Lap takedown of ileostomy  COLONOSCOPY N/A 8/1/2016 COLONOSCOPY performed by Cornelius Adam MD at [address] HX APPENDECTOMY  approx 2005  HX CASI AND BSO  2013  
 due to ovarian cancer Shanellee Jayjay TONSILLECTOMY  age 7  
 
 
[unfilled] Social History Substance Use Topics  Smoking status: Current Every Day Smoker Packs/day: 1.00 Last attempt to quit: 1/11/2012  Smokeless tobacco: Never Used  Alcohol use No  
 
 
Current Outpatient Prescriptions Medication Sig Dispense Refill  ondansetron hcl (ZOFRAN) 4 mg tablet TAKE 1 TABLET BY MOUTH EVERY EIGHT (8) HOURS AS NEEDED FOR NAUSEA. 30 Tab 3  
 memantine (NAMENDA TITRATION KARLA) 5-10 mg DsPk Use as directed 1 Tab 0  
 memantine (NAMENDA) 10 mg tablet Take 1 Tab by mouth two (2) times a day. 60 Tab 11  epoetin roverto (PROCRIT) 10,000 unit/mL injection by SubCUTAneous route once.  ELIQUIS 2.5 mg tablet TAKE 1 TABLET BY MOUTH TWO (2) TIMES A DAY TO PREVENT BLOOD CLOTS 60 Tab 3  cloNIDine HCl (CATAPRES) 0.1 mg tablet TAKE 1 TABLET EVERY 12 HOURS 60 Tab 11  
 calcitRIOL (ROCALTROL) 0.5 mcg capsule TAKE 1 CAPSULE EVERY DAY 30 Cap 6  
 buPROPion (WELLBUTRIN) 100 mg tablet TAKE 1 TABLET THREE TIMES DAILY 90 Tab 11  
 famotidine (PEPCID) 20 mg tablet TAKE 1 TABLET EVERY DAY 30 Tab 11  
 amLODIPine (NORVASC) 10 mg tablet TAKE 1 TABLET EVERY DAY 30 Tab 11  
 atorvastatin (LIPITOR) 20 mg tablet Take 1 Tab by mouth nightly. 30 Tab 0  
 L. acidoph & paracasei- S therm- Bifido (TY-Q/RISAQUAD) 8 billion cell cap cap Take 1 Cap by mouth daily.  oxybutynin (DITROPAN) 5 mg tablet Take 5 mg by mouth two (2) times a day.  gabapentin (NEURONTIN) 100 mg capsule Take 100 mg by mouth two (2) times a day.  patiromer calcium sorbitex (VELTASSA) 16.8 gram pwpk Take 1 Package by mouth every evening. Takes daily between 5873-9181    
 linaclotide (LINZESS) 290 mcg cap capsule Take 290 mcg by mouth daily as needed.  diphenoxylate-atropine (LOMOTIL) 2.5-0.025 mg per tablet TAKE 1 TABLET TWICE A DAY IF NEEDED FOR DIARRHEA OR CRAMPING 60 Tab 5  
 iron, carbonyl (FEOSOL) 45 mg tab Take 1 Tab by mouth two (2) times a day.  magnesium oxide (MAG-OX) 400 mg tablet Take 800 mg by mouth three (3) times daily. Indications: HYPOMAGNESEMIA  acetaminophen (TYLENOL) 500 mg tablet Take 1,000 mg by mouth every six (6) hours as needed for Pain.  multivitamin (ONE A DAY) tablet Take 1 Tab by mouth every morning.  sodium bicarbonate 650 mg tablet Take 650 mg by mouth two (2) times a day. Allergies Allergen Reactions  Citrus And Derivatives Anaphylaxis Patient and her  reported  Penicillin G Anaphylaxis  Orange Juice Not Reported This Time  Sulfa (Sulfonamide Antibiotics) Other (comments) \"Dont remember\"  Vancomycin Hives Review of Systems: A comprehensive review of systems was negative except for: Constitutional: positive for fatigue and malaise Ears, nose, mouth, throat, and face: positive for hearing loss Cardiovascular: positive for chest pressure/discomfort, irregular heart beats Musculoskeletal: positive for myalgias, arthralgias and stiff joints Neurological: positive for dizziness, memory problems and weakness Behvioral/Psych: positive for anxiety and depression Objective: 
 
 
Objective:  
 
@IPVITALS(24:)@ Lab Review No results found for this or any previous visit (from the past 24 hour(s)). Additional comments:I personally viewed and interpreted the patient's CT scan and MRI scan and labs NEUROLOGICAL EXAM: 
 
Appearance: The patient is well developed, well nourished, provides a fair history and is in no acute distress. Mental Status: Oriented to place and person and the president, has a little difficulty with the date, had difficulty doing serial sevens, can remember 2 out of 3 words at 30 seconds with distraction, can spell world backward, and had a little difficulty getting the clock to show the time 10:50. Cognitive function and fund of knowledge is abnormal. Speech is fluent without aphasia or dysarthria. Mood and affect appropriate, but looks depressed . Cranial Nerves:   Intact visual fields. Fundi are benign. NIKITA, EOM's full, no nystagmus, no ptosis. Facial sensation is normal. Corneal reflexes are not tested. Facial movement is symmetric.  Hearing is abnormal bilaterally. Palate is midline with normal sternocleidomastoid and trapezius muscles are normal. Tongue is midline. Neck without meningismus or bruits Temporal arteries are not tender or enlarged Motor:  4/5 strength in upper and lower proximal and distal muscles. Normal tone but reducible generally. No fasciculations. Reflexes:   Deep tendon reflexes 1+/4 and symmetrical. 
No babinski or clonus present Sensory:   Normal to touch, pinprick and temperature and vibration. DSS is intact Gait:  Normal gait though patient does move a little slowly due to her age . Tremor:   No tremor noted. Cerebellar:  No cerebellar signs present. Neurovascular:  Normal heart sounds and regular rhythm, peripheral pulses decreased, and no carotid bruits. Assessment: 
 
 
 
Assessment: ICD-10-CM ICD-9-CM 1. Bilateral carotid artery stenosis I65.23 433.10 memantine (NAMENDA TITRATION KARLA) 5-10 mg DsPk  
  433.30 memantine (NAMENDA) 10 mg tablet 2. Stenosis of both internal carotid arteries I65.23 433.10 memantine (NAMENDA TITRATION KARLA) 5-10 mg DsPk  
  433.30 memantine (NAMENDA) 10 mg tablet 3. Cerebral microvascular disease I67.9 437.9 memantine (NAMENDA TITRATION KARLA) 5-10 mg DsPk  
   memantine (NAMENDA) 10 mg tablet 4. Altered mental status, unspecified altered mental status type R41.82 780.97 memantine (NAMENDA TITRATION KARLA) 5-10 mg DsPk  
   memantine (NAMENDA) 10 mg tablet 5. Disturbance of memory R41.3 780.93 memantine (NAMENDA TITRATION AKRLA) 5-10 mg DsPk  
   memantine (NAMENDA) 10 mg tablet Plan:  
 
Patient with progressive memory loss now, clearly not being able to handle her ADLs and daily activities, and her  feels that she needs some medication for this. Her father had bad reaction to Aricept, so we will hold on that and try to Namenda and a starter pack was given to the patient plus maintenance dose after that Side effects of medication explained to the patient and her  in detail, other cause me to have any side effects. Her dizziness is better now, and her confusion better and she most likely had a decompensated stress reaction on top of dementia Overall she is doing well and we have encouraged her to remain mentally and physically active and take her medications regularly and her vitamins and vitamin D Follow-up in 3- 6 months time or earlier if needed and she will call if any problem Signed: 
Yair Ley MD 
2/5/2018 
1:42 PM 
 
Tomas Pina MD 
[phone] This note will not be viewable in [address] If you have questions, please do not hesitate to call me. I look forward to following Ms. Conte along with you. Sincerely, Yair Ley MD

## 2018-02-05 NOTE — PROGRESS NOTES
Neurology Progress Note    Patient ID:  Felipe Dolan  [id number]  89 y.o.  1945      CHIEF COMPLAINT: Memory loss and dizziness    Subjective:      Patient has complaints of memory loss which the  says is getting worse slowly with time and mainly involves recent memory, and she has a difficult time following her calendar of events, and was seeing her at the request of Dr. Leobardo Worrell. 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 She had a repeat carotid Doppler study done by her vascular surgeon in September 2017, and seems to be getting that at least once a year with them. She has neuropsych testing scheduled in June, and it showed clear memory loss, but psychiatry was leaning more toward mild cognitive impairment, but not sure. She has a known left carotid stenosis followed by her vascular surgeon on a routine basis. All of the other causes for memory loss including metabolic studies were negative in the hospital.  She is on chronic anticoagulation for recurring blood clots. She has renal insufficiency, and no CTA was done. She continues to try to remain mentally and physically active and take her vitamins, and exercise regularly. She has had no more recurring episodes of vertigo. She has had no other new focal weakness sensory loss or focal neurological deficits. Her father had bad reaction on Aricept, and she does not want to try that medication at this time. She is willing to try something else we will give her Namenda because she clearly seem to be progressing, and is having difficulty handling her ADLs. She has had no headache, no fever, no meningismus, no focal weakness, no sensory loss, no other cause for her memory loss.   She denies any increased stress tension or anxiety. Current Outpatient Prescriptions   Medication Sig    ondansetron hcl (ZOFRAN) 4 mg tablet TAKE 1 TABLET BY MOUTH EVERY EIGHT (8) HOURS AS NEEDED FOR NAUSEA.  memantine (NAMENDA TITRATION KARLA) 5-10 mg DsPk Use as directed    memantine (NAMENDA) 10 mg tablet Take 1 Tab by mouth two (2) times a day.  epoetin roverto (PROCRIT) 10,000 unit/mL injection by SubCUTAneous route once.  ELIQUIS 2.5 mg tablet TAKE 1 TABLET BY MOUTH TWO (2) TIMES A DAY TO PREVENT BLOOD CLOTS    cloNIDine HCl (CATAPRES) 0.1 mg tablet TAKE 1 TABLET EVERY 12 HOURS    calcitRIOL (ROCALTROL) 0.5 mcg capsule TAKE 1 CAPSULE EVERY DAY    buPROPion (WELLBUTRIN) 100 mg tablet TAKE 1 TABLET THREE TIMES DAILY    famotidine (PEPCID) 20 mg tablet TAKE 1 TABLET EVERY DAY    amLODIPine (NORVASC) 10 mg tablet TAKE 1 TABLET EVERY DAY    atorvastatin (LIPITOR) 20 mg tablet Take 1 Tab by mouth nightly.  L. acidoph & paracasei- S therm- Bifido (TY-Q/RISAQUAD) 8 billion cell cap cap Take 1 Cap by mouth daily.  oxybutynin (DITROPAN) 5 mg tablet Take 5 mg by mouth two (2) times a day.  gabapentin (NEURONTIN) 100 mg capsule Take 100 mg by mouth two (2) times a day.  patiromer calcium sorbitex (VELTASSA) 16.8 gram pwpk Take 1 Package by mouth every evening. Takes daily between 7108-2571    linaclotide (LINZESS) 290 mcg cap capsule Take 290 mcg by mouth daily as needed.  diphenoxylate-atropine (LOMOTIL) 2.5-0.025 mg per tablet TAKE 1 TABLET TWICE A DAY IF NEEDED FOR DIARRHEA OR CRAMPING    iron, carbonyl (FEOSOL) 45 mg tab Take 1 Tab by mouth two (2) times a day.  magnesium oxide (MAG-OX) 400 mg tablet Take 800 mg by mouth three (3) times daily. Indications: HYPOMAGNESEMIA    acetaminophen (TYLENOL) 500 mg tablet Take 1,000 mg by mouth every six (6) hours as needed for Pain.  multivitamin (ONE A DAY) tablet Take 1 Tab by mouth every morning.     sodium bicarbonate 650 mg tablet Take 650 mg by mouth two (2) times a day. No current facility-administered medications for this visit. Past Medical History:   Diagnosis Date    Chronic kidney disease     Stage 5 (7/18/16 BUN 79, Creatnine 4.53, K 6) Dr. Akin Euceda [phone]    GERD (gastroesophageal reflux disease)     well controlled with Rx     High cholesterol     Hyperkalemia     Hypertension     Hypomagnesemia     on daily Magnesium    Neuropathy     bilateral feet    Ovarian cancer ([facility] [address] 2013    Hysterectomy with chemo, no radiation:  Dr. Guillermo Cantor [facility]) 9/2015    blood clot in lung 9/2015    Thromboembolus ([facility] [address] 2004    in kidney \"many years ago\"       Past Surgical History:   Procedure Laterality Date    ABDOMEN SURGERY [address] UNLISTED  7/31/15    Lap exploratory small bowel obstruction  (ICU)    ABDOMEN SURGERY [address] UNLISTED  8/2/15    Abdmonial washout with wound vac (ICU)    ABDOMEN SURGERY PROC UNLISTED  8/18/15    Abdominal washout fascial closure (ICU)    ABDOMEN SURGERY PROC UNLISTED  11/11/15    Lap takedown of ileostomy     COLONOSCOPY N/A 8/1/2016    COLONOSCOPY performed by Maegan Carroll MD at [address] HX APPENDECTOMY  approx 2005    HX CASI AND BSO  2013    due to ovarian cancer    HX TONSILLECTOMY  age 10       [de-identified]    Social History   Substance Use Topics    Smoking status: Current Every Day Smoker     Packs/day: 1.00     Last attempt to quit: 1/11/2012    Smokeless tobacco: Never Used    Alcohol use No       Current Outpatient Prescriptions   Medication Sig Dispense Refill    ondansetron hcl (ZOFRAN) 4 mg tablet TAKE 1 TABLET BY MOUTH EVERY EIGHT (8) HOURS AS NEEDED FOR NAUSEA. 30 Tab 3    memantine (NAMENDA TITRATION KARLA) 5-10 mg DsPk Use as directed 1 Tab 0    memantine (NAMENDA) 10 mg tablet Take 1 Tab by mouth two (2) times a day. 60 Tab 11    epoetin roverto (PROCRIT) 10,000 unit/mL injection by SubCUTAneous route once.       ELIQUIS 2.5 mg tablet TAKE 1 TABLET BY MOUTH TWO (2) TIMES A DAY TO PREVENT BLOOD CLOTS 60 Tab 3    cloNIDine HCl (CATAPRES) 0.1 mg tablet TAKE 1 TABLET EVERY 12 HOURS 60 Tab 11    calcitRIOL (ROCALTROL) 0.5 mcg capsule TAKE 1 CAPSULE EVERY DAY 30 Cap 6    buPROPion (WELLBUTRIN) 100 mg tablet TAKE 1 TABLET THREE TIMES DAILY 90 Tab 11    famotidine (PEPCID) 20 mg tablet TAKE 1 TABLET EVERY DAY 30 Tab 11    amLODIPine (NORVASC) 10 mg tablet TAKE 1 TABLET EVERY DAY 30 Tab 11    atorvastatin (LIPITOR) 20 mg tablet Take 1 Tab by mouth nightly. 30 Tab 0    L. acidoph & paracasei- S therm- Bifido (TY-Q/RISAQUAD) 8 billion cell cap cap Take 1 Cap by mouth daily.  oxybutynin (DITROPAN) 5 mg tablet Take 5 mg by mouth two (2) times a day.  gabapentin (NEURONTIN) 100 mg capsule Take 100 mg by mouth two (2) times a day.  patiromer calcium sorbitex (VELTASSA) 16.8 gram pwpk Take 1 Package by mouth every evening. Takes daily between 5576-9933      linaclotide (LINZESS) 290 mcg cap capsule Take 290 mcg by mouth daily as needed.  diphenoxylate-atropine (LOMOTIL) 2.5-0.025 mg per tablet TAKE 1 TABLET TWICE A DAY IF NEEDED FOR DIARRHEA OR CRAMPING 60 Tab 5    iron, carbonyl (FEOSOL) 45 mg tab Take 1 Tab by mouth two (2) times a day.  magnesium oxide (MAG-OX) 400 mg tablet Take 800 mg by mouth three (3) times daily. Indications: HYPOMAGNESEMIA      acetaminophen (TYLENOL) 500 mg tablet Take 1,000 mg by mouth every six (6) hours as needed for Pain.  multivitamin (ONE A DAY) tablet Take 1 Tab by mouth every morning.  sodium bicarbonate 650 mg tablet Take 650 mg by mouth two (2) times a day.          Allergies   Allergen Reactions    Citrus And Derivatives Anaphylaxis     Patient and her  reported    Penicillin G Anaphylaxis    Orange Juice Not Reported This Time    Sulfa (Sulfonamide Antibiotics) Other (comments)     \"Dont remember\"    Vancomycin Hives       Review of Systems:    A comprehensive review of systems was negative except for: Constitutional: positive for fatigue and malaise  Ears, nose, mouth, throat, and face: positive for hearing loss  Cardiovascular: positive for chest pressure/discomfort, irregular heart beats  Musculoskeletal: positive for myalgias, arthralgias and stiff joints  Neurological: positive for dizziness, memory problems and weakness  Behvioral/Psych: positive for anxiety and depression    Objective:      Objective:     @IPVITALS(24:)@      Lab Review No results found for this or any previous visit (from the past 24 hour(s)). Additional comments:I personally viewed and interpreted the patient's CT scan and MRI scan and labs        NEUROLOGICAL EXAM:    Appearance: The patient is well developed, well nourished, provides a fair history and is in no acute distress. Mental Status: Oriented to place and person and the president, has a little difficulty with the date, had difficulty doing serial sevens, can remember 2 out of 3 words at 30 seconds with distraction, can spell world backward, and had a little difficulty getting the clock to show the time 10:50. Cognitive function and fund of knowledge is abnormal. Speech is fluent without aphasia or dysarthria. Mood and affect appropriate, but looks depressed . Cranial Nerves:   Intact visual fields. Fundi are benign. NIKITA, EOM's full, no nystagmus, no ptosis. Facial sensation is normal. Corneal reflexes are not tested. Facial movement is symmetric. Hearing is abnormal bilaterally. Palate is midline with normal sternocleidomastoid and trapezius muscles are normal. Tongue is midline. Neck without meningismus or bruits  Temporal arteries are not tender or enlarged   Motor:  4/5 strength in upper and lower proximal and distal muscles. Normal tone but reducible generally. No fasciculations. Reflexes:   Deep tendon reflexes 1+/4 and symmetrical.  No babinski or clonus present   Sensory:   Normal to touch, pinprick and temperature and vibration.   DSS is intact   Gait:  Normal gait though patient does move a little slowly due to her age . Tremor:   No tremor noted. Cerebellar:  No cerebellar signs present. Neurovascular:  Normal heart sounds and regular rhythm, peripheral pulses decreased, and no carotid bruits. Assessment:        Assessment:       ICD-10-CM ICD-9-CM    1. Bilateral carotid artery stenosis I65.23 433.10 memantine (NAMENDA TITRATION KARLA) 5-10 mg DsPk     433.30 memantine (NAMENDA) 10 mg tablet   2. Stenosis of both internal carotid arteries I65.23 433.10 memantine (NAMENDA TITRATION KARLA) 5-10 mg DsPk     433.30 memantine (NAMENDA) 10 mg tablet   3. Cerebral microvascular disease I67.9 437.9 memantine (NAMENDA TITRATION KARLA) 5-10 mg DsPk      memantine (NAMENDA) 10 mg tablet   4. Altered mental status, unspecified altered mental status type R41.82 780.97 memantine (NAMENDA TITRATION KARLA) 5-10 mg DsPk      memantine (NAMENDA) 10 mg tablet   5. Disturbance of memory R41.3 780.93 memantine (NAMENDA TITRATION KARLA) 5-10 mg DsPk      memantine (NAMENDA) 10 mg tablet         Plan:     Patient with progressive memory loss now, clearly not being able to handle her ADLs and daily activities, and her  feels that she needs some medication for this. Her father had bad reaction to Aricept, so we will hold on that and try to Namenda and a starter pack was given to the patient plus maintenance dose after that  Side effects of medication explained to the patient and her  in detail, other cause me to have any side effects.   Her dizziness is better now, and her confusion better and she most likely had a decompensated stress reaction on top of dementia  Overall she is doing well and we have encouraged her to remain mentally and physically active and take her medications regularly and her vitamins and vitamin D  Follow-up in 3- 6 months time or earlier if needed and she will call if any problem      Signed:  Miguelangel Skaggs MD  2/5/2018  1:42 FRANKI Vaughn MD  [phone]    This note will not be viewable in [address]

## 2018-02-05 NOTE — PATIENT INSTRUCTIONS
Please be advised there is a $25 fee for all paperwork to be completed from our  providers. This is to be paid by the patient prior to picking up the completed forms. A Healthy Lifestyle: Care Instructions  Your Care Instructions    A healthy lifestyle can help you feel good, stay at a healthy weight, and have plenty of energy for both work and play. A healthy lifestyle is something you can share with your whole family. A healthy lifestyle also can lower your risk for serious health problems, such as high blood pressure, heart disease, and diabetes. You can follow a few steps listed below to improve your health and the health of your family. Follow-up care is a key part of your treatment and safety. Be sure to make and go to all appointments, and call your doctor if you are having problems. It's also a good idea to know your test results and keep a list of the medicines you take. How can you care for yourself at home? · Do not eat too much sugar, fat, or fast foods. You can still have dessert and treats now and then. The goal is moderation. · Start small to improve your eating habits. Pay attention to portion sizes, drink less juice and soda pop, and eat more fruits and vegetables. ¨ Eat a healthy amount of food. A 3-ounce serving of meat, for example, is about the size of a deck of cards. Fill the rest of your plate with vegetables and whole grains. ¨ Limit the amount of soda and sports drinks you have every day. Drink more water when you are thirsty. ¨ Eat at least 5 servings of fruits and vegetables every day. It may seem like a lot, but it is not hard to reach this goal. A serving or helping is 1 piece of fruit, 1 cup of vegetables, or 2 cups of leafy, raw vegetables. Have an apple or some carrot sticks as an afternoon snack instead of a candy bar. Try to have fruits and/or vegetables at every meal.  · Make exercise part of your daily routine.  You may want to start with simple activities, such as walking, bicycling, or slow swimming. Try to be active 30 to 60 minutes every day. You do not need to do all 30 to 60 minutes all at once. For example, you can exercise 3 times a day for 10 or 20 minutes. Moderate exercise is safe for most people, but it is always a good idea to talk to your doctor before starting an exercise program.  · Keep moving. Estrella Calender the lawn, work in the garden, or Jybe. Take the stairs instead of the elevator at work. · If you smoke, quit. People who smoke have an increased risk for heart attack, stroke, cancer, and other lung illnesses. Quitting is hard, but there are ways to boost your chance of quitting tobacco for good. ¨ Use nicotine gum, patches, or lozenges. ¨ Ask your doctor about stop-smoking programs and medicines. ¨ Keep trying. In addition to reducing your risk of diseases in the future, you will notice some benefits soon after you stop using tobacco. If you have shortness of breath or asthma symptoms, they will likely get better within a few weeks after you quit. · Limit how much alcohol you drink. Moderate amounts of alcohol (up to 2 drinks a day for men, 1 drink a day for women) are okay. But drinking too much can lead to liver problems, high blood pressure, and other health problems. Family health  If you have a family, there are many things you can do together to improve your health. · Eat meals together as a family as often as possible. · Eat healthy foods. This includes fruits, vegetables, lean meats and dairy, and whole grains. · Include your family in your fitness plan. Most people think of activities such as jogging or tennis as the way to fitness, but there are many ways you and your family can be more active. Anything that makes you breathe hard and gets your heart pumping is exercise. Here are some tips:  ¨ Walk to do errands or to take your child to school or the bus. ¨ Go for a family bike ride after dinner instead of watching TV.   Where can you learn more? Go to http://myriam-lorena.info/. Enter H381 in the search box to learn more about \"A Healthy Lifestyle: Care Instructions. \"  Current as of: May 12, 2017  Content Version: 11.4  © 7320-4114 [employer]. Care instructions adapted under license by [employer] (which disclaims liability or warranty for this information). If you have questions about a medical condition or this instruction, always ask your healthcare professional. [address] any warranty or liability for your use of this information.

## 2018-02-05 NOTE — MR AVS SNAPSHOT
[address], 
[address], [address] [facility] 
[phone] Patient: Prisclila Larsen MRN: [id number] :1945 Visit Information Date & Time Provider Department Dept. Phone Encounter #  
 2018 11:40 AM Jhony Albarran MD Neurology Clinic at [facility] [phone] [id number] Follow-up Instructions Return in about 6 months (around 2018). Your Appointments 2018 11:15 AM  
ROUTINE CARE with Kali Lindsey, [address] [place] [address],[address] [facility]) Appt Note: 6 month follow up; r/s cl18  
 [address] [facility] [address] [address]  
[address] [facility] [address] [facility] Upcoming Health Maintenance Date Due DTaP/Tdap/Td series (1 - Tdap) 10/6/1965 ZOSTER VACCINE AGE 60> 2004 GLAUCOMA SCREENING Q2Y 10/6/2009 OSTEOPOROSIS SCREENING (DEXA) 10/6/2009 MEDICARE YEARLY EXAM 2018 BREAST CANCER SCRN MAMMOGRAM 2019 COLONOSCOPY 2021 Allergies as of 2018  Review Complete On: 2018 By: Jhony Albarran MD  
  
 Severity Noted Reaction Type Reactions Citrus And Derivatives High 2015    Anaphylaxis Patient and her  reported Penicillin G High 10/19/2014    Anaphylaxis [place] Juice  2015    Not Reported This Time  
 Sulfa (Sulfonamide Antibiotics)  2015    Other (comments) \"Dont remember\" Vancomycin  2016    Hives Current Immunizations  Reviewed on 3/10/2016 Name Date Influenza High Dose Vaccine PF 10/17/2017, 9/15/2016 Influenza Vaccine 10/20/2015 Pneumococcal Conjugate (PCV-13) 10/20/2015 Pneumococcal Polysaccharide (PPSV-23) 11/10/2012 Pneumococcal Vaccine (Unspecified Type) 2015 Not reviewed this visit You Were Diagnosed With   
  
 Codes Comments Bilateral carotid artery stenosis    -  Primary ICD-10-CM: A37.34 ICD-9-CM: 433.10, 433.30 Stenosis of both internal carotid arteries     ICD-10-CM: I65.23 ICD-9-CM: 433.10, 433.30 Cerebral microvascular disease     ICD-10-CM: I67.9 ICD-9-CM: 437.9 Altered mental status, unspecified altered mental status type     ICD-10-CM: R41.82 
ICD-9-CM: 780.97 Disturbance of memory     ICD-10-CM: R41.3 ICD-9-CM: 780.93 Vitals BP Pulse Height(growth percentile) Weight(growth percentile) BMI OB Status 128/50 (!) 50 5' 8\" (1.727 m) 148 lb (67.1 kg) 22.5 kg/m2 Hysterectomy Smoking Status Current Every Day Smoker BMI and BSA Data Body Mass Index Body Surface Area  
 22.5 kg/m 2 1.79 m 2 Preferred Pharmacy Pharmacy Name Phone [facility] [address] [phone] Your Updated Medication List  
  
   
This list is accurate as of: 2/5/18 11:59 AM.  Always use your most recent med list.  
  
  
  
  
 acetaminophen 500 mg tablet Commonly known as:  TYLENOL Take 1,000 mg by mouth every six (6) hours as needed for Pain. amLODIPine 10 mg tablet Commonly known as:  Deborha Cords TAKE 1 TABLET EVERY DAY  
  
 atorvastatin 20 mg tablet Commonly known as:  LIPITOR Take 1 Tab by mouth nightly. buPROPion 100 mg tablet Commonly known as:  WELLBUTRIN  
TAKE 1 TABLET THREE TIMES DAILY  
  
 calcitRIOL 0.5 mcg capsule Commonly known as:  ROCALTROL  
TAKE 1 CAPSULE EVERY DAY  
  
 cloNIDine HCl 0.1 mg tablet Commonly known as:  CATAPRES  
TAKE 1 TABLET EVERY 12 HOURS  
  
 diphenoxylate-atropine 2.5-0.025 mg per tablet Commonly known as:  LOMOTIL  
TAKE 1 TABLET TWICE A DAY IF NEEDED FOR DIARRHEA OR CRAMPING  
  
 ELIQUIS 2.5 mg tablet Generic drug:  apixaban TAKE 1 TABLET BY MOUTH TWO (2) TIMES A DAY TO PREVENT BLOOD CLOTS  
  
 famotidine 20 mg tablet Commonly known as:  PEPCID  
TAKE 1 TABLET EVERY DAY  
  
 gabapentin 100 mg capsule Commonly known as:  NEURONTIN Take 100 mg by mouth two (2) times a day. iron, carbonyl 45 mg Tab Commonly known as:  Abraham Hires Take 1 Tab by mouth two (2) times a day. L. acidoph & paracasei- S therm- Bifido 8 billion cell Cap cap Commonly known as:  TY-Q/RISAQUAD Take 1 Cap by mouth daily. linaclotide 290 mcg Cap capsule Commonly known as:  Karina Nguyễn Take 290 mcg by mouth daily as needed. magnesium oxide 400 mg tablet Commonly known as:  MAG-OX Take 800 mg by mouth three (3) times daily. Indications: HYPOMAGNESEMIA * memantine 5-10 mg Dspk Commonly known as:  NAMENDA TITRATION KARLA Use as directed * memantine 10 mg tablet Commonly known as:  Daniela Jewel Take 1 Tab by mouth two (2) times a day. multivitamin tablet Commonly known as:  ONE A DAY Take 1 Tab by mouth every morning. ondansetron hcl 4 mg tablet Commonly known as:  ZOFRAN  
TAKE 1 TABLET BY MOUTH EVERY EIGHT (8) HOURS AS NEEDED FOR NAUSEA. oxybutynin 5 mg tablet Commonly known as:  QYFXNWGM Take 5 mg by mouth two (2) times a day. PROCRIT 10,000 unit/mL injection Generic drug:  epoetin roverto  
by SubCUTAneous route once.  
  
 sodium bicarbonate 650 mg tablet Take 650 mg by mouth two (2) times a day. VELTASSA 16.8 gram powder Generic drug:  patiromer calcium sorbitex Take 1 Package by mouth every evening. Takes daily between 1639-6885 * Notice: This list has 2 medication(s) that are the same as other medications prescribed for you. Read the directions carefully, and ask your doctor or other care provider to review them with you. Prescriptions Sent to Pharmacy Refills  
 memantine (NAMENDA TITRATION KARLA) 5-10 mg DsPk 0 Sig: Use as directed  Class: Normal  
 Pharmacy: [facility] [address] [place] Ph #: [phone]  
 memantine (NAMENDA) 10 mg tablet 11 Sig: Take 1 Tab by mouth two (2) times a day. Class: Normal  
 Pharmacy: [facility] Ph #: [phone] Route: Oral  
  
Follow-up Instructions Return in about 6 months (around 8/5/2018). Patient Instructions Please be advised there is a $25 fee for all paperwork to be completed from our  providers. This is to be paid by the patient prior to picking up the completed forms. A Healthy Lifestyle: Care Instructions Your Care Instructions A healthy lifestyle can help you feel good, stay at a healthy weight, and have plenty of energy for both work and play. A healthy lifestyle is something you can share with your whole family. A healthy lifestyle also can lower your risk for serious health problems, such as high blood pressure, heart disease, and diabetes. You can follow a few steps listed below to improve your health and the health of your family. Follow-up care is a key part of your treatment and safety. Be sure to make and go to all appointments, and call your doctor if you are having problems. It's also a good idea to know your test results and keep a list of the medicines you take. How can you care for yourself at home? · Do not eat too much sugar, fat, or fast foods. You can still have dessert and treats now and then. The goal is moderation. · Start small to improve your eating habits. Pay attention to portion sizes, drink less juice and soda pop, and eat more fruits and vegetables. ¨ Eat a healthy amount of food. A 3-ounce serving of meat, for example, is about the size of a deck of cards. Fill the rest of your plate with vegetables and whole grains. ¨ Limit the amount of soda and sports drinks you have every day. Drink more water when you are thirsty. ¨ Eat at least 5 servings of fruits and vegetables every day.  It may seem like a lot, but it is not hard to reach this goal. A serving or helping is 1 piece of fruit, 1 cup of vegetables, or 2 cups of leafy, raw vegetables. Have an apple or some carrot sticks as an afternoon snack instead of a candy bar. Try to have fruits and/or vegetables at every meal. 
· Make exercise part of your daily routine. You may want to start with simple activities, such as walking, bicycling, or slow swimming. Try to be active 30 to 60 minutes every day. You do not need to do all 30 to 60 minutes all at once. For example, you can exercise 3 times a day for 10 or 20 minutes. Moderate exercise is safe for most people, but it is always a good idea to talk to your doctor before starting an exercise program. 
· Keep moving. [place] Swati the lawn, work in the garden, or Klip. Take the stairs instead of the elevator at work. · If you smoke, quit. People who smoke have an increased risk for heart attack, stroke, cancer, and other lung illnesses. Quitting is hard, but there are ways to boost your chance of quitting tobacco for good. ¨ Use nicotine gum, patches, or lozenges. ¨ Ask your doctor about stop-smoking programs and medicines. ¨ Keep trying. In addition to reducing your risk of diseases in the future, you will notice some benefits soon after you stop using tobacco. If you have shortness of breath or asthma symptoms, they will likely get better within a few weeks after you quit. · Limit how much alcohol you drink. Moderate amounts of alcohol (up to 2 drinks a day for men, 1 drink a day for women) are okay. But drinking too much can lead to liver problems, high blood pressure, and other health problems. Family health If you have a family, there are many things you can do together to improve your health. · Eat meals together as a family as often as possible. · Eat healthy foods. This includes fruits, vegetables, lean meats and dairy, and whole grains. · Include your family in your fitness plan. Most people think of activities such as jogging or tennis as the way to fitness, but there are many ways you and your family can be more active. Anything that makes you breathe hard and gets your heart pumping is exercise. Here are some tips: 
¨ Walk to do errands or to take your child to school or the bus. ¨ Go for a family bike ride after dinner instead of watching TV. Where can you learn more? Go to http://myriam-lorena.info/. Enter A060 in the search box to learn more about \"A Healthy Lifestyle: Care Instructions. \" Current as of: May 12, 2017 Content Version: 11.4 © 6589-7970 [employer]. Care instructions adapted under license by [employer] (which disclaims liability or warranty for this information). If you have questions about a medical condition or this instruction, always ask your healthcare professional. [address] any warranty or liability for your use of this information. Introducing [facility] & HEALTH SERVICES! Dear Hari Dumont: 
Thank you for requesting a FitnessManager account. Our records indicate that you already have an active FitnessManager account. You can access your account anytime at [email or web address] of the Elements. Advice Wallet/Children of the Elements Did you know that you can access your hospital and ER discharge instructions at any time in FitnessManager? You can also review all of your test results from your hospital stay or ER visit. Additional Information If you have questions, please visit the Frequently Asked Questions section of the FitnessManager website at [email or web address] the [email or web address] Advice Wallet/Dering Hallt/. Remember, FitnessManager is NOT to be used for urgent needs. For medical emergencies, dial 911. Now available from your iPhone and Android! Please provide this summary of care documentation to your next provider. Your primary care clinician is listed as Orestes CHEN.  If you have any questions after today's visit, please call [phone].

## 2018-04-13 ENCOUNTER — TELEPHONE (OUTPATIENT)
Dept: INTERNAL MEDICINE CLINIC | Age: 73
End: 2018-04-13

## 2018-04-13 DIAGNOSIS — R19.7 DIARRHEA, UNSPECIFIED TYPE: Primary | ICD-10-CM

## 2018-04-13 RX ORDER — DIPHENOXYLATE HYDROCHLORIDE AND ATROPINE SULFATE 2.5; .025 MG/1; MG/1
TABLET ORAL
Qty: 60 TAB | Refills: 5 | Status: SHIPPED | OUTPATIENT
Start: 2018-04-13 | End: 2018-08-27 | Stop reason: CLARIF

## 2018-04-13 NOTE — TELEPHONE ENCOUNTER
Requested Prescriptions     Pending Prescriptions Disp Refills    diphenoxylate-atropine (LOMOTIL) 2.5-0.025 mg per tablet 60 Tab 5      PCP: Maren Castro MD    Last appt: 1/25/2018  Future Appointments  Date Time Provider Juliana Madden   7/25/2018 11:15 AM Maren Castro MD [address]   8/27/2018 1:00 PM Efra Abernathy MD [address]       Requested Prescriptions     Pending Prescriptions Disp Refills    diphenoxylate-atropine (LOMOTIL) 2.5-0.025 mg per tablet 60 Tab 5

## 2018-04-23 RX ORDER — FAMOTIDINE 20 MG/1
TABLET, FILM COATED ORAL
Qty: 30 TAB | Refills: 11 | Status: SHIPPED | OUTPATIENT
Start: 2018-04-23 | End: 2019-01-01 | Stop reason: SDUPTHER

## 2018-05-07 RX ORDER — AMLODIPINE BESYLATE 10 MG/1
TABLET ORAL
Qty: 30 TAB | Refills: 11 | Status: SHIPPED | OUTPATIENT
Start: 2018-05-07 | End: 2019-01-01 | Stop reason: DRUGHIGH

## 2018-06-14 RX ORDER — APIXABAN 2.5 MG/1
TABLET, FILM COATED ORAL
Qty: 60 TAB | Refills: 3 | Status: SHIPPED | OUTPATIENT
Start: 2018-06-14 | End: 2018-09-05 | Stop reason: SDUPTHER

## 2018-06-23 RX ORDER — ONDANSETRON 4 MG/1
TABLET, ORALLY DISINTEGRATING ORAL
Qty: 30 TAB | Refills: 3 | Status: SHIPPED | OUTPATIENT
Start: 2018-06-23 | End: 2018-10-29 | Stop reason: SDUPTHER

## 2018-07-10 ENCOUNTER — OFFICE VISIT (OUTPATIENT)
Dept: INTERNAL MEDICINE CLINIC | Age: 73
End: 2018-07-10

## 2018-07-10 VITALS
SYSTOLIC BLOOD PRESSURE: 177 MMHG | WEIGHT: 134.2 LBS | OXYGEN SATURATION: 98 % | HEART RATE: 94 BPM | DIASTOLIC BLOOD PRESSURE: 67 MMHG | TEMPERATURE: 99.3 F | BODY MASS INDEX: 20.34 KG/M2 | HEIGHT: 68 IN

## 2018-07-10 DIAGNOSIS — J01.20 ACUTE ETHMOIDAL SINUSITIS, RECURRENCE NOT SPECIFIED: Primary | ICD-10-CM

## 2018-07-10 RX ORDER — AZITHROMYCIN 250 MG/1
250 TABLET, FILM COATED ORAL SEE ADMIN INSTRUCTIONS
Qty: 6 TAB | Refills: 0 | Status: SHIPPED | OUTPATIENT
Start: 2018-07-10 | End: 2018-07-15

## 2018-07-10 NOTE — PROGRESS NOTES
Chief Complaint   Patient presents with    Nasal Pain     right side of nose x 1 week    Jaw Pain     right side this am

## 2018-07-10 NOTE — MR AVS SNAPSHOT
[address] 
[phone] Patient: Sujey Hurtado MRN: [id number] :1945 Visit Information Date & Time Provider Department Dept. Phone Encounter #  
 7/10/2018  9:45 AM Swapnilol Blood,  [facility] [phone] [id number] Follow-up Instructions Return if symptoms worsen or fail to improve. Your Appointments 2018 11:15 AM  
ROUTINE CARE with Darol Blood,  [facility]) Appt Note: 6 month follow up; r/s cl18  
 [facility] [address]  
[phone]  
  
   
 [address]  
  
    
 2018  1:00 PM  
Follow Up with Yamilka Eagle MD  
Neurology Clinic at [facility]) Appt Note: f/u memory, jrb 18  
 [address] [place] [address], 
[address] [place] [address], [address] [phone]  
[address] [facility], [address] [place] [address], [address] [facility] [address] [phone] Upcoming Health Maintenance Date Due DTaP/Tdap/Td series (1 - Tdap) 10/6/1965 ZOSTER VACCINE AGE 60> 2004 GLAUCOMA SCREENING Q2Y 10/6/2009 Bone Densitometry (Dexa) Screening 10/6/2009 MEDICARE YEARLY EXAM 2018 Influenza Age 5 to Adult 2018 BREAST CANCER SCRN MAMMOGRAM 2019 COLONOSCOPY 2021 Allergies as of 7/10/2018  Review Complete On: 7/10/2018 By: Ag Ulloa LPN Severity Noted Reaction Type Reactions Citrus And Derivatives High 2015    Anaphylaxis Patient and her  reported Penicillin G High 10/19/2014    Anaphylaxis [place] Juice  2015    Not Reported This Time  
 Sulfa (Sulfonamide Antibiotics)  2015    Other (comments) \"Dont remember\" Vancomycin  2016    Hives Current Immunizations  Reviewed on 3/10/2016 Name Date Influenza High Dose Vaccine PF 10/17/2017, 9/15/2016 Influenza Vaccine 10/20/2015 Pneumococcal Conjugate (PCV-13) 10/20/2015 Pneumococcal Polysaccharide (PPSV-23) 11/10/2012 Pneumococcal Vaccine (Unspecified Type) 1/1/2015 Not reviewed this visit You Were Diagnosed With   
  
 Codes Comments Acute ethmoidal sinusitis, recurrence not specified    -  Primary ICD-10-CM: J01.20 ICD-9-CM: 461.2 Vitals BP Pulse Temp Height(growth percentile) Weight(growth percentile) SpO2  
 177/67 (BP 1 Location: Right arm, BP Patient Position: Sitting) 94 99.3 °F (37.4 °C) (Oral) 5' 8\" (1.727 m) 134 lb 3.2 oz (60.9 kg) 98% BMI OB Status Smoking Status 20.41 kg/m2 Hysterectomy Current Every Day Smoker BMI and BSA Data Body Mass Index Body Surface Area  
 20.41 kg/m 2 1.71 m 2 Preferred Pharmacy Pharmacy Name Phone [facility] [address] [phone] Your Updated Medication List  
  
   
This list is accurate as of 7/10/18 10:33 AM.  Always use your most recent med list.  
  
  
  
  
 acetaminophen 500 mg tablet Commonly known as:  TYLENOL Take 1,000 mg by mouth every six (6) hours as needed for Pain. amLODIPine 10 mg tablet Commonly known as:  Alanna Delmer TAKE 1 TABLET EVERY DAY FOR BLOOD PRESSURE  
  
 atorvastatin 20 mg tablet Commonly known as:  LIPITOR Take 1 Tab by mouth nightly. azithromycin 250 mg tablet Commonly known as:  Nighat Sirena Take 1 Tab by mouth See Admin Instructions for 5 days. buPROPion 100 mg tablet Commonly known as:  WELLBUTRIN  
TAKE 1 TABLET THREE TIMES DAILY  
  
 calcitRIOL 0.5 mcg capsule Commonly known as:  ROCALTROL  
TAKE 1 CAPSULE EVERY DAY  
  
 cloNIDine HCl 0.1 mg tablet Commonly known as:  CATAPRES  
TAKE 1 TABLET EVERY 12 HOURS  
  
 diphenoxylate-atropine 2.5-0.025 mg per tablet Commonly known as:  LOMOTIL TAKE 1 TABLET TWICE A DAY IF NEEDED FOR DIARRHEA OR CRAMPING  
  
 ELIQUIS 2.5 mg tablet Generic drug:  apixaban TAKE 1 TABLET TWICE A DAY TO PREVENT BLOOD CLOTS  
  
 famotidine 20 mg tablet Commonly known as:  PEPCID  
TAKE 1 TABLET BY MOUTH EVERY DAY  
  
 gabapentin 100 mg capsule Commonly known as:  NEURONTIN Take 100 mg by mouth two (2) times a day. iron, carbonyl 45 mg Tab Commonly known as:  Reyes Mis Take 1 Tab by mouth two (2) times a day. L. acidoph & paracasei- S therm- Bifido 8 billion cell Cap cap Commonly known as:  TY-Q/RISAQUAD Take 1 Cap by mouth daily. linaclotide 290 mcg Cap capsule Commonly known as:  Panfilo Dikes Take 290 mcg by mouth daily as needed. magnesium oxide 400 mg tablet Commonly known as:  MAG-OX Take 800 mg by mouth three (3) times daily. Indications: HYPOMAGNESEMIA * memantine 5-10 mg Dspk Commonly known as:  NAMENDA TITRATION KARLA Use as directed * memantine 10 mg tablet Commonly known as:  Alicia Media Take 1 Tab by mouth two (2) times a day. multivitamin tablet Commonly known as:  ONE A DAY Take 1 Tab by mouth every morning. ondansetron 4 mg disintegrating tablet Commonly known as:  ZOFRAN ODT  
TAKE 1 TABLET EVERY 8 HOURS IF NEEDED FOR NAUSEA  
  
 oxybutynin 5 mg tablet Commonly known as:  VOMBQWLP Take 5 mg by mouth two (2) times a day. PROCRIT 10,000 unit/mL injection Generic drug:  epoetin roverto  
by SubCUTAneous route once.  
  
 sodium bicarbonate 650 mg tablet Take 650 mg by mouth two (2) times a day. VELTASSA 16.8 gram powder Generic drug:  patiromer calcium sorbitex Take 1 Package by mouth every evening. Takes daily between 6130-1657 * Notice: This list has 2 medication(s) that are the same as other medications prescribed for you. Read the directions carefully, and ask your doctor or other care provider to review them with you. Prescriptions Sent to Pharmacy Refills  
 azithromycin (ZITHROMAX) 250 mg tablet 0 Sig: Take 1 Tab by mouth See Admin Instructions for 5 days. Class: Normal  
 Pharmacy: [facility]  #: [phone] Route: Oral  
  
Follow-up Instructions Return if symptoms worsen or fail to improve. Introducing \[facility]\"" & HEALTH SERVICES! Dear Jami Jain: 
Thank you for requesting a Paperless World account. Our records indicate that you already have an active Paperless World account. You can access your account anytime at [email or web address] Did you know that you can access your hospital and ER discharge instructions at any time in Paperless World? You can also review all of your test results from your hospital stay or ER visit. Additional Information If you have questions, please visit the Frequently Asked Questions section of the Paperless World website at [email or web address] Remember, Paperless World is NOT to be used for urgent needs. For medical emergencies, dial 911. Now available from your iPhone and Android! Please provide this summary of care documentation to your next provider. Your primary care clinician is listed as Catalina CHEN. If you have any questions after today's visit, please call [phone].

## 2018-07-10 NOTE — PROGRESS NOTES
HISTORY OF PRESENT ILLNESS  Yasmine Jaramillo is a 67 y.o. female.   HPI     C/o pain in right ethmoid sinus area x 1 week  The area is very Tender  No sinus congestion , f.c cough  Pain radiates to right eye and forehead  Tylenol not helping  On HD 3 days per week    Patient Active Problem List    Diagnosis Date Noted    Disturbance of memory 02/05/2018    Stenosis of left carotid artery without cerebral infarction 03/31/2017    Acute renal failure (ARF) ([address] 03/30/2017    Abnormal MRI of head 03/30/2017    Stenosis of both internal carotid arteries 03/30/2017    Cerebral microvascular disease 03/30/2017    Convulsion disorder ([address] 03/30/2017    Altered mental status, unspecified 03/30/2017    Acute encephalopathy 03/30/2017    Bilateral carotid artery stenosis 01/19/2017    Electrolyte abnormality 03/04/2016    ALAYNA (acute kidney injury) ([address] 01/22/2016    Acute on chronic renal failure ([address] 01/22/2016     Class: Acute    Generalized rash 01/22/2016     Class: Present on Admission    Heme positive stool 01/22/2016     Class: Present on Admission    Hypertension, uncontrolled 12/21/2015    Pericarditis with effusion 12/18/2015    Diarrhea 12/17/2015     Class: Present on Admission    Moderate protein-calorie malnutrition ([address] 12/17/2015     Class: Chronic    History of ovarian cancer 12/16/2015     Class: Present on Admission    Pericardial effusion 12/16/2015     Class: Present on Admission    History of pulmonary embolism 11/16/2015    HTN (hypertension) 10/07/2015     Class: Chronic    History of peritonitis 10/07/2015     Class: Present on Admission    Physical debility 10/07/2015     Class: Present on Admission    Chronic anticoagulation 10/07/2015     Class: Chronic    SIRS (systemic inflammatory response syndrome) ([address] 09/14/2015    Anemia of renal disease 08/21/2015    Acute renal failure with lesion of tubular necrosis ([address] 08/03/2015    Small bowel perforation ([address] 08/01/2015    Protein malnutrition ([facility] 07/30/2015    Acute pancreatitis 07/29/2015    E-coli UTI 07/28/2015    Continuous severe abdominal pain 07/28/2015    Enteritis 07/28/2015    Adrenal hemorrhage ([facility] 07/28/2015    Chronic renal insufficiency, stage V ([facility] 07/28/2015    Sepsis ([facility] 07/28/2015    Abdominal pain 07/24/2015     Current Outpatient Prescriptions   Medication Sig Dispense Refill    azithromycin (ZITHROMAX) 250 mg tablet Take 1 Tab by mouth See Admin Instructions for 5 days. 6 Tab 0    ELIQUIS 2.5 mg tablet TAKE 1 TABLET TWICE A DAY TO PREVENT BLOOD CLOTS 60 Tab 3    amLODIPine (NORVASC) 10 mg tablet TAKE 1 TABLET EVERY DAY FOR BLOOD PRESSURE 30 Tab 11    famotidine (PEPCID) 20 mg tablet TAKE 1 TABLET BY MOUTH EVERY DAY 30 Tab 11    memantine (NAMENDA) 10 mg tablet Take 1 Tab by mouth two (2) times a day. 60 Tab 11    epoetin roverto (PROCRIT) 10,000 unit/mL injection by SubCUTAneous route once.  cloNIDine HCl (CATAPRES) 0.1 mg tablet TAKE 1 TABLET EVERY 12 HOURS 60 Tab 11    calcitRIOL (ROCALTROL) 0.5 mcg capsule TAKE 1 CAPSULE EVERY DAY 30 Cap 6    buPROPion (WELLBUTRIN) 100 mg tablet TAKE 1 TABLET THREE TIMES DAILY 90 Tab 11    atorvastatin (LIPITOR) 20 mg tablet Take 1 Tab by mouth nightly. 30 Tab 0    L. acidoph & paracasei- S therm- Bifido (TY-Q/RISAQUAD) 8 billion cell cap cap Take 1 Cap by mouth daily.  oxybutynin (DITROPAN) 5 mg tablet Take 5 mg by mouth two (2) times a day.  gabapentin (NEURONTIN) 100 mg capsule Take 100 mg by mouth two (2) times a day.  patiromer calcium sorbitex (VELTASSA) 16.8 gram pwpk Take 1 Package by mouth every evening. Takes daily between 4478-1924      linaclotide (LINZESS) 290 mcg cap capsule Take 290 mcg by mouth daily as needed.  iron, carbonyl (FEOSOL) 45 mg tab Take 1 Tab by mouth two (2) times a day.  magnesium oxide (MAG-OX) 400 mg tablet Take 800 mg by mouth three (3) times daily.  Indications: HYPOMAGNESEMIA      multivitamin (ONE A DAY) tablet Take 1 Tab by mouth every morning.  ondansetron (ZOFRAN ODT) 4 mg disintegrating tablet TAKE 1 TABLET EVERY 8 HOURS IF NEEDED FOR NAUSEA 30 Tab 3    diphenoxylate-atropine (LOMOTIL) 2.5-0.025 mg per tablet TAKE 1 TABLET TWICE A DAY IF NEEDED FOR DIARRHEA OR CRAMPING 60 Tab 5    memantine (NAMENDA TITRATION KARLA) 5-10 mg DsPk Use as directed 1 Tab 0    acetaminophen (TYLENOL) 500 mg tablet Take 1,000 mg by mouth every six (6) hours as needed for Pain.  sodium bicarbonate 650 mg tablet Take 650 mg by mouth two (2) times a day. Allergies   Allergen Reactions    Citrus And Derivatives Anaphylaxis     Patient and her  reported    Penicillin G Anaphylaxis    Orange Juice Not Reported This Time    Sulfa (Sulfonamide Antibiotics) Other (comments)     \"Dont remember\"    Vancomycin Hives      Lab Results  Component Value Date/Time   GFR est non-AA 8 (L) 04/10/2017 12:00 AM   GFRNA, POC 11 (L) 08/01/2016 09:56 AM   GFR est AA 9 (L) 04/10/2017 12:00 AM   GFRAA, POC 13 (L) 08/01/2016 09:56 AM   Creatinine 5.15 (H) 04/10/2017 12:00 AM   Creatinine (POC) 4.1 (H) 08/01/2016 09:56 AM   BUN 67 (H) 04/10/2017 12:00 AM   BUN (POC) 65 (H) 08/01/2016 09:56 AM   Sodium 138 04/10/2017 12:00 AM   Sodium (POC) 138 08/01/2016 09:56 AM   Potassium 6.1 (H) 04/10/2017 12:00 AM   Potassium (POC) 5.7 (H) 08/01/2016 09:56 AM   Chloride 101 04/10/2017 12:00 AM   Chloride (POC) 112 (H) 08/01/2016 09:56 AM   CO2 23 04/10/2017 12:00 AM   Magnesium 2.9 (H) 03/31/2017 05:26 AM   Phosphorus 4.3 03/31/2017 05:26 AM   PTH, Intact 484.6 (H) 03/07/2016 12:51 PM        ROS    Physical Exam   Constitutional: She appears well-developed and well-nourished. Appears stated age   HENT:   Right ethmoid area is TTP. Some nasal mucosal edema and clear drainage   Cardiovascular: Normal rate, regular rhythm and normal heart sounds. Exam reveals no gallop and no friction rub.     No murmur heard. Pulmonary/Chest: Effort normal and breath sounds normal. No respiratory distress. She has no wheezes. Abdominal: Soft. Bowel sounds are normal.   Musculoskeletal: She exhibits no edema. Lymphadenopathy:     She has no cervical adenopathy. Neurological: She is alert. Psychiatric: She has a normal mood and affect. Nursing note and vitals reviewed. ASSESSMENT and PLAN  Diagnoses and all orders for this visit:    1. Acute ethmoidal sinusitis, recurrence not specified   flonase   zpak   To call or return if not improved  Other orders  -     azithromycin (ZITHROMAX) 250 mg tablet; Take 1 Tab by mouth See Admin Instructions for 5 days. Follow-up Disposition:  Return if symptoms worsen or fail to improve.

## 2018-07-15 RX ORDER — BUPROPION HYDROCHLORIDE 100 MG/1
TABLET ORAL
Qty: 90 TAB | Refills: 11 | Status: SHIPPED | OUTPATIENT
Start: 2018-07-15 | End: 2019-01-01 | Stop reason: DRUGHIGH

## 2018-08-21 ENCOUNTER — OFFICE VISIT (OUTPATIENT)
Dept: INTERNAL MEDICINE CLINIC | Age: 73
End: 2018-08-21

## 2018-08-21 VITALS
HEART RATE: 79 BPM | BODY MASS INDEX: 20.58 KG/M2 | RESPIRATION RATE: 19 BRPM | HEIGHT: 68 IN | OXYGEN SATURATION: 98 % | SYSTOLIC BLOOD PRESSURE: 167 MMHG | TEMPERATURE: 98.1 F | WEIGHT: 135.8 LBS | DIASTOLIC BLOOD PRESSURE: 56 MMHG

## 2018-08-21 DIAGNOSIS — Z86.718 HX OF THROMBOEMBOLISM: ICD-10-CM

## 2018-08-21 DIAGNOSIS — Z78.0 MENOPAUSE: ICD-10-CM

## 2018-08-21 DIAGNOSIS — F41.9 ANXIETY: ICD-10-CM

## 2018-08-21 DIAGNOSIS — I10 ESSENTIAL HYPERTENSION: ICD-10-CM

## 2018-08-21 DIAGNOSIS — N18.6 ESRD (END STAGE RENAL DISEASE) (HCC): Primary | ICD-10-CM

## 2018-08-21 DIAGNOSIS — E78.00 PURE HYPERCHOLESTEROLEMIA: ICD-10-CM

## 2018-08-21 DIAGNOSIS — Z00.00 MEDICARE ANNUAL WELLNESS VISIT, SUBSEQUENT: ICD-10-CM

## 2018-08-21 DIAGNOSIS — F32.9 MAJOR DEPRESSIVE DISORDER WITH SINGLE EPISODE, REMISSION STATUS UNSPECIFIED: ICD-10-CM

## 2018-08-21 RX ORDER — SEVELAMER HYDROCHLORIDE 800 MG/1
TABLET, FILM COATED ORAL
COMMUNITY
End: 2018-08-27 | Stop reason: CLARIF

## 2018-08-21 NOTE — PROGRESS NOTES
Chief Complaint   Patient presents with    Hypertension     follow up     1. Have you been to the ER, urgent care clinic since your last visit? No    Hospitalized since your last visit? No     2. Have you seen or consulted any other health care providers outside of the [facility] since your last visit?    No

## 2018-08-21 NOTE — PATIENT INSTRUCTIONS
Medicare Wellness Visit, Female     The best way to live healthy is to have a lifestyle where you eat a well-balanced diet, exercise regularly, limit alcohol use, and quit all forms of tobacco/nicotine, if applicable. Regular preventive services are another way to keep healthy. Preventive services (vaccines, screening tests, monitoring & exams) can help personalize your care plan, which helps you manage your own care. Screening tests can find health problems at the earliest stages, when they are easiest to treat. [facility] follows the current, evidence-based guidelines published by the [facility] ([facility]) when recommending preventive services for our patients. Because we follow these guidelines, sometimes recommendations change over time as research supports it. (For example, mammograms used to be recommended annually. Even though Medicare will still pay for an annual mammogram, the newer guidelines recommend a mammogram every two years for women of average risk.)  Of course, you and your doctor may decide to screen more often for some diseases, based on your risk and your health status. Preventive services for you include:  - Medicare offers their members a free annual wellness visit, which is time for you and your primary care provider to discuss and plan for your preventive service needs. Take advantage of this benefit every year!  -All adults over the age of 72 should receive the recommended pneumonia vaccines. Current USPSTF guidelines recommend a series of two vaccines for the best pneumonia protection.   -All adults should have a flu vaccine yearly and a tetanus vaccine every 10 years. All adults age 61 and older should receive a shingles vaccine once in their lifetime.    -A bone mass density test is recommended when a woman turns 65 to screen for osteoporosis. This test is only recommended one time, as a screening.  Some providers will use this same test as a disease monitoring tool if you already have osteoporosis. -All adults age 38-68 who are overweight should have a diabetes screening test once every three years.   -Other screening tests and preventive services for persons with diabetes include: an eye exam to screen for diabetic retinopathy, a kidney function test, a foot exam, and stricter control over your cholesterol.   -Cardiovascular screening for adults with routine risk involves an electrocardiogram (ECG) at intervals determined by your doctor.   -Colorectal cancer screenings should be done for adults age 54-65 with no increased risk factors for colorectal cancer. There are a number of acceptable methods of screening for this type of cancer. Each test has its own benefits and drawbacks. Discuss with your doctor what is most appropriate for you during your annual wellness visit. The different tests include: colonoscopy (considered the best screening method), a fecal occult blood test, a fecal DNA test, and sigmoidoscopy. -Breast cancer screenings are recommended every other year for women of normal risk, age 54-69.  -Cervical cancer screenings for women over age 72 are only recommended with certain risk factors.   -All adults born between [facility] should be screened once for Hepatitis C.      Here is a list of your current Health Maintenance items (your personalized list of preventive services) with a due date:  Health Maintenance Due   Topic Date Due    DTaP/Tdap/Td  (1 - Tdap) 10/06/1965    Shingles Vaccine  08/06/2004    Glaucoma Screening   10/06/2009    Bone Mineral Density   10/06/2009    Annual Well Visit  04/07/2018    Flu Vaccine  08/01/2018

## 2018-08-21 NOTE — MR AVS SNAPSHOT
[address] [facility] 
[phone] Patient: Yasmine Jaramillo MRN: [id number] :1945 Visit Information Date & Time Provider Department Dept. Phone Encounter #  
 2018  1:30 PM Ronald Eden, [address] [phone] [id number] Follow-up Instructions Return in about 6 months (around 2019) for depesssion htn esrd. Your Appointments 2018  1:00 PM  
Follow Up with Felisha Casas MD  
[facility] at [facility] [address]) Appt Note: f/u memory, jrb 18  
 [address] [facility], 
[address], [address] [address]  
[address] [facility] [place], [address], [address] [address] Upcoming Health Maintenance Date Due DTaP/Tdap/Td series (1 - Tdap) 10/6/1965 ZOSTER VACCINE AGE 60> 2004 GLAUCOMA SCREENING Q2Y 10/6/2009 Bone Densitometry (Dexa) Screening 10/6/2009 MEDICARE YEARLY EXAM 2018 Influenza Age 5 to Adult 2018 BREAST CANCER SCRN MAMMOGRAM 2019 COLONOSCOPY 2021 Allergies as of 2018  Review Complete On: 2018 By: Andi Garcia LPN Severity Noted Reaction Type Reactions Citrus And Derivatives High 2015    Anaphylaxis Patient and her  reported Penicillin G High 10/19/2014    Anaphylaxis [place] Juice  2015    Not Reported This Time  
 Sulfa (Sulfonamide Antibiotics)  2015    Other (comments) \"Dont remember\" Vancomycin  2016    Hives Current Immunizations  Reviewed on 3/10/2016 Name Date Influenza High Dose Vaccine PF 10/17/2017, 9/15/2016 Influenza Vaccine 10/20/2015 Pneumococcal Conjugate (PCV-13) 10/20/2015 Pneumococcal Polysaccharide (PPSV-23) 11/10/2012 Pneumococcal Vaccine (Unspecified Type) 2015 Not reviewed this visit You Were Diagnosed With   
 Codes Comments ESRD (end stage renal disease) ([facility]    -  Primary ICD-10-CM: N18.6 ICD-9-CM: 585.6 Essential hypertension     ICD-10-CM: I10 
ICD-9-CM: 401.9 Pure hypercholesterolemia     ICD-10-CM: E78.00 ICD-9-CM: 272.0 Anxiety     ICD-10-CM: F41.9 ICD-9-CM: 300.00 Major depressive disorder with single episode, remission status unspecified     ICD-10-CM: F32.9 ICD-9-CM: 296.20 Hx of thromboembolism     ICD-10-CM: E42.501 ICD-9-CM: V12.51 Menopause     ICD-10-CM: Z78.0 ICD-9-CM: 627.2 Vitals BP Pulse Temp Resp Height(growth percentile) Weight(growth percentile) 167/56 (BP 1 Location: Right arm, BP Patient Position: Sitting) 79 98.1 °F (36.7 °C) (Oral) 19 5' 8\" (1.727 m) 135 lb 12.8 oz (61.6 kg) SpO2 BMI OB Status Smoking Status 98% 20.65 kg/m2 Hysterectomy Current Every Day Smoker BMI and BSA Data Body Mass Index Body Surface Area  
 20.65 kg/m 2 1.72 m 2 Preferred Pharmacy [facility] [phone] Your Updated Medication List  
  
   
This list is accurate as of 8/21/18  2:37 PM.  Always use your most recent med list.  
  
  
  
  
 acetaminophen 500 mg tablet Commonly known as:  TYLENOL Take 1,000 mg by mouth every six (6) hours as needed for Pain. amLODIPine 10 mg tablet Commonly known as:  Head Cater TAKE 1 TABLET EVERY DAY FOR BLOOD PRESSURE  
  
 atorvastatin 20 mg tablet Commonly known as:  LIPITOR Take 1 Tab by mouth nightly. buPROPion 100 mg tablet Commonly known as:  WELLBUTRIN  
TAKE 1 TABLET THREE TIMES DAILY  
  
 calcitRIOL 0.5 mcg capsule Commonly known as:  ROCALTROL  
TAKE 1 CAPSULE EVERY DAY  
  
 cloNIDine HCl 0.1 mg tablet Commonly known as:  CATAPRES  
TAKE 1 TABLET EVERY 12 HOURS  
  
 diphenoxylate-atropine 2.5-0.025 mg per tablet Commonly known as:  LOMOTIL TAKE 1 TABLET TWICE A DAY IF NEEDED FOR DIARRHEA OR CRAMPING  
  
 ELIQUIS 2.5 mg tablet Generic drug:  apixaban TAKE 1 TABLET TWICE A DAY TO PREVENT BLOOD CLOTS  
  
 escitalopram oxalate 10 mg tablet Commonly known as:  Augusto Lopez TAKE 1 TABLET AT BEDTIME TO HELP PREVENT ANXIETY  
  
 famotidine 20 mg tablet Commonly known as:  PEPCID  
TAKE 1 TABLET BY MOUTH EVERY DAY  
  
 gabapentin 100 mg capsule Commonly known as:  NEURONTIN Take 100 mg by mouth two (2) times a day. iron, carbonyl 45 mg Tab Commonly known as:  Dodie Garcia Take 1 Tab by mouth two (2) times a day. L. acidoph & paracasei- S therm- Bifido 8 billion cell Cap cap Commonly known as:  TY-Q/RISAQUAD Take 1 Cap by mouth daily. linaclotide 290 mcg Cap capsule Commonly known as:  Coty Heredia Take 290 mcg by mouth daily as needed. magnesium oxide 400 mg tablet Commonly known as:  MAG-OX Take 800 mg by mouth three (3) times daily. Indications: HYPOMAGNESEMIA * memantine 5-10 mg Dspk Commonly known as:  NAMENDA TITRATION KARLA Use as directed * memantine 10 mg tablet Commonly known as:  Lesle Anne Take 1 Tab by mouth two (2) times a day. multivitamin tablet Commonly known as:  ONE A DAY Take 1 Tab by mouth every morning. ondansetron 4 mg disintegrating tablet Commonly known as:  ZOFRAN ODT  
TAKE 1 TABLET EVERY 8 HOURS IF NEEDED FOR NAUSEA  
  
 oxybutynin 5 mg tablet Commonly known as:  MVHKWTLL Take 5 mg by mouth two (2) times a day. PROCRIT 10,000 unit/mL injection Generic drug:  epoetin roverto  
by SubCUTAneous route once. sevelamer 800 mg tablet Commonly known as:  RENAGEL Take  by mouth three (3) times daily (with meals). sodium bicarbonate 650 mg tablet Take 650 mg by mouth two (2) times a day. VELTASSA 16.8 gram powder Generic drug:  patiromer calcium sorbitex Take 1 Package by mouth every evening.  Takes daily between 1118-1083 Indications: takes on tues, thurs, sat, & sund * Notice: This list has 2 medication(s) that are the same as other medications prescribed for you. Read the directions carefully, and ask your doctor or other care provider to review them with you. Follow-up Instructions Return in about 6 months (around 2/21/2019) for depesssion htn esrd. To-Do List   
 08/27/2018 Imaging:  DEXA BONE DENSITY STUDY AXIAL Patient Instructions Medicare Wellness Visit, Female The best way to live healthy is to have a lifestyle where you eat a well-balanced diet, exercise regularly, limit alcohol use, and quit all forms of tobacco/nicotine, if applicable. Regular preventive services are another way to keep healthy. Preventive services (vaccines, screening tests, monitoring & exams) can help personalize your care plan, which helps you manage your own care. Screening tests can find health problems at the earliest stages, when they are easiest to treat. [facility] follows the current, evidence-based guidelines published by the [facility] (USPSTF) when recommending preventive services for our patients. Because we follow these guidelines, sometimes recommendations change over time as research supports it. (For example, mammograms used to be recommended annually. Even though Medicare will still pay for an annual mammogram, the newer guidelines recommend a mammogram every two years for women of average risk.) Of course, you and your doctor may decide to screen more often for some diseases, based on your risk and your health status. Preventive services for you include: - Medicare offers their members a free annual wellness visit, which is time for you and your primary care provider to discuss and plan for your preventive service needs.  Take advantage of this benefit every year! 
-All adults over the age of 72 should receive the recommended pneumonia vaccines. Current USPSTF guidelines recommend a series of two vaccines for the best pneumonia protection.  
-All adults should have a flu vaccine yearly and a tetanus vaccine every 10 years. All adults age 61 and older should receive a shingles vaccine once in their lifetime.   
-A bone mass density test is recommended when a woman turns 65 to screen for osteoporosis. This test is only recommended one time, as a screening. Some providers will use this same test as a disease monitoring tool if you already have osteoporosis. -All adults age 38-68 who are overweight should have a diabetes screening test once every three years.  
-Other screening tests and preventive services for persons with diabetes include: an eye exam to screen for diabetic retinopathy, a kidney function test, a foot exam, and stricter control over your cholesterol.  
-Cardiovascular screening for adults with routine risk involves an electrocardiogram (ECG) at intervals determined by your doctor.  
-Colorectal cancer screenings should be done for adults age 54-65 with no increased risk factors for colorectal cancer. There are a number of acceptable methods of screening for this type of cancer. Each test has its own benefits and drawbacks. Discuss with your doctor what is most appropriate for you during your annual wellness visit. The different tests include: colonoscopy (considered the best screening method), a fecal occult blood test, a fecal DNA test, and sigmoidoscopy. -Breast cancer screenings are recommended every other year for women of normal risk, age 54-69. 
-Cervical cancer screenings for women over age 72 are only recommended with certain risk factors.  
-All adults born between [facility] should be screened once for Hepatitis C. Here is a list of your current Health Maintenance items (your personalized list of preventive services) with a due date: 
Health Maintenance Due Topic Date Due  
  DTaP/Tdap/Td  (1 - Tdap) 10/06/1965  Shingles Vaccine  08/06/2004  Glaucoma Screening   10/06/2009  Bone Mineral Density   10/06/2009 [address] [place] [address] Annual Well Visit  04/07/2018  Flu Vaccine  08/01/2018 Introducing [facility] SERVICES! Dear Laci Browning: 
Thank you for requesting a giftee account. Our records indicate that you already have an active giftee account. You can access your account anytime at [email or web address]. [email or web address] Did you know that you can access your hospital and ER discharge instructions at any time in giftee? You can also review all of your test results from your hospital stay or ER visit. Additional Information If you have questions, please visit the Frequently Asked Questions section of the giftee website at [email or web address] Remember, giftee is NOT to be used for urgent needs. For medical emergencies, dial 911. Now available from your iPhone and Android! Please provide this summary of care documentation to your next provider. Your primary care clinician is listed as Nancy CHEN. If you have any questions after today's visit, please call [phone].

## 2018-08-21 NOTE — PROGRESS NOTES
HISTORY OF PRESENT ILLNESS  Eduard Shaikh is a 67 y.o. female. HPI     F/u ckd 5, htn hld anemia , hyperkalemia, hx dvt/PE  requiring eliquis and medicare wellness----------  In HD 3 days per week  Had clot removal left arm brachial artery last month  Remains on eliquis 2.5 mg bid  Renal MD is now Dr Hosea Hartley  She feels mentally more clear since HD started and tolerating HD  Does not feel depressed now  Stay active physically      Last OV  Saw Dr Hebert Swanson last week--creatinine and K were up--sees MD every few weeks for monitoring and management  On iron medication for anemia and procrit  Has stable carotid stenosis--f/u Dr Amberly Andrews wellbutrin  Tid for anxiety-effective  Last visits:  Pt here in hospital f/u for [facility]  [id number], slurred speech and acute on chronic renal failure  3/29-3/31/17  Prerenal azotemia with hyperkalemia  IVF and and bicarb  lipitor dose was lowered  Bun/cr 57/4.6 and K 4.2 on day of discharge back to baseline levels  `t  F/u CKD 4/5, htn hld anemia, hx dvt/PE requiring chronic anticoagulation with eliquis  Sees Dr Hebert Swanson for renal dz  Last creatinine down to 3.2 and potassium wnl.    No cp sob or swelling   Getting back to her usual self since surgery for colon perforation.        Patient Active Problem List    Diagnosis Date Noted    Disturbance of memory 02/05/2018    Stenosis of left carotid artery without cerebral infarction 03/31/2017    Acute renal failure (ARF) ([address] 03/30/2017    Abnormal MRI of head 03/30/2017    Stenosis of both internal carotid arteries 03/30/2017    Cerebral microvascular disease 03/30/2017    Convulsion disorder ([address] 03/30/2017    Altered mental status, unspecified 03/30/2017    Acute encephalopathy 03/30/2017    Bilateral carotid artery stenosis 01/19/2017    Electrolyte abnormality 03/04/2016    ALAYNA (acute kidney injury) ([address] 01/22/2016    Acute on chronic renal failure ([address] 01/22/2016     Class: Acute    Generalized rash 01/22/2016     Class: Present on Admission    Heme positive stool 01/22/2016     Class: Present on Admission    Hypertension, uncontrolled 12/21/2015    Pericarditis with effusion 12/18/2015    Diarrhea 12/17/2015     Class: Present on Admission    Moderate protein-calorie malnutrition ([facility] 12/17/2015     Class: Chronic    History of ovarian cancer 12/16/2015     Class: Present on Admission    Pericardial effusion 12/16/2015     Class: Present on Admission    History of pulmonary embolism 11/16/2015    HTN (hypertension) 10/07/2015     Class: Chronic    History of peritonitis 10/07/2015     Class: Present on Admission    Physical debility 10/07/2015     Class: Present on Admission    Chronic anticoagulation 10/07/2015     Class: Chronic    SIRS (systemic inflammatory response syndrome) ([facility]) 09/14/2015    Anemia of renal disease 08/21/2015    Acute renal failure with lesion of tubular necrosis ([facility]) 08/03/2015    Small bowel perforation ([facility]) 08/01/2015    Protein malnutrition ([facility] 07/30/2015    Acute pancreatitis 07/29/2015    E-coli UTI 07/28/2015    Continuous severe abdominal pain 07/28/2015    Enteritis 07/28/2015    Adrenal hemorrhage ([facility] 07/28/2015    Chronic renal insufficiency, stage V ([facility]) 07/28/2015    Sepsis ([facility] 07/28/2015    Abdominal pain 07/24/2015     Current Outpatient Prescriptions   Medication Sig Dispense Refill    escitalopram oxalate (LEXAPRO) 10 mg tablet TAKE 1 TABLET AT BEDTIME TO HELP PREVENT ANXIETY 30 Tab 11    buPROPion (WELLBUTRIN) 100 mg tablet TAKE 1 TABLET THREE TIMES DAILY 90 Tab 11    ondansetron (ZOFRAN ODT) 4 mg disintegrating tablet TAKE 1 TABLET EVERY 8 HOURS IF NEEDED FOR NAUSEA 30 Tab 3    ELIQUIS 2.5 mg tablet TAKE 1 TABLET TWICE A DAY TO PREVENT BLOOD CLOTS 60 Tab 3    amLODIPine (NORVASC) 10 mg tablet TAKE 1 TABLET EVERY DAY FOR BLOOD PRESSURE 30 Tab 11    famotidine (PEPCID) 20 mg tablet TAKE 1 TABLET BY MOUTH EVERY DAY 30 Tab 11    diphenoxylate-atropine (LOMOTIL) 2.5-0.025 mg per tablet TAKE 1 TABLET TWICE A DAY IF NEEDED FOR DIARRHEA OR CRAMPING 60 Tab 5    memantine (NAMENDA TITRATION KARLA) 5-10 mg DsPk Use as directed 1 Tab 0    memantine (NAMENDA) 10 mg tablet Take 1 Tab by mouth two (2) times a day. 60 Tab 11    epoetin roverto (PROCRIT) 10,000 unit/mL injection by SubCUTAneous route once.  cloNIDine HCl (CATAPRES) 0.1 mg tablet TAKE 1 TABLET EVERY 12 HOURS 60 Tab 11    calcitRIOL (ROCALTROL) 0.5 mcg capsule TAKE 1 CAPSULE EVERY DAY 30 Cap 6    atorvastatin (LIPITOR) 20 mg tablet Take 1 Tab by mouth nightly. 30 Tab 0    L. acidoph & paracasei- S therm- Bifido (TY-Q/RISAQUAD) 8 billion cell cap cap Take 1 Cap by mouth daily.  oxybutynin (DITROPAN) 5 mg tablet Take 5 mg by mouth two (2) times a day.  gabapentin (NEURONTIN) 100 mg capsule Take 100 mg by mouth two (2) times a day.  patiromer calcium sorbitex (VELTASSA) 16.8 gram pwpk Take 1 Package by mouth every evening. Takes daily between 0548-3037      linaclotide (LINZESS) 290 mcg cap capsule Take 290 mcg by mouth daily as needed.  iron, carbonyl (FEOSOL) 45 mg tab Take 1 Tab by mouth two (2) times a day.  magnesium oxide (MAG-OX) 400 mg tablet Take 800 mg by mouth three (3) times daily. Indications: HYPOMAGNESEMIA      acetaminophen (TYLENOL) 500 mg tablet Take 1,000 mg by mouth every six (6) hours as needed for Pain.  multivitamin (ONE A DAY) tablet Take 1 Tab by mouth every morning.  sodium bicarbonate 650 mg tablet Take 650 mg by mouth two (2) times a day.        Allergies   Allergen Reactions    Citrus And Derivatives Anaphylaxis     Patient and her  reported    Penicillin G Anaphylaxis    Orange Juice Not Reported This Time    Sulfa (Sulfonamide Antibiotics) Other (comments)     \"Dont remember\"    Vancomycin Hives      Lab Results  Component Value Date/Time   Hemoglobin A1c 5.0 03/30/2017 02:27 AM Hemoglobin A1c 5.8 08/22/2015 04:52 AM   Glucose 70 04/10/2017 12:00 AM   Glucose (POC) 111 (H) 03/31/2017 04:43 PM   Microalbumin/Creat ratio (mg/g creat) 4648 (H) 03/07/2016 02:36 PM   Microalbumin,urine random 198.00 03/07/2016 02:36 PM   LDL, calculated 31.2 03/30/2017 02:27 AM   Creatinine (POC) 4.1 (H) 08/01/2016 09:56 AM   Creatinine 5.15 (H) 04/10/2017 12:00 AM      Lab Results  Component Value Date/Time   Cholesterol, total 116 03/30/2017 02:27 AM   HDL Cholesterol 55 03/30/2017 02:27 AM   LDL, calculated 31.2 03/30/2017 02:27 AM   Triglyceride 149 03/30/2017 02:27 AM   CHOL/HDL Ratio 2.1 03/30/2017 02:27 AM     Lab Results  Component Value Date/Time   GFR est non-AA 8 (L) 04/10/2017 12:00 AM   GFRNA, POC 11 (L) 08/01/2016 09:56 AM   GFR est AA 9 (L) 04/10/2017 12:00 AM   GFRAA, POC 13 (L) 08/01/2016 09:56 AM   Creatinine 5.15 (H) 04/10/2017 12:00 AM   Creatinine (POC) 4.1 (H) 08/01/2016 09:56 AM   BUN 67 (H) 04/10/2017 12:00 AM   BUN (POC) 65 (H) 08/01/2016 09:56 AM   Sodium 138 04/10/2017 12:00 AM   Sodium (POC) 138 08/01/2016 09:56 AM   Potassium 6.1 (H) 04/10/2017 12:00 AM   Potassium (POC) 5.7 (H) 08/01/2016 09:56 AM   Chloride 101 04/10/2017 12:00 AM   Chloride (POC) 112 (H) 08/01/2016 09:56 AM   CO2 23 04/10/2017 12:00 AM   Magnesium 2.9 (H) 03/31/2017 05:26 AM   Phosphorus 4.3 03/31/2017 05:26 AM   PTH, Intact 484.6 (H) 03/07/2016 12:51 PM        ROS    Physical Exam   Constitutional: She appears well-developed and well-nourished. Appears stated age   Cardiovascular: Normal rate, regular rhythm and normal heart sounds. Exam reveals no gallop and no friction rub. No murmur heard. Pulmonary/Chest: Effort normal and breath sounds normal. No respiratory distress. She has no wheezes. Abdominal: Soft. Bowel sounds are normal.   Musculoskeletal: She exhibits no edema. Neurological: She is alert. Psychiatric: She has a normal mood and affect.    Nursing note and vitals reviewed. ASSESSMENT and PLAN  Diagnoses and all orders for this visit:    1. ESRD (end stage renal disease) ([address]    2. Essential hypertension    3. Pure hypercholesterolemia    4. Anxiety    5. Major depressive disorder with single episode, remission status unspecified    6. Hx of thromboembolism    7. Menopause  -     DEXA BONE DENSITY STUDY AXIAL; Future      Follow-up Disposition:  Return in about 6 months (around 2/21/2019) for depesssion htn esrd. This is the Subsequent Medicare Annual Wellness Exam, performed 12 months or more after the Initial AWV or the last Subsequent AWV    I have reviewed the patient's medical history in detail and updated the computerized patient record.      History     Past Medical History:   Diagnosis Date    Chronic kidney disease     Stage 5 (7/18/16 BUN 79, Creatnine 4.53, K 6) Dr. Sasha Harding [phone]    GERD (gastroesophageal reflux disease)     well controlled with Rx     High cholesterol     Hyperkalemia     Hypertension     Hypomagnesemia     on daily Magnesium    Neuropathy     bilateral feet    Ovarian cancer ([address] 2013    Hysterectomy with chemo, no radiation:  Dr. Compa Lowe [facility]) 9/2015    blood clot in lung 9/2015    Thromboembolus ([address] 2004    in kidney \"many years ago\"      Past Surgical History:   Procedure Laterality Date    ABDOMEN SURGERY [address] UNLISTED  7/31/15    Lap exploratory small bowel obstruction  (ICU)    ABDOMEN SURGERY [address] UNLISTED  8/2/15    Abdmonial washout with wound vac (ICU)    ABDOMEN SURGERY PROC UNLISTED  8/18/15    Abdominal washout fascial closure (ICU)    ABDOMEN SURGERY PROC UNLISTED  11/11/15    Lap takedown of ileostomy     COLONOSCOPY N/A 8/1/2016    COLONOS[facility] performed by [facility] HX APPENDECTOMY  approx 2005    HX CASI AND BSO  2013    due to ovarian cancer    HX TONSILLECTOMY  age 10     Current Outpatient Prescriptions   Medication Sig Dispense Refill    sevelamer (RENAGEL) 800 mg tablet Take  by mouth three (3) times daily (with meals).  escitalopram oxalate (LEXAPRO) 10 mg tablet TAKE 1 TABLET AT BEDTIME TO HELP PREVENT ANXIETY 30 Tab 11    buPROPion (WELLBUTRIN) 100 mg tablet TAKE 1 TABLET THREE TIMES DAILY 90 Tab 11    ELIQUIS 2.5 mg tablet TAKE 1 TABLET TWICE A DAY TO PREVENT BLOOD CLOTS 60 Tab 3    amLODIPine (NORVASC) 10 mg tablet TAKE 1 TABLET EVERY DAY FOR BLOOD PRESSURE 30 Tab 11    famotidine (PEPCID) 20 mg tablet TAKE 1 TABLET BY MOUTH EVERY DAY 30 Tab 11    memantine (NAMENDA TITRATION KARLA) 5-10 mg DsPk Use as directed 1 Tab 0    memantine (NAMENDA) 10 mg tablet Take 1 Tab by mouth two (2) times a day. 60 Tab 11    epoetin roverto (PROCRIT) 10,000 unit/mL injection by SubCUTAneous route once.  calcitRIOL (ROCALTROL) 0.5 mcg capsule TAKE 1 CAPSULE EVERY DAY 30 Cap 6    atorvastatin (LIPITOR) 20 mg tablet Take 1 Tab by mouth nightly. 30 Tab 0    L. acidoph & paracasei- S therm- Bifido (TY-Q/RISAQUAD) 8 billion cell cap cap Take 1 Cap by mouth daily.  oxybutynin (DITROPAN) 5 mg tablet Take 5 mg by mouth two (2) times a day.  gabapentin (NEURONTIN) 100 mg capsule Take 100 mg by mouth two (2) times a day.  patiromer calcium sorbitex (VELTASSA) 16.8 gram pwpk Take 1 Package by mouth every evening. Takes daily between [id number]  Indications: takes on tues, thurs, sat, & sund      linaclotide (LINZESS) 290 mcg cap capsule Take 290 mcg by mouth daily as needed.  iron, carbonyl (FEOSOL) 45 mg tab Take 1 Tab by mouth two (2) times a day.  multivitamin (ONE A DAY) tablet Take 1 Tab by mouth every morning.       ondansetron (ZOFRAN ODT) 4 mg disintegrating tablet TAKE 1 TABLET EVERY 8 HOURS IF NEEDED FOR NAUSEA 30 Tab 3    diphenoxylate-atropine (LOMOTIL) 2.5-0.025 mg per tablet TAKE 1 TABLET TWICE A DAY IF NEEDED FOR DIARRHEA OR CRAMPING 60 Tab 5    cloNIDine HCl (CATAPRES) 0.1 mg tablet TAKE 1 TABLET EVERY 12 HOURS 60 Tab 11    magnesium oxide (MAG-OX) 400 mg tablet Take 800 mg by mouth three (3) times daily. Indications: HYPOMAGNESEMIA      acetaminophen (TYLENOL) 500 mg tablet Take 1,000 mg by mouth every six (6) hours as needed for Pain.  sodium bicarbonate 650 mg tablet Take 650 mg by mouth two (2) times a day.        Allergies   Allergen Reactions    Citrus And Derivatives Anaphylaxis     Patient and her  reported    Penicillin G Anaphylaxis    Orange Juice Not Reported This Time    Sulfa (Sulfonamide Antibiotics) Other (comments)     \"Dont remember\"    Vancomycin Hives     Family History   Problem Relation Age of Onset    Other Mother      peptic ulcer    Alzheimer Father      Social History   Substance Use Topics    Smoking status: Current Every Day Smoker     Packs/day: 1.00     Last attempt to quit: 1/11/2012    Smokeless tobacco: Never Used    Alcohol use No     Patient Active Problem List   Diagnosis Code    Abdominal pain R10.9    E-coli UTI N39.0, B96.20    Continuous severe abdominal pain R10.9    Enteritis K52.9    Adrenal hemorrhage ([facility] E27.49    Chronic renal insufficiency, stage V ([facility]) N18.5    Sepsis (HCC) A41.9    Acute pancreatitis K85.90    Protein malnutrition ([facility] E46    Small bowel perforation ([facility]) K63.1    Acute renal failure with lesion of tubular necrosis ([facility]) N17.0    Anemia of renal disease D63.1    SIRS (systemic inflammatory response syndrome) ([facility]) R65.10    HTN (hypertension) I10    History of peritonitis Z87.19    Physical debility R53.81    Chronic anticoagulation Z79.01    History of pulmonary embolism Z86.711    History of ovarian cancer Z85.43    Pericardial effusion I31.3    Diarrhea R19.7    Moderate protein-calorie malnutrition (HCC) E44.0    Pericarditis with effusion I31.9    Hypertension, uncontrolled I10    ALAYNA (acute kidney injury) ([facility] N17.9    Acute on chronic renal failure (HCC) N17.9, N18.9    Generalized rash R21    Heme positive stool R19.5    Electrolyte abnormality E87.8    Bilateral carotid artery stenosis I65.23    Acute renal failure (ARF) ([facility] N17.9    Abnormal MRI of head R93.0    Stenosis of both internal carotid arteries I65.23    Cerebral microvascular disease I67.9    Convulsion disorder (HCC) R56.9    Altered mental status, unspecified R41.82    Acute encephalopathy G93.40    Stenosis of left carotid artery without cerebral infarction I65.22    Disturbance of memory R41.3    Anxiety F41.9       Depression Risk Factor Screening:     PHQ over the last two weeks 8/21/2018   Little interest or pleasure in doing things Not at all   Feeling down, depressed, irritable, or hopeless Not at all   Total Score PHQ 2 0   Trouble falling or staying asleep, or sleeping too much -   Feeling tired or having little energy -   Poor appetite, weight loss, or overeating -   Feeling bad about yourself - or that you are a failure or have let yourself or your family down -   Moving or speaking so slowly that other people could have noticed; or the opposite being so fidgety that others notice -   Thoughts of being better off dead, or hurting yourself in some way -   How difficult have these problems made it for you to do your work, take care of your home and get along with others -     Alcohol Risk Factor Screening: You do not drink alcohol or very rarely. Functional Ability and Level of Safety:   Hearing Loss  The patient needs further evaluation. Activities of Daily Living  The home contains: handrails  Patient does total self care    Fall Risk  Fall Risk Assessment, last 12 mths 8/21/2018   Able to walk? Yes   Fall in past 12 months?  No       Abuse Screen  Patient is not abused    Cognitive Screening   Evaluation of Cognitive Function:  Has your family/caregiver stated any concerns about your memory: yes  Normal    Patient Care Team   Patient Care Team:  Luisa Ledesma MD as PCP - General (Internal Medicine)  Yudi Singleton MD as Surgeon (General Surgery)  Shona Lesch, MD (Nephrology)  Abdiel Rubalcava, RN as Ambulatory Care Navigator    Assessment/Plan   Education and counseling provided:  Are appropriate based on today's review and evaluation  End-of-Life planning (with patient's consent)-see ACP note  Bone mass measurement (DEXA)-ordered  tdap and shingles vaccine-recommended    Diagnoses and all orders for this visit:    1. ESRD (end stage renal disease) ([facility]   F/u with renal MD, tolerating HD  2. Essential hypertension   Reasonable control  3. Pure hypercholesterolemia   Continue statin  4. Anxiety   controlled  5. Major depressive disorder with single episode, remission status unspecified   controlled  6.  Hx of thromboembolism   On eliquis, advised pt to discuss warfarin vs eliquis with renal MD      Health Maintenance Due   Topic Date Due    DTaP/Tdap/Td series (1 - Tdap) 10/06/1965    ZOSTER VACCINE AGE 60>  08/06/2004    GLAUCOMA SCREENING Q2Y  10/06/2009    Bone Densitometry (Dexa) Screening  10/06/2009    MEDICARE YEARLY EXAM  04/07/2018    Influenza Age 9 to Adult  08/01/2018

## 2018-08-27 ENCOUNTER — OFFICE VISIT (OUTPATIENT)
Dept: NEUROLOGY | Age: 73
End: 2018-08-27

## 2018-08-27 VITALS
DIASTOLIC BLOOD PRESSURE: 62 MMHG | OXYGEN SATURATION: 97 % | WEIGHT: 138 LBS | SYSTOLIC BLOOD PRESSURE: 168 MMHG | HEART RATE: 82 BPM | BODY MASS INDEX: 20.92 KG/M2 | HEIGHT: 68 IN

## 2018-08-27 DIAGNOSIS — R93.0 ABNORMAL MRI OF HEAD: ICD-10-CM

## 2018-08-27 DIAGNOSIS — I67.89 CEREBRAL MICROVASCULAR DISEASE: ICD-10-CM

## 2018-08-27 DIAGNOSIS — R41.3 DISTURBANCE OF MEMORY: ICD-10-CM

## 2018-08-27 DIAGNOSIS — R41.82 ALTERED MENTAL STATUS, UNSPECIFIED ALTERED MENTAL STATUS TYPE: ICD-10-CM

## 2018-08-27 DIAGNOSIS — I65.23 STENOSIS OF BOTH INTERNAL CAROTID ARTERIES: ICD-10-CM

## 2018-08-27 DIAGNOSIS — I65.23 BILATERAL CAROTID ARTERY STENOSIS: Primary | ICD-10-CM

## 2018-08-27 RX ORDER — LIDOCAINE AND PRILOCAINE 25; 25 MG/G; MG/G
CREAM TOPICAL
Refills: 11 | COMMUNITY
Start: 2018-08-13 | End: 2019-01-01

## 2018-08-27 RX ORDER — SEVELAMER CARBONATE 800 MG/1
4000 TABLET, FILM COATED ORAL
COMMUNITY
Start: 2018-08-16

## 2018-08-27 RX ORDER — HYDRALAZINE HYDROCHLORIDE 50 MG/1
50 TABLET, FILM COATED ORAL 2 TIMES DAILY
COMMUNITY
Start: 2018-07-25 | End: 2019-01-01

## 2018-08-27 RX ORDER — PRAMIPEXOLE DIHYDROCHLORIDE 0.25 MG/1
TABLET ORAL
Refills: 3 | COMMUNITY
Start: 2018-07-15

## 2018-08-27 RX ORDER — LOSARTAN POTASSIUM 100 MG/1
TABLET ORAL
Refills: 6 | COMMUNITY
Start: 2018-08-15 | End: 2018-01-01 | Stop reason: SDUPTHER

## 2018-08-27 NOTE — LETTER
8/27/2018 9:50 PM 
 
Patient:  Betty Edge YOB: 1945 Date of Visit: 8/27/2018 Dear No Recipients: Thank you for referring Ms. Jovanni Dyer to me for evaluation/treatment. Below are the relevant portions of my assessment and plan of care. Neurology Progress Note Patient ID: 
Betty Edge [id number] 
53 y.o. 
1945 CHIEF COMPLAINT: Memory loss and dizziness Subjective:  
  
Patient is a 21-year-old right-handed  female seen at the request of Dr. Khushi Hinojosa for evaluation of complaints of memory loss which the  says is a little better on Namenda 10 mg twice a day after starting the titration pack. She has had no side effects on the medication and seem to be tolerating it well. Her father had a bad reaction to Aricept and she is afraid to try that. He does not think she needs any new medication at this time, so we will continue the current treatment. Patient is now on dialysis, and is tolerating that well so far but still has difficulty with generalized weakness and trying to find enough protein in her diet. I told them this asked the renal doctors. Apparently she can take peas and beans and nuts I am not sure what to tell her. Her memory loss mainly involves recent memory, and she has a difficult time following her calendar of events, and was seeing her at the request of Dr. Khushi Hinojosa. 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 She had a repeat carotid Doppler study done by her vascular surgeon in September 2017, and seems to be getting that at least once a year with them.   She has neuropsych testing scheduled in June, and it showed clear memory loss, but psychiatry was leaning more toward mild cognitive impairment, but not sure. She has a known left carotid stenosis followed by her vascular surgeon on a routine basis. All of the other causes for memory loss including metabolic studies were negative in the hospital.  She is on chronic anticoagulation for recurring blood clots. She has renal insufficiency, and no CTA was done. She continues to try to remain mentally and physically active and take her vitamins, and exercise regularly. She has had no more recurring episodes of vertigo. She has had no other new focal weakness sensory loss or focal neurological deficits. Her father had bad reaction on Aricept, and she does not want to try that medication at this time. She is willing to try something else we will give her Namenda because she clearly seem to be progressing, and is having difficulty handling her ADLs. She has had no headache, no fever, no meningismus, no focal weakness, no sensory loss, no other cause for her memory loss. She denies any increased stress tension or anxiety. Current Outpatient Prescriptions Medication Sig  
 losartan (COZAAR) 100 mg tablet TAKE 1 TABLET EVERY MORNING  
 lidocaine-prilocaine (EMLA) topical cream APPLY TO AFFECTED AREA AS DIRECTED - COVER WITH OCCLUSIVE DRESSING  
 hydrALAZINE (APRESOLINE) 50 mg tablet 50 mg two (2) times a day.  pramipexole (MIRAPEX) 0.25 mg tablet TAKE 1 TABLET BEFORE EACH DIALYSIS  
 sevelamer carbonate (RENVELA) 800 mg tab tab 3 with meals and 1 with snacks  escitalopram oxalate (LEXAPRO) 10 mg tablet TAKE 1 TABLET AT BEDTIME TO HELP PREVENT ANXIETY  buPROPion (WELLBUTRIN) 100 mg tablet TAKE 1 TABLET THREE TIMES DAILY (Patient taking differently: TAKE 1 TABLET  DAILY)  ondansetron (ZOFRAN ODT) 4 mg disintegrating tablet TAKE 1 TABLET EVERY 8 HOURS IF NEEDED FOR NAUSEA  ELIQUIS 2.5 mg tablet TAKE 1 TABLET TWICE A DAY TO PREVENT BLOOD CLOTS  amLODIPine (NORVASC) 10 mg tablet TAKE 1 TABLET EVERY DAY FOR BLOOD PRESSURE  famotidine (PEPCID) 20 mg tablet TAKE 1 TABLET BY MOUTH EVERY DAY  memantine (NAMENDA) 10 mg tablet Take 1 Tab by mouth two (2) times a day. (Patient taking differently: Take 10 mg by mouth daily.)  epoetin roverto (PROCRIT) 10,000 unit/mL injection by SubCUTAneous route once.  atorvastatin (LIPITOR) 20 mg tablet Take 1 Tab by mouth nightly.  L. acidoph & paracasei- S therm- Bifido (TY-Q/RISAQUAD) 8 billion cell cap cap Take 1 Cap by mouth daily.  oxybutynin (DITROPAN) 5 mg tablet Take 5 mg by mouth two (2) times a day.  gabapentin (NEURONTIN) 100 mg capsule Take 100 mg by mouth two (2) times a day.  linaclotide (LINZESS) 290 mcg cap capsule Take 290 mcg by mouth daily as needed.  iron, carbonyl (FEOSOL) 45 mg tab Take 1 Tab by mouth two (2) times a day.  acetaminophen (TYLENOL) 500 mg tablet Take 1,000 mg by mouth every six (6) hours as needed for Pain.  multivitamin (ONE A DAY) tablet Take 1 Tab by mouth every morning. No current facility-administered medications for this visit. Past Medical History:  
Diagnosis Date  Chronic kidney disease Stage 5 (7/18/16 BUN 79, Creatnine 4.53, K 6) Dr. Delma Merlos [phone]  GERD (gastroesophageal reflux disease)   
 well controlled with Rx   
 High cholesterol  Hyperkalemia  Hypertension  Hypomagnesemia   
 on daily Magnesium  Neuropathy   
 bilateral feet  Ovarian cancer ([facility] [address] 2013 Hysterectomy with chemo, no radiation:  Dr. Mihaela Valles  Thromboembolus ([facility] [address] 9/2015  
 blood clot in lung 9/2015  Thromboembolus ([facility] [address] 2004  
 in kidney \"many years ago\" Past Surgical History:  
Procedure Laterality Date  ABDOMEN SURGERY PROC UNLISTED  7/31/15 Lap exploratory small bowel obstruction  (ICU)  ABDOMEN SURGERY PROC UNLISTED  8/2/15 Abdmonial washout with wound vac (ICU)  ABDOMEN SURGERY PROC UNLISTED  8/18/15 Abdominal washout fascial closure (ICU)  ABDOMEN SURGERY PROC UNLISTED  11/11/15 Lap takedown of ileostomy  COLONOSCOPY N/A 8/1/2016 COLONOSCOPY performed by Dustin Hines MD at . [facility] [address] HX APPENDECTOMY  approx 2005  HX CASI AND BSO  2013  
 due to ovarian cancer Bernetta Marking TONSILLECTOMY  age 7  
 
 
[unfilled] Social History Substance Use Topics  Smoking status: Current Every Day Smoker Packs/day: 1.00 Last attempt to quit: 1/11/2012  Smokeless tobacco: Never Used  Alcohol use No  
 
 
Current Outpatient Prescriptions Medication Sig Dispense Refill  losartan (COZAAR) 100 mg tablet TAKE 1 TABLET EVERY MORNING  6  
 lidocaine-prilocaine (EMLA) topical cream APPLY TO AFFECTED AREA AS DIRECTED - COVER WITH OCCLUSIVE DRESSING  11  
 hydrALAZINE (APRESOLINE) 50 mg tablet 50 mg two (2) times a day.  pramipexole (MIRAPEX) 0.25 mg tablet TAKE 1 TABLET BEFORE EACH DIALYSIS  3  
 sevelamer carbonate (RENVELA) 800 mg tab tab 3 with meals and 1 with snacks  escitalopram oxalate (LEXAPRO) 10 mg tablet TAKE 1 TABLET AT BEDTIME TO HELP PREVENT ANXIETY 30 Tab 11  
 buPROPion (WELLBUTRIN) 100 mg tablet TAKE 1 TABLET THREE TIMES DAILY (Patient taking differently: TAKE 1 TABLET  DAILY) 90 Tab 11  
 ondansetron (ZOFRAN ODT) 4 mg disintegrating tablet TAKE 1 TABLET EVERY 8 HOURS IF NEEDED FOR NAUSEA 30 Tab 3  
 ELIQUIS 2.5 mg tablet TAKE 1 TABLET TWICE A DAY TO PREVENT BLOOD CLOTS 60 Tab 3  
 amLODIPine (NORVASC) 10 mg tablet TAKE 1 TABLET EVERY DAY FOR BLOOD PRESSURE 30 Tab 11  
 famotidine (PEPCID) 20 mg tablet TAKE 1 TABLET BY MOUTH EVERY DAY 30 Tab 11  
 memantine (NAMENDA) 10 mg tablet Take 1 Tab by mouth two (2) times a day. (Patient taking differently: Take 10 mg by mouth daily.) 60 Tab 11  epoetin roverto (PROCRIT) 10,000 unit/mL injection by SubCUTAneous route once.  atorvastatin (LIPITOR) 20 mg tablet Take 1 Tab by mouth nightly. 30 Tab 0  
 L. acidoph & paracasei- S therm- Bifido (TY-Q/RISAQUAD) 8 billion cell cap cap Take 1 Cap by mouth daily.  oxybutynin (DITROPAN) 5 mg tablet Take 5 mg by mouth two (2) times a day.  gabapentin (NEURONTIN) 100 mg capsule Take 100 mg by mouth two (2) times a day.  linaclotide (LINZESS) 290 mcg cap capsule Take 290 mcg by mouth daily as needed.  iron, carbonyl (FEOSOL) 45 mg tab Take 1 Tab by mouth two (2) times a day.  acetaminophen (TYLENOL) 500 mg tablet Take 1,000 mg by mouth every six (6) hours as needed for Pain.  multivitamin (ONE A DAY) tablet Take 1 Tab by mouth every morning. Allergies Allergen Reactions  Citrus And Derivatives Anaphylaxis Patient and her  reported  Penicillin G Anaphylaxis  Orange Juice Not Reported This Time  Sulfa (Sulfonamide Antibiotics) Other (comments) \"Dont remember\"  Vancomycin Hives Review of Systems: A comprehensive review of systems was negative except for: Constitutional: positive for fatigue and malaise Ears, nose, mouth, throat, and face: positive for hearing loss Cardiovascular: positive for chest pressure/discomfort, irregular heart beats Musculoskeletal: positive for myalgias, arthralgias and stiff joints Neurological: positive for dizziness, memory problems and weakness Behvioral/Psych: positive for anxiety and depression Objective: 
 
 
Objective:  
 
@IPVITALS(24:)@ Lab Review No results found for this or any previous visit (from the past 24 hour(s)). Additional comments:I personally viewed and interpreted the patient's CT scan and MRI scan and labs NEUROLOGICAL EXAM: 
 
Appearance: The patient is well developed, well nourished, provides a fair history and is in no acute distress.   
Mental Status: Oriented to place and person and the president, has a little difficulty with the date, had difficulty doing serial sevens, can remember 2 out of 3 words at 30 seconds with distraction, can spell world backward, and had a little difficulty getting the clock to show the time 10:50. Cognitive function and fund of knowledge is abnormal. Speech is fluent without aphasia or dysarthria. Mood and affect appropriate, but looks depressed . Cranial Nerves:   Intact visual fields. Fundi are benign. NIKITA, EOM's full, no nystagmus, no ptosis. Facial sensation is normal. Corneal reflexes are not tested. Facial movement is symmetric. Hearing is abnormal bilaterally. Palate is midline with normal sternocleidomastoid and trapezius muscles are normal. Tongue is midline. Neck without meningismus or bruits Temporal arteries are not tender or enlarged Motor:  4/5 strength in upper and lower proximal and distal muscles. Normal tone but reducible generally. No fasciculations. Reflexes:   Deep tendon reflexes 1+/4 and symmetrical. 
No babinski or clonus present Sensory:   Normal to touch, pinprick and temperature and vibration. DSS is intact Gait:  Normal gait though patient does move a little slowly due to her age . Tremor:   No tremor noted. Cerebellar:  No cerebellar signs present. Neurovascular:  Normal heart sounds and regular rhythm, peripheral pulses decreased, and no carotid bruits. Assessment: 
 
 
 
Assessment: ICD-10-CM ICD-9-CM 1. Bilateral carotid artery stenosis I65.23 433.10 losartan (COZAAR) 100 mg tablet 433.30 lidocaine-prilocaine (EMLA) topical cream  
   hydrALAZINE (APRESOLINE) 50 mg tablet  
   pramipexole (MIRAPEX) 0.25 mg tablet  
   sevelamer carbonate (RENVELA) 800 mg tab tab 2. Stenosis of both internal carotid arteries I65.23 433.10 losartan (COZAAR) 100 mg tablet 433.30 lidocaine-prilocaine (EMLA) topical cream  
   hydrALAZINE (APRESOLINE) 50 mg tablet  
   pramipexole (MIRAPEX) 0.25 mg tablet sevelamer carbonate (RENVELA) 800 mg tab tab 3. Cerebral microvascular disease I67.9 437.9 losartan (COZAAR) 100 mg tablet  
   lidocaine-prilocaine (EMLA) topical cream  
   hydrALAZINE (APRESOLINE) 50 mg tablet  
   pramipexole (MIRAPEX) 0.25 mg tablet  
   sevelamer carbonate (RENVELA) 800 mg tab tab 4. Disturbance of memory R41.3 780.93 losartan (COZAAR) 100 mg tablet  
   lidocaine-prilocaine (EMLA) topical cream  
   hydrALAZINE (APRESOLINE) 50 mg tablet  
   pramipexole (MIRAPEX) 0.25 mg tablet  
   sevelamer carbonate (RENVELA) 800 mg tab tab 5. Altered mental status, unspecified altered mental status type R41.82 780.97 losartan (COZAAR) 100 mg tablet  
   lidocaine-prilocaine (EMLA) topical cream  
   hydrALAZINE (APRESOLINE) 50 mg tablet  
   pramipexole (MIRAPEX) 0.25 mg tablet  
   sevelamer carbonate (RENVELA) 800 mg tab tab 6. Abnormal MRI of head R93.0 793.0 losartan (COZAAR) 100 mg tablet  
   lidocaine-prilocaine (EMLA) topical cream  
   hydrALAZINE (APRESOLINE) 50 mg tablet  
   pramipexole (MIRAPEX) 0.25 mg tablet  
   sevelamer carbonate (RENVELA) 800 mg tab tab Plan:  
 
Patient doing better on Namenda 10 mg twice a day, and has had no new side effects on the medications. Patient's father had a bad reaction to Aricept and they are leery of this 1, and he feels she is doing well just started dialysis so I am not going to add any new medication. Patient with progressive memory loss now, She also doing much better on her Lexapro 10 mg a day, and takes gabapentin 100 mg twice a day and is taking her vitamins and multivitamins and vitamin D remaining mentally and physically active and she is doing crossword puzzles to help her memory to We discussed the pros and cons of starting another medication but they want to wait, because she started dialysis and they do not want to complicate things, we sent a new refills for her medications for her today and they will call if any problem before she seen again. For her protein problems, they are to check with the dialysis people because they are restricting her diet so much. Her dizziness is better now, and her confusion better and she most likely had a decompensated stress reaction on top of dementia Overall she is doing well and we have encouraged her to remain mentally and physically active and take her medications regularly and her vitamins and vitamin D Follow-up in  6 months time or earlier if needed and she will call if any problem Signed: 
Daniel Jerez MD 
8/27/2018 
1:42 PM 
 
Citlalli Kaye MD 
[phone] This note will not be viewable in [address] If you have questions, please do not hesitate to call me. I look forward to following Ms. Conte along with you. Sincerely, Daniel Jerez MD

## 2018-08-27 NOTE — PATIENT INSTRUCTIONS
Office Policies    o Phone calls/patient messages:  Please allow up to 24 hours for someone in the office to contact you about your call or message. Be mindful your provider may be out of the office or your message may require further review. We encourage you to use [employer] for your messages as this is a faster, more efficient way to communicate with our office    o Medication Refills:  Prescription medications require up to 48 business hours to process. We encourage you to use [employer] for your refills. For controlled medications: Please allow up to 72 business hours to process. Certain medications may require you to  a written prescription at our office. NO narcotic/controlled medications will be prescribed after 4pm Monday through Friday or on weekends    o Form/Paperwork Completion:  Please note there is a $25 fee for all paperwork completed by our providers. We ask that you allow 7-14 business days. Pre-payment is due prior to picking up/faxing the completed form. You may also download your forms to [employer] to have your doctor print off.

## 2018-08-27 NOTE — MR AVS SNAPSHOT
[address], 
[address], [address] [facility] 
[phone] Patient: Yasmine Jaramillo MRN: [id number] :1945 Visit Information Date & Time Provider Department Dept. Phone Encounter #  
 2018  1:00 PM Felisha Casas MD Neurology Clinic at [facility] [phone] [id number] Follow-up Instructions Return in about 6 months (around 2019). Your Appointments 2019  2:30 PM  
ROUTINE CARE with Ronald Meek, [address] [place] [address],[address] [facility] Appt Note: 6 month f/u  
 [place] [address] [id number]  
[address] [place] [address] [address] [facility] Upcoming Health Maintenance Date Due DTaP/Tdap/Td series (1 - Tdap) 10/6/1965 ZOSTER VACCINE AGE 60> 2004 GLAUCOMA SCREENING Q2Y 10/6/2009 Bone Densitometry (Dexa) Screening 10/6/2009 Influenza Age 5 to Adult 2018 BREAST CANCER SCRN MAMMOGRAM 2019 MEDICARE YEARLY EXAM 2019 COLONOSCOPY 2021 Allergies as of 2018  Review Complete On: 2018 By: Felisha Casas MD  
  
 Severity Noted Reaction Type Reactions Citrus And Derivatives High 2015    Anaphylaxis Patient and her  reported Penicillin G High 10/19/2014    Anaphylaxis Ralls Juice  2015    Not Reported This Time  
 Sulfa (Sulfonamide Antibiotics)  2015    Other (comments) \"Dont remember\" Vancomycin  2016    Hives Current Immunizations  Reviewed on 3/10/2016 Name Date Influenza High Dose Vaccine PF 10/17/2017, 9/15/2016 Influenza Vaccine 10/20/2015 Pneumococcal Conjugate (PCV-13) 10/20/2015 Pneumococcal Polysaccharide (PPSV-23) 11/10/2012 Pneumococcal Vaccine (Unspecified Type) 2015 Not reviewed this visit You Were Diagnosed With   
  
 Codes Comments Bilateral carotid artery stenosis    -  Primary ICD-10-CM: K51.31 ICD-9-CM: 433.10, 433.30 Stenosis of both internal carotid arteries     ICD-10-CM: I65.23 ICD-9-CM: 433.10, 433.30 Cerebral microvascular disease     ICD-10-CM: I67.9 ICD-9-CM: 437.9 Disturbance of memory     ICD-10-CM: R41.3 ICD-9-CM: 780.93 Altered mental status, unspecified altered mental status type     ICD-10-CM: R41.82 
ICD-9-CM: 780.97 Abnormal MRI of head     ICD-10-CM: R93.0 ICD-9-CM: 793.0 Vitals BP Pulse Height(growth percentile) Weight(growth percentile) SpO2 BMI  
 168/62 82 5' 8\" (1.727 m) 138 lb (62.6 kg) 97% 20.98 kg/m2 OB Status Smoking Status Hysterectomy Current Every Day Smoker BMI and BSA Data Body Mass Index Body Surface Area  
 20.98 kg/m 2 1.73 m 2 Preferred Pharmacy [facility] Name Phone [facility] [phone] Your Updated Medication List  
  
   
This list is accurate as of 8/27/18  1:54 PM.  Always use your most recent med list.  
  
  
  
  
 acetaminophen 500 mg tablet Commonly known as:  TYLENOL Take 1,000 mg by mouth every six (6) hours as needed for Pain. amLODIPine 10 mg tablet Commonly known as:  Marilyn Darting TAKE 1 TABLET EVERY DAY FOR BLOOD PRESSURE  
  
 atorvastatin 20 mg tablet Commonly known as:  LIPITOR Take 1 Tab by mouth nightly. buPROPion 100 mg tablet Commonly known as:  WELLBUTRIN  
TAKE 1 TABLET THREE TIMES DAILY  
  
 ELIQUIS 2.5 mg tablet Generic drug:  apixaban TAKE 1 TABLET TWICE A DAY TO PREVENT BLOOD CLOTS  
  
 escitalopram oxalate 10 mg tablet Commonly known as:  Aure Levo TAKE 1 TABLET AT BEDTIME TO HELP PREVENT ANXIETY  
  
 famotidine 20 mg tablet Commonly known as:  PEPCID  
TAKE 1 TABLET BY MOUTH EVERY DAY  
  
 gabapentin 100 mg capsule Commonly known as:  NEURONTIN  
 Take 100 mg by mouth two (2) times a day. hydrALAZINE 50 mg tablet Commonly known as:  APRESOLINE 50 mg two (2) times a day. iron, carbonyl 45 mg Tab Commonly known as:  Reyes Mis Take 1 Tab by mouth two (2) times a day. L. acidoph & paracasei- S therm- Bifido 8 billion cell Cap cap Commonly known as:  TY-Q/RISAQUAD Take 1 Cap by mouth daily. lidocaine-prilocaine topical cream  
Commonly known as:  EMLA  
APPLY TO AFFECTED AREA AS DIRECTED - COVER WITH OCCLUSIVE DRESSING  
  
 linaclotide 290 mcg Cap capsule Commonly known as:  [place]ale Dikes Take 290 mcg by mouth daily as needed. losartan 100 mg tablet Commonly known as:  COZAAR  
TAKE 1 TABLET EVERY MORNING  
  
 memantine 10 mg tablet Commonly known as:  Alicia Media Take 1 Tab by mouth two (2) times a day. multivitamin tablet Commonly known as:  ONE A DAY Take 1 Tab by mouth every morning. ondansetron 4 mg disintegrating tablet Commonly known as:  ZOFRAN ODT  
TAKE 1 TABLET EVERY 8 HOURS IF NEEDED FOR NAUSEA  
  
 oxybutynin 5 mg tablet Commonly known as:  TOPSHDAG Take 5 mg by mouth two (2) times a day. pramipexole 0.25 mg tablet Commonly known as:  MIRAPEX TAKE 1 TABLET BEFORE EACH DIALYSIS  
  
 PROCRIT 10,000 unit/mL injection Generic drug:  epoetin roverto  
by SubCUTAneous route once. sevelamer carbonate 800 mg Tab tab Commonly known as:  RENVELA  
3 with meals and 1 with snacks Follow-up Instructions Return in about 6 months (around 2/27/2019). Patient Instructions Office Policies 
 
o Phone calls/patient messages: Please allow up to 24 hours for someone in the office to contact you about your call or message. Be mindful your provider may be out of the office or your message may require further review. We encourage you to use [employer] for your messages as this is a faster, more efficient way to communicate with our office 
 
o Medication Refills: Prescription medications require up to 48 business hours to process. We encourage you to use [employer] for your refills. For controlled medications: Please allow up to 72 business hours to process. Certain medications may require you to  a written prescription at our office. NO narcotic/controlled medications will be prescribed after 4pm Monday through Friday or on weekends 
 
o Form/Paperwork Completion: 
Please note there is a $25 fee for all paperwork completed by our providers. We ask that you allow 7-14 business days. Pre-payment is due prior to picking up/faxing the completed form. You may also download your forms to [employer] to have your doctor print off. Introducing [facility]! Dear Elvira Marquez: 
Thank you for requesting a [employer] account. Our records indicate that you already have an active [employer] account. You can access your account anytime at [email or web address]"[employer] [facility]/"[employer]" Did you know that you can access your hospital and ER discharge instructions at any time in [employer]? You can also review all of your test results from your hospital stay or ER visit. Additional Information If you have questions, please visit the Frequently Asked Questions section of the [employer] website at [email or web address]"[employer] [facility]/"[employer]"/. Remember, [employer] is NOT to be used for urgent needs. For medical emergencies, dial 911. Now available from your iPhone and Android! Please provide this summary of care documentation to your next provider. Your primary care clinician is listed as Maddi CHEN. If you have any questions after today's visit, please call [phone].

## 2018-08-28 NOTE — PROGRESS NOTES
Neurology Progress Note    Patient ID:  Fran Rose  [id number]  80 y.o.  1945      CHIEF COMPLAINT: Memory loss and dizziness    Subjective:      Patient is a 58-year-old right-handed  female seen at the request of Dr. Velvet Mulligan for evaluation of complaints of memory loss which the  says is a little better on Namenda 10 mg twice a day after starting the titration pack. She has had no side effects on the medication and seem to be tolerating it well. Her father had a bad reaction to Aricept and she is afraid to try that. He does not think she needs any new medication at this time, so we will continue the current treatment. Patient is now on dialysis, and is tolerating that well so far but still has difficulty with generalized weakness and trying to find enough protein in her diet. I told them this asked the renal doctors. Apparently she can take peas and beans and nuts I am not sure what to tell her. Her memory loss mainly involves recent memory, and she has a difficult time following her calendar of events, and was seeing her at the request of Dr. Velvet Mulligan. 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 She had a repeat carotid Doppler study done by her vascular surgeon in September 2017, and seems to be getting that at least once a year with them. She has neuropsych testing scheduled in June, and it showed clear memory loss, but psychiatry was leaning more toward mild cognitive impairment, but not sure. She has a known left carotid stenosis followed by her vascular surgeon on a routine basis. All of the other causes for memory loss including metabolic studies were negative in the hospital.  She is on chronic anticoagulation for recurring blood clots.   She has renal insufficiency, and no CTA was done. She continues to try to remain mentally and physically active and take her vitamins, and exercise regularly. She has had no more recurring episodes of vertigo. She has had no other new focal weakness sensory loss or focal neurological deficits. Her father had bad reaction on Aricept, and she does not want to try that medication at this time. She is willing to try something else we will give her Namenda because she clearly seem to be progressing, and is having difficulty handling her ADLs. She has had no headache, no fever, no meningismus, no focal weakness, no sensory loss, no other cause for her memory loss. She denies any increased stress tension or anxiety. Current Outpatient Prescriptions   Medication Sig    losartan (COZAAR) 100 mg tablet TAKE 1 TABLET EVERY MORNING    lidocaine-prilocaine (EMLA) topical cream APPLY TO AFFECTED AREA AS DIRECTED - COVER WITH OCCLUSIVE DRESSING    hydrALAZINE (APRESOLINE) 50 mg tablet 50 mg two (2) times a day.  pramipexole (MIRAPEX) 0.25 mg tablet TAKE 1 TABLET BEFORE EACH DIALYSIS    sevelamer carbonate (RENVELA) 800 mg tab tab 3 with meals and 1 with snacks    escitalopram oxalate (LEXAPRO) 10 mg tablet TAKE 1 TABLET AT BEDTIME TO HELP PREVENT ANXIETY    buPROPion (WELLBUTRIN) 100 mg tablet TAKE 1 TABLET THREE TIMES DAILY (Patient taking differently: TAKE 1 TABLET  DAILY)    ondansetron (ZOFRAN ODT) 4 mg disintegrating tablet TAKE 1 TABLET EVERY 8 HOURS IF NEEDED FOR NAUSEA    ELIQUIS 2.5 mg tablet TAKE 1 TABLET TWICE A DAY TO PREVENT BLOOD CLOTS    amLODIPine (NORVASC) 10 mg tablet TAKE 1 TABLET EVERY DAY FOR BLOOD PRESSURE    famotidine (PEPCID) 20 mg tablet TAKE 1 TABLET BY MOUTH EVERY DAY    memantine (NAMENDA) 10 mg tablet Take 1 Tab by mouth two (2) times a day. (Patient taking differently: Take 10 mg by mouth daily.)    epoetin roverto (PROCRIT) 10,000 unit/mL injection by SubCUTAneous route once.     atorvastatin (LIPITOR) 20 mg tablet Take 1 Tab by mouth nightly.  L. acidoph & paracasei- S therm- Bifido (TY-Q/RISAQUAD) 8 billion cell cap cap Take 1 Cap by mouth daily.  oxybutynin (DITROPAN) 5 mg tablet Take 5 mg by mouth two (2) times a day.  gabapentin (NEURONTIN) 100 mg capsule Take 100 mg by mouth two (2) times a day.  linaclotide (LINZESS) 290 mcg cap capsule Take 290 mcg by mouth daily as needed.  iron, carbonyl (FEOSOL) 45 mg tab Take 1 Tab by mouth two (2) times a day.  acetaminophen (TYLENOL) 500 mg tablet Take 1,000 mg by mouth every six (6) hours as needed for Pain.  multivitamin (ONE A DAY) tablet Take 1 Tab by mouth every morning. No current facility-administered medications for this visit.          Past Medical History:   Diagnosis Date    Chronic kidney disease     Stage 5 (7/18/16 BUN 79, Creatnine 4.53, K 6) Dr. Preethi Magana [phone]    GERD (gastroesophageal reflux disease)     well controlled with Rx     High cholesterol     Hyperkalemia     Hypertension     Hypomagnesemia     on daily Magnesium    Neuropathy     bilateral feet    Ovarian cancer ([facility] 2013    Hysterectomy with chemo, no radiation:  [facility]o [facility]) 9/2015    blood clot in lung 9/2015    Thromboembolus ([facility] 2004    in kidney \"many years ago\"       Past Surgical History:   Procedure Laterality Date    ABDOMEN SURGERY [employer] UNLISTED  7/31/15    Lap exploratory small bowel obstruction  (ICU)    ABDOMEN SURGERY [employer] UNLISTED  8/2/15    Abdmonial washout with wound vac (ICU)    ABDOMEN SURGERY PROC UNLISTED  8/18/15    Abdominal washout fascial closure (ICU)    ABDOMEN SURGERY PROC UNLISTED  11/11/15    Lap takedown of ileostomy     COLONOSCOPY N/A 8/1/2016    COLONOSCOPY performed by Tamra [facility]on MD at [facility] HX APPENDECTOMY  approx 2005    HX CASI AND BSO  2013    due to ovarian cancer    HX TONSILLECTOMY  age 10       Evi.    Social History Substance Use Topics    Smoking status: Current Every Day Smoker     Packs/day: 1.00     Last attempt to quit: 1/11/2012    Smokeless tobacco: Never Used    Alcohol use No       Current Outpatient Prescriptions   Medication Sig Dispense Refill    losartan (COZAAR) 100 mg tablet TAKE 1 TABLET EVERY MORNING  6    lidocaine-prilocaine (EMLA) topical cream APPLY TO AFFECTED AREA AS DIRECTED - COVER WITH OCCLUSIVE DRESSING  11    hydrALAZINE (APRESOLINE) 50 mg tablet 50 mg two (2) times a day.  pramipexole (MIRAPEX) 0.25 mg tablet TAKE 1 TABLET BEFORE EACH DIALYSIS  3    sevelamer carbonate (RENVELA) 800 mg tab tab 3 with meals and 1 with snacks      escitalopram oxalate (LEXAPRO) 10 mg tablet TAKE 1 TABLET AT BEDTIME TO HELP PREVENT ANXIETY 30 Tab 11    buPROPion (WELLBUTRIN) 100 mg tablet TAKE 1 TABLET THREE TIMES DAILY (Patient taking differently: TAKE 1 TABLET  DAILY) 90 Tab 11    ondansetron (ZOFRAN ODT) 4 mg disintegrating tablet TAKE 1 TABLET EVERY 8 HOURS IF NEEDED FOR NAUSEA 30 Tab 3    ELIQUIS 2.5 mg tablet TAKE 1 TABLET TWICE A DAY TO PREVENT BLOOD CLOTS 60 Tab 3    amLODIPine (NORVASC) 10 mg tablet TAKE 1 TABLET EVERY DAY FOR BLOOD PRESSURE 30 Tab 11    famotidine (PEPCID) 20 mg tablet TAKE 1 TABLET BY MOUTH EVERY DAY 30 Tab 11    memantine (NAMENDA) 10 mg tablet Take 1 Tab by mouth two (2) times a day. (Patient taking differently: Take 10 mg by mouth daily.) 60 Tab 11    epoetin roverto (PROCRIT) 10,000 unit/mL injection by SubCUTAneous route once.  atorvastatin (LIPITOR) 20 mg tablet Take 1 Tab by mouth nightly. 30 Tab 0    L. acidoph & paracasei- S therm- Bifido (TY-Q/RISAQUAD) 8 billion cell cap cap Take 1 Cap by mouth daily.  oxybutynin (DITROPAN) 5 mg tablet Take 5 mg by mouth two (2) times a day.  gabapentin (NEURONTIN) 100 mg capsule Take 100 mg by mouth two (2) times a day.  linaclotide (LINZESS) 290 mcg cap capsule Take 290 mcg by mouth daily as needed.  iron, carbonyl (FEOSOL) 45 mg tab Take 1 Tab by mouth two (2) times a day.  acetaminophen (TYLENOL) 500 mg tablet Take 1,000 mg by mouth every six (6) hours as needed for Pain.  multivitamin (ONE A DAY) tablet Take 1 Tab by mouth every morning. Allergies   Allergen Reactions    Citrus And Derivatives Anaphylaxis     Patient and her  reported    Penicillin G Anaphylaxis    Orange Juice Not Reported This Time    Sulfa (Sulfonamide Antibiotics) Other (comments)     \"Dont remember\"    Vancomycin Hives       Review of Systems:    A comprehensive review of systems was negative except for: Constitutional: positive for fatigue and malaise  Ears, nose, mouth, throat, and face: positive for hearing loss  Cardiovascular: positive for chest pressure/discomfort, irregular heart beats  Musculoskeletal: positive for myalgias, arthralgias and stiff joints  Neurological: positive for dizziness, memory problems and weakness  Behvioral/Psych: positive for anxiety and depression    Objective:      Objective:     @IPVITALS(24:)@      Lab Review No results found for this or any previous visit (from the past 24 hour(s)). Additional comments:I personally viewed and interpreted the patient's CT scan and MRI scan and labs        NEUROLOGICAL EXAM:    Appearance: The patient is well developed, well nourished, provides a fair history and is in no acute distress. Mental Status: Oriented to place and person and the president, has a little difficulty with the date, had difficulty doing serial sevens, can remember 2 out of 3 words at 30 seconds with distraction, can spell world backward, and had a little difficulty getting the clock to show the time 10:50. Cognitive function and fund of knowledge is abnormal. Speech is fluent without aphasia or dysarthria. Mood and affect appropriate, but looks depressed . Cranial Nerves:   Intact visual fields. Fundi are benign.  NIKITA, EOM's full, no nystagmus, no ptosis. Facial sensation is normal. Corneal reflexes are not tested. Facial movement is symmetric. Hearing is abnormal bilaterally. Palate is midline with normal sternocleidomastoid and trapezius muscles are normal. Tongue is midline. Neck without meningismus or bruits  Temporal arteries are not tender or enlarged   Motor:  4/5 strength in upper and lower proximal and distal muscles. Normal tone but reducible generally. No fasciculations. Reflexes:   Deep tendon reflexes 1+/4 and symmetrical.  No babinski or clonus present   Sensory:   Normal to touch, pinprick and temperature and vibration. DSS is intact   Gait:  Normal gait though patient does move a little slowly due to her age . Tremor:   No tremor noted. Cerebellar:  No cerebellar signs present. Neurovascular:  Normal heart sounds and regular rhythm, peripheral pulses decreased, and no carotid bruits. Assessment:        Assessment:       ICD-10-CM ICD-9-CM    1. Bilateral carotid artery stenosis I65.23 433.10 losartan (COZAAR) 100 mg tablet     433.30 lidocaine-prilocaine (EMLA) topical cream      hydrALAZINE (APRESOLINE) 50 mg tablet      pramipexole (MIRAPEX) 0.25 mg tablet      sevelamer carbonate (RENVELA) 800 mg tab tab   2. Stenosis of both internal carotid arteries I65.23 433.10 losartan (COZAAR) 100 mg tablet     433.30 lidocaine-prilocaine (EMLA) topical cream      hydrALAZINE (APRESOLINE) 50 mg tablet      pramipexole (MIRAPEX) 0.25 mg tablet      sevelamer carbonate (RENVELA) 800 mg tab tab   3. Cerebral microvascular disease I67.9 437.9 losartan (COZAAR) 100 mg tablet      lidocaine-prilocaine (EMLA) topical cream      hydrALAZINE (APRESOLINE) 50 mg tablet      pramipexole (MIRAPEX) 0.25 mg tablet      sevelamer carbonate (RENVELA) 800 mg tab tab   4.  Disturbance of memory R41.3 780.93 losartan (COZAAR) 100 mg tablet      lidocaine-prilocaine (EMLA) topical cream      hydrALAZINE (APRESOLINE) 50 mg tablet      pramipexole (MIRAPEX) 0.25 mg tablet      sevelamer carbonate (RENVELA) 800 mg tab tab   5. Altered mental status, unspecified altered mental status type R41.82 780.97 losartan (COZAAR) 100 mg tablet      lidocaine-prilocaine (EMLA) topical cream      hydrALAZINE (APRESOLINE) 50 mg tablet      pramipexole (MIRAPEX) 0.25 mg tablet      sevelamer carbonate (RENVELA) 800 mg tab tab   6. Abnormal MRI of head R93.0 793.0 losartan (COZAAR) 100 mg tablet      lidocaine-prilocaine (EMLA) topical cream      hydrALAZINE (APRESOLINE) 50 mg tablet      pramipexole (MIRAPEX) 0.25 mg tablet      sevelamer carbonate (RENVELA) 800 mg tab tab         Plan:     Patient doing better on Namenda 10 mg twice a day, and has had no new side effects on the medications. Patient's father had a bad reaction to Aricept and they are leery of this 1, and he feels she is doing well just started dialysis so I am not going to add any new medication. Patient with progressive memory loss now,   She also doing much better on her Lexapro 10 mg a day, and takes gabapentin 100 mg twice a day and is taking her vitamins and multivitamins and vitamin D remaining mentally and physically active and she is doing crossword puzzles to help her memory to  We discussed the pros and cons of starting another medication but they want to wait, because she started dialysis and they do not want to complicate things, we sent a new refills for her medications for her today and they will call if any problem before she seen again. For her protein problems, they are to check with the dialysis people because they are restricting her diet so much.   Her dizziness is better now, and her confusion better and she most likely had a decompensated stress reaction on top of dementia  Overall she is doing well and we have encouraged her to remain mentally and physically active and take her medications regularly and her vitamins and vitamin D  Follow-up in  6 months time or earlier if needed and she will call if any problem      Signed:  Karoline Knowles MD  8/27/2018  1:42 PM    Robert Campbell MD  [phone]    This note will not be viewable in [address]

## 2018-08-31 ENCOUNTER — TELEPHONE (OUTPATIENT)
Dept: INTERNAL MEDICINE CLINIC | Age: 73
End: 2018-08-31

## 2018-08-31 NOTE — TELEPHONE ENCOUNTER
Spoke with patient after 2 patient identifiers being note and advised per Dr. Beryle Levee that cipro would be called in per patients request. Patient expressed understanding and has no further questions at this time.     Phoned in cipro 250mg to [facility] per Dr. Alex Kendrick orders

## 2018-08-31 NOTE — TELEPHONE ENCOUNTER
#[phone]  states pt has another UTI and needs the usual antibiotic called into [facility] today. He states this is very important prior to the wknd.

## 2018-09-05 RX ORDER — APIXABAN 2.5 MG/1
TABLET, FILM COATED ORAL
Qty: 60 TAB | Refills: 3 | Status: SHIPPED | OUTPATIENT
Start: 2018-09-05 | End: 2019-01-01 | Stop reason: SDUPTHER

## 2018-10-07 RX ORDER — ATORVASTATIN CALCIUM 40 MG/1
TABLET, FILM COATED ORAL
Qty: 30 TAB | Refills: 11 | Status: SHIPPED | OUTPATIENT
Start: 2018-10-07 | End: 2019-01-01

## 2018-10-29 RX ORDER — ONDANSETRON 4 MG/1
TABLET, ORALLY DISINTEGRATING ORAL
Qty: 30 TAB | Refills: 3 | Status: SHIPPED | OUTPATIENT
Start: 2018-10-29 | End: 2019-01-01 | Stop reason: SDUPTHER

## 2019-01-01 ENCOUNTER — HOSPITAL ENCOUNTER (OUTPATIENT)
Dept: LAB | Age: 74
Discharge: HOME | End: 2019-05-03
Payer: MEDICARE

## 2019-01-01 ENCOUNTER — HOME CARE VISIT (OUTPATIENT)
Dept: SCHEDULING | Facility: HOME HEALTH | Age: 74
End: 2019-01-01
Payer: MEDICARE

## 2019-01-01 ENCOUNTER — HOME HEALTH ADMISSION (OUTPATIENT)
Dept: HOME HEALTH SERVICES | Facility: HOME HEALTH | Age: 74
End: 2019-01-01
Payer: MEDICARE

## 2019-01-01 ENCOUNTER — PATIENT OUTREACH (OUTPATIENT)
Dept: INTERNAL MEDICINE CLINIC | Age: 74
End: 2019-01-01

## 2019-01-01 ENCOUNTER — APPOINTMENT (OUTPATIENT)
Dept: GENERAL RADIOLOGY | Age: 74
DRG: 871 | End: 2019-01-01
Attending: FAMILY MEDICINE
Payer: MEDICARE

## 2019-01-01 ENCOUNTER — HOME CARE VISIT (OUTPATIENT)
Dept: SCHEDULING | Facility: HOME HEALTH | Age: 74
End: 2019-01-01

## 2019-01-01 ENCOUNTER — APPOINTMENT (OUTPATIENT)
Dept: CT IMAGING | Age: 74
DRG: 871 | End: 2019-01-01
Attending: EMERGENCY MEDICINE
Payer: MEDICARE

## 2019-01-01 ENCOUNTER — HOSPITAL ENCOUNTER (OUTPATIENT)
Dept: LAB | Age: 74
Discharge: HOME | DRG: 391 | End: 2019-03-26
Payer: MEDICARE

## 2019-01-01 ENCOUNTER — APPOINTMENT (OUTPATIENT)
Dept: GENERAL RADIOLOGY | Age: 74
DRG: 871 | End: 2019-01-01
Attending: EMERGENCY MEDICINE
Payer: MEDICARE

## 2019-01-01 ENCOUNTER — TELEPHONE (OUTPATIENT)
Dept: INTERNAL MEDICINE CLINIC | Age: 74
End: 2019-01-01

## 2019-01-01 ENCOUNTER — OFFICE VISIT (OUTPATIENT)
Dept: INTERNAL MEDICINE CLINIC | Age: 74
End: 2019-01-01

## 2019-01-01 ENCOUNTER — HOME CARE VISIT (OUTPATIENT)
Dept: HOME HEALTH SERVICES | Facility: HOME HEALTH | Age: 74
End: 2019-01-01
Payer: MEDICARE

## 2019-01-01 ENCOUNTER — ANESTHESIA EVENT (OUTPATIENT)
Dept: ENDOSCOPY | Age: 74
DRG: 871 | End: 2019-01-01
Payer: MEDICARE

## 2019-01-01 ENCOUNTER — APPOINTMENT (OUTPATIENT)
Dept: CT IMAGING | Age: 74
DRG: 391 | End: 2019-01-01
Attending: FAMILY MEDICINE
Payer: MEDICARE

## 2019-01-01 ENCOUNTER — APPOINTMENT (OUTPATIENT)
Dept: VASCULAR SURGERY | Age: 74
DRG: 391 | End: 2019-01-01
Attending: FAMILY MEDICINE
Payer: MEDICARE

## 2019-01-01 ENCOUNTER — APPOINTMENT (OUTPATIENT)
Dept: GENERAL RADIOLOGY | Age: 74
DRG: 391 | End: 2019-01-01
Attending: EMERGENCY MEDICINE
Payer: MEDICARE

## 2019-01-01 ENCOUNTER — HOSPITAL ENCOUNTER (INPATIENT)
Age: 74
LOS: 6 days | Discharge: HOME HEALTH | DRG: 871 | End: 2019-04-25
Attending: EMERGENCY MEDICINE | Admitting: FAMILY MEDICINE
Payer: MEDICARE

## 2019-01-01 ENCOUNTER — HOSPITAL ENCOUNTER (INPATIENT)
Age: 74
LOS: 1 days | Discharge: HOME HEALTH | DRG: 077 | End: 2019-06-29
Attending: EMERGENCY MEDICINE | Admitting: INTERNAL MEDICINE
Payer: MEDICARE

## 2019-01-01 ENCOUNTER — APPOINTMENT (OUTPATIENT)
Dept: CT IMAGING | Age: 74
DRG: 391 | End: 2019-01-01
Attending: EMERGENCY MEDICINE
Payer: MEDICARE

## 2019-01-01 ENCOUNTER — APPOINTMENT (OUTPATIENT)
Dept: GENERAL RADIOLOGY | Age: 74
DRG: 871 | End: 2019-01-01
Attending: INTERNAL MEDICINE
Payer: MEDICARE

## 2019-01-01 ENCOUNTER — HOSPITAL ENCOUNTER (OUTPATIENT)
Dept: MAMMOGRAPHY | Age: 74
Discharge: HOME | End: 2019-02-22
Attending: INTERNAL MEDICINE
Payer: MEDICARE

## 2019-01-01 ENCOUNTER — ANESTHESIA (OUTPATIENT)
Dept: ENDOSCOPY | Age: 74
DRG: 871 | End: 2019-01-01
Payer: MEDICARE

## 2019-01-01 ENCOUNTER — APPOINTMENT (OUTPATIENT)
Dept: GENERAL RADIOLOGY | Age: 74
DRG: 077 | End: 2019-01-01
Attending: EMERGENCY MEDICINE
Payer: MEDICARE

## 2019-01-01 ENCOUNTER — APPOINTMENT (OUTPATIENT)
Dept: NON INVASIVE DIAGNOSTICS | Age: 74
DRG: 871 | End: 2019-01-01
Attending: FAMILY MEDICINE
Payer: MEDICARE

## 2019-01-01 ENCOUNTER — HOME HEALTH ADMISSION (OUTPATIENT)
Dept: HOME HEALTH SERVICES | Facility: HOME HEALTH | Age: 74
End: 2019-01-01

## 2019-01-01 ENCOUNTER — HOSPITAL ENCOUNTER (OUTPATIENT)
Dept: LAB | Age: 74
Discharge: HOME | End: 2019-05-08

## 2019-01-01 ENCOUNTER — DOCUMENTATION ONLY (OUTPATIENT)
Dept: INTERNAL MEDICINE CLINIC | Age: 74
End: 2019-01-01

## 2019-01-01 ENCOUNTER — APPOINTMENT (OUTPATIENT)
Dept: CT IMAGING | Age: 74
DRG: 077 | End: 2019-01-01
Attending: EMERGENCY MEDICINE
Payer: MEDICARE

## 2019-01-01 ENCOUNTER — HOSPITAL ENCOUNTER (INPATIENT)
Age: 74
LOS: 2 days | Discharge: HOME HEALTH | DRG: 391 | End: 2019-03-29
Attending: EMERGENCY MEDICINE | Admitting: FAMILY MEDICINE
Payer: MEDICARE

## 2019-01-01 VITALS
HEART RATE: 78 BPM | SYSTOLIC BLOOD PRESSURE: 190 MMHG | OXYGEN SATURATION: 98 % | DIASTOLIC BLOOD PRESSURE: 90 MMHG | TEMPERATURE: 98 F

## 2019-01-01 VITALS
HEIGHT: 68 IN | OXYGEN SATURATION: 97 % | TEMPERATURE: 97.8 F | SYSTOLIC BLOOD PRESSURE: 130 MMHG | HEART RATE: 67 BPM | RESPIRATION RATE: 18 BRPM | WEIGHT: 117 LBS | DIASTOLIC BLOOD PRESSURE: 60 MMHG | BODY MASS INDEX: 17.73 KG/M2

## 2019-01-01 VITALS
RESPIRATION RATE: 16 BRPM | WEIGHT: 135 LBS | BODY MASS INDEX: 20.46 KG/M2 | SYSTOLIC BLOOD PRESSURE: 202 MMHG | OXYGEN SATURATION: 100 % | TEMPERATURE: 99 F | HEART RATE: 85 BPM | HEIGHT: 68 IN | DIASTOLIC BLOOD PRESSURE: 65 MMHG

## 2019-01-01 VITALS
OXYGEN SATURATION: 95 % | SYSTOLIC BLOOD PRESSURE: 140 MMHG | DIASTOLIC BLOOD PRESSURE: 62 MMHG | HEART RATE: 66 BPM | RESPIRATION RATE: 16 BRPM | TEMPERATURE: 97.6 F

## 2019-01-01 VITALS
HEART RATE: 64 BPM | WEIGHT: 121 LBS | OXYGEN SATURATION: 98 % | BODY MASS INDEX: 17.92 KG/M2 | TEMPERATURE: 97.8 F | DIASTOLIC BLOOD PRESSURE: 55 MMHG | RESPIRATION RATE: 14 BRPM | HEIGHT: 69 IN | SYSTOLIC BLOOD PRESSURE: 145 MMHG

## 2019-01-01 VITALS
DIASTOLIC BLOOD PRESSURE: 67 MMHG | SYSTOLIC BLOOD PRESSURE: 174 MMHG | OXYGEN SATURATION: 98 % | TEMPERATURE: 98.1 F | RESPIRATION RATE: 16 BRPM | HEART RATE: 86 BPM | HEIGHT: 69 IN | BODY MASS INDEX: 19.7 KG/M2 | WEIGHT: 133 LBS

## 2019-01-01 VITALS
OXYGEN SATURATION: 95 % | TEMPERATURE: 98.2 F | SYSTOLIC BLOOD PRESSURE: 178 MMHG | HEART RATE: 75 BPM | DIASTOLIC BLOOD PRESSURE: 78 MMHG | RESPIRATION RATE: 18 BRPM

## 2019-01-01 VITALS
SYSTOLIC BLOOD PRESSURE: 148 MMHG | HEART RATE: 65 BPM | RESPIRATION RATE: 17 BRPM | OXYGEN SATURATION: 93 % | DIASTOLIC BLOOD PRESSURE: 58 MMHG

## 2019-01-01 VITALS — SYSTOLIC BLOOD PRESSURE: 172 MMHG | DIASTOLIC BLOOD PRESSURE: 58 MMHG | OXYGEN SATURATION: 97 % | HEART RATE: 71 BPM

## 2019-01-01 VITALS
SYSTOLIC BLOOD PRESSURE: 172 MMHG | DIASTOLIC BLOOD PRESSURE: 68 MMHG | RESPIRATION RATE: 16 BRPM | OXYGEN SATURATION: 98 % | HEART RATE: 87 BPM

## 2019-01-01 VITALS
HEART RATE: 61 BPM | DIASTOLIC BLOOD PRESSURE: 60 MMHG | RESPIRATION RATE: 16 BRPM | SYSTOLIC BLOOD PRESSURE: 142 MMHG | OXYGEN SATURATION: 94 %

## 2019-01-01 VITALS
SYSTOLIC BLOOD PRESSURE: 120 MMHG | HEIGHT: 69 IN | DIASTOLIC BLOOD PRESSURE: 54 MMHG | WEIGHT: 111.3 LBS | RESPIRATION RATE: 20 BRPM | TEMPERATURE: 98 F | OXYGEN SATURATION: 95 % | HEART RATE: 78 BPM | BODY MASS INDEX: 16.48 KG/M2

## 2019-01-01 VITALS
SYSTOLIC BLOOD PRESSURE: 133 MMHG | OXYGEN SATURATION: 98 % | HEART RATE: 80 BPM | BODY MASS INDEX: 16.7 KG/M2 | WEIGHT: 113.1 LBS | DIASTOLIC BLOOD PRESSURE: 76 MMHG | RESPIRATION RATE: 18 BRPM | TEMPERATURE: 98 F

## 2019-01-01 VITALS
RESPIRATION RATE: 18 BRPM | SYSTOLIC BLOOD PRESSURE: 164 MMHG | TEMPERATURE: 98.3 F | OXYGEN SATURATION: 96 % | SYSTOLIC BLOOD PRESSURE: 160 MMHG | RESPIRATION RATE: 17 BRPM | HEART RATE: 66 BPM | HEART RATE: 58 BPM | TEMPERATURE: 97.9 F | OXYGEN SATURATION: 99 % | DIASTOLIC BLOOD PRESSURE: 80 MMHG | DIASTOLIC BLOOD PRESSURE: 62 MMHG

## 2019-01-01 VITALS
TEMPERATURE: 97 F | OXYGEN SATURATION: 90 % | DIASTOLIC BLOOD PRESSURE: 70 MMHG | RESPIRATION RATE: 18 BRPM | HEART RATE: 78 BPM | SYSTOLIC BLOOD PRESSURE: 158 MMHG

## 2019-01-01 VITALS
HEART RATE: 92 BPM | BODY MASS INDEX: 20.16 KG/M2 | RESPIRATION RATE: 18 BRPM | SYSTOLIC BLOOD PRESSURE: 136 MMHG | OXYGEN SATURATION: 96 % | WEIGHT: 133 LBS | HEIGHT: 68 IN | DIASTOLIC BLOOD PRESSURE: 58 MMHG | TEMPERATURE: 99.5 F

## 2019-01-01 VITALS
DIASTOLIC BLOOD PRESSURE: 64 MMHG | OXYGEN SATURATION: 97 % | RESPIRATION RATE: 16 BRPM | SYSTOLIC BLOOD PRESSURE: 200 MMHG | HEART RATE: 88 BPM

## 2019-01-01 VITALS
OXYGEN SATURATION: 100 % | SYSTOLIC BLOOD PRESSURE: 166 MMHG | HEART RATE: 62 BPM | TEMPERATURE: 97.6 F | BODY MASS INDEX: 18.19 KG/M2 | WEIGHT: 120 LBS | HEIGHT: 68 IN | DIASTOLIC BLOOD PRESSURE: 53 MMHG | RESPIRATION RATE: 16 BRPM

## 2019-01-01 VITALS
RESPIRATION RATE: 18 BRPM | OXYGEN SATURATION: 98 % | SYSTOLIC BLOOD PRESSURE: 148 MMHG | DIASTOLIC BLOOD PRESSURE: 78 MMHG | HEART RATE: 72 BPM | TEMPERATURE: 98.1 F

## 2019-01-01 VITALS
SYSTOLIC BLOOD PRESSURE: 148 MMHG | OXYGEN SATURATION: 93 % | TEMPERATURE: 97.5 F | RESPIRATION RATE: 18 BRPM | HEART RATE: 82 BPM | DIASTOLIC BLOOD PRESSURE: 82 MMHG

## 2019-01-01 VITALS
DIASTOLIC BLOOD PRESSURE: 80 MMHG | HEART RATE: 70 BPM | RESPIRATION RATE: 18 BRPM | TEMPERATURE: 98.2 F | SYSTOLIC BLOOD PRESSURE: 126 MMHG | OXYGEN SATURATION: 98 %

## 2019-01-01 VITALS
HEART RATE: 78 BPM | TEMPERATURE: 97.6 F | SYSTOLIC BLOOD PRESSURE: 156 MMHG | DIASTOLIC BLOOD PRESSURE: 60 MMHG | OXYGEN SATURATION: 98 % | RESPIRATION RATE: 16 BRPM

## 2019-01-01 VITALS
DIASTOLIC BLOOD PRESSURE: 60 MMHG | HEART RATE: 61 BPM | OXYGEN SATURATION: 91 % | RESPIRATION RATE: 18 BRPM | SYSTOLIC BLOOD PRESSURE: 145 MMHG

## 2019-01-01 VITALS
TEMPERATURE: 98.2 F | OXYGEN SATURATION: 98 % | DIASTOLIC BLOOD PRESSURE: 80 MMHG | SYSTOLIC BLOOD PRESSURE: 122 MMHG | HEART RATE: 70 BPM | RESPIRATION RATE: 18 BRPM

## 2019-01-01 VITALS
SYSTOLIC BLOOD PRESSURE: 160 MMHG | HEART RATE: 63 BPM | OXYGEN SATURATION: 97 % | DIASTOLIC BLOOD PRESSURE: 60 MMHG | RESPIRATION RATE: 17 BRPM

## 2019-01-01 VITALS
TEMPERATURE: 98.2 F | OXYGEN SATURATION: 95 % | DIASTOLIC BLOOD PRESSURE: 62 MMHG | HEART RATE: 76 BPM | RESPIRATION RATE: 18 BRPM | SYSTOLIC BLOOD PRESSURE: 150 MMHG

## 2019-01-01 VITALS
TEMPERATURE: 97.9 F | SYSTOLIC BLOOD PRESSURE: 130 MMHG | OXYGEN SATURATION: 97 % | HEART RATE: 83 BPM | BODY MASS INDEX: 17.58 KG/M2 | HEIGHT: 68 IN | WEIGHT: 116 LBS | RESPIRATION RATE: 20 BRPM | DIASTOLIC BLOOD PRESSURE: 60 MMHG

## 2019-01-01 VITALS
SYSTOLIC BLOOD PRESSURE: 142 MMHG | OXYGEN SATURATION: 95 % | RESPIRATION RATE: 18 BRPM | DIASTOLIC BLOOD PRESSURE: 60 MMHG | TEMPERATURE: 98 F | HEART RATE: 65 BPM

## 2019-01-01 VITALS
HEART RATE: 98 BPM | DIASTOLIC BLOOD PRESSURE: 62 MMHG | TEMPERATURE: 98.1 F | BODY MASS INDEX: 18.09 KG/M2 | WEIGHT: 119 LBS | SYSTOLIC BLOOD PRESSURE: 148 MMHG | OXYGEN SATURATION: 98 % | RESPIRATION RATE: 18 BRPM

## 2019-01-01 VITALS
DIASTOLIC BLOOD PRESSURE: 50 MMHG | HEART RATE: 67 BPM | OXYGEN SATURATION: 97 % | WEIGHT: 117 LBS | SYSTOLIC BLOOD PRESSURE: 168 MMHG | TEMPERATURE: 98.2 F | BODY MASS INDEX: 17.79 KG/M2 | RESPIRATION RATE: 16 BRPM

## 2019-01-01 VITALS — HEART RATE: 76 BPM | SYSTOLIC BLOOD PRESSURE: 156 MMHG | OXYGEN SATURATION: 98 % | DIASTOLIC BLOOD PRESSURE: 60 MMHG

## 2019-01-01 VITALS
TEMPERATURE: 98.1 F | BODY MASS INDEX: 19.4 KG/M2 | HEIGHT: 69 IN | RESPIRATION RATE: 18 BRPM | SYSTOLIC BLOOD PRESSURE: 175 MMHG | WEIGHT: 131 LBS | OXYGEN SATURATION: 96 % | HEART RATE: 89 BPM | DIASTOLIC BLOOD PRESSURE: 71 MMHG

## 2019-01-01 VITALS
TEMPERATURE: 98 F | DIASTOLIC BLOOD PRESSURE: 52 MMHG | HEART RATE: 85 BPM | WEIGHT: 137 LBS | HEIGHT: 68 IN | SYSTOLIC BLOOD PRESSURE: 180 MMHG | BODY MASS INDEX: 20.76 KG/M2 | OXYGEN SATURATION: 96 %

## 2019-01-01 VITALS
SYSTOLIC BLOOD PRESSURE: 150 MMHG | TEMPERATURE: 97.8 F | HEART RATE: 71 BPM | RESPIRATION RATE: 16 BRPM | DIASTOLIC BLOOD PRESSURE: 68 MMHG | OXYGEN SATURATION: 98 %

## 2019-01-01 VITALS
DIASTOLIC BLOOD PRESSURE: 62 MMHG | SYSTOLIC BLOOD PRESSURE: 150 MMHG | RESPIRATION RATE: 18 BRPM | HEART RATE: 76 BPM | TEMPERATURE: 97.6 F | OXYGEN SATURATION: 98 %

## 2019-01-01 VITALS
RESPIRATION RATE: 18 BRPM | TEMPERATURE: 97.5 F | HEART RATE: 60 BPM | SYSTOLIC BLOOD PRESSURE: 160 MMHG | DIASTOLIC BLOOD PRESSURE: 62 MMHG | OXYGEN SATURATION: 98 %

## 2019-01-01 VITALS — DIASTOLIC BLOOD PRESSURE: 64 MMHG | HEART RATE: 64 BPM | OXYGEN SATURATION: 97 % | SYSTOLIC BLOOD PRESSURE: 178 MMHG

## 2019-01-01 VITALS
TEMPERATURE: 98.1 F | SYSTOLIC BLOOD PRESSURE: 162 MMHG | DIASTOLIC BLOOD PRESSURE: 56 MMHG | HEART RATE: 68 BPM | RESPIRATION RATE: 17 BRPM | OXYGEN SATURATION: 93 %

## 2019-01-01 VITALS — RESPIRATION RATE: 18 BRPM | DIASTOLIC BLOOD PRESSURE: 80 MMHG | HEART RATE: 77 BPM | SYSTOLIC BLOOD PRESSURE: 164 MMHG

## 2019-01-01 VITALS
SYSTOLIC BLOOD PRESSURE: 142 MMHG | HEART RATE: 71 BPM | RESPIRATION RATE: 18 BRPM | OXYGEN SATURATION: 97 % | DIASTOLIC BLOOD PRESSURE: 60 MMHG

## 2019-01-01 VITALS
OXYGEN SATURATION: 92 % | HEART RATE: 64 BPM | RESPIRATION RATE: 17 BRPM | SYSTOLIC BLOOD PRESSURE: 162 MMHG | DIASTOLIC BLOOD PRESSURE: 65 MMHG

## 2019-01-01 VITALS
HEART RATE: 132 BPM | SYSTOLIC BLOOD PRESSURE: 166 MMHG | DIASTOLIC BLOOD PRESSURE: 55 MMHG | RESPIRATION RATE: 16 BRPM | OXYGEN SATURATION: 92 % | DIASTOLIC BLOOD PRESSURE: 54 MMHG | HEART RATE: 73 BPM | OXYGEN SATURATION: 96 % | RESPIRATION RATE: 18 BRPM | SYSTOLIC BLOOD PRESSURE: 140 MMHG

## 2019-01-01 VITALS
HEART RATE: 68 BPM | OXYGEN SATURATION: 98 % | TEMPERATURE: 97.6 F | RESPIRATION RATE: 18 BRPM | SYSTOLIC BLOOD PRESSURE: 160 MMHG | DIASTOLIC BLOOD PRESSURE: 80 MMHG

## 2019-01-01 VITALS
SYSTOLIC BLOOD PRESSURE: 150 MMHG | RESPIRATION RATE: 18 BRPM | DIASTOLIC BLOOD PRESSURE: 60 MMHG | TEMPERATURE: 98.2 F | OXYGEN SATURATION: 93 % | HEART RATE: 67 BPM

## 2019-01-01 VITALS
OXYGEN SATURATION: 98 % | HEART RATE: 76 BPM | DIASTOLIC BLOOD PRESSURE: 64 MMHG | RESPIRATION RATE: 18 BRPM | SYSTOLIC BLOOD PRESSURE: 196 MMHG

## 2019-01-01 VITALS
DIASTOLIC BLOOD PRESSURE: 62 MMHG | RESPIRATION RATE: 16 BRPM | SYSTOLIC BLOOD PRESSURE: 142 MMHG | OXYGEN SATURATION: 98 % | TEMPERATURE: 97.8 F | HEART RATE: 78 BPM

## 2019-01-01 VITALS
SYSTOLIC BLOOD PRESSURE: 140 MMHG | RESPIRATION RATE: 17 BRPM | HEART RATE: 64 BPM | DIASTOLIC BLOOD PRESSURE: 55 MMHG | OXYGEN SATURATION: 99 %

## 2019-01-01 VITALS
HEART RATE: 72 BPM | DIASTOLIC BLOOD PRESSURE: 70 MMHG | SYSTOLIC BLOOD PRESSURE: 160 MMHG | RESPIRATION RATE: 18 BRPM | TEMPERATURE: 97.4 F

## 2019-01-01 DIAGNOSIS — I65.23 BILATERAL CAROTID ARTERY STENOSIS: ICD-10-CM

## 2019-01-01 DIAGNOSIS — J18.9 COMMUNITY ACQUIRED PNEUMONIA OF LEFT LOWER LOBE OF LUNG: ICD-10-CM

## 2019-01-01 DIAGNOSIS — I67.4 HYPERTENSIVE ENCEPHALOPATHY: Primary | ICD-10-CM

## 2019-01-01 DIAGNOSIS — R52 PAIN: Primary | ICD-10-CM

## 2019-01-01 DIAGNOSIS — F41.9 ANXIETY: ICD-10-CM

## 2019-01-01 DIAGNOSIS — D50.0 ANEMIA, BLOOD LOSS: ICD-10-CM

## 2019-01-01 DIAGNOSIS — J81.0 ACUTE PULMONARY EDEMA (HCC): ICD-10-CM

## 2019-01-01 DIAGNOSIS — R07.9 LEFT-SIDED CHEST PAIN: Primary | ICD-10-CM

## 2019-01-01 DIAGNOSIS — R19.7 DIARRHEA, UNSPECIFIED TYPE: Primary | ICD-10-CM

## 2019-01-01 DIAGNOSIS — Z12.39 BREAST SCREENING: ICD-10-CM

## 2019-01-01 DIAGNOSIS — I65.22 CAROTID STENOSIS, ASYMPTOMATIC, LEFT: Primary | ICD-10-CM

## 2019-01-01 DIAGNOSIS — Z99.2 ESRD ON DIALYSIS (HCC): ICD-10-CM

## 2019-01-01 DIAGNOSIS — N18.6 ESRD (END STAGE RENAL DISEASE) (HCC): ICD-10-CM

## 2019-01-01 DIAGNOSIS — Z79.01 ANTICOAGULATED: ICD-10-CM

## 2019-01-01 DIAGNOSIS — N18.6 ESRD ON DIALYSIS (HCC): ICD-10-CM

## 2019-01-01 DIAGNOSIS — R41.3 MEMORY LOSS: ICD-10-CM

## 2019-01-01 DIAGNOSIS — I65.23 STENOSIS OF BOTH INTERNAL CAROTID ARTERIES: ICD-10-CM

## 2019-01-01 DIAGNOSIS — R93.0 ABNORMAL MRI OF HEAD: ICD-10-CM

## 2019-01-01 DIAGNOSIS — Z00.00 MEDICARE ANNUAL WELLNESS VISIT, SUBSEQUENT: ICD-10-CM

## 2019-01-01 DIAGNOSIS — I10 ESSENTIAL HYPERTENSION: ICD-10-CM

## 2019-01-01 DIAGNOSIS — J96.01 ACUTE RESPIRATORY FAILURE WITH HYPOXIA (HCC): Primary | ICD-10-CM

## 2019-01-01 DIAGNOSIS — G30.1 LATE ONSET ALZHEIMER'S DISEASE WITHOUT BEHAVIORAL DISTURBANCE (HCC): ICD-10-CM

## 2019-01-01 DIAGNOSIS — A41.9 SEPSIS DUE TO PNEUMONIA (HCC): ICD-10-CM

## 2019-01-01 DIAGNOSIS — R41.82 ALTERED MENTAL STATUS, UNSPECIFIED ALTERED MENTAL STATUS TYPE: ICD-10-CM

## 2019-01-01 DIAGNOSIS — J18.9 COMMUNITY ACQUIRED PNEUMONIA, UNSPECIFIED LATERALITY: ICD-10-CM

## 2019-01-01 DIAGNOSIS — R19.7 DIARRHEA, UNSPECIFIED TYPE: ICD-10-CM

## 2019-01-01 DIAGNOSIS — R41.3 DISTURBANCE OF MEMORY: ICD-10-CM

## 2019-01-01 DIAGNOSIS — K92.1 HEMATOCHEZIA: ICD-10-CM

## 2019-01-01 DIAGNOSIS — Z86.718 HX OF THROMBOEMBOLISM: ICD-10-CM

## 2019-01-01 DIAGNOSIS — J81.0 ACUTE PULMONARY EDEMA (HCC): Primary | ICD-10-CM

## 2019-01-01 DIAGNOSIS — F02.80 LATE ONSET ALZHEIMER'S DISEASE WITHOUT BEHAVIORAL DISTURBANCE (HCC): ICD-10-CM

## 2019-01-01 DIAGNOSIS — J06.9 VIRAL URI WITH COUGH: Primary | ICD-10-CM

## 2019-01-01 DIAGNOSIS — Z23 ENCOUNTER FOR IMMUNIZATION: ICD-10-CM

## 2019-01-01 DIAGNOSIS — I10 ACCELERATED HYPERTENSION: ICD-10-CM

## 2019-01-01 DIAGNOSIS — I67.89 CEREBRAL MICROVASCULAR DISEASE: ICD-10-CM

## 2019-01-01 DIAGNOSIS — J18.9 PNEUMONIA OF BOTH LOWER LOBES DUE TO INFECTIOUS ORGANISM: Primary | ICD-10-CM

## 2019-01-01 DIAGNOSIS — R19.5 HEME POSITIVE STOOL: ICD-10-CM

## 2019-01-01 DIAGNOSIS — Z78.0 MENOPAUSE: ICD-10-CM

## 2019-01-01 DIAGNOSIS — J18.9 SEPSIS DUE TO PNEUMONIA (HCC): ICD-10-CM

## 2019-01-01 LAB
ABO + RH BLD: NORMAL
ALBUMIN SERPL-MCNC: 2.2 G/DL (ref 3.5–5)
ALBUMIN SERPL-MCNC: 2.4 G/DL (ref 3.5–5)
ALBUMIN SERPL-MCNC: 2.5 G/DL (ref 3.5–5)
ALBUMIN SERPL-MCNC: 2.5 G/DL (ref 3.5–5)
ALBUMIN SERPL-MCNC: 2.7 G/DL (ref 3.5–5)
ALBUMIN/GLOB SERPL: 0.6 {RATIO} (ref 1.1–2.2)
ALP SERPL-CCNC: 104 U/L (ref 45–117)
ALP SERPL-CCNC: 108 U/L (ref 45–117)
ALP SERPL-CCNC: 119 U/L (ref 45–117)
ALP SERPL-CCNC: 98 U/L (ref 45–117)
ALT SERPL-CCNC: 13 U/L (ref 12–78)
ALT SERPL-CCNC: 15 U/L (ref 12–78)
ALT SERPL-CCNC: 16 U/L (ref 12–78)
ALT SERPL-CCNC: 22 U/L (ref 12–78)
ANION GAP SERPL CALC-SCNC: 10 MMOL/L (ref 5–15)
ANION GAP SERPL CALC-SCNC: 11 MMOL/L (ref 5–15)
ANION GAP SERPL CALC-SCNC: 11 MMOL/L (ref 5–15)
ANION GAP SERPL CALC-SCNC: 13 MMOL/L (ref 5–15)
ANION GAP SERPL CALC-SCNC: 4 MMOL/L (ref 5–15)
ANION GAP SERPL CALC-SCNC: 5 MMOL/L (ref 5–15)
ANION GAP SERPL CALC-SCNC: 6 MMOL/L (ref 5–15)
ANION GAP SERPL CALC-SCNC: 7 MMOL/L (ref 5–15)
ANION GAP SERPL CALC-SCNC: 8 MMOL/L (ref 5–15)
ANION GAP SERPL CALC-SCNC: 9 MMOL/L (ref 5–15)
ANTI-COMPLEMENT (C3B,C3D): NORMAL
APPEARANCE UR: ABNORMAL
ARTERIAL PATENCY WRIST A: YES
AST SERPL-CCNC: 24 U/L (ref 15–37)
AST SERPL-CCNC: 26 U/L (ref 15–37)
AST SERPL-CCNC: 29 U/L (ref 15–37)
AST SERPL-CCNC: 32 U/L (ref 15–37)
ATRIAL RATE: 107 BPM
ATRIAL RATE: 64 BPM
ATRIAL RATE: 79 BPM
BACTERIA SPEC CULT: NORMAL
BACTERIA URNS QL MICRO: NEGATIVE /HPF
BASE DEFICIT BLD-SCNC: 5 MMOL/L
BASOPHILS # BLD: 0 K/UL (ref 0–0.1)
BASOPHILS # BLD: 0.1 K/UL (ref 0–0.1)
BASOPHILS # BLD: 0.1 K/UL (ref 0–0.1)
BASOPHILS NFR BLD: 0 % (ref 0–1)
BASOPHILS NFR BLD: 1 % (ref 0–1)
BDY SITE: ABNORMAL
BILIRUB SERPL-MCNC: 0.3 MG/DL (ref 0.2–1)
BILIRUB SERPL-MCNC: 0.4 MG/DL (ref 0.2–1)
BILIRUB SERPL-MCNC: 0.6 MG/DL (ref 0.2–1)
BILIRUB SERPL-MCNC: 0.8 MG/DL (ref 0.2–1)
BILIRUB UR QL: NEGATIVE
BLD PROD TYP BPU: NORMAL
BLD PROD TYP BPU: NORMAL
BLOOD BANK CMNT PATIENT-IMP: NORMAL
BLOOD GROUP ANTIBODIES SERPL: NORMAL
BNP SERPL-MCNC: ABNORMAL PG/ML
BPU ID: NORMAL
BPU ID: NORMAL
BUN SERPL-MCNC: 13 MG/DL (ref 6–20)
BUN SERPL-MCNC: 13 MG/DL (ref 6–20)
BUN SERPL-MCNC: 16 MG/DL (ref 6–20)
BUN SERPL-MCNC: 23 MG/DL (ref 6–20)
BUN SERPL-MCNC: 26 MG/DL (ref 6–20)
BUN SERPL-MCNC: 31 MG/DL (ref 6–20)
BUN SERPL-MCNC: 39 MG/DL (ref 6–20)
BUN SERPL-MCNC: 40 MG/DL (ref 6–20)
BUN SERPL-MCNC: 41 MG/DL (ref 6–20)
BUN SERPL-MCNC: 42 MG/DL (ref 6–20)
BUN SERPL-MCNC: 58 MG/DL (ref 6–20)
BUN SERPL-MCNC: 58 MG/DL (ref 6–20)
BUN SERPL-MCNC: 62 MG/DL (ref 6–20)
BUN/CREAT SERPL: 10 (ref 12–20)
BUN/CREAT SERPL: 12 (ref 12–20)
BUN/CREAT SERPL: 12 (ref 12–20)
BUN/CREAT SERPL: 5 (ref 12–20)
BUN/CREAT SERPL: 5 (ref 12–20)
BUN/CREAT SERPL: 7 (ref 12–20)
BUN/CREAT SERPL: 7 (ref 12–20)
BUN/CREAT SERPL: 8 (ref 12–20)
BUN/CREAT SERPL: 8 (ref 12–20)
BUN/CREAT SERPL: 9 (ref 12–20)
BUN/CREAT SERPL: 9 (ref 12–20)
C DIFF TOX GENS STL QL NAA+PROBE: NEGATIVE
C JEJUNI+C COLI AG STL QL: NEGATIVE
CALCIUM SERPL-MCNC: 8 MG/DL (ref 8.5–10.1)
CALCIUM SERPL-MCNC: 8 MG/DL (ref 8.5–10.1)
CALCIUM SERPL-MCNC: 8.2 MG/DL (ref 8.5–10.1)
CALCIUM SERPL-MCNC: 8.2 MG/DL (ref 8.5–10.1)
CALCIUM SERPL-MCNC: 8.3 MG/DL (ref 8.5–10.1)
CALCIUM SERPL-MCNC: 8.4 MG/DL (ref 8.5–10.1)
CALCIUM SERPL-MCNC: 8.7 MG/DL (ref 8.5–10.1)
CALCIUM SERPL-MCNC: 8.8 MG/DL (ref 8.5–10.1)
CALCIUM SERPL-MCNC: 8.8 MG/DL (ref 8.5–10.1)
CALCIUM SERPL-MCNC: 9 MG/DL (ref 8.5–10.1)
CALCIUM SERPL-MCNC: 9.3 MG/DL (ref 8.5–10.1)
CALCULATED P AXIS, ECG09: 58 DEGREES
CALCULATED P AXIS, ECG09: 69 DEGREES
CALCULATED P AXIS, ECG09: 75 DEGREES
CALCULATED R AXIS, ECG10: -2 DEGREES
CALCULATED R AXIS, ECG10: 28 DEGREES
CALCULATED R AXIS, ECG10: 3 DEGREES
CALCULATED T AXIS, ECG11: 22 DEGREES
CALCULATED T AXIS, ECG11: 74 DEGREES
CALCULATED T AXIS, ECG11: 88 DEGREES
CAMPYLOBACTER STL CULT: NORMAL
CHLORIDE SERPL-SCNC: 100 MMOL/L (ref 97–108)
CHLORIDE SERPL-SCNC: 102 MMOL/L (ref 97–108)
CHLORIDE SERPL-SCNC: 103 MMOL/L (ref 97–108)
CHLORIDE SERPL-SCNC: 104 MMOL/L (ref 97–108)
CHLORIDE SERPL-SCNC: 104 MMOL/L (ref 97–108)
CHLORIDE SERPL-SCNC: 105 MMOL/L (ref 97–108)
CHLORIDE SERPL-SCNC: 107 MMOL/L (ref 97–108)
CHLORIDE SERPL-SCNC: 107 MMOL/L (ref 97–108)
CHLORIDE SERPL-SCNC: 108 MMOL/L (ref 97–108)
CO2 SERPL-SCNC: 21 MMOL/L (ref 21–32)
CO2 SERPL-SCNC: 21 MMOL/L (ref 21–32)
CO2 SERPL-SCNC: 22 MMOL/L (ref 21–32)
CO2 SERPL-SCNC: 22 MMOL/L (ref 21–32)
CO2 SERPL-SCNC: 23 MMOL/L (ref 21–32)
CO2 SERPL-SCNC: 24 MMOL/L (ref 21–32)
CO2 SERPL-SCNC: 24 MMOL/L (ref 21–32)
CO2 SERPL-SCNC: 25 MMOL/L (ref 21–32)
CO2 SERPL-SCNC: 28 MMOL/L (ref 21–32)
CO2 SERPL-SCNC: 28 MMOL/L (ref 21–32)
CO2 SERPL-SCNC: 29 MMOL/L (ref 21–32)
COLOR UR: ABNORMAL
COMMENT, HOLDF: NORMAL
COMMENT, HOLDF: NORMAL
CREAT SERPL-MCNC: 1.89 MG/DL (ref 0.55–1.02)
CREAT SERPL-MCNC: 2.42 MG/DL (ref 0.55–1.02)
CREAT SERPL-MCNC: 2.6 MG/DL (ref 0.55–1.02)
CREAT SERPL-MCNC: 2.98 MG/DL (ref 0.55–1.02)
CREAT SERPL-MCNC: 3.43 MG/DL (ref 0.55–1.02)
CREAT SERPL-MCNC: 3.5 MG/DL (ref 0.55–1.02)
CREAT SERPL-MCNC: 4.29 MG/DL (ref 0.55–1.02)
CREAT SERPL-MCNC: 4.29 MG/DL (ref 0.55–1.02)
CREAT SERPL-MCNC: 4.72 MG/DL (ref 0.55–1.02)
CREAT SERPL-MCNC: 4.88 MG/DL (ref 0.55–1.02)
CREAT SERPL-MCNC: 5.05 MG/DL (ref 0.55–1.02)
CREAT SERPL-MCNC: 5.95 MG/DL (ref 0.55–1.02)
CREAT SERPL-MCNC: 6.06 MG/DL (ref 0.55–1.02)
CROSSMATCH RESULT,%XM: NORMAL
CROSSMATCH RESULT,%XM: NORMAL
DAT IGG-SP REAG RBC QL: NORMAL
DAT POLY-SP REAG RBC QL: NORMAL
DIAGNOSIS, 93000: NORMAL
DIFFERENTIAL METHOD BLD: ABNORMAL
E COLI SXT STL QL IA: NEGATIVE
E COLI SXT1+2 STL IA: NEGATIVE
ECHO AO ROOT DIAM: 2.82 CM
ECHO AV AREA PEAK VELOCITY: 0.9 CM2
ECHO AV CUSP MM: 0 CM
ECHO AV PEAK GRADIENT: 18.4 MMHG
ECHO AV PEAK VELOCITY: 214.55 CM/S
ECHO LA MAJOR AXIS: 3.32 CM
ECHO LA TO AORTIC ROOT RATIO: 1.18
ECHO LV INTERNAL DIMENSION DIASTOLIC: 4.19 CM (ref 3.9–5.3)
ECHO LV INTERNAL DIMENSION SYSTOLIC: 3.31 CM
ECHO LV IVSD: 1.68 CM (ref 0.6–0.9)
ECHO LV MASS 2D: 293.1 G (ref 67–162)
ECHO LV POSTERIOR WALL DIASTOLIC: 1.3 CM (ref 0.6–0.9)
ECHO LV POSTERIOR WALL SYSTOLIC: 1.27 CM
ECHO LVOT DIAM: 1.35 CM
ECHO LVOT PEAK GRADIENT: 6.7 MMHG
ECHO LVOT PEAK VELOCITY: 129.86 CM/S
ECHO LVOT SV: 33.5 ML
ECHO LVOT VTI: 23.46 CM
ECHO MV A VELOCITY: 0 CM/S
ECHO MV E DECELERATION TIME (DT): 0 MS
ECHO MV E VELOCITY: 0 CM/S
ECHO MV PRESSURE HALF TIME (PHT): 0 MS
ECHO MV REGURGITANT PEAK GRADIENT: 7.9 MMHG
ECHO MV REGURGITANT PEAK VELOCITY: 140.96 CM/S
ECHO TV REGURGITANT MAX VELOCITY: 213.14 CM/S
ECHO TV REGURGITANT PEAK GRADIENT: 18.2 MMHG
EOSINOPHIL # BLD: 0 K/UL (ref 0–0.4)
EOSINOPHIL # BLD: 0 K/UL (ref 0–0.4)
EOSINOPHIL # BLD: 0.1 K/UL (ref 0–0.4)
EOSINOPHIL NFR BLD: 0 % (ref 0–7)
EOSINOPHIL NFR BLD: 0 % (ref 0–7)
EOSINOPHIL NFR BLD: 1 % (ref 0–7)
EPITH CASTS URNS QL MICRO: ABNORMAL /LPF
ERYTHROCYTE [DISTWIDTH] IN BLOOD BY AUTOMATED COUNT: 16.5 % (ref 11.5–14.5)
ERYTHROCYTE [DISTWIDTH] IN BLOOD BY AUTOMATED COUNT: 16.7 % (ref 11.5–14.5)
ERYTHROCYTE [DISTWIDTH] IN BLOOD BY AUTOMATED COUNT: 16.9 % (ref 11.5–14.5)
ERYTHROCYTE [DISTWIDTH] IN BLOOD BY AUTOMATED COUNT: 17 % (ref 11.5–14.5)
ERYTHROCYTE [DISTWIDTH] IN BLOOD BY AUTOMATED COUNT: 17.1 % (ref 11.5–14.5)
ERYTHROCYTE [DISTWIDTH] IN BLOOD BY AUTOMATED COUNT: 17.2 % (ref 11.5–14.5)
ERYTHROCYTE [DISTWIDTH] IN BLOOD BY AUTOMATED COUNT: 17.3 % (ref 11.5–14.5)
ERYTHROCYTE [DISTWIDTH] IN BLOOD BY AUTOMATED COUNT: 17.3 % (ref 11.5–14.5)
ERYTHROCYTE [DISTWIDTH] IN BLOOD BY AUTOMATED COUNT: 17.4 % (ref 11.5–14.5)
ERYTHROCYTE [DISTWIDTH] IN BLOOD BY AUTOMATED COUNT: 17.6 % (ref 11.5–14.5)
ERYTHROCYTE [DISTWIDTH] IN BLOOD BY AUTOMATED COUNT: 17.6 % (ref 11.5–14.5)
FERRITIN SERPL-MCNC: 1186 NG/ML (ref 26–388)
FLUAV AG NPH QL IA: NEGATIVE
FLUBV AG NOSE QL IA: NEGATIVE
FOLATE SERPL-MCNC: 18.9 NG/ML (ref 5–21)
GAS FLOW.O2 O2 DELIVERY SYS: ABNORMAL L/MIN
GAS FLOW.O2 SETTING OXYMISER: 15 L/M
GLOBULIN SER CALC-MCNC: 3.6 G/DL (ref 2–4)
GLOBULIN SER CALC-MCNC: 4.1 G/DL (ref 2–4)
GLOBULIN SER CALC-MCNC: 4.3 G/DL (ref 2–4)
GLOBULIN SER CALC-MCNC: 4.3 G/DL (ref 2–4)
GLUCOSE BLD STRIP.AUTO-MCNC: 111 MG/DL (ref 65–100)
GLUCOSE SERPL-MCNC: 72 MG/DL (ref 65–100)
GLUCOSE SERPL-MCNC: 83 MG/DL (ref 65–100)
GLUCOSE SERPL-MCNC: 84 MG/DL (ref 65–100)
GLUCOSE SERPL-MCNC: 85 MG/DL (ref 65–100)
GLUCOSE SERPL-MCNC: 88 MG/DL (ref 65–100)
GLUCOSE SERPL-MCNC: 88 MG/DL (ref 65–100)
GLUCOSE SERPL-MCNC: 91 MG/DL (ref 65–100)
GLUCOSE SERPL-MCNC: 91 MG/DL (ref 65–100)
GLUCOSE SERPL-MCNC: 92 MG/DL (ref 65–100)
GLUCOSE SERPL-MCNC: 94 MG/DL (ref 65–100)
GLUCOSE SERPL-MCNC: 95 MG/DL (ref 65–100)
GLUCOSE SERPL-MCNC: 98 MG/DL (ref 65–100)
GLUCOSE SERPL-MCNC: 98 MG/DL (ref 65–100)
GLUCOSE UR STRIP.AUTO-MCNC: 100 MG/DL
HBV SURFACE AG SER QL: 0.62 INDEX
HBV SURFACE AG SER QL: NEGATIVE
HCO3 BLD-SCNC: 20.3 MMOL/L (ref 22–26)
HCT VFR BLD AUTO: 25.7 % (ref 35–47)
HCT VFR BLD AUTO: 26.2 % (ref 35–47)
HCT VFR BLD AUTO: 26.3 % (ref 35–47)
HCT VFR BLD AUTO: 27.9 % (ref 35–47)
HCT VFR BLD AUTO: 28.3 % (ref 35–47)
HCT VFR BLD AUTO: 30.4 % (ref 35–47)
HCT VFR BLD AUTO: 30.6 % (ref 35–47)
HCT VFR BLD AUTO: 31.6 % (ref 35–47)
HCT VFR BLD AUTO: 32.7 % (ref 35–47)
HCT VFR BLD AUTO: 34 % (ref 35–47)
HCT VFR BLD AUTO: 38.3 % (ref 35–47)
HCT VFR BLD AUTO: 40.9 % (ref 35–47)
HCT VFR BLD AUTO: 47.2 % (ref 35–47)
HEMOCCULT STL QL: POSITIVE
HGB BLD-MCNC: 10.8 G/DL (ref 11.5–16)
HGB BLD-MCNC: 11.4 G/DL (ref 11.5–16)
HGB BLD-MCNC: 13.4 G/DL (ref 11.5–16)
HGB BLD-MCNC: 7.2 G/DL (ref 11.5–16)
HGB BLD-MCNC: 7.5 G/DL (ref 11.5–16)
HGB BLD-MCNC: 7.6 G/DL (ref 11.5–16)
HGB BLD-MCNC: 7.9 G/DL (ref 11.5–16)
HGB BLD-MCNC: 8.1 G/DL (ref 11.5–16)
HGB BLD-MCNC: 8.7 G/DL (ref 11.5–16)
HGB BLD-MCNC: 8.8 G/DL (ref 11.5–16)
HGB BLD-MCNC: 9.2 G/DL (ref 11.5–16)
HGB BLD-MCNC: 9.5 G/DL (ref 11.5–16)
HGB BLD-MCNC: 9.6 G/DL (ref 11.5–16)
HGB UR QL STRIP: ABNORMAL
HYALINE CASTS URNS QL MICRO: ABNORMAL /LPF (ref 0–5)
IMM GRANULOCYTES # BLD AUTO: 0.1 K/UL (ref 0–0.04)
IMM GRANULOCYTES NFR BLD AUTO: 1 % (ref 0–0.5)
INR PPP: 1.5 (ref 0.9–1.1)
IRON SATN MFR SERPL: 17 % (ref 20–50)
IRON SERPL-MCNC: 26 UG/DL (ref 35–150)
KETONES UR QL STRIP.AUTO: NEGATIVE MG/DL
LACTATE BLD-SCNC: 0.68 MMOL/L (ref 0.4–2)
LACTATE SERPL-SCNC: 0.9 MMOL/L (ref 0.4–2)
LEUKOCYTE ESTERASE UR QL STRIP.AUTO: NEGATIVE
LYMPHOCYTES # BLD: 0.9 K/UL (ref 0.8–3.5)
LYMPHOCYTES # BLD: 0.9 K/UL (ref 0.8–3.5)
LYMPHOCYTES # BLD: 1 K/UL (ref 0.8–3.5)
LYMPHOCYTES # BLD: 1 K/UL (ref 0.8–3.5)
LYMPHOCYTES # BLD: 1.3 K/UL (ref 0.8–3.5)
LYMPHOCYTES NFR BLD: 10 % (ref 12–49)
LYMPHOCYTES NFR BLD: 8 % (ref 12–49)
LYMPHOCYTES NFR BLD: 8 % (ref 12–49)
LYMPHOCYTES NFR BLD: 9 % (ref 12–49)
LYMPHOCYTES NFR BLD: 9 % (ref 12–49)
MAGNESIUM SERPL-MCNC: 2 MG/DL (ref 1.6–2.4)
MCH RBC QN AUTO: 25.5 PG (ref 26–34)
MCH RBC QN AUTO: 25.6 PG (ref 26–34)
MCH RBC QN AUTO: 25.7 PG (ref 26–34)
MCH RBC QN AUTO: 26.2 PG (ref 26–34)
MCH RBC QN AUTO: 26.4 PG (ref 26–34)
MCH RBC QN AUTO: 26.5 PG (ref 26–34)
MCH RBC QN AUTO: 26.6 PG (ref 26–34)
MCH RBC QN AUTO: 26.6 PG (ref 26–34)
MCH RBC QN AUTO: 26.7 PG (ref 26–34)
MCH RBC QN AUTO: 26.8 PG (ref 26–34)
MCH RBC QN AUTO: 26.9 PG (ref 26–34)
MCHC RBC AUTO-ENTMCNC: 27.9 G/DL (ref 30–36.5)
MCHC RBC AUTO-ENTMCNC: 27.9 G/DL (ref 30–36.5)
MCHC RBC AUTO-ENTMCNC: 28 G/DL (ref 30–36.5)
MCHC RBC AUTO-ENTMCNC: 28.2 G/DL (ref 30–36.5)
MCHC RBC AUTO-ENTMCNC: 28.4 G/DL (ref 30–36.5)
MCHC RBC AUTO-ENTMCNC: 28.4 G/DL (ref 30–36.5)
MCHC RBC AUTO-ENTMCNC: 28.6 G/DL (ref 30–36.5)
MCHC RBC AUTO-ENTMCNC: 28.9 G/DL (ref 30–36.5)
MCHC RBC AUTO-ENTMCNC: 28.9 G/DL (ref 30–36.5)
MCHC RBC AUTO-ENTMCNC: 29.1 G/DL (ref 30–36.5)
MCHC RBC AUTO-ENTMCNC: 29.4 G/DL (ref 30–36.5)
MCV RBC AUTO: 89.7 FL (ref 80–99)
MCV RBC AUTO: 90.5 FL (ref 80–99)
MCV RBC AUTO: 91.3 FL (ref 80–99)
MCV RBC AUTO: 91.3 FL (ref 80–99)
MCV RBC AUTO: 91.6 FL (ref 80–99)
MCV RBC AUTO: 91.6 FL (ref 80–99)
MCV RBC AUTO: 92.3 FL (ref 80–99)
MCV RBC AUTO: 92.6 FL (ref 80–99)
MCV RBC AUTO: 93.5 FL (ref 80–99)
MCV RBC AUTO: 95 FL (ref 80–99)
MCV RBC AUTO: 95.3 FL (ref 80–99)
MONOCYTES # BLD: 1.5 K/UL (ref 0–1)
MONOCYTES # BLD: 1.6 K/UL (ref 0–1)
MONOCYTES # BLD: 1.7 K/UL (ref 0–1)
MONOCYTES # BLD: 2.2 K/UL (ref 0–1)
MONOCYTES # BLD: 2.3 K/UL (ref 0–1)
MONOCYTES NFR BLD: 14 % (ref 5–13)
MONOCYTES NFR BLD: 15 % (ref 5–13)
MONOCYTES NFR BLD: 16 % (ref 5–13)
MONOCYTES NFR BLD: 17 % (ref 5–13)
MONOCYTES NFR BLD: 17 % (ref 5–13)
NEUTS SEG # BLD: 10.3 K/UL (ref 1.8–8)
NEUTS SEG # BLD: 6.9 K/UL (ref 1.8–8)
NEUTS SEG # BLD: 7.1 K/UL (ref 1.8–8)
NEUTS SEG # BLD: 9.3 K/UL (ref 1.8–8)
NEUTS SEG # BLD: 9.5 K/UL (ref 1.8–8)
NEUTS SEG NFR BLD: 71 % (ref 32–75)
NEUTS SEG NFR BLD: 73 % (ref 32–75)
NEUTS SEG NFR BLD: 74 % (ref 32–75)
NEUTS SEG NFR BLD: 74 % (ref 32–75)
NEUTS SEG NFR BLD: 76 % (ref 32–75)
NITRITE UR QL STRIP.AUTO: NEGATIVE
NRBC # BLD: 0 K/UL (ref 0–0.01)
NRBC BLD-RTO: 0 PER 100 WBC
O2/TOTAL GAS SETTING VFR VENT: 100 %
P-R INTERVAL, ECG05: 146 MS
P-R INTERVAL, ECG05: 158 MS
P-R INTERVAL, ECG05: 158 MS
PCO2 BLD: 37.1 MMHG (ref 35–45)
PH BLD: 7.35 [PH] (ref 7.35–7.45)
PH UR STRIP: 7.5 [PH] (ref 5–8)
PHOSPHATE SERPL-MCNC: 3.3 MG/DL (ref 2.6–4.7)
PHOSPHATE SERPL-MCNC: 4.7 MG/DL (ref 2.6–4.7)
PLATELET # BLD AUTO: 108 K/UL (ref 150–400)
PLATELET # BLD AUTO: 114 K/UL (ref 150–400)
PLATELET # BLD AUTO: 116 K/UL (ref 150–400)
PLATELET # BLD AUTO: 148 K/UL (ref 150–400)
PLATELET # BLD AUTO: 158 K/UL (ref 150–400)
PLATELET # BLD AUTO: 179 K/UL (ref 150–400)
PLATELET # BLD AUTO: 224 K/UL (ref 150–400)
PLATELET # BLD AUTO: 267 K/UL (ref 150–400)
PLATELET # BLD AUTO: 284 K/UL (ref 150–400)
PLATELET # BLD AUTO: 285 K/UL (ref 150–400)
PLATELET # BLD AUTO: 292 K/UL (ref 150–400)
PMV BLD AUTO: 10 FL (ref 8.9–12.9)
PMV BLD AUTO: 10.1 FL (ref 8.9–12.9)
PMV BLD AUTO: 10.2 FL (ref 8.9–12.9)
PMV BLD AUTO: 10.2 FL (ref 8.9–12.9)
PMV BLD AUTO: 10.3 FL (ref 8.9–12.9)
PMV BLD AUTO: 10.4 FL (ref 8.9–12.9)
PMV BLD AUTO: 11.3 FL (ref 8.9–12.9)
PMV BLD AUTO: 9.3 FL (ref 8.9–12.9)
PMV BLD AUTO: 9.5 FL (ref 8.9–12.9)
PMV BLD AUTO: 9.6 FL (ref 8.9–12.9)
PMV BLD AUTO: 9.8 FL (ref 8.9–12.9)
PO2 BLD: 51 MMHG (ref 80–100)
POTASSIUM SERPL-SCNC: 3.5 MMOL/L (ref 3.5–5.1)
POTASSIUM SERPL-SCNC: 3.5 MMOL/L (ref 3.5–5.1)
POTASSIUM SERPL-SCNC: 3.6 MMOL/L (ref 3.5–5.1)
POTASSIUM SERPL-SCNC: 3.7 MMOL/L (ref 3.5–5.1)
POTASSIUM SERPL-SCNC: 3.9 MMOL/L (ref 3.5–5.1)
POTASSIUM SERPL-SCNC: 4 MMOL/L (ref 3.5–5.1)
POTASSIUM SERPL-SCNC: 4.2 MMOL/L (ref 3.5–5.1)
POTASSIUM SERPL-SCNC: 4.3 MMOL/L (ref 3.5–5.1)
POTASSIUM SERPL-SCNC: 4.5 MMOL/L (ref 3.5–5.1)
POTASSIUM SERPL-SCNC: 4.8 MMOL/L (ref 3.5–5.1)
POTASSIUM SERPL-SCNC: 5 MMOL/L (ref 3.5–5.1)
POTASSIUM SERPL-SCNC: 5.1 MMOL/L (ref 3.5–5.1)
POTASSIUM SERPL-SCNC: 5.3 MMOL/L (ref 3.5–5.1)
PROT SERPL-MCNC: 5.8 G/DL (ref 6.4–8.2)
PROT SERPL-MCNC: 6.6 G/DL (ref 6.4–8.2)
PROT SERPL-MCNC: 6.7 G/DL (ref 6.4–8.2)
PROT SERPL-MCNC: 7 G/DL (ref 6.4–8.2)
PROT UR STRIP-MCNC: 300 MG/DL
PROTHROMBIN TIME: 15 SEC (ref 9–11.1)
Q-T INTERVAL, ECG07: 354 MS
Q-T INTERVAL, ECG07: 418 MS
Q-T INTERVAL, ECG07: 462 MS
QRS DURATION, ECG06: 104 MS
QRS DURATION, ECG06: 108 MS
QRS DURATION, ECG06: 94 MS
QTC CALCULATION (BEZET), ECG08: 472 MS
QTC CALCULATION (BEZET), ECG08: 476 MS
QTC CALCULATION (BEZET), ECG08: 479 MS
RBC # BLD AUTO: 2.75 M/UL (ref 3.8–5.2)
RBC # BLD AUTO: 2.83 M/UL (ref 3.8–5.2)
RBC # BLD AUTO: 2.85 M/UL (ref 3.8–5.2)
RBC # BLD AUTO: 3.33 M/UL (ref 3.8–5.2)
RBC # BLD AUTO: 3.38 M/UL (ref 3.8–5.2)
RBC # BLD AUTO: 3.46 M/UL (ref 3.8–5.2)
RBC # BLD AUTO: 3.57 M/UL (ref 3.8–5.2)
RBC # BLD AUTO: 3.71 M/UL (ref 3.8–5.2)
RBC # BLD AUTO: 4.03 M/UL (ref 3.8–5.2)
RBC # BLD AUTO: 4.29 M/UL (ref 3.8–5.2)
RBC # BLD AUTO: 5.26 M/UL (ref 3.8–5.2)
RBC #/AREA URNS HPF: >100 /HPF (ref 0–5)
RBC MORPH BLD: ABNORMAL
SALM + SHIG STL CULT: NORMAL
SAMPLES BEING HELD,HOLD: NORMAL
SAMPLES BEING HELD,HOLD: NORMAL
SAO2 % BLD: 84 % (ref 92–97)
SERVICE CMNT-IMP: ABNORMAL
SERVICE CMNT-IMP: NORMAL
SODIUM SERPL-SCNC: 134 MMOL/L (ref 136–145)
SODIUM SERPL-SCNC: 134 MMOL/L (ref 136–145)
SODIUM SERPL-SCNC: 135 MMOL/L (ref 136–145)
SODIUM SERPL-SCNC: 136 MMOL/L (ref 136–145)
SODIUM SERPL-SCNC: 138 MMOL/L (ref 136–145)
SODIUM SERPL-SCNC: 139 MMOL/L (ref 136–145)
SODIUM SERPL-SCNC: 140 MMOL/L (ref 136–145)
SODIUM SERPL-SCNC: 140 MMOL/L (ref 136–145)
SODIUM SERPL-SCNC: 141 MMOL/L (ref 136–145)
SP GR UR REFRACTOMETRY: 1.01 (ref 1–1.03)
SPECIMEN EXP DATE BLD: NORMAL
SPECIMEN TYPE: ABNORMAL
STATUS OF UNIT,%ST: NORMAL
STATUS OF UNIT,%ST: NORMAL
T4 FREE SERPL-MCNC: 1.1 NG/DL (ref 0.8–1.5)
TIBC SERPL-MCNC: 156 UG/DL (ref 250–450)
TOTAL RESP. RATE, ITRR: 25
TROPONIN I SERPL-MCNC: 0.08 NG/ML
TROPONIN I SERPL-MCNC: 0.31 NG/ML
TROPONIN I SERPL-MCNC: 0.43 NG/ML
TROPONIN I SERPL-MCNC: <0.05 NG/ML
TSH SERPL DL<=0.05 MIU/L-ACNC: 4.96 UIU/ML (ref 0.36–3.74)
UNIT DIVISION, %UDIV: 0
UNIT DIVISION, %UDIV: 0
UROBILINOGEN UR QL STRIP.AUTO: 0.2 EU/DL (ref 0.2–1)
VENTRICULAR RATE, ECG03: 107 BPM
VENTRICULAR RATE, ECG03: 64 BPM
VENTRICULAR RATE, ECG03: 79 BPM
VIT B12 SERPL-MCNC: 363 PG/ML (ref 193–986)
VIT B12 SERPL-MCNC: 411 PG/ML (ref 193–986)
WBC # BLD AUTO: 10.9 K/UL (ref 3.6–11)
WBC # BLD AUTO: 12 K/UL (ref 3.6–11)
WBC # BLD AUTO: 12.3 K/UL (ref 3.6–11)
WBC # BLD AUTO: 12.8 K/UL (ref 3.6–11)
WBC # BLD AUTO: 13.4 K/UL (ref 3.6–11)
WBC # BLD AUTO: 14.1 K/UL (ref 3.6–11)
WBC # BLD AUTO: 14.1 K/UL (ref 3.6–11)
WBC # BLD AUTO: 5.8 K/UL (ref 3.6–11)
WBC # BLD AUTO: 6.8 K/UL (ref 3.6–11)
WBC # BLD AUTO: 9.7 K/UL (ref 3.6–11)
WBC # BLD AUTO: 9.7 K/UL (ref 3.6–11)
WBC URNS QL MICRO: ABNORMAL /HPF (ref 0–4)

## 2019-01-01 PROCEDURE — 3331090001 HH PPS REVENUE CREDIT

## 2019-01-01 PROCEDURE — 36415 COLL VENOUS BLD VENIPUNCTURE: CPT

## 2019-01-01 PROCEDURE — G0152 HHCP-SERV OF OT,EA 15 MIN: HCPCS

## 2019-01-01 PROCEDURE — 94640 AIRWAY INHALATION TREATMENT: CPT

## 2019-01-01 PROCEDURE — 74011250636 HC RX REV CODE- 250/636: Performed by: INTERNAL MEDICINE

## 2019-01-01 PROCEDURE — 3331090002 HH PPS REVENUE DEBIT

## 2019-01-01 PROCEDURE — G0300 HHS/HOSPICE OF LPN EA 15 MIN: HCPCS

## 2019-01-01 PROCEDURE — 84443 ASSAY THYROID STIM HORMONE: CPT

## 2019-01-01 PROCEDURE — 85025 COMPLETE CBC W/AUTO DIFF WBC: CPT

## 2019-01-01 PROCEDURE — 84439 ASSAY OF FREE THYROXINE: CPT

## 2019-01-01 PROCEDURE — 81001 URINALYSIS AUTO W/SCOPE: CPT

## 2019-01-01 PROCEDURE — 97116 GAIT TRAINING THERAPY: CPT

## 2019-01-01 PROCEDURE — 88305 TISSUE EXAM BY PATHOLOGIST: CPT

## 2019-01-01 PROCEDURE — 5A1D70Z PERFORMANCE OF URINARY FILTRATION, INTERMITTENT, LESS THAN 6 HOURS PER DAY: ICD-10-PCS | Performed by: INTERNAL MEDICINE

## 2019-01-01 PROCEDURE — 83605 ASSAY OF LACTIC ACID: CPT

## 2019-01-01 PROCEDURE — 74011250636 HC RX REV CODE- 250/636: Performed by: EMERGENCY MEDICINE

## 2019-01-01 PROCEDURE — 80053 COMPREHEN METABOLIC PANEL: CPT

## 2019-01-01 PROCEDURE — 80048 BASIC METABOLIC PNL TOTAL CA: CPT

## 2019-01-01 PROCEDURE — C9113 INJ PANTOPRAZOLE SODIUM, VIA: HCPCS | Performed by: FAMILY MEDICINE

## 2019-01-01 PROCEDURE — 93005 ELECTROCARDIOGRAM TRACING: CPT

## 2019-01-01 PROCEDURE — 74011000250 HC RX REV CODE- 250: Performed by: INTERNAL MEDICINE

## 2019-01-01 PROCEDURE — 85027 COMPLETE CBC AUTOMATED: CPT

## 2019-01-01 PROCEDURE — 84484 ASSAY OF TROPONIN QUANT: CPT

## 2019-01-01 PROCEDURE — 70450 CT HEAD/BRAIN W/O DYE: CPT

## 2019-01-01 PROCEDURE — 74011250636 HC RX REV CODE- 250/636: Performed by: FAMILY MEDICINE

## 2019-01-01 PROCEDURE — G0299 HHS/HOSPICE OF RN EA 15 MIN: HCPCS

## 2019-01-01 PROCEDURE — 74011000250 HC RX REV CODE- 250: Performed by: FAMILY MEDICINE

## 2019-01-01 PROCEDURE — 74011250637 HC RX REV CODE- 250/637: Performed by: INTERNAL MEDICINE

## 2019-01-01 PROCEDURE — G0495 RN CARE TRAIN/EDU IN HH: HCPCS

## 2019-01-01 PROCEDURE — 74011250637 HC RX REV CODE- 250/637: Performed by: EMERGENCY MEDICINE

## 2019-01-01 PROCEDURE — 74011250637 HC RX REV CODE- 250/637: Performed by: FAMILY MEDICINE

## 2019-01-01 PROCEDURE — 97161 PT EVAL LOW COMPLEX 20 MIN: CPT

## 2019-01-01 PROCEDURE — 97530 THERAPEUTIC ACTIVITIES: CPT

## 2019-01-01 PROCEDURE — 0DBL8ZX EXCISION OF TRANSVERSE COLON, VIA NATURAL OR ARTIFICIAL OPENING ENDOSCOPIC, DIAGNOSTIC: ICD-10-PCS | Performed by: INTERNAL MEDICINE

## 2019-01-01 PROCEDURE — 83735 ASSAY OF MAGNESIUM: CPT

## 2019-01-01 PROCEDURE — 77010033711 HC HIGH FLOW OXYGEN

## 2019-01-01 PROCEDURE — 82803 BLOOD GASES ANY COMBINATION: CPT

## 2019-01-01 PROCEDURE — 85610 PROTHROMBIN TIME: CPT

## 2019-01-01 PROCEDURE — 90935 HEMODIALYSIS ONE EVALUATION: CPT

## 2019-01-01 PROCEDURE — 99285 EMERGENCY DEPT VISIT HI MDM: CPT

## 2019-01-01 PROCEDURE — G0151 HHCP-SERV OF PT,EA 15 MIN: HCPCS

## 2019-01-01 PROCEDURE — 82272 OCCULT BLD FECES 1-3 TESTS: CPT

## 2019-01-01 PROCEDURE — 96374 THER/PROPH/DIAG INJ IV PUSH: CPT

## 2019-01-01 PROCEDURE — 74176 CT ABD & PELVIS W/O CONTRAST: CPT

## 2019-01-01 PROCEDURE — 80069 RENAL FUNCTION PANEL: CPT

## 2019-01-01 PROCEDURE — 400013 HH SOC

## 2019-01-01 PROCEDURE — 74011250637 HC RX REV CODE- 250/637: Performed by: HOSPITALIST

## 2019-01-01 PROCEDURE — 74011250636 HC RX REV CODE- 250/636: Performed by: NURSE PRACTITIONER

## 2019-01-01 PROCEDURE — 77010033678 HC OXYGEN DAILY

## 2019-01-01 PROCEDURE — 82962 GLUCOSE BLOOD TEST: CPT

## 2019-01-01 PROCEDURE — 65660000000 HC RM CCU STEPDOWN

## 2019-01-01 PROCEDURE — 87040 BLOOD CULTURE FOR BACTERIA: CPT

## 2019-01-01 PROCEDURE — 97162 PT EVAL MOD COMPLEX 30 MIN: CPT

## 2019-01-01 PROCEDURE — P9047 ALBUMIN (HUMAN), 25%, 50ML: HCPCS | Performed by: INTERNAL MEDICINE

## 2019-01-01 PROCEDURE — 77030033269 HC SLV COMPR SCD KNE2 CARD -B

## 2019-01-01 PROCEDURE — 71045 X-RAY EXAM CHEST 1 VIEW: CPT

## 2019-01-01 PROCEDURE — 0DBK8ZX EXCISION OF ASCENDING COLON, VIA NATURAL OR ARTIFICIAL OPENING ENDOSCOPIC, DIAGNOSTIC: ICD-10-PCS | Performed by: INTERNAL MEDICINE

## 2019-01-01 PROCEDURE — 84100 ASSAY OF PHOSPHORUS: CPT

## 2019-01-01 PROCEDURE — 74011250637 HC RX REV CODE- 250/637: Performed by: PSYCHIATRY & NEUROLOGY

## 2019-01-01 PROCEDURE — 74011636320 HC RX REV CODE- 636/320: Performed by: EMERGENCY MEDICINE

## 2019-01-01 PROCEDURE — 87493 C DIFF AMPLIFIED PROBE: CPT

## 2019-01-01 PROCEDURE — 77030013992 HC SNR POLYP ENDOSC BSC -B: Performed by: INTERNAL MEDICINE

## 2019-01-01 PROCEDURE — 87427 SHIGA-LIKE TOXIN AG IA: CPT

## 2019-01-01 PROCEDURE — 85018 HEMOGLOBIN: CPT

## 2019-01-01 PROCEDURE — 82728 ASSAY OF FERRITIN: CPT

## 2019-01-01 PROCEDURE — 76040000007: Performed by: INTERNAL MEDICINE

## 2019-01-01 PROCEDURE — 86922 COMPATIBILITY TEST ANTIGLOB: CPT

## 2019-01-01 PROCEDURE — 74011250637 HC RX REV CODE- 250/637: Performed by: NURSE PRACTITIONER

## 2019-01-01 PROCEDURE — 86920 COMPATIBILITY TEST SPIN: CPT

## 2019-01-01 PROCEDURE — 77030038269 HC DRN EXT URIN PURWCK BARD -A

## 2019-01-01 PROCEDURE — 94660 CPAP INITIATION&MGMT: CPT

## 2019-01-01 PROCEDURE — 3331090003 HH PPS REVENUE ADJ

## 2019-01-01 PROCEDURE — 36600 WITHDRAWAL OF ARTERIAL BLOOD: CPT

## 2019-01-01 PROCEDURE — 83540 ASSAY OF IRON: CPT

## 2019-01-01 PROCEDURE — 74011250636 HC RX REV CODE- 250/636: Performed by: HOSPITALIST

## 2019-01-01 PROCEDURE — 86880 COOMBS TEST DIRECT: CPT

## 2019-01-01 PROCEDURE — 86921 COMPATIBILITY TEST INCUBATE: CPT

## 2019-01-01 PROCEDURE — 76060000032 HC ANESTHESIA 0.5 TO 1 HR: Performed by: INTERNAL MEDICINE

## 2019-01-01 PROCEDURE — 82607 VITAMIN B-12: CPT

## 2019-01-01 PROCEDURE — 97535 SELF CARE MNGMENT TRAINING: CPT

## 2019-01-01 PROCEDURE — 71250 CT THORAX DX C-: CPT

## 2019-01-01 PROCEDURE — 99218 HC RM OBSERVATION: CPT

## 2019-01-01 PROCEDURE — 93970 EXTREMITY STUDY: CPT

## 2019-01-01 PROCEDURE — 93306 TTE W/DOPPLER COMPLETE: CPT

## 2019-01-01 PROCEDURE — 97110 THERAPEUTIC EXERCISES: CPT

## 2019-01-01 PROCEDURE — 87804 INFLUENZA ASSAY W/OPTIC: CPT

## 2019-01-01 PROCEDURE — 86900 BLOOD TYPING SEROLOGIC ABO: CPT

## 2019-01-01 PROCEDURE — 71275 CT ANGIOGRAPHY CHEST: CPT

## 2019-01-01 PROCEDURE — P9016 RBC LEUKOCYTES REDUCED: HCPCS

## 2019-01-01 PROCEDURE — 0DBP8ZX EXCISION OF RECTUM, VIA NATURAL OR ARTIFICIAL OPENING ENDOSCOPIC, DIAGNOSTIC: ICD-10-PCS | Performed by: INTERNAL MEDICINE

## 2019-01-01 PROCEDURE — 87045 FECES CULTURE AEROBIC BACT: CPT

## 2019-01-01 PROCEDURE — 97165 OT EVAL LOW COMPLEX 30 MIN: CPT

## 2019-01-01 PROCEDURE — 82746 ASSAY OF FOLIC ACID SERUM: CPT

## 2019-01-01 PROCEDURE — 83880 ASSAY OF NATRIURETIC PEPTIDE: CPT

## 2019-01-01 PROCEDURE — 87340 HEPATITIS B SURFACE AG IA: CPT

## 2019-01-01 PROCEDURE — 0DBN8ZX EXCISION OF SIGMOID COLON, VIA NATURAL OR ARTIFICIAL OPENING ENDOSCOPIC, DIAGNOSTIC: ICD-10-PCS | Performed by: INTERNAL MEDICINE

## 2019-01-01 PROCEDURE — 77067 SCR MAMMO BI INCL CAD: CPT

## 2019-01-01 PROCEDURE — 74011250636 HC RX REV CODE- 250/636

## 2019-01-01 PROCEDURE — 65210000002 HC RM PRIVATE GYN

## 2019-01-01 PROCEDURE — 30233N1 TRANSFUSION OF NONAUTOLOGOUS RED BLOOD CELLS INTO PERIPHERAL VEIN, PERCUTANEOUS APPROACH: ICD-10-PCS | Performed by: INTERNAL MEDICINE

## 2019-01-01 PROCEDURE — 96360 HYDRATION IV INFUSION INIT: CPT

## 2019-01-01 RX ORDER — HYDRALAZINE HYDROCHLORIDE 50 MG/1
100 TABLET, FILM COATED ORAL 3 TIMES DAILY
Status: DISCONTINUED | OUTPATIENT
Start: 2019-01-01 | End: 2019-01-01 | Stop reason: HOSPADM

## 2019-01-01 RX ORDER — SODIUM CHLORIDE 0.9 % (FLUSH) 0.9 %
5-40 SYRINGE (ML) INJECTION AS NEEDED
Status: DISCONTINUED | OUTPATIENT
Start: 2019-01-01 | End: 2019-01-01 | Stop reason: HOSPADM

## 2019-01-01 RX ORDER — ACETAMINOPHEN 325 MG/1
650 TABLET ORAL
Status: COMPLETED | OUTPATIENT
Start: 2019-01-01 | End: 2019-01-01

## 2019-01-01 RX ORDER — LEVOFLOXACIN 5 MG/ML
750 INJECTION, SOLUTION INTRAVENOUS EVERY 24 HOURS
Status: DISCONTINUED | OUTPATIENT
Start: 2019-01-01 | End: 2019-01-01 | Stop reason: SDUPTHER

## 2019-01-01 RX ORDER — LEVOFLOXACIN 5 MG/ML
500 INJECTION, SOLUTION INTRAVENOUS
Status: DISCONTINUED | OUTPATIENT
Start: 2019-01-01 | End: 2019-01-01

## 2019-01-01 RX ORDER — LOSARTAN POTASSIUM 100 MG/1
100 TABLET ORAL DAILY
Status: DISCONTINUED | OUTPATIENT
Start: 2019-01-01 | End: 2019-01-01 | Stop reason: HOSPADM

## 2019-01-01 RX ORDER — NALOXONE HYDROCHLORIDE 0.4 MG/ML
0.4 INJECTION, SOLUTION INTRAMUSCULAR; INTRAVENOUS; SUBCUTANEOUS
Status: CANCELLED | OUTPATIENT
Start: 2019-01-01 | End: 2019-01-01

## 2019-01-01 RX ORDER — FAMOTIDINE 20 MG/1
TABLET, FILM COATED ORAL
Qty: 30 TAB | Refills: 11 | Status: SHIPPED | OUTPATIENT
Start: 2019-01-01

## 2019-01-01 RX ORDER — AMLODIPINE BESYLATE 5 MG/1
10 TABLET ORAL
Status: COMPLETED | OUTPATIENT
Start: 2019-01-01 | End: 2019-01-01

## 2019-01-01 RX ORDER — OXYBUTYNIN CHLORIDE 5 MG/1
5 TABLET ORAL DAILY
Status: DISCONTINUED | OUTPATIENT
Start: 2019-01-01 | End: 2019-01-01 | Stop reason: HOSPADM

## 2019-01-01 RX ORDER — NALOXONE HYDROCHLORIDE 0.4 MG/ML
0.4 INJECTION, SOLUTION INTRAMUSCULAR; INTRAVENOUS; SUBCUTANEOUS AS NEEDED
Status: DISCONTINUED | OUTPATIENT
Start: 2019-01-01 | End: 2019-01-01 | Stop reason: HOSPADM

## 2019-01-01 RX ORDER — AMLODIPINE BESYLATE 5 MG/1
10 TABLET ORAL
Status: DISCONTINUED | OUTPATIENT
Start: 2019-01-01 | End: 2019-01-01 | Stop reason: HOSPADM

## 2019-01-01 RX ORDER — SODIUM CHLORIDE 0.9 % (FLUSH) 0.9 %
10 SYRINGE (ML) INJECTION
Status: COMPLETED | OUTPATIENT
Start: 2019-01-01 | End: 2019-01-01

## 2019-01-01 RX ORDER — LINEZOLID 2 MG/ML
600 INJECTION, SOLUTION INTRAVENOUS EVERY 12 HOURS
Status: DISCONTINUED | OUTPATIENT
Start: 2019-01-01 | End: 2019-01-01

## 2019-01-01 RX ORDER — SODIUM CHLORIDE 0.9 % (FLUSH) 0.9 %
5-40 SYRINGE (ML) INJECTION EVERY 8 HOURS
Status: DISCONTINUED | OUTPATIENT
Start: 2019-01-01 | End: 2019-01-01 | Stop reason: HOSPADM

## 2019-01-01 RX ORDER — ATORVASTATIN CALCIUM 20 MG/1
20 TABLET, FILM COATED ORAL
Status: DISCONTINUED | OUTPATIENT
Start: 2019-01-01 | End: 2019-01-01 | Stop reason: HOSPADM

## 2019-01-01 RX ORDER — AMLODIPINE BESYLATE 5 MG/1
10 TABLET ORAL DAILY
Status: DISCONTINUED | OUTPATIENT
Start: 2019-01-01 | End: 2019-01-01 | Stop reason: HOSPADM

## 2019-01-01 RX ORDER — METOPROLOL TARTRATE 25 MG/1
25 TABLET, FILM COATED ORAL 2 TIMES DAILY
Status: DISCONTINUED | OUTPATIENT
Start: 2019-01-01 | End: 2019-01-01

## 2019-01-01 RX ORDER — GALANTAMINE 4 MG/1
4 TABLET, FILM COATED ORAL 2 TIMES DAILY WITH MEALS
Qty: 60 TAB | Refills: 0 | Status: SHIPPED | OUTPATIENT
Start: 2019-01-01 | End: 2019-01-01 | Stop reason: SDUPTHER

## 2019-01-01 RX ORDER — AMLODIPINE BESYLATE 5 MG/1
5 TABLET ORAL DAILY
Status: DISCONTINUED | OUTPATIENT
Start: 2019-01-01 | End: 2019-01-01

## 2019-01-01 RX ORDER — SODIUM CHLORIDE 9 MG/ML
50 INJECTION, SOLUTION INTRAVENOUS CONTINUOUS
Status: DISCONTINUED | OUTPATIENT
Start: 2019-01-01 | End: 2019-01-01

## 2019-01-01 RX ORDER — IPRATROPIUM BROMIDE AND ALBUTEROL SULFATE 2.5; .5 MG/3ML; MG/3ML
3 SOLUTION RESPIRATORY (INHALATION)
Status: DISCONTINUED | OUTPATIENT
Start: 2019-01-01 | End: 2019-01-01

## 2019-01-01 RX ORDER — MEMANTINE HYDROCHLORIDE 10 MG/1
10 TABLET ORAL
Status: DISCONTINUED | OUTPATIENT
Start: 2019-01-01 | End: 2019-01-01 | Stop reason: HOSPADM

## 2019-01-01 RX ORDER — AMLODIPINE BESYLATE 10 MG/1
10 TABLET ORAL DAILY
COMMUNITY
End: 2019-01-01

## 2019-01-01 RX ORDER — HYDRALAZINE HYDROCHLORIDE 20 MG/ML
10 INJECTION INTRAMUSCULAR; INTRAVENOUS
Status: DISCONTINUED | OUTPATIENT
Start: 2019-01-01 | End: 2019-01-01

## 2019-01-01 RX ORDER — SODIUM CHLORIDE 9 MG/ML
250 INJECTION, SOLUTION INTRAVENOUS AS NEEDED
Status: DISCONTINUED | OUTPATIENT
Start: 2019-01-01 | End: 2019-01-01 | Stop reason: SDUPTHER

## 2019-01-01 RX ORDER — POLYETHYLENE GLYCOL 3350 17 G/17G
17 POWDER, FOR SOLUTION ORAL DAILY
COMMUNITY

## 2019-01-01 RX ORDER — LEVOFLOXACIN 500 MG/1
500 TABLET, FILM COATED ORAL
Status: DISCONTINUED | OUTPATIENT
Start: 2019-01-01 | End: 2019-01-01

## 2019-01-01 RX ORDER — IPRATROPIUM BROMIDE AND ALBUTEROL SULFATE 2.5; .5 MG/3ML; MG/3ML
3 SOLUTION RESPIRATORY (INHALATION)
Status: DISCONTINUED | OUTPATIENT
Start: 2019-01-01 | End: 2019-01-01 | Stop reason: SDUPTHER

## 2019-01-01 RX ORDER — METOPROLOL TARTRATE 25 MG/1
12.5 TABLET, FILM COATED ORAL 2 TIMES DAILY
Status: DISCONTINUED | OUTPATIENT
Start: 2019-01-01 | End: 2019-01-01 | Stop reason: HOSPADM

## 2019-01-01 RX ORDER — NALOXONE HYDROCHLORIDE 0.4 MG/ML
0.4 INJECTION, SOLUTION INTRAMUSCULAR; INTRAVENOUS; SUBCUTANEOUS
Status: DISCONTINUED | OUTPATIENT
Start: 2019-01-01 | End: 2019-01-01 | Stop reason: HOSPADM

## 2019-01-01 RX ORDER — MEMANTINE HYDROCHLORIDE 10 MG/1
TABLET ORAL
Qty: 60 TAB | Refills: 11 | Status: SHIPPED | OUTPATIENT
Start: 2019-01-01 | End: 2019-01-01 | Stop reason: DRUGHIGH

## 2019-01-01 RX ORDER — PRAMIPEXOLE DIHYDROCHLORIDE 0.25 MG/1
0.25 TABLET ORAL 3 TIMES WEEKLY
Status: DISCONTINUED | OUTPATIENT
Start: 2019-01-01 | End: 2019-01-01 | Stop reason: HOSPADM

## 2019-01-01 RX ORDER — HYDRALAZINE HYDROCHLORIDE 20 MG/ML
20 INJECTION INTRAMUSCULAR; INTRAVENOUS
Status: DISCONTINUED | OUTPATIENT
Start: 2019-01-01 | End: 2019-01-01 | Stop reason: HOSPADM

## 2019-01-01 RX ORDER — MORPHINE SULFATE 2 MG/ML
2 INJECTION, SOLUTION INTRAMUSCULAR; INTRAVENOUS
Status: DISCONTINUED | OUTPATIENT
Start: 2019-01-01 | End: 2019-01-01 | Stop reason: HOSPADM

## 2019-01-01 RX ORDER — DIPHENOXYLATE HYDROCHLORIDE AND ATROPINE SULFATE 2.5; .025 MG/1; MG/1
TABLET ORAL
Qty: 60 TAB | Refills: 5 | Status: SHIPPED | OUTPATIENT
Start: 2019-01-01

## 2019-01-01 RX ORDER — BUPROPION HYDROCHLORIDE 100 MG/1
100 TABLET ORAL
COMMUNITY

## 2019-01-01 RX ORDER — GUAIFENESIN 600 MG/1
600 TABLET, EXTENDED RELEASE ORAL EVERY 12 HOURS
Status: DISCONTINUED | OUTPATIENT
Start: 2019-01-01 | End: 2019-01-01 | Stop reason: HOSPADM

## 2019-01-01 RX ORDER — CARBOXYMETHYLCELLULOSE SODIUM 10 MG/ML
1 GEL OPHTHALMIC
COMMUNITY

## 2019-01-01 RX ORDER — ONDANSETRON 4 MG/1
4 TABLET, ORALLY DISINTEGRATING ORAL
Status: DISCONTINUED | OUTPATIENT
Start: 2019-01-01 | End: 2019-01-01 | Stop reason: HOSPADM

## 2019-01-01 RX ORDER — LEVOFLOXACIN 500 MG/1
500 TABLET, FILM COATED ORAL
Qty: 2 TAB | Refills: 0 | Status: SHIPPED | OUTPATIENT
Start: 2019-01-01 | End: 2019-01-01

## 2019-01-01 RX ORDER — MIDAZOLAM HYDROCHLORIDE 1 MG/ML
.25-5 INJECTION, SOLUTION INTRAMUSCULAR; INTRAVENOUS
Status: DISCONTINUED | OUTPATIENT
Start: 2019-01-01 | End: 2019-01-01 | Stop reason: HOSPADM

## 2019-01-01 RX ORDER — HYDRALAZINE HYDROCHLORIDE 20 MG/ML
10 INJECTION INTRAMUSCULAR; INTRAVENOUS
Status: COMPLETED | OUTPATIENT
Start: 2019-01-01 | End: 2019-01-01

## 2019-01-01 RX ORDER — DIPHENOXYLATE HYDROCHLORIDE AND ATROPINE SULFATE 2.5; .025 MG/1; MG/1
1 TABLET ORAL
Qty: 60 TAB | Refills: 0 | Status: SHIPPED | OUTPATIENT
Start: 2019-01-01 | End: 2019-01-01 | Stop reason: SDUPTHER

## 2019-01-01 RX ORDER — ESCITALOPRAM OXALATE 10 MG/1
10 TABLET ORAL EVERY EVENING
Status: DISCONTINUED | OUTPATIENT
Start: 2019-01-01 | End: 2019-01-01 | Stop reason: HOSPADM

## 2019-01-01 RX ORDER — LEVOFLOXACIN 5 MG/ML
500 INJECTION, SOLUTION INTRAVENOUS
Status: COMPLETED | OUTPATIENT
Start: 2019-01-01 | End: 2019-01-01

## 2019-01-01 RX ORDER — DONEPEZIL HYDROCHLORIDE 5 MG/1
5 TABLET, FILM COATED ORAL
Status: DISCONTINUED | OUTPATIENT
Start: 2019-01-01 | End: 2019-01-01

## 2019-01-01 RX ORDER — DIPHENOXYLATE HYDROCHLORIDE AND ATROPINE SULFATE 2.5; .025 MG/1; MG/1
TABLET ORAL
COMMUNITY
End: 2019-01-01

## 2019-01-01 RX ORDER — LOSARTAN POTASSIUM 100 MG/1
TABLET ORAL
Qty: 30 TAB | Refills: 6 | Status: SHIPPED | OUTPATIENT
Start: 2019-01-01

## 2019-01-01 RX ORDER — LEVOFLOXACIN 500 MG/1
TABLET, FILM COATED ORAL DAILY
COMMUNITY
End: 2019-01-01

## 2019-01-01 RX ORDER — GALANTAMINE 4 MG/1
4 TABLET, FILM COATED ORAL 2 TIMES DAILY WITH MEALS
Status: DISCONTINUED | OUTPATIENT
Start: 2019-01-01 | End: 2019-01-01

## 2019-01-01 RX ORDER — LEVOFLOXACIN 5 MG/ML
500 INJECTION, SOLUTION INTRAVENOUS
Status: DISCONTINUED | OUTPATIENT
Start: 2019-01-01 | End: 2019-01-01 | Stop reason: HOSPADM

## 2019-01-01 RX ORDER — DEXTROMETHORPHAN/PSEUDOEPHED 2.5-7.5/.8
1.2 DROPS ORAL
Status: DISCONTINUED | OUTPATIENT
Start: 2019-01-01 | End: 2019-01-01 | Stop reason: HOSPADM

## 2019-01-01 RX ORDER — SEVELAMER HYDROCHLORIDE 800 MG/1
800 TABLET, FILM COATED ORAL AS NEEDED
COMMUNITY
End: 2019-01-01

## 2019-01-01 RX ORDER — LIDOCAINE HYDROCHLORIDE 20 MG/ML
INJECTION, SOLUTION EPIDURAL; INFILTRATION; INTRACAUDAL; PERINEURAL AS NEEDED
Status: DISCONTINUED | OUTPATIENT
Start: 2019-01-01 | End: 2019-01-01 | Stop reason: HOSPADM

## 2019-01-01 RX ORDER — GUAIFENESIN 100 MG/5ML
81 LIQUID (ML) ORAL DAILY
Status: DISCONTINUED | OUTPATIENT
Start: 2019-01-01 | End: 2019-01-01 | Stop reason: HOSPADM

## 2019-01-01 RX ORDER — SEVELAMER CARBONATE 800 MG/1
4000 TABLET, FILM COATED ORAL
Status: DISCONTINUED | OUTPATIENT
Start: 2019-01-01 | End: 2019-01-01 | Stop reason: HOSPADM

## 2019-01-01 RX ORDER — PRAMIPEXOLE DIHYDROCHLORIDE 0.25 MG/1
0.25 TABLET ORAL
Status: DISCONTINUED | OUTPATIENT
Start: 2019-01-01 | End: 2019-01-01 | Stop reason: HOSPADM

## 2019-01-01 RX ORDER — ONDANSETRON 4 MG/1
TABLET, ORALLY DISINTEGRATING ORAL
Qty: 30 TAB | Refills: 3 | Status: SHIPPED | OUTPATIENT
Start: 2019-01-01 | End: 2019-01-01 | Stop reason: SDUPTHER

## 2019-01-01 RX ORDER — HYDRALAZINE HYDROCHLORIDE 25 MG/1
75 TABLET, FILM COATED ORAL 3 TIMES DAILY
Qty: 90 TAB | Refills: 0 | Status: SHIPPED | OUTPATIENT
Start: 2019-01-01 | End: 2019-01-01 | Stop reason: SDUPTHER

## 2019-01-01 RX ORDER — SODIUM CHLORIDE 9 MG/ML
75 INJECTION, SOLUTION INTRAVENOUS CONTINUOUS
Status: DISPENSED | OUTPATIENT
Start: 2019-01-01 | End: 2019-01-01

## 2019-01-01 RX ORDER — IPRATROPIUM BROMIDE AND ALBUTEROL SULFATE 2.5; .5 MG/3ML; MG/3ML
3 SOLUTION RESPIRATORY (INHALATION)
Status: DISCONTINUED | OUTPATIENT
Start: 2019-01-01 | End: 2019-01-01 | Stop reason: HOSPADM

## 2019-01-01 RX ORDER — BUPROPION HYDROCHLORIDE 100 MG/1
100 TABLET ORAL
Status: DISCONTINUED | OUTPATIENT
Start: 2019-01-01 | End: 2019-01-01 | Stop reason: HOSPADM

## 2019-01-01 RX ORDER — GALANTAMINE 4 MG/1
TABLET, FILM COATED ORAL
Qty: 60 TAB | Refills: 0 | Status: SHIPPED | OUTPATIENT
Start: 2019-01-01 | End: 2019-01-01 | Stop reason: SDUPTHER

## 2019-01-01 RX ORDER — LOSARTAN POTASSIUM 50 MG/1
100 TABLET ORAL
Status: DISCONTINUED | OUTPATIENT
Start: 2019-01-01 | End: 2019-01-01 | Stop reason: HOSPADM

## 2019-01-01 RX ORDER — SODIUM CHLORIDE 9 MG/ML
250 INJECTION, SOLUTION INTRAVENOUS AS NEEDED
Status: DISCONTINUED | OUTPATIENT
Start: 2019-01-01 | End: 2019-01-01 | Stop reason: HOSPADM

## 2019-01-01 RX ORDER — FAMOTIDINE 20 MG/1
20 TABLET, FILM COATED ORAL DAILY
Status: DISCONTINUED | OUTPATIENT
Start: 2019-01-01 | End: 2019-01-01 | Stop reason: HOSPADM

## 2019-01-01 RX ORDER — OXYBUTYNIN CHLORIDE 5 MG/1
10 TABLET, EXTENDED RELEASE ORAL DAILY
Status: DISCONTINUED | OUTPATIENT
Start: 2019-01-01 | End: 2019-01-01 | Stop reason: HOSPADM

## 2019-01-01 RX ORDER — SODIUM CHLORIDE 0.9 % (FLUSH) 0.9 %
5-40 SYRINGE (ML) INJECTION EVERY 8 HOURS
Status: CANCELLED | OUTPATIENT
Start: 2019-01-01

## 2019-01-01 RX ORDER — APIXABAN 2.5 MG/1
TABLET, FILM COATED ORAL
Qty: 60 TAB | Refills: 3 | Status: SHIPPED | OUTPATIENT
Start: 2019-01-01 | End: 2019-01-01

## 2019-01-01 RX ORDER — ACETAMINOPHEN 325 MG/1
650 TABLET ORAL
Status: DISCONTINUED | OUTPATIENT
Start: 2019-01-01 | End: 2019-01-01 | Stop reason: HOSPADM

## 2019-01-01 RX ORDER — ALBUMIN HUMAN 250 G/1000ML
12.5 SOLUTION INTRAVENOUS ONCE
Status: DISCONTINUED | OUTPATIENT
Start: 2019-01-01 | End: 2019-01-01 | Stop reason: HOSPADM

## 2019-01-01 RX ORDER — OXYBUTYNIN CHLORIDE 5 MG/1
TABLET ORAL
Qty: 60 TAB | Refills: 6 | Status: SHIPPED | OUTPATIENT
Start: 2019-01-01 | End: 2019-01-01 | Stop reason: SDUPTHER

## 2019-01-01 RX ORDER — ONDANSETRON 4 MG/1
TABLET, ORALLY DISINTEGRATING ORAL
Qty: 30 TAB | Refills: 3 | Status: SHIPPED | OUTPATIENT
Start: 2019-01-01

## 2019-01-01 RX ORDER — ATROPINE SULFATE 0.1 MG/ML
0.5 INJECTION INTRAVENOUS
Status: DISCONTINUED | OUTPATIENT
Start: 2019-01-01 | End: 2019-01-01 | Stop reason: HOSPADM

## 2019-01-01 RX ORDER — METOPROLOL TARTRATE 25 MG/1
12.5 TABLET, FILM COATED ORAL 2 TIMES DAILY
Qty: 60 TAB | Refills: 0 | Status: SHIPPED | OUTPATIENT
Start: 2019-01-01 | End: 2019-01-01 | Stop reason: SDUPTHER

## 2019-01-01 RX ORDER — GALANTAMINE 4 MG/1
4 TABLET, FILM COATED ORAL 2 TIMES DAILY WITH MEALS
Status: DISCONTINUED | OUTPATIENT
Start: 2019-01-01 | End: 2019-01-01 | Stop reason: HOSPADM

## 2019-01-01 RX ORDER — FUROSEMIDE 10 MG/ML
80 INJECTION INTRAMUSCULAR; INTRAVENOUS
Status: COMPLETED | OUTPATIENT
Start: 2019-01-01 | End: 2019-01-01

## 2019-01-01 RX ORDER — EPINEPHRINE 0.1 MG/ML
1 INJECTION INTRACARDIAC; INTRAVENOUS
Status: DISCONTINUED | OUTPATIENT
Start: 2019-01-01 | End: 2019-01-01 | Stop reason: HOSPADM

## 2019-01-01 RX ORDER — GUAIFENESIN 600 MG/1
600 TABLET, EXTENDED RELEASE ORAL EVERY 12 HOURS
Qty: 14 TAB | Refills: 0 | Status: SHIPPED | OUTPATIENT
Start: 2019-01-01 | End: 2019-01-01

## 2019-01-01 RX ORDER — ESCITALOPRAM OXALATE 10 MG/1
TABLET ORAL
Qty: 30 TAB | Refills: 11 | Status: SHIPPED | OUTPATIENT
Start: 2019-01-01

## 2019-01-01 RX ORDER — GABAPENTIN 100 MG/1
CAPSULE ORAL
Qty: 60 CAP | Refills: 6 | Status: SHIPPED | OUTPATIENT
Start: 2019-01-01

## 2019-01-01 RX ORDER — LEVOFLOXACIN 5 MG/ML
750 INJECTION, SOLUTION INTRAVENOUS
Status: DISCONTINUED | OUTPATIENT
Start: 2019-01-01 | End: 2019-01-01

## 2019-01-01 RX ORDER — GABAPENTIN 100 MG/1
100 CAPSULE ORAL 2 TIMES DAILY
Status: DISCONTINUED | OUTPATIENT
Start: 2019-01-01 | End: 2019-01-01 | Stop reason: HOSPADM

## 2019-01-01 RX ORDER — LABETALOL HYDROCHLORIDE 5 MG/ML
20 INJECTION, SOLUTION INTRAVENOUS ONCE
Status: DISCONTINUED | OUTPATIENT
Start: 2019-01-01 | End: 2019-01-01

## 2019-01-01 RX ORDER — ACETAMINOPHEN 500 MG
1000 TABLET ORAL
Status: COMPLETED | OUTPATIENT
Start: 2019-01-01 | End: 2019-01-01

## 2019-01-01 RX ORDER — FLUMAZENIL 0.1 MG/ML
0.2 INJECTION INTRAVENOUS
Status: DISCONTINUED | OUTPATIENT
Start: 2019-01-01 | End: 2019-01-01 | Stop reason: HOSPADM

## 2019-01-01 RX ORDER — SEVELAMER CARBONATE 800 MG/1
800 TABLET, FILM COATED ORAL AS NEEDED
Status: DISCONTINUED | OUTPATIENT
Start: 2019-01-01 | End: 2019-01-01 | Stop reason: HOSPADM

## 2019-01-01 RX ORDER — ALBUMIN HUMAN 250 G/1000ML
25 SOLUTION INTRAVENOUS
Status: DISCONTINUED | OUTPATIENT
Start: 2019-01-01 | End: 2019-01-01 | Stop reason: HOSPADM

## 2019-01-01 RX ORDER — HYDRALAZINE HYDROCHLORIDE 50 MG/1
100 TABLET, FILM COATED ORAL 3 TIMES DAILY
Status: DISCONTINUED | OUTPATIENT
Start: 2019-01-01 | End: 2019-01-01

## 2019-01-01 RX ORDER — AMLODIPINE BESYLATE 5 MG/1
10 TABLET ORAL DAILY
Qty: 30 TAB | Refills: 0 | Status: SHIPPED | OUTPATIENT
Start: 2019-01-01

## 2019-01-01 RX ORDER — IBUPROFEN 200 MG
1 TABLET ORAL DAILY
Status: DISCONTINUED | OUTPATIENT
Start: 2019-01-01 | End: 2019-01-01 | Stop reason: HOSPADM

## 2019-01-01 RX ORDER — HYDRALAZINE HYDROCHLORIDE 25 MG/1
25 TABLET, FILM COATED ORAL ONCE
Status: COMPLETED | OUTPATIENT
Start: 2019-01-01 | End: 2019-01-01

## 2019-01-01 RX ORDER — FUROSEMIDE 10 MG/ML
40 INJECTION INTRAMUSCULAR; INTRAVENOUS ONCE
Status: COMPLETED | OUTPATIENT
Start: 2019-01-01 | End: 2019-01-01

## 2019-01-01 RX ORDER — LEVOFLOXACIN 500 MG/1
500 TABLET, FILM COATED ORAL ONCE
Status: COMPLETED | OUTPATIENT
Start: 2019-01-01 | End: 2019-01-01

## 2019-01-01 RX ORDER — LANOLIN ALCOHOL/MO/W.PET/CERES
325 CREAM (GRAM) TOPICAL DAILY
Status: DISCONTINUED | OUTPATIENT
Start: 2019-01-01 | End: 2019-01-01

## 2019-01-01 RX ORDER — AMLODIPINE BESYLATE 5 MG/1
5 TABLET ORAL DAILY
Qty: 30 TAB | Refills: 5 | Status: ON HOLD | OUTPATIENT
Start: 2019-01-01 | End: 2019-01-01 | Stop reason: SDUPTHER

## 2019-01-01 RX ORDER — HEPARIN SODIUM 5000 [USP'U]/ML
5000 INJECTION, SOLUTION INTRAVENOUS; SUBCUTANEOUS EVERY 8 HOURS
Status: DISCONTINUED | OUTPATIENT
Start: 2019-01-01 | End: 2019-01-01 | Stop reason: HOSPADM

## 2019-01-01 RX ORDER — GABAPENTIN 100 MG/1
100 CAPSULE ORAL 3 TIMES DAILY
Status: DISCONTINUED | OUTPATIENT
Start: 2019-01-01 | End: 2019-01-01 | Stop reason: HOSPADM

## 2019-01-01 RX ORDER — FUROSEMIDE 10 MG/ML
INJECTION INTRAMUSCULAR; INTRAVENOUS
Status: DISPENSED
Start: 2019-01-01 | End: 2019-01-01

## 2019-01-01 RX ORDER — MEMANTINE HYDROCHLORIDE 10 MG/1
10 TABLET ORAL
COMMUNITY

## 2019-01-01 RX ORDER — LOSARTAN POTASSIUM 50 MG/1
100 TABLET ORAL ONCE
Status: COMPLETED | OUTPATIENT
Start: 2019-01-01 | End: 2019-01-01

## 2019-01-01 RX ORDER — ACETAMINOPHEN 325 MG/1
650 TABLET ORAL
Status: DISCONTINUED | OUTPATIENT
Start: 2019-01-01 | End: 2019-01-01 | Stop reason: SDUPTHER

## 2019-01-01 RX ORDER — PROPOFOL 10 MG/ML
INJECTION, EMULSION INTRAVENOUS AS NEEDED
Status: DISCONTINUED | OUTPATIENT
Start: 2019-01-01 | End: 2019-01-01 | Stop reason: HOSPADM

## 2019-01-01 RX ORDER — GABAPENTIN 100 MG/1
CAPSULE ORAL
Qty: 60 CAP | Refills: 6 | Status: SHIPPED | OUTPATIENT
Start: 2019-01-01 | End: 2019-01-01 | Stop reason: SDUPTHER

## 2019-01-01 RX ORDER — AMLODIPINE BESYLATE 5 MG/1
10 TABLET ORAL DAILY
Status: DISCONTINUED | OUTPATIENT
Start: 2019-01-01 | End: 2019-01-01

## 2019-01-01 RX ORDER — LEVOFLOXACIN 5 MG/ML
750 INJECTION, SOLUTION INTRAVENOUS
Status: COMPLETED | OUTPATIENT
Start: 2019-01-01 | End: 2019-01-01

## 2019-01-01 RX ORDER — OXYBUTYNIN CHLORIDE 5 MG/1
TABLET ORAL
Qty: 60 TAB | Refills: 6 | Status: SHIPPED | OUTPATIENT
Start: 2019-01-01

## 2019-01-01 RX ORDER — HYDRALAZINE HYDROCHLORIDE 100 MG/1
100 TABLET, FILM COATED ORAL 3 TIMES DAILY
COMMUNITY
Start: 2019-01-01 | End: 2019-01-01

## 2019-01-01 RX ORDER — SODIUM CHLORIDE 0.9 % (FLUSH) 0.9 %
5-40 SYRINGE (ML) INJECTION AS NEEDED
Status: CANCELLED | OUTPATIENT
Start: 2019-01-01

## 2019-01-01 RX ORDER — ASPIRIN 81 MG/1
81 TABLET ORAL DAILY
Status: DISCONTINUED | OUTPATIENT
Start: 2019-01-01 | End: 2019-01-01 | Stop reason: HOSPADM

## 2019-01-01 RX ORDER — HYDRALAZINE HYDROCHLORIDE 50 MG/1
50 TABLET, FILM COATED ORAL 3 TIMES DAILY
Status: DISCONTINUED | OUTPATIENT
Start: 2019-01-01 | End: 2019-01-01

## 2019-01-01 RX ORDER — OXYBUTYNIN CHLORIDE 5 MG/1
5 TABLET ORAL 2 TIMES DAILY
Status: DISCONTINUED | OUTPATIENT
Start: 2019-01-01 | End: 2019-01-01

## 2019-01-01 RX ADMIN — OXYBUTYNIN CHLORIDE 10 MG: 5 TABLET, EXTENDED RELEASE ORAL at 10:10

## 2019-01-01 RX ADMIN — HEPARIN SODIUM 5000 UNITS: 5000 INJECTION INTRAVENOUS; SUBCUTANEOUS at 23:58

## 2019-01-01 RX ADMIN — GUAIFENESIN 600 MG: 600 TABLET, EXTENDED RELEASE ORAL at 08:43

## 2019-01-01 RX ADMIN — SEVELAMER CARBONATE 2400 MG: 800 TABLET, FILM COATED ORAL at 16:54

## 2019-01-01 RX ADMIN — Medication 10 ML: at 14:00

## 2019-01-01 RX ADMIN — GABAPENTIN 100 MG: 100 CAPSULE ORAL at 22:07

## 2019-01-01 RX ADMIN — NEPHROCAP 1 CAPSULE: 1 CAP ORAL at 09:55

## 2019-01-01 RX ADMIN — GALANTAMINE 4 MG: 4 TABLET, FILM COATED ORAL at 10:10

## 2019-01-01 RX ADMIN — ACETAMINOPHEN 650 MG: 325 TABLET ORAL at 05:30

## 2019-01-01 RX ADMIN — BUPROPION HYDROCHLORIDE 100 MG: 100 TABLET, FILM COATED ORAL at 21:27

## 2019-01-01 RX ADMIN — BUPROPION HYDROCHLORIDE 100 MG: 100 TABLET, FILM COATED ORAL at 23:57

## 2019-01-01 RX ADMIN — MEMANTINE HYDROCHLORIDE 10 MG: 10 TABLET ORAL at 21:04

## 2019-01-01 RX ADMIN — BUPROPION HYDROCHLORIDE 100 MG: 100 TABLET, FILM COATED ORAL at 22:07

## 2019-01-01 RX ADMIN — Medication 10 ML: at 22:41

## 2019-01-01 RX ADMIN — SEVELAMER CARBONATE 4000 MG: 800 TABLET, FILM COATED ORAL at 10:09

## 2019-01-01 RX ADMIN — GABAPENTIN 100 MG: 100 CAPSULE ORAL at 17:14

## 2019-01-01 RX ADMIN — GABAPENTIN 100 MG: 100 CAPSULE ORAL at 21:36

## 2019-01-01 RX ADMIN — POLYETHYLENE GLYCOL-3350 AND ELECTROLYTES 2000 ML: 236; 6.74; 5.86; 2.97; 22.74 POWDER, FOR SOLUTION ORAL at 16:43

## 2019-01-01 RX ADMIN — METOPROLOL TARTRATE 12.5 MG: 25 TABLET ORAL at 17:13

## 2019-01-01 RX ADMIN — GUAIFENESIN 600 MG: 600 TABLET, EXTENDED RELEASE ORAL at 09:56

## 2019-01-01 RX ADMIN — LINEZOLID 600 MG: 600 INJECTION, SOLUTION INTRAVENOUS at 05:28

## 2019-01-01 RX ADMIN — GUAIFENESIN 600 MG: 600 TABLET, EXTENDED RELEASE ORAL at 21:27

## 2019-01-01 RX ADMIN — ESCITALOPRAM OXALATE 10 MG: 10 TABLET, FILM COATED ORAL at 21:36

## 2019-01-01 RX ADMIN — GABAPENTIN 100 MG: 100 CAPSULE ORAL at 21:46

## 2019-01-01 RX ADMIN — HYDRALAZINE HYDROCHLORIDE 100 MG: 50 TABLET, FILM COATED ORAL at 10:00

## 2019-01-01 RX ADMIN — HYDRALAZINE HYDROCHLORIDE 50 MG: 50 TABLET, FILM COATED ORAL at 09:48

## 2019-01-01 RX ADMIN — Medication 10 ML: at 21:40

## 2019-01-01 RX ADMIN — METOPROLOL TARTRATE 12.5 MG: 25 TABLET ORAL at 10:09

## 2019-01-01 RX ADMIN — LOSARTAN POTASSIUM 100 MG: 100 TABLET, FILM COATED ORAL at 09:28

## 2019-01-01 RX ADMIN — GALANTAMINE 4 MG: 4 TABLET, FILM COATED ORAL at 09:36

## 2019-01-01 RX ADMIN — Medication 1 CAPSULE: at 08:00

## 2019-01-01 RX ADMIN — NEPHROCAP 1 CAPSULE: 1 CAP ORAL at 09:28

## 2019-01-01 RX ADMIN — GABAPENTIN 100 MG: 100 CAPSULE ORAL at 08:01

## 2019-01-01 RX ADMIN — ESCITALOPRAM OXALATE 10 MG: 10 TABLET ORAL at 17:30

## 2019-01-01 RX ADMIN — LOSARTAN POTASSIUM 100 MG: 100 TABLET, FILM COATED ORAL at 09:11

## 2019-01-01 RX ADMIN — Medication 1 CAPSULE: at 09:54

## 2019-01-01 RX ADMIN — GABAPENTIN 100 MG: 100 CAPSULE ORAL at 09:11

## 2019-01-01 RX ADMIN — BUPROPION HYDROCHLORIDE 100 MG: 100 TABLET, FILM COATED ORAL at 22:40

## 2019-01-01 RX ADMIN — ASPIRIN 81 MG: 81 TABLET, COATED ORAL at 09:35

## 2019-01-01 RX ADMIN — HEPARIN SODIUM 5000 UNITS: 5000 INJECTION INTRAVENOUS; SUBCUTANEOUS at 23:41

## 2019-01-01 RX ADMIN — LIDOCAINE HYDROCHLORIDE 60 MG: 20 INJECTION, SOLUTION EPIDURAL; INFILTRATION; INTRACAUDAL; PERINEURAL at 13:16

## 2019-01-01 RX ADMIN — GABAPENTIN 100 MG: 100 CAPSULE ORAL at 09:28

## 2019-01-01 RX ADMIN — ACETAMINOPHEN 650 MG: 325 TABLET ORAL at 19:01

## 2019-01-01 RX ADMIN — HYDRALAZINE HYDROCHLORIDE 100 MG: 50 TABLET, FILM COATED ORAL at 08:00

## 2019-01-01 RX ADMIN — SEVELAMER CARBONATE 4000 MG: 800 TABLET, FILM COATED ORAL at 14:28

## 2019-01-01 RX ADMIN — MEMANTINE HYDROCHLORIDE 10 MG: 10 TABLET ORAL at 21:46

## 2019-01-01 RX ADMIN — IPRATROPIUM BROMIDE AND ALBUTEROL SULFATE 3 ML: .5; 3 SOLUTION RESPIRATORY (INHALATION) at 07:27

## 2019-01-01 RX ADMIN — Medication 10 ML: at 21:56

## 2019-01-01 RX ADMIN — GABAPENTIN 100 MG: 100 CAPSULE ORAL at 09:48

## 2019-01-01 RX ADMIN — ESCITALOPRAM OXALATE 10 MG: 10 TABLET, FILM COATED ORAL at 17:48

## 2019-01-01 RX ADMIN — METOPROLOL TARTRATE 25 MG: 25 TABLET ORAL at 09:28

## 2019-01-01 RX ADMIN — SEVELAMER CARBONATE 3200 MG: 800 TABLET, FILM COATED ORAL at 17:44

## 2019-01-01 RX ADMIN — HYDRALAZINE HYDROCHLORIDE 100 MG: 50 TABLET, FILM COATED ORAL at 09:11

## 2019-01-01 RX ADMIN — FAMOTIDINE 20 MG: 20 TABLET, FILM COATED ORAL at 09:48

## 2019-01-01 RX ADMIN — HYDRALAZINE HYDROCHLORIDE 75 MG: 50 TABLET, FILM COATED ORAL at 10:09

## 2019-01-01 RX ADMIN — HYDRALAZINE HYDROCHLORIDE 20 MG: 20 INJECTION INTRAMUSCULAR; INTRAVENOUS at 22:50

## 2019-01-01 RX ADMIN — SEVELAMER CARBONATE 2400 MG: 800 TABLET, FILM COATED ORAL at 13:36

## 2019-01-01 RX ADMIN — SEVELAMER CARBONATE 2400 MG: 800 TABLET, FILM COATED ORAL at 11:14

## 2019-01-01 RX ADMIN — METOPROLOL TARTRATE 25 MG: 25 TABLET ORAL at 19:00

## 2019-01-01 RX ADMIN — IOPAMIDOL 140 ML: 755 INJECTION, SOLUTION INTRAVENOUS at 14:48

## 2019-01-01 RX ADMIN — SEVELAMER CARBONATE 4000 MG: 800 TABLET, FILM COATED ORAL at 09:11

## 2019-01-01 RX ADMIN — HYDRALAZINE HYDROCHLORIDE 25 MG: 50 TABLET, FILM COATED ORAL at 22:13

## 2019-01-01 RX ADMIN — OXYBUTYNIN CHLORIDE 10 MG: 5 TABLET, EXTENDED RELEASE ORAL at 09:35

## 2019-01-01 RX ADMIN — LINEZOLID 600 MG: 600 INJECTION, SOLUTION INTRAVENOUS at 08:03

## 2019-01-01 RX ADMIN — AMLODIPINE BESYLATE 10 MG: 5 TABLET ORAL at 09:28

## 2019-01-01 RX ADMIN — METOPROLOL TARTRATE 25 MG: 25 TABLET ORAL at 11:10

## 2019-01-01 RX ADMIN — AMLODIPINE BESYLATE 5 MG: 5 TABLET ORAL at 17:15

## 2019-01-01 RX ADMIN — ACETAMINOPHEN 650 MG: 325 TABLET ORAL at 04:47

## 2019-01-01 RX ADMIN — Medication 10 ML: at 21:05

## 2019-01-01 RX ADMIN — IPRATROPIUM BROMIDE AND ALBUTEROL SULFATE 3 ML: .5; 3 SOLUTION RESPIRATORY (INHALATION) at 08:21

## 2019-01-01 RX ADMIN — LOSARTAN POTASSIUM 100 MG: 100 TABLET, FILM COATED ORAL at 08:01

## 2019-01-01 RX ADMIN — Medication 10 ML: at 22:12

## 2019-01-01 RX ADMIN — ACETAMINOPHEN 650 MG: 325 TABLET ORAL at 18:29

## 2019-01-01 RX ADMIN — HYDRALAZINE HYDROCHLORIDE 75 MG: 50 TABLET, FILM COATED ORAL at 20:31

## 2019-01-01 RX ADMIN — OXYBUTYNIN CHLORIDE 5 MG: 5 TABLET ORAL at 09:12

## 2019-01-01 RX ADMIN — GABAPENTIN 100 MG: 100 CAPSULE ORAL at 17:15

## 2019-01-01 RX ADMIN — GUAIFENESIN 600 MG: 600 TABLET, EXTENDED RELEASE ORAL at 22:07

## 2019-01-01 RX ADMIN — METOPROLOL TARTRATE 12.5 MG: 25 TABLET ORAL at 18:53

## 2019-01-01 RX ADMIN — AMLODIPINE BESYLATE 10 MG: 5 TABLET ORAL at 09:54

## 2019-01-01 RX ADMIN — GUAIFENESIN 600 MG: 600 TABLET, EXTENDED RELEASE ORAL at 08:01

## 2019-01-01 RX ADMIN — HYDRALAZINE HYDROCHLORIDE 100 MG: 50 TABLET, FILM COATED ORAL at 09:28

## 2019-01-01 RX ADMIN — GABAPENTIN 100 MG: 100 CAPSULE ORAL at 09:12

## 2019-01-01 RX ADMIN — GALANTAMINE 4 MG: 4 TABLET, FILM COATED ORAL at 18:59

## 2019-01-01 RX ADMIN — ASPIRIN 81 MG: 81 TABLET, COATED ORAL at 10:09

## 2019-01-01 RX ADMIN — FAMOTIDINE 20 MG: 20 TABLET, FILM COATED ORAL at 10:10

## 2019-01-01 RX ADMIN — HYDRALAZINE HYDROCHLORIDE 10 MG: 20 INJECTION INTRAMUSCULAR; INTRAVENOUS at 13:53

## 2019-01-01 RX ADMIN — ACETAMINOPHEN 1000 MG: 500 TABLET ORAL at 13:17

## 2019-01-01 RX ADMIN — ASPIRIN 81 MG 81 MG: 81 TABLET ORAL at 08:47

## 2019-01-01 RX ADMIN — AMLODIPINE BESYLATE 10 MG: 5 TABLET ORAL at 10:00

## 2019-01-01 RX ADMIN — ESCITALOPRAM OXALATE 10 MG: 10 TABLET ORAL at 18:53

## 2019-01-01 RX ADMIN — METOPROLOL TARTRATE 12.5 MG: 25 TABLET ORAL at 09:49

## 2019-01-01 RX ADMIN — LOSARTAN POTASSIUM 100 MG: 100 TABLET ORAL at 10:09

## 2019-01-01 RX ADMIN — MEMANTINE HYDROCHLORIDE 10 MG: 10 TABLET ORAL at 22:25

## 2019-01-01 RX ADMIN — IPRATROPIUM BROMIDE AND ALBUTEROL SULFATE 3 ML: .5; 3 SOLUTION RESPIRATORY (INHALATION) at 16:00

## 2019-01-01 RX ADMIN — NEPHROCAP 1 CAPSULE: 1 CAP ORAL at 09:12

## 2019-01-01 RX ADMIN — GABAPENTIN 100 MG: 100 CAPSULE ORAL at 21:27

## 2019-01-01 RX ADMIN — EPOETIN ALFA-EPBX 20000 UNITS: 10000 INJECTION, SOLUTION INTRAVENOUS; SUBCUTANEOUS at 17:00

## 2019-01-01 RX ADMIN — Medication 10 ML: at 04:15

## 2019-01-01 RX ADMIN — SEVELAMER CARBONATE 4000 MG: 800 TABLET, FILM COATED ORAL at 17:22

## 2019-01-01 RX ADMIN — PRAMIPEXOLE DIHYDROCHLORIDE 0.25 MG: 0.25 TABLET ORAL at 22:26

## 2019-01-01 RX ADMIN — SEVELAMER CARBONATE 4000 MG: 800 TABLET, FILM COATED ORAL at 17:00

## 2019-01-01 RX ADMIN — Medication 1 CAPSULE: at 09:30

## 2019-01-01 RX ADMIN — LOSARTAN POTASSIUM 100 MG: 100 TABLET ORAL at 09:48

## 2019-01-01 RX ADMIN — ASPIRIN 81 MG: 81 TABLET, COATED ORAL at 09:49

## 2019-01-01 RX ADMIN — FUROSEMIDE 40 MG: 10 INJECTION, SOLUTION INTRAMUSCULAR; INTRAVENOUS at 15:29

## 2019-01-01 RX ADMIN — HYDRALAZINE HYDROCHLORIDE 50 MG: 50 TABLET, FILM COATED ORAL at 15:41

## 2019-01-01 RX ADMIN — GABAPENTIN 100 MG: 100 CAPSULE ORAL at 17:42

## 2019-01-01 RX ADMIN — PANTOPRAZOLE SODIUM 40 MG: 40 INJECTION, POWDER, FOR SOLUTION INTRAVENOUS at 08:12

## 2019-01-01 RX ADMIN — GUAIFENESIN 600 MG: 600 TABLET, EXTENDED RELEASE ORAL at 21:40

## 2019-01-01 RX ADMIN — LEVOFLOXACIN 500 MG: 500 TABLET, FILM COATED ORAL at 13:36

## 2019-01-01 RX ADMIN — BUPROPION HYDROCHLORIDE 100 MG: 100 TABLET, FILM COATED ORAL at 21:40

## 2019-01-01 RX ADMIN — ACETAMINOPHEN 650 MG: 325 TABLET ORAL at 15:44

## 2019-01-01 RX ADMIN — HYDRALAZINE HYDROCHLORIDE 10 MG: 20 INJECTION INTRAMUSCULAR; INTRAVENOUS at 04:53

## 2019-01-01 RX ADMIN — ONDANSETRON 4 MG: 4 TABLET, ORALLY DISINTEGRATING ORAL at 15:44

## 2019-01-01 RX ADMIN — ESCITALOPRAM OXALATE 10 MG: 10 TABLET, FILM COATED ORAL at 17:42

## 2019-01-01 RX ADMIN — Medication 5 ML: at 14:00

## 2019-01-01 RX ADMIN — Medication 10 ML: at 14:48

## 2019-01-01 RX ADMIN — HYDRALAZINE HYDROCHLORIDE 20 MG: 20 INJECTION INTRAMUSCULAR; INTRAVENOUS at 22:54

## 2019-01-01 RX ADMIN — MEMANTINE HYDROCHLORIDE 10 MG: 10 TABLET ORAL at 22:07

## 2019-01-01 RX ADMIN — ATORVASTATIN CALCIUM 20 MG: 20 TABLET, FILM COATED ORAL at 21:40

## 2019-01-01 RX ADMIN — HYDRALAZINE HYDROCHLORIDE 100 MG: 50 TABLET, FILM COATED ORAL at 22:25

## 2019-01-01 RX ADMIN — FUROSEMIDE 80 MG: 10 INJECTION, SOLUTION INTRAMUSCULAR; INTRAVENOUS at 13:17

## 2019-01-01 RX ADMIN — Medication 10 ML: at 23:11

## 2019-01-01 RX ADMIN — ALBUMIN (HUMAN) 25 G: 0.25 INJECTION, SOLUTION INTRAVENOUS at 18:18

## 2019-01-01 RX ADMIN — MEMANTINE HYDROCHLORIDE 10 MG: 10 TABLET ORAL at 22:40

## 2019-01-01 RX ADMIN — Medication 1 CAPSULE: at 08:43

## 2019-01-01 RX ADMIN — ASPIRIN 81 MG 81 MG: 81 TABLET ORAL at 09:28

## 2019-01-01 RX ADMIN — Medication 10 ML: at 06:00

## 2019-01-01 RX ADMIN — METOPROLOL TARTRATE 12.5 MG: 25 TABLET ORAL at 17:16

## 2019-01-01 RX ADMIN — GABAPENTIN 100 MG: 100 CAPSULE ORAL at 16:43

## 2019-01-01 RX ADMIN — LOSARTAN POTASSIUM 100 MG: 50 TABLET ORAL at 10:00

## 2019-01-01 RX ADMIN — GABAPENTIN 100 MG: 100 CAPSULE ORAL at 10:00

## 2019-01-01 RX ADMIN — ACETAMINOPHEN 650 MG: 325 TABLET ORAL at 21:42

## 2019-01-01 RX ADMIN — HYDRALAZINE HYDROCHLORIDE 100 MG: 50 TABLET, FILM COATED ORAL at 22:40

## 2019-01-01 RX ADMIN — ATORVASTATIN CALCIUM 20 MG: 20 TABLET, FILM COATED ORAL at 21:46

## 2019-01-01 RX ADMIN — HEPARIN SODIUM 5000 UNITS: 5000 INJECTION INTRAVENOUS; SUBCUTANEOUS at 18:52

## 2019-01-01 RX ADMIN — HYDRALAZINE HYDROCHLORIDE 75 MG: 50 TABLET, FILM COATED ORAL at 21:52

## 2019-01-01 RX ADMIN — HYDRALAZINE HYDROCHLORIDE 75 MG: 50 TABLET, FILM COATED ORAL at 09:36

## 2019-01-01 RX ADMIN — LEVOFLOXACIN 500 MG: 5 INJECTION, SOLUTION INTRAVENOUS at 13:17

## 2019-01-01 RX ADMIN — SEVELAMER CARBONATE 4000 MG: 800 TABLET, FILM COATED ORAL at 10:03

## 2019-01-01 RX ADMIN — HEPARIN SODIUM 5000 UNITS: 5000 INJECTION INTRAVENOUS; SUBCUTANEOUS at 09:49

## 2019-01-01 RX ADMIN — Medication 10 ML: at 17:16

## 2019-01-01 RX ADMIN — ASPIRIN 81 MG 81 MG: 81 TABLET ORAL at 09:12

## 2019-01-01 RX ADMIN — ONDANSETRON 4 MG: 4 TABLET, ORALLY DISINTEGRATING ORAL at 14:03

## 2019-01-01 RX ADMIN — SEVELAMER CARBONATE 4000 MG: 800 TABLET, FILM COATED ORAL at 12:28

## 2019-01-01 RX ADMIN — APIXABAN 2.5 MG: 2.5 TABLET, FILM COATED ORAL at 09:12

## 2019-01-01 RX ADMIN — Medication 10 ML: at 06:33

## 2019-01-01 RX ADMIN — HYDRALAZINE HYDROCHLORIDE 100 MG: 50 TABLET, FILM COATED ORAL at 09:55

## 2019-01-01 RX ADMIN — ACETAMINOPHEN 650 MG: 325 TABLET ORAL at 14:33

## 2019-01-01 RX ADMIN — ATORVASTATIN CALCIUM 20 MG: 20 TABLET, FILM COATED ORAL at 21:52

## 2019-01-01 RX ADMIN — HYDRALAZINE HYDROCHLORIDE 75 MG: 50 TABLET, FILM COATED ORAL at 18:53

## 2019-01-01 RX ADMIN — HEPARIN SODIUM 5000 UNITS: 5000 INJECTION INTRAVENOUS; SUBCUTANEOUS at 23:45

## 2019-01-01 RX ADMIN — IPRATROPIUM BROMIDE AND ALBUTEROL SULFATE 3 ML: .5; 3 SOLUTION RESPIRATORY (INHALATION) at 14:55

## 2019-01-01 RX ADMIN — GUAIFENESIN 600 MG: 600 TABLET, EXTENDED RELEASE ORAL at 21:36

## 2019-01-01 RX ADMIN — HYDRALAZINE HYDROCHLORIDE 100 MG: 50 TABLET, FILM COATED ORAL at 17:24

## 2019-01-01 RX ADMIN — METOPROLOL TARTRATE 12.5 MG: 25 TABLET ORAL at 17:29

## 2019-01-01 RX ADMIN — AMLODIPINE BESYLATE 10 MG: 5 TABLET ORAL at 22:26

## 2019-01-01 RX ADMIN — Medication 10 ML: at 21:47

## 2019-01-01 RX ADMIN — GABAPENTIN 100 MG: 100 CAPSULE ORAL at 22:40

## 2019-01-01 RX ADMIN — ATORVASTATIN CALCIUM 20 MG: 20 TABLET, FILM COATED ORAL at 21:36

## 2019-01-01 RX ADMIN — BUPROPION HYDROCHLORIDE 100 MG: 100 TABLET, FILM COATED ORAL at 21:36

## 2019-01-01 RX ADMIN — GABAPENTIN 100 MG: 100 CAPSULE ORAL at 17:24

## 2019-01-01 RX ADMIN — PROPOFOL 300 MG: 10 INJECTION, EMULSION INTRAVENOUS at 13:56

## 2019-01-01 RX ADMIN — LEVOFLOXACIN 750 MG: 5 INJECTION, SOLUTION INTRAVENOUS at 21:35

## 2019-01-01 RX ADMIN — GUAIFENESIN 600 MG: 600 TABLET, EXTENDED RELEASE ORAL at 09:13

## 2019-01-01 RX ADMIN — HYDRALAZINE HYDROCHLORIDE 20 MG: 20 INJECTION INTRAMUSCULAR; INTRAVENOUS at 04:13

## 2019-01-01 RX ADMIN — ACETAMINOPHEN 650 MG: 325 TABLET ORAL at 10:03

## 2019-01-01 RX ADMIN — IPRATROPIUM BROMIDE AND ALBUTEROL SULFATE 3 ML: .5; 3 SOLUTION RESPIRATORY (INHALATION) at 15:28

## 2019-01-01 RX ADMIN — SEVELAMER CARBONATE 4000 MG: 800 TABLET, FILM COATED ORAL at 12:11

## 2019-01-01 RX ADMIN — BUPROPION HYDROCHLORIDE 100 MG: 100 TABLET, FILM COATED ORAL at 22:50

## 2019-01-01 RX ADMIN — ASPIRIN 81 MG 81 MG: 81 TABLET ORAL at 11:10

## 2019-01-01 RX ADMIN — IPRATROPIUM BROMIDE AND ALBUTEROL SULFATE 3 ML: .5; 3 SOLUTION RESPIRATORY (INHALATION) at 19:53

## 2019-01-01 RX ADMIN — GABAPENTIN 100 MG: 100 CAPSULE ORAL at 17:29

## 2019-01-01 RX ADMIN — MEMANTINE HYDROCHLORIDE 10 MG: 10 TABLET ORAL at 21:36

## 2019-01-01 RX ADMIN — GABAPENTIN 100 MG: 100 CAPSULE ORAL at 21:40

## 2019-01-01 RX ADMIN — ACETAMINOPHEN 650 MG: 325 TABLET ORAL at 21:52

## 2019-01-01 RX ADMIN — AMLODIPINE BESYLATE 5 MG: 5 TABLET ORAL at 09:48

## 2019-01-01 RX ADMIN — GABAPENTIN 100 MG: 100 CAPSULE ORAL at 08:43

## 2019-01-01 RX ADMIN — IPRATROPIUM BROMIDE AND ALBUTEROL SULFATE 3 ML: .5; 3 SOLUTION RESPIRATORY (INHALATION) at 20:49

## 2019-01-01 RX ADMIN — IPRATROPIUM BROMIDE AND ALBUTEROL SULFATE 3 ML: .5; 3 SOLUTION RESPIRATORY (INHALATION) at 08:08

## 2019-01-01 RX ADMIN — LOSARTAN POTASSIUM 100 MG: 50 TABLET ORAL at 22:25

## 2019-01-01 RX ADMIN — OXYBUTYNIN CHLORIDE 10 MG: 5 TABLET, EXTENDED RELEASE ORAL at 09:48

## 2019-01-01 RX ADMIN — HYDRALAZINE HYDROCHLORIDE 100 MG: 50 TABLET, FILM COATED ORAL at 21:46

## 2019-01-01 RX ADMIN — MEMANTINE HYDROCHLORIDE 10 MG: 10 TABLET ORAL at 21:40

## 2019-01-01 RX ADMIN — GUAIFENESIN 600 MG: 600 TABLET, EXTENDED RELEASE ORAL at 22:40

## 2019-01-01 RX ADMIN — LEVOFLOXACIN 500 MG: 500 TABLET, FILM COATED ORAL at 11:08

## 2019-01-01 RX ADMIN — GABAPENTIN 100 MG: 100 CAPSULE ORAL at 18:53

## 2019-01-01 RX ADMIN — FAMOTIDINE 20 MG: 20 TABLET, FILM COATED ORAL at 09:36

## 2019-01-01 RX ADMIN — LINEZOLID 600 MG: 600 INJECTION, SOLUTION INTRAVENOUS at 21:36

## 2019-01-01 RX ADMIN — GUAIFENESIN 600 MG: 600 TABLET, EXTENDED RELEASE ORAL at 09:28

## 2019-01-01 RX ADMIN — LOSARTAN POTASSIUM 100 MG: 100 TABLET ORAL at 17:15

## 2019-01-01 RX ADMIN — HYDRALAZINE HYDROCHLORIDE 100 MG: 50 TABLET, FILM COATED ORAL at 22:06

## 2019-01-01 RX ADMIN — ATORVASTATIN CALCIUM 20 MG: 20 TABLET, FILM COATED ORAL at 22:40

## 2019-01-01 RX ADMIN — PRAMIPEXOLE DIHYDROCHLORIDE 0.25 MG: 0.25 TABLET ORAL at 17:22

## 2019-01-01 RX ADMIN — GABAPENTIN 100 MG: 100 CAPSULE ORAL at 10:09

## 2019-01-01 RX ADMIN — SEVELAMER CARBONATE 4000 MG: 800 TABLET, FILM COATED ORAL at 18:52

## 2019-01-01 RX ADMIN — METOPROLOL TARTRATE 12.5 MG: 25 TABLET ORAL at 17:49

## 2019-01-01 RX ADMIN — HEPARIN SODIUM 5000 UNITS: 5000 INJECTION INTRAVENOUS; SUBCUTANEOUS at 10:12

## 2019-01-01 RX ADMIN — SEVELAMER CARBONATE 4000 MG: 800 TABLET, FILM COATED ORAL at 09:29

## 2019-01-01 RX ADMIN — AMLODIPINE BESYLATE 10 MG: 5 TABLET ORAL at 09:36

## 2019-01-01 RX ADMIN — GABAPENTIN 100 MG: 100 CAPSULE ORAL at 09:56

## 2019-01-01 RX ADMIN — Medication 10 ML: at 22:00

## 2019-01-01 RX ADMIN — GABAPENTIN 100 MG: 100 CAPSULE ORAL at 09:36

## 2019-01-01 RX ADMIN — HYDRALAZINE HYDROCHLORIDE 50 MG: 50 TABLET, FILM COATED ORAL at 21:40

## 2019-01-01 RX ADMIN — LEVOFLOXACIN 500 MG: 5 INJECTION, SOLUTION INTRAVENOUS at 14:00

## 2019-01-01 RX ADMIN — PANTOPRAZOLE SODIUM 40 MG: 40 INJECTION, POWDER, FOR SOLUTION INTRAVENOUS at 09:12

## 2019-01-01 RX ADMIN — METOPROLOL TARTRATE 25 MG: 25 TABLET ORAL at 09:57

## 2019-01-01 RX ADMIN — LINEZOLID 600 MG: 600 INJECTION, SOLUTION INTRAVENOUS at 22:50

## 2019-01-01 RX ADMIN — Medication 5 ML: at 05:57

## 2019-01-01 RX ADMIN — PRAMIPEXOLE DIHYDROCHLORIDE 0.25 MG: 0.25 TABLET ORAL at 18:59

## 2019-01-01 RX ADMIN — ATORVASTATIN CALCIUM 20 MG: 20 TABLET, FILM COATED ORAL at 21:04

## 2019-01-01 RX ADMIN — HEPARIN SODIUM 5000 UNITS: 5000 INJECTION INTRAVENOUS; SUBCUTANEOUS at 17:29

## 2019-01-01 RX ADMIN — SODIUM CHLORIDE 75 ML/HR: 900 INJECTION, SOLUTION INTRAVENOUS at 13:05

## 2019-01-01 RX ADMIN — GABAPENTIN 100 MG: 100 CAPSULE ORAL at 17:28

## 2019-01-01 RX ADMIN — LOSARTAN POTASSIUM 100 MG: 100 TABLET, FILM COATED ORAL at 09:57

## 2019-01-01 RX ADMIN — LINEZOLID 600 MG: 600 INJECTION, SOLUTION INTRAVENOUS at 15:18

## 2019-01-01 RX ADMIN — MORPHINE SULFATE 2 MG: 2 INJECTION, SOLUTION INTRAMUSCULAR; INTRAVENOUS at 17:58

## 2019-01-01 RX ADMIN — HYDRALAZINE HYDROCHLORIDE 100 MG: 50 TABLET, FILM COATED ORAL at 17:42

## 2019-01-01 RX ADMIN — MEMANTINE HYDROCHLORIDE 10 MG: 10 TABLET ORAL at 21:52

## 2019-01-01 RX ADMIN — Medication 10 ML: at 05:20

## 2019-01-01 RX ADMIN — SEVELAMER CARBONATE 4000 MG: 800 TABLET, FILM COATED ORAL at 08:43

## 2019-01-01 RX ADMIN — METOPROLOL TARTRATE 25 MG: 25 TABLET ORAL at 17:42

## 2019-01-01 RX ADMIN — Medication 5 ML: at 05:25

## 2019-01-01 RX ADMIN — HYDRALAZINE HYDROCHLORIDE 100 MG: 50 TABLET, FILM COATED ORAL at 21:36

## 2019-01-01 RX ADMIN — OXYBUTYNIN CHLORIDE 5 MG: 5 TABLET ORAL at 10:00

## 2019-01-01 RX ADMIN — IRON SUCROSE 100 MG: 20 INJECTION, SOLUTION INTRAVENOUS at 09:36

## 2019-01-01 RX ADMIN — AMLODIPINE BESYLATE 10 MG: 5 TABLET ORAL at 08:00

## 2019-01-01 RX ADMIN — LINEZOLID 600 MG: 600 INJECTION, SOLUTION INTRAVENOUS at 10:06

## 2019-01-01 RX ADMIN — Medication 10 ML: at 05:30

## 2019-01-01 RX ADMIN — LINEZOLID 600 MG: 600 INJECTION, SOLUTION INTRAVENOUS at 22:36

## 2019-01-01 RX ADMIN — Medication 10 ML: at 21:37

## 2019-01-01 RX ADMIN — ATORVASTATIN CALCIUM 20 MG: 20 TABLET, FILM COATED ORAL at 22:07

## 2019-01-01 RX ADMIN — SEVELAMER CARBONATE 2400 MG: 800 TABLET, FILM COATED ORAL at 09:55

## 2019-01-01 RX ADMIN — METOPROLOL TARTRATE 12.5 MG: 25 TABLET ORAL at 09:11

## 2019-01-01 RX ADMIN — SEVELAMER CARBONATE 4000 MG: 800 TABLET, FILM COATED ORAL at 17:29

## 2019-01-01 RX ADMIN — GUAIFENESIN 600 MG: 600 TABLET, EXTENDED RELEASE ORAL at 21:46

## 2019-01-01 RX ADMIN — ATORVASTATIN CALCIUM 20 MG: 20 TABLET, FILM COATED ORAL at 22:25

## 2019-01-01 RX ADMIN — METOPROLOL TARTRATE 12.5 MG: 25 TABLET ORAL at 09:36

## 2019-01-01 RX ADMIN — ESCITALOPRAM OXALATE 10 MG: 10 TABLET ORAL at 17:16

## 2019-01-01 RX ADMIN — ACETAMINOPHEN 650 MG: 325 TABLET ORAL at 03:55

## 2019-01-01 RX ADMIN — AMLODIPINE BESYLATE 5 MG: 5 TABLET ORAL at 10:10

## 2019-01-01 RX ADMIN — Medication 10 ML: at 15:19

## 2019-01-01 RX ADMIN — FERROUS SULFATE TAB 325 MG (65 MG ELEMENTAL FE) 325 MG: 325 (65 FE) TAB at 09:29

## 2019-01-01 RX ADMIN — ESCITALOPRAM OXALATE 10 MG: 10 TABLET, FILM COATED ORAL at 17:28

## 2019-01-01 RX ADMIN — Medication 1 CAPSULE: at 09:11

## 2019-01-01 RX ADMIN — ACETAMINOPHEN 650 MG: 325 TABLET ORAL at 10:10

## 2019-01-01 RX ADMIN — LOSARTAN POTASSIUM 100 MG: 100 TABLET ORAL at 09:36

## 2019-01-01 RX ADMIN — SODIUM CHLORIDE 500 ML: 900 INJECTION, SOLUTION INTRAVENOUS at 19:01

## 2019-01-01 RX ADMIN — MEMANTINE HYDROCHLORIDE 10 MG: 10 TABLET ORAL at 21:27

## 2019-01-01 RX ADMIN — FERROUS SULFATE TAB 325 MG (65 MG ELEMENTAL FE) 325 MG: 325 (65 FE) TAB at 08:00

## 2019-01-01 RX ADMIN — FERROUS SULFATE TAB 325 MG (65 MG ELEMENTAL FE) 325 MG: 325 (65 FE) TAB at 09:56

## 2019-01-01 RX ADMIN — IPRATROPIUM BROMIDE AND ALBUTEROL SULFATE 3 ML: .5; 3 SOLUTION RESPIRATORY (INHALATION) at 20:48

## 2019-01-01 RX ADMIN — ESCITALOPRAM OXALATE 10 MG: 10 TABLET, FILM COATED ORAL at 17:14

## 2019-01-01 RX ADMIN — HYDRALAZINE HYDROCHLORIDE 75 MG: 50 TABLET, FILM COATED ORAL at 15:38

## 2019-01-01 RX ADMIN — LINEZOLID 600 MG: 600 INJECTION, SOLUTION INTRAVENOUS at 22:15

## 2019-01-01 RX ADMIN — HEPARIN SODIUM 5000 UNITS: 5000 INJECTION INTRAVENOUS; SUBCUTANEOUS at 17:15

## 2019-01-01 RX ADMIN — HYDRALAZINE HYDROCHLORIDE 10 MG: 20 INJECTION INTRAMUSCULAR; INTRAVENOUS at 11:00

## 2019-01-01 RX ADMIN — Medication 10 ML: at 05:21

## 2019-01-01 RX ADMIN — SEVELAMER CARBONATE 4000 MG: 800 TABLET, FILM COATED ORAL at 09:35

## 2019-01-01 RX ADMIN — ATORVASTATIN CALCIUM 20 MG: 20 TABLET, FILM COATED ORAL at 21:27

## 2019-01-01 RX ADMIN — Medication 10 ML: at 19:01

## 2019-01-01 RX ADMIN — NEPHROCAP 1 CAPSULE: 1 CAP ORAL at 08:00

## 2019-01-01 RX ADMIN — Medication 10 ML: at 04:53

## 2019-01-01 RX ADMIN — HYDRALAZINE HYDROCHLORIDE 100 MG: 50 TABLET, FILM COATED ORAL at 17:28

## 2019-01-01 RX ADMIN — BUPROPION HYDROCHLORIDE 100 MG: 100 TABLET, FILM COATED ORAL at 21:52

## 2019-02-12 NOTE — PROGRESS NOTES
HISTORY OF PRESENT ILLNESS Estuardo Smith is a 75 y.o. female. HPI  
  
F/u ckd 5, htn hld anemia , hyperkalemia, hx dvt/PE  requiring eliquis Followed by pulm MD for abnl chest CT-Dr. Khushbu Silver Tolerating HD but some AVF problems per pt Reports anxiety has been controlled Memory loss short term has been stable Due for 10 yr colonoscopy and some brbpr per patient Last OV In HD 3 days per week Had clot removal left arm brachial artery last month Remains on eliquis 2.5 mg bid Renal MD is now Dr Kathrine Rangel She feels mentally more clear since HD started and tolerating HD Does not feel depressed now Stay active physically 
  
  
Last OV Saw Dr Iva Zuleta last week--creatinine and K were up--sees MD every few weeks for monitoring and management On iron medication for anemia and procrit Has stable carotid stenosis--f/u Dr Angelia Nguyen Takes wellbutrin  Tid for anxiety-effective Last visits: 
Pt here in hospital f/u for [facility] visit Admitted to [facility] with confusion, slurred speech and acute on chronic renal failure  3/29-3/31/17 Prerenal azotemia with hyperkalemia IVF and and bicarb 
lipitor dose was lowered Bun/cr 57/4.6 and K 4.2 on day of discharge back to baseline levels 
`t 
F/u CKD 4/5, htn hld anemia, hx dvt/PE requiring chronic anticoagulation with eliquis Sees Dr Iva Zuleta for renal dz Last creatinine down to 3.2 and potassium wnl. No cp sob or swelling Getting back to her usual self since surgery for colon perforation.  
  
 
 
 
 
Patient Active Problem List  
 Diagnosis Date Noted  Anemia of renal disease 08/21/2015 Priority: 1 - One  Acute renal failure with lesion of tubular necrosis ([address] 08/03/2015 Priority: 1 - One  Chronic renal insufficiency, stage V ([address] 07/28/2015 Priority: 1 - One  Generalized rash 01/22/2016 Priority: 2 - Two Class: Present on Admission  Heme positive stool 01/22/2016 Priority: 2 - Two Class: Present on Admission  Hypertension, uncontrolled 12/21/2015 Priority: 2 - Two  Diarrhea 12/17/2015 Priority: 2 - Two Class: Present on Admission  Pericardial effusion 12/16/2015 Priority: 2 - Two Class: Present on Admission  Protein malnutrition ([address] 07/30/2015 Priority: 2 - Two  Acute on chronic renal failure ([facility] 01/22/2016 Priority: 3 - Three Class: Acute  Moderate protein-calorie malnutrition ([address] 12/17/2015 Priority: 3 - Three Class: Chronic  
 HTN (hypertension) 10/07/2015 Priority: 3 - Three Class: Chronic  History of peritonitis 10/07/2015 Priority: 3 - Three Class: Present on Admission  Physical debility 10/07/2015 Priority: 3 - Three Class: Present on Admission  Chronic anticoagulation 10/07/2015 Priority: 3 - Three Class: Chronic  Anxiety 08/21/2018  Disturbance of memory 02/05/2018  Stenosis of left carotid artery without cerebral infarction 03/31/2017  Acute renal failure (ARF) ([facility] [address] 03/30/2017  Abnormal MRI of head 03/30/2017  Stenosis of both internal carotid arteries 03/30/2017  Cerebral microvascular disease 03/30/2017  Convulsion disorder ([address] 03/30/2017  Altered mental status, unspecified 03/30/2017  Acute encephalopathy 03/30/2017  Bilateral carotid artery stenosis 01/19/2017  Electrolyte abnormality 03/04/2016  ALAYNA (acute kidney injury) ([facility] [address] 01/22/2016  Pericarditis with effusion 12/18/2015  History of ovarian cancer 12/16/2015 Class: Present on Admission  History of pulmonary embolism 11/16/2015  SIRS (systemic inflammatory response syndrome) (HCC) 09/14/2015  Small bowel perforation ([address] 08/01/2015  Acute pancreatitis 07/29/2015  E-coli UTI 07/28/2015  Continuous severe abdominal pain 07/28/2015  Enteritis 07/28/2015  Adrenal hemorrhage ([address] 07/28/2015  Sepsis ([facility] [address] 07/28/2015  Abdominal pain 07/24/2015 Current Outpatient Medications Medication Sig Dispense Refill  gabapentin (NEURONTIN) 100 mg capsule TAKE 1 CAPSULE BY MOUTH TWICE A DAY FOR NERVE PAIN 60 Cap 6  
 ELIQUIS 2.5 mg tablet TAKE 1 TABLET TWICE A DAY TO PREVENT BLOOD CLOTS 60 Tab 3  
 losartan (COZAAR) 100 mg tablet TAKE 1 TABLET EVERY MORNING 30 Tab 6  
 ondansetron (ZOFRAN ODT) 4 mg disintegrating tablet TAKE 1 TABLET EVERY 8 HOURS IF NEEDED FOR NAUSEA 30 Tab 3  
 atorvastatin (LIPITOR) 40 mg tablet TAKE 1 TABLET BY MOUTH EVERY EVENING FOR CHOLESTEROL 30 Tab 11  
 lidocaine-prilocaine (EMLA) topical cream APPLY TO AFFECTED AREA AS DIRECTED - COVER WITH OCCLUSIVE DRESSING  11  
 hydrALAZINE (APRESOLINE) 50 mg tablet 50 mg two (2) times a day.  pramipexole (MIRAPEX) 0.25 mg tablet TAKE 1 TABLET BEFORE EACH DIALYSIS  3  
 sevelamer carbonate (RENVELA) 800 mg tab tab 3 with meals and 1 with snacks  escitalopram oxalate (LEXAPRO) 10 mg tablet TAKE 1 TABLET AT BEDTIME TO HELP PREVENT ANXIETY 30 Tab 11  
 buPROPion (WELLBUTRIN) 100 mg tablet TAKE 1 TABLET THREE TIMES DAILY (Patient taking differently: TAKE 1 TABLET  DAILY) 90 Tab 11  
 amLODIPine (NORVASC) 10 mg tablet TAKE 1 TABLET EVERY DAY FOR BLOOD PRESSURE 30 Tab 11  
 famotidine (PEPCID) 20 mg tablet TAKE 1 TABLET BY MOUTH EVERY DAY 30 Tab 11  
 memantine (NAMENDA) 10 mg tablet Take 1 Tab by mouth two (2) times a day. (Patient taking differently: Take 10 mg by mouth daily.) 60 Tab 11  epoetin roverto (PROCRIT) 10,000 unit/mL injection by SubCUTAneous route once.  atorvastatin (LIPITOR) 20 mg tablet Take 1 Tab by mouth nightly. 30 Tab 0  
 L. acidoph & paracasei- S therm- Bifido (TY-Q/RISAQUAD) 8 billion cell cap cap Take 1 Cap by mouth daily.  oxybutynin (DITROPAN) 5 mg tablet Take 5 mg by mouth two (2) times a day.  linaclotide (LINZESS) 290 mcg cap capsule Take 290 mcg by mouth daily as needed.  iron, carbonyl (FEOSOL) 45 mg tab Take 1 Tab by mouth two (2) times a day.  acetaminophen (TYLENOL) 500 mg tablet Take 1,000 mg by mouth every six (6) hours as needed for Pain.  multivitamin (ONE A DAY) tablet Take 1 Tab by mouth every morning. Allergies Allergen Reactions  Citrus And Derivatives Anaphylaxis Patient and her  reported  Penicillin G Anaphylaxis  Orange Juice Not Reported This Time  Sulfa (Sulfonamide Antibiotics) Other (comments) \"Dont remember\"  Vancomycin Hives Social History Tobacco Use  Smoking status: Current Every Day Smoker Packs/day: 1.00 Last attempt to quit: 2012 Years since quittin.0  Smokeless tobacco: Never Used Substance Use Topics  Alcohol use: No  
  
Lab Results Component Value Date/Time WBC 10.1 2017 05:26 AM  
 HGB 10.2 (L) 2017 05:26 AM  
 Hemoglobin (POC) 7.8 (L) 2016 09:56 AM  
 HCT 33.1 (L) 2017 05:26 AM  
 Hematocrit (POC) 23 (L) 2016 09:56 AM  
 PLATELET 062  05:26 AM  
 MCV 89.9 2017 05:26 AM  
 
Lab Results Component Value Date/Time Hemoglobin A1c 5.0 2017 02:27 AM  
 Hemoglobin A1c 5.8 2015 04:52 AM  
 Glucose 70 04/10/2017 12:00 AM  
 Glucose (POC) 111 (H) 2017 04:43 PM  
 Microalbumin/Creat ratio (mg/g creat) 4,648 (H) 2016 02:36 PM  
 Microalbumin,urine random 198.00 2016 02:36 PM  
 LDL, calculated 31.2 2017 02:27 AM  
 Creatinine (POC) 4.1 (H) 2016 09:56 AM  
 Creatinine 5.15 (H) 04/10/2017 12:00 AM  
  
Lab Results Component Value Date/Time  GFR est non-AA 8 (L) 04/10/2017 12:00 AM  
 GFRNA, POC 11 (L) 2016 09:56 AM  
 GFR est AA 9 (L) 04/10/2017 12:00 AM  
 GFRAA, POC 13 (L) 2016 09:56 AM  
 Creatinine 5.15 (H) 04/10/2017 12:00 AM  
 Creatinine (POC) 4.1 (H) 2016 09:56 AM  
 BUN 67 (H) 04/10/2017 12:00 AM  
 BUN (POC) 65 (H) 2016 09:56 AM  
 Sodium 138 04/10/2017 12:00 AM  
 Sodium (POC) 138 08/01/2016 09:56 AM  
 Potassium 6.1 (H) 04/10/2017 12:00 AM  
 Potassium (POC) 5.7 (H) 08/01/2016 09:56 AM  
 Chloride 101 04/10/2017 12:00 AM  
 Chloride (POC) 112 (H) 08/01/2016 09:56 AM  
 CO2 23 04/10/2017 12:00 AM  
 Magnesium 2.9 (H) 03/31/2017 05:26 AM  
 Phosphorus 4.3 03/31/2017 05:26 AM  
 PTH, Intact 484.6 (H) 03/07/2016 12:51 PM  
  
ROS Physical Exam  
Constitutional: She appears well-developed and well-nourished. Appears stated age Neck:  
Left carotid bruit Cardiovascular: Normal rate and regular rhythm. Exam reveals no gallop and no friction rub. Murmur heard. Pulmonary/Chest: Effort normal and breath sounds normal. No respiratory distress. She has no wheezes. Abdominal: Soft. Bowel sounds are normal. She exhibits no distension and no mass. There is no tenderness. There is no rebound and no guarding. Musculoskeletal: She exhibits edema. Mild ankle and pedal edema b/l Left arm edema with AVF Neurological: She is alert. Psychiatric: She has a normal mood and affect. Nursing note and vitals reviewed. ASSESSMENT and PLAN Diagnoses and all orders for this visit: 1. Carotid stenosis, asymptomatic, left 
-     REFERRAL TO VASCULAR SURGERY 2. Essential hypertension 
-     REFERRAL TO VASCULAR SURGERY Elevated Defer to renal MD 
3. Anxiety Controlled on lexapro 4. ESRD (end stage renal disease) ([facility] [address] 5. Breast screening -     [facility] MAMMO BI SCREENING INCL CAD; Future 6. Hematochezia 
-     REFERRAL TO GASTROENTEROLOGY Follow-up Disposition: 
Return in about 6 months (around 8/12/2019).

## 2019-03-14 NOTE — TELEPHONE ENCOUNTER
Patient's , Samia Longoria states he needs a call back to get an acute appt asap for Flu like symptoms for Dialysis treatment patient. Please call.  Thank you

## 2019-03-14 NOTE — TELEPHONE ENCOUNTER
Spoke with patient after 2 patient identifiers being note and advised of appt on Friday, March 15, 2019 02:15 PM, patient accpeted. Patient expressed understanding and has no further questions at this time.

## 2019-03-15 NOTE — PROGRESS NOTES
SUBJECTIVE  Ms. Jose Hall is a patient of Shelvia Cogan, MD and presents today acutely for     Chief Complaint   Patient presents with    Generalized Body Aches     x2 weeks    Cough     non productive     She has been sick for 2 weeks. Low grade temp today. Symptoms include: Fatigue, weakness, body aches, some sinus congestion, and mild dry cough. She goes to dialysis, and gets rather tired anyway TIW on dialysis days. OBJECTIVE  Visit Vitals  /58 (BP 1 Location: Right arm, BP Patient Position: Sitting)   Pulse 92   Temp 99.5 °F (37.5 °C) (Oral)   Resp 18   Ht 5' 8\" (1.727 m)   Wt 133 lb (60.3 kg)   SpO2 96%   BMI 20.22 kg/m²     Gen: Pleasant, tired-appearing 75 y.o.  female in NAD.   HEENT: PERRLA. EOMI. OP moist and pink.  Neck: Supple.  No LAD.  HEART: RRR, No M/G/R.   LUNGS: CTAB No W/R.   ABDOMEN: S, NT, ND, BS+.   EXTREMITIES: Warm. No C/C/E. SKIN: Warm. Dry. No rashes or other lesions noted. ASSESSMENT     ICD-10-CM ICD-9-CM    1. Viral URI with cough J06.9 465.9     B97.89       PLAN  At this point, tamiflu won't be of help. Symptomatic care (rest, fluids, OTC analgesics, etc). Call if no better in 10 days, or worsens. F/U with primary care physician as planned. I have reviewed with the patient details of the assessment and plan and all questions were answered. Relevant patient education was performed. Follow-up Disposition:  Return if symptoms worsen or fail to improve.

## 2019-03-25 NOTE — PROGRESS NOTES
Chief Complaint   Patient presents with    Diarrhea     x 2 weeks    Extremity Weakness      x 2 weeks    Joint Pain     x2 weeks

## 2019-03-25 NOTE — PROGRESS NOTES
HISTORY OF PRESENT ILLNESS  Jessica Hensley is a 75 y.o. female.   HPI   Here with her   C/o intermittent diarrhea x month but worse last 3 days  > 10 episodes per day last 2 d and 4 times today  Small amount of blood in tissues  No pain   Last colonoscopy 2016-rectal poly-Dr Nisreen Forrest  No recent antibiotics or travel  Stable weight    Patient Active Problem List    Diagnosis Date Noted    Anemia of renal disease 08/21/2015     Priority: 1 - One    Acute renal failure with lesion of tubular necrosis ([address] 08/03/2015     Priority: 1 - One    Chronic renal insufficiency, stage V ([address] 07/28/2015     Priority: 1 - One    Generalized rash 01/22/2016     Priority: 2 - Two     Class: Present on Admission    Heme positive stool 01/22/2016     Priority: 2 - Two     Class: Present on Admission    Hypertension, uncontrolled 12/21/2015     Priority: 2 - Two    Diarrhea 12/17/2015     Priority: 2 - Two     Class: Present on Admission    Pericardial effusion 12/16/2015     Priority: 2 - Two     Class: Present on Admission    Protein malnutrition ([address] 07/30/2015     Priority: 2 - Two    Acute on chronic renal failure ([address] 01/22/2016     Priority: 3 - Three     Class: Acute    Moderate protein-calorie malnutrition ([address] 12/17/2015     Priority: 3 - Three     Class: Chronic    HTN (hypertension) 10/07/2015     Priority: 3 - Three     Class: Chronic    History of peritonitis 10/07/2015     Priority: 3 - Three     Class: Present on Admission    Physical debility 10/07/2015     Priority: 3 - Three     Class: Present on Admission    Chronic anticoagulation 10/07/2015     Priority: 3 - Three     Class: Chronic    Anxiety 08/21/2018    Disturbance of memory 02/05/2018    Stenosis of left carotid artery without cerebral infarction 03/31/2017    Acute renal failure (ARF) ([address] 03/30/2017    Abnormal MRI of head 03/30/2017    Stenosis of both internal carotid arteries 03/30/2017    Cerebral microvascular disease 03/30/2017    Convulsion disorder ([facility] 03/30/2017    Altered mental status, unspecified 03/30/2017    Acute encephalopathy 03/30/2017    Bilateral carotid artery stenosis 01/19/2017    Electrolyte abnormality 03/04/2016    ALAYNA (acute kidney injury) ([facility] 01/22/2016    Pericarditis with effusion 12/18/2015    History of ovarian cancer 12/16/2015     Class: Present on Admission    History of pulmonary embolism 11/16/2015    SIRS (systemic inflammatory response syndrome) ([facility] 09/14/2015    Small bowel perforation (HCC) 08/01/2015    Acute pancreatitis 07/29/2015    E-coli UTI 07/28/2015    Continuous severe abdominal pain 07/28/2015    Enteritis 07/28/2015    Adrenal hemorrhage ([facility] 07/28/2015    Sepsis ([facility] 07/28/2015    Abdominal pain 07/24/2015     Current Outpatient Medications   Medication Sig Dispense Refill    diphenoxylate-atropine (LOMOTIL) 2.5-0.025 mg per tablet Take  by mouth four (4) times daily as needed for Diarrhea.  diphenoxylate-atropine (LOMOTIL) 2.5-0.025 mg per tablet Take 1 Tab by mouth four (4) times daily as needed for Diarrhea. Max Daily Amount: 4 Tabs. 60 Tab 0    hydrALAZINE (APRESOLINE) 100 mg tablet Take 100 mg by mouth three (3) times daily.       memantine (NAMENDA) 10 mg tablet TAKE 1 TABLET TWICE A DAY 60 Tab 11    gabapentin (NEURONTIN) 100 mg capsule TAKE 1 CAPSULE BY MOUTH TWICE A DAY FOR NERVE PAIN 60 Cap 6    ELIQUIS 2.5 mg tablet TAKE 1 TABLET TWICE A DAY TO PREVENT BLOOD CLOTS 60 Tab 3    losartan (COZAAR) 100 mg tablet TAKE 1 TABLET EVERY MORNING 30 Tab 6    ondansetron (ZOFRAN ODT) 4 mg disintegrating tablet TAKE 1 TABLET EVERY 8 HOURS IF NEEDED FOR NAUSEA 30 Tab 3    pramipexole (MIRAPEX) 0.25 mg tablet TAKE 1 TABLET BEFORE EACH DIALYSIS  3    sevelamer carbonate (RENVELA) 800 mg tab tab 5 with meals and 1 with snacks      escitalopram oxalate (LEXAPRO) 10 mg tablet TAKE 1 TABLET AT BEDTIME TO HELP PREVENT ANXIETY 30 Tab 11    buPROPion (WELLBUTRIN) 100 mg tablet TAKE 1 TABLET THREE TIMES DAILY (Patient taking differently: TAKE 1 TABLET  DAILY) 90 Tab 11    amLODIPine (NORVASC) 10 mg tablet TAKE 1 TABLET EVERY DAY FOR BLOOD PRESSURE 30 Tab 11    famotidine (PEPCID) 20 mg tablet TAKE 1 TABLET BY MOUTH EVERY DAY 30 Tab 11    atorvastatin (LIPITOR) 20 mg tablet Take 1 Tab by mouth nightly. 30 Tab 0    L. acidoph & paracasei- S therm- Bifido (TY-Q/RISAQUAD) 8 billion cell cap cap Take 1 Cap by mouth daily.  oxybutynin (DITROPAN) 5 mg tablet Take 5 mg by mouth two (2) times a day.  iron, carbonyl (FEOSOL) 45 mg tab Take 1 Tab by mouth two (2) times a day.  acetaminophen (TYLENOL) 500 mg tablet Take 1,000 mg by mouth every six (6) hours as needed for Pain.  multivitamin (ONE A DAY) tablet Take 1 Tab by mouth every morning.  lidocaine-prilocaine (EMLA) topical cream APPLY TO AFFECTED AREA AS DIRECTED - COVER WITH OCCLUSIVE DRESSING  11    epoetin roverto (PROCRIT) 10,000 unit/mL injection by SubCUTAneous route once.  linaclotide (LINZESS) 290 mcg cap capsule Take 290 mcg by mouth daily as needed.        Allergies   Allergen Reactions    Citrus And Derivatives Anaphylaxis     Patient and her  reported    Penicillin G Anaphylaxis    Orange Juice Not Reported This Time    Sulfa (Sulfonamide Antibiotics) Other (comments)     \"Dont remember\"    Vancomycin Hives      Lab Results   Component Value Date/Time    WBC 10.1 03/31/2017 05:26 AM    HGB 10.2 (L) 03/31/2017 05:26 AM    Hemoglobin (POC) 7.8 (L) 08/01/2016 09:56 AM    HCT 33.1 (L) 03/31/2017 05:26 AM    Hematocrit (POC) 23 (L) 08/01/2016 09:56 AM    PLATELET 000 08/79/6712 05:26 AM    MCV 89.9 03/31/2017 05:26 AM     Lab Results   Component Value Date/Time    GFR est non-AA 8 (L) 04/10/2017 12:00 AM    GFRNA, POC 11 (L) 08/01/2016 09:56 AM    GFR est AA 9 (L) 04/10/2017 12:00 AM    GFRAA, POC 13 (L) 08/01/2016 09:56 AM    Creatinine 5.15 (H) 04/10/2017 12:00 AM Creatinine (POC) 4.1 (H) 08/01/2016 09:56 AM    BUN 67 (H) 04/10/2017 12:00 AM    BUN (POC) 65 (H) 08/01/2016 09:56 AM    Sodium 138 04/10/2017 12:00 AM    Sodium (POC) 138 08/01/2016 09:56 AM    Potassium 6.1 (H) 04/10/2017 12:00 AM    Potassium (POC) 5.7 (H) 08/01/2016 09:56 AM    Chloride 101 04/10/2017 12:00 AM    Chloride (POC) 112 (H) 08/01/2016 09:56 AM    CO2 23 04/10/2017 12:00 AM    Magnesium 2.9 (H) 03/31/2017 05:26 AM    Phosphorus 4.3 03/31/2017 05:26 AM    PTH, Intact 484.6 (H) 03/07/2016 12:51 PM        ROS    Physical Exam   Constitutional: She appears well-developed and well-nourished. Appears stated age   Cardiovascular: Normal rate, regular rhythm and normal heart sounds. Exam reveals no gallop and no friction rub. No murmur heard. Pulmonary/Chest: Effort normal and breath sounds normal. No respiratory distress. She has no wheezes. Abdominal: Soft. Bowel sounds are normal. She exhibits no distension and no mass. There is no tenderness. There is no rebound and no guarding. Musculoskeletal: She exhibits no edema. Neurological: She is alert. Psychiatric: She has a normal mood and affect. Nursing note and vitals reviewed. ASSESSMENT and PLAN  Diagnoses and all orders for this visit:    1. Diarrhea, unspecified type  -     diphenoxylate-atropine (LOMOTIL) 2.5-0.025 mg per tablet; Take 1 Tab by mouth four (4) times daily as needed for Diarrhea. Max Daily Amount: 4 Tabs. -     CULTURE, STOOL  -     C DIFFICILE TOXIN GENE, EMILI   BRAT diet recommended    Follow-up and Dispositions    · Return in about 6 months (around 9/25/2019), or if symptoms worsen or fail to improve.

## 2019-03-25 NOTE — TELEPHONE ENCOUNTER
Spoke with patient after 2 patient identifiers being note and advised of appt Monday, March 25, 2019 02:00 PM, patient accepted. Patient expressed understanding and has no further questions at this time.

## 2019-03-25 NOTE — TELEPHONE ENCOUNTER
----- Message from [facility] sent at 3/25/2019 10:41 AM EDT -----  Regarding: Dr. Milagros Foreman: [phone]  Pt's  stated pt is having uncontrolled diarrhea and would like to speak with the nurse. It started all weekend.     Message copied/pasted from [facility]

## 2019-03-27 PROBLEM — J18.9 PNEUMONIA: Status: ACTIVE | Noted: 2019-01-01

## 2019-03-27 PROBLEM — I10 HYPERTENSION: Status: ACTIVE | Noted: 2019-01-01

## 2019-03-27 PROBLEM — K92.2 GI BLEED: Status: ACTIVE | Noted: 2019-01-01

## 2019-03-27 PROBLEM — R50.9 FEVER: Status: ACTIVE | Noted: 2019-01-01

## 2019-03-27 PROBLEM — N18.6 ESRD (END STAGE RENAL DISEASE) (HCC): Status: ACTIVE | Noted: 2019-01-01

## 2019-03-27 PROBLEM — D69.6 THROMBOCYTOPENIA (HCC): Status: ACTIVE | Noted: 2019-01-01

## 2019-03-27 NOTE — ED PROVIDER NOTES
75 y.o. female with past medical history significant for HTN, hypercholesterolemia, h/o PE, h/o thromboembolus in kidney, CKD, ovarian CA s/p CASI and BSO, GERD, neuropathy, hypomagnesemia, hyperkalemia, s/p appendectomy, who presents ambulatory to the ED accompanied by spouse, with chief complaint of diarrhea. Spouse reports that patient has been experiencing intermittent diarrhea for >1 month. He states that patient's diarrhea started to become more frequent and eventually became constant ~1 week ago. Pt reports that she had a stool sample collected at PCP yesterday, but is unsure of the result. Pt also complains of swelling of her bilateral hands and lower extremities. Notes that she was admitted to the hospital ~3 years ago for \"diarrhea and swelling\". She also reports that she has been experiencing chills, rectal bleeding and generalized body aches. Spouse notes that patient has been \"warm to the touch\". Pt reports that she receives HD MWF and received a full treatment this morning. Spouse reports that patient drove herself to treatment center, but both he and patient's nurse noted that patient \"did not look or behave normally\". Notes that she is currently taking Eliquis BID for h/o blood clots. Pt denies any change in her dialysis regimen, and has an AV graft in her LUE. Pt specifically denies chest pain, shortness of breath or cough. There are no other acute medical concerns at this time. Review of medical records indicate that the patient had no C. Diff positive stool samples in 2015 or 2016. Social hx: Positive for Tobacco use (current every day smoker, quit date: 1/11/2012); Negative for EtOH use; Negative for Illicit Drug use PCP: Lawyer Nadia MD 
 
Note written by Gonzales Baird, as dictated by Casimir Phoenix, MD 6:15 PM 
 
The history is provided by the patient, medical records and the spouse. No  was used. Past Medical History:  
Diagnosis Date  Chronic kidney disease Stage 5 (16 BUN 79, Creatnine 4.53, K 6) Dr. Nunn Liner [phone]  GERD (gastroesophageal reflux disease)   
 well controlled with Rx   
 High cholesterol  Hyperkalemia  Hypertension  Hypomagnesemia   
 on daily Magnesium  Neuropathy   
 bilateral feet  Ovarian cancer ([facility]  Hysterectomy with chemo, no radiation:  Dr. Marnie Romero  Thromboembolus ([facility] 2015  
 blood clot in lung 2015  Thromboembolus ([facility] 2004  
 in kidney \"many years ago\" Past Surgical History:  
Procedure Laterality Date  ABDOMEN SURGERY PROC UNLISTED  7/31/15 Lap exploratory small bowel obstruction  (ICU)  ABDOMEN SURGERY PROC UNLISTED  8/2/15 Abdmonial washout with wound vac (ICU)  ABDOMEN SURGERY PROC UNLISTED  8/18/15 Abdominal washout fascial closure (ICU)  ABDOMEN SURGERY PROC UNLISTED  11/11/15 Lap takedown of ileostomy  COLONOSCOPY N/A 2016 COLONOSCOPY performed by Preston Montenegro MD at . [facility] HX APPENDECTOMY  approx 2005  HX CASI AND BSO    
 due to ovarian cancer  HX TONSILLECTOMY  age 10 Family History:  
Problem Relation Age of Onset  Other Mother   
     peptic ulcer  Alzheimer Father Social History Socioeconomic History  Marital status:  Spouse name: Not on file  Number of children: Not on file  Years of education: Not on file  Highest education level: Not on file Occupational History  Not on file Social Needs  Financial resource strain: Not on file  Food insecurity:  
  Worry: Not on file Inability: Not on file  Transportation needs:  
  Medical: Not on file Non-medical: Not on file Tobacco Use  Smoking status: Current Every Day Smoker Packs/day: 1.00 Last attempt to quit: 2012 Years since quittin.2  Smokeless tobacco: Never Used Substance and Sexual Activity  Alcohol use:  No  
  Drug use: Yes Types: Prescription, OTC  Sexual activity: Never Lifestyle  Physical activity:  
  Days per week: Not on file Minutes per session: Not on file  Stress: Not on file Relationships  Social connections:  
  Talks on phone: Not on file Gets together: Not on file Attends Jainism service: Not on file Active member of club or organization: Not on file Attends meetings of clubs or organizations: Not on file Relationship status: Not on file  Intimate partner violence:  
  Fear of current or ex partner: Not on file Emotionally abused: Not on file Physically abused: Not on file Forced sexual activity: Not on file Other Topics Concern  Not on file Social History Narrative  Not on file ALLERGIES: Citrus and derivatives; Penicillin g; Orange juice; Sulfa (sulfonamide antibiotics); and Vancomycin Review of Systems Constitutional: Positive for chills. Respiratory: Negative for cough and shortness of breath. Cardiovascular: Positive for leg swelling (bilateral). Negative for chest pain. Gastrointestinal: Positive for anal bleeding and diarrhea. Musculoskeletal: Positive for joint swelling (bilateral hands) and myalgias (generalized body aches). All other systems reviewed and are negative. Vitals:  
 03/27/19 1526 03/27/19 1820 03/27/19 1822 BP:  185/53 Pulse: 95 78 Resp:  16 Temp:  (!) 101 °F (38.3 °C) SpO2: 98% 98% 96% Physical Exam  
Constitutional: She is oriented to person, place, and time. She appears well-developed and well-nourished. HENT:  
Head: Normocephalic and atraumatic. Eyes: Conjunctivae are normal.  
Neck: Normal range of motion. Cardiovascular: Normal rate, regular rhythm, normal heart sounds and intact distal pulses. No murmur heard. Pulmonary/Chest: Effort normal and breath sounds normal. No stridor. No respiratory distress. She has no wheezes. She has no rales. Abdominal: Soft. Bowel sounds are normal. She exhibits no distension and no mass. There is no tenderness. There is no guarding. Genitourinary:  
Genitourinary Comments: Brown stool no external hemorrhoids, no gross blood Musculoskeletal: Normal range of motion. She exhibits edema. 1+ pitting edema b/l LE Neurological: She is alert and oriented to person, place, and time. No cranial nerve deficit. Coordination normal.  
Skin: Skin is warm and dry. Nursing note and vitals reviewed. MDM Number of Diagnoses or Management Options Anticoagulated:  
Diarrhea, unspecified type:  
ESRD (end stage renal disease) ([facility] Heme positive stool:  
Pneumonia of both lower lobes due to infectious organism [facility]):  
Diagnosis management comments: eval for flu vs c diff colitis vs bacteremia as pt accessed 3 x per week for dialysis and at risk of infection from that alone Amount and/or Complexity of Data Reviewed Clinical lab tests: ordered and reviewed Tests in the radiology section of CPT®: ordered and reviewed Obtain history from someone other than the patient: yes () Discuss the patient with other providers: yes (hospitalist) Patient Progress Patient progress: stable Procedures CONSULT NOTE: 
8:54 PM Rashaun Carvalho MD communicated with Dr. Ketan Lynch MD, Consult for Hospitalist via [facility] Text. Discussed available diagnostic tests and clinical findings. Dr. Batres Service will evaluate the patient for admission to the hospital.  
 
8:54 PM 
Patient is being admitted to the hospital.  The results of their tests and reasons for their admission have been discussed with them and/or available family. They convey agreement and understanding for the need to be admitted and for their admission diagnosis. pt with b/l LL pneumonia on ct scan. Will treat with levaquin given allergies. qtcx on monitor ok. No diarrhea in ed to send stool off.  Doubt cdiff given prior negative samples and negative ct not showing diffuse colonic thickening. Pt with blood in stool on eliquis. Hb stable

## 2019-03-27 NOTE — ED TRIAGE NOTES
Pt presents with abdominal pain and diarrhea. Pt was at her dialysis appt today and her nurse told her to come to the ED. Pt states she has had constant diarrhea she cannot control. Pt PCP did a stool culture and C diff culture yesterday but patient states it has not gotten better.

## 2019-03-28 NOTE — PROGRESS NOTES
Day #1 of Levaquin Indication:  CAP Current regimen:  750mg IV q 24 h x 4 doses Abx regimen: levaquin monotherapy Recent Labs  
  19 
1645 WBC 9.7 CREA 2.60* BUN 13 Est CrCl:  End-stage renal disease, on outpt hemodialysis , , and . Temp (24hrs), Av.8 °F (37.1 °C), Min:97.9 °F (36.6 °C), Max:101 °F (38.3 °C) Cultures:  
3/27 blood- in process 3/27 blood- in process Plan: Change to 750mg x 1, then 500mg q 48 h

## 2019-03-28 NOTE — H&P
[facility] HISTORY AND PHYSICAL Name:  Dea Sorto 
MR#:  [id number] :  1945 ACCOUNT #:  [de-identified] ADMIT DATE:  2019 CHIEF COMPLAINT:  Diarrhea. HISTORY OF PRESENT ILLNESS:  A 69-year-old white female with past medical history of end-stage renal disease, on hemodialysis, GERD, hypertension, hyperlipidemia, peripheral neuropathy, ovarian cancer, PE, renal vein thrombosis, tobacco abuse, presented to the emergency department from [address] [place] [address] with reported chief complaint of diarrhea. The patient's  is present, provided some additional history. The patient has a history of dementia (not really on Namenda). Remainder of history was obtained from review of ED and electronic medical records. Per the collective reports, the patient reported not feeling well this morning. She reported, however, was able to go to her hemodialysis center and complete treatment. However, she was referred to emergency department by hemodialysis nurse as she was complaining of having diarrhea. Her  notes the patient is having diarrhea for weeks and reportedly had seen bright red blood per rectum on recurrent occasions. The patient denies any abdominal pain. She does not complain of any dizziness,  lightheadedness, new-onset focal weakness, numbness, paraesthesia, slurred speech, facial droop, headache, neck pain, chest pain, shortness of breath, cough, congestion, palpitation, nausea, vomiting, hematuria, calf pain, fever, chills, or rash. She and her  note that the patient has had swelling and edema of both legs over the past week. She notes that normally her legs are thin. On arrival to the emergency department, initial recorded vital signs were temperature of 101 degrees Fahrenheit, blood pressure 185/53, heart rate of 95, respiratory rate of 16, and O2 saturation 98% on room air.   The patient comments that her systolic blood pressure is usually in the 200s, which is confirmed by her . Workup in the emergency department included laboratory studies showing a hemoglobin of 11.4, platelets of 144. Stool occult blood test is positive. The patient has been placed on contact precautions with concern for diarrhea, stool culture and C. difficile antigen test has been ordered. The patient also had a chest x-ray portable showing lower lobe airspace disease and CT of the abdomen and pelvis without contrast showing no acute abdominal or pelvic process, however, showing lower lobe airspace disease that may represent pneumonia. Per the ED, the patient was started on levofloxacin 750 mg IV for antibiotic coverage. The patient is now seen for admission to the hospitalist service for continued evaluation and treatment. PAST MEDICAL HISTORY: 
1. End-stage renal disease, on hemodialysis Mondays, Wednesdays, and Fridays. 2.  GERD. 3.  Hypertension. 4.  Hyperlipidemia. 5.  Hypokalemia. 6.  Hypomagnesemia. 7.  Peripheral neuropathy of both feet. 8.  Ovarian cancer - status post hysterectomy, chemotherapy (no radiation); diagnosed in 2013. 9.  PE. 
10.  Thromboembolus - in kidneys per chart records in 2004. 11.  Tobacco abuse. PAST SURGICAL HISTORY: 
1.  Status post laparoscopic exploratory small bowel surgery for an evacuation of intraabdominal hematoma on 11/15/2015. 
2.  Abdominal washout with wound VAC placement, 08/02/2015. 
3.  Laparoscopic takedown of ileostomy on 11/11/2015. 4.  Colonoscopy. 5.  Appendectomy. 6.  [facility], 2013. 7.  Tonsillectomy. MEDICATIONS:  Medication list reviewed and noted on chart records. acetaminophen (TYLENOL) 500 mg tablet    --  --  Provider, Historical   
 Take 1,000 mg by mouth every six (6) hours as needed for Pain.  amLODIPine (NORVASC) 10 mg tablet  3/26/2019  05/07/18  -- Andrei Harris MD  
 TAKE 1 TABLET EVERY DAY FOR BLOOD PRESSURE  
 Patient taking differently: TAKE 1 TABLET EVERY night FOR BLOOD PRESSURE Notes:  Generic For:NORVASC 10MG  
 atorvastatin (LIPITOR) 20 mg tablet  3/26/2019  03/31/17  -- Shravan Yepez MD  
 Take 1 Tab by mouth nightly. b complex-vitamin c-folic acid (NEPHROCAPS) 1 mg capsule  3/27/2019  --  --  Provider, Historical  
 Take 1 Cap by mouth daily. buPROPion (WELLBUTRIN) 100 mg tablet  3/26/2019  --  --  Provider, Historical  
 Take 100 mg by mouth nightly. diphenoxylate-atropine (LOMOTIL) 2.5-0.025 mg per tablet    03/25/19  -- Andra Obregon MD  
 Take 1 Tab by mouth four (4) times daily as needed for Diarrhea. Max Daily Amount: 4 Tabs. ELIQUIS 2.5 mg tablet  3/27/2019  01/05/19  -- Andra Obregon MD  
 TAKE 1 TABLET TWICE A DAY TO PREVENT BLOOD CLOTS  
 escitalopram oxalate (LEXAPRO) 10 mg tablet  3/26/2019  08/20/18  -- Andra Obregon MD  
 TAKE 1 TABLET AT BEDTIME TO HELP PREVENT ANXIETY Notes:  Generic For:LEXAPRO   10MG   N O T I C E    PRESCRIPTION PREVIOUSLY AUTHORIZED BY DOCTOR:MONSTER CORDOBA [phone]  
 famotidine (PEPCID) 20 mg tablet  3/26/2019  04/23/18  -- Andra Obregon MD  
 TAKE 1 TABLET BY MOUTH EVERY DAY Notes:  Takes at bedtime  
 gabapentin (NEURONTIN) 100 mg capsule  3/27/2019  01/16/19  -- Andra Obregon MD  
 TAKE 1 CAPSULE BY MOUTH TWICE A DAY FOR NERVE PAIN Notes:  Generic For:NEURONTIN  100MG   N O T I C E    PRESCRIPTION PREVIOUSLY AUTHORIZED BY DOCTOR:MONSTER CORDOBA [phone]  
 hydrALAZINE (APRESOLINE) 100 mg tablet  3/27/2019  03/04/19  --  Provider, Historical  
 Take 100 mg by mouth three (3) times daily. iron, carbonyl (FEOSOL) 45 mg tab  3/27/2019  --  --  Provider, Historical  
 Take 1 Tab by mouth two (2) times a day. L. acidoph & paracasei- S therm- Bifido (TY-Q/RISAQUAD) 8 billion cell cap cap  3/27/2019  --  --  Provider, Historical  
 Take 1 Cap by mouth daily. linaclotide (LINZESS) 290 mcg cap capsule    --  --  Provider, Historical  
 Take 290 mcg by mouth daily as needed. Notes:  Hasn't needed in months  
 losartan (COZAAR) 100 mg tablet  3/26/2019  12/17/18  -- Pedro Ponce MD  
 TAKE 1 TABLET EVERY MORNING Patient taking differently: TAKE 1 TABLET EVERY night Notes:  Generic For:*COZAAR    100MG  
 memantine (NAMENDA) 10 mg tablet  3/26/2019  --  --  Provider, Historical  
 Take 10 mg by mouth nightly. ondansetron (ZOFRAN ODT) 4 mg disintegrating tablet    10/29/18  -- Pedro Ponce MD  
 TAKE 1 TABLET EVERY 8 HOURS IF NEEDED FOR NAUSEA Notes:  Generic For:*ZOFRAN ODT 4MG oxybutynin (DITROPAN) 5 mg tablet  3/27/2019  03/27/19  -- Pedro Ponce MD  
 TAKE 1 TABLET BY MOUTH TWICE A DAY Notes:  N O T I C E    PRESCRIPTION PREVIOUSLY AUTHORIZED BY DOCTOR:RENETTA CACERES [phone]  
 pramipexole (MIRAPEX) 0.25 mg tablet  3/20/2019  07/15/18  --  Provider, Historical  
 TAKE 1 TABLET BEFORE EACH DIALYSIS  MWF Notes:  Received from: External Pharmacy  
 sevelamer (RENAGEL) 800 mg tablet  3/27/2019  --  --  Provider, Historical  
 Take 800 mg by mouth as needed (with snacks). Indications: Renal Osteodystrophy with Hyperphosphatemia  
 sevelamer carbonate (RENVELA) 800 mg tab tab    08/16/18  --  Provider, Historical  
 Take 4,000 mg by mouth three (3) times daily (with meals). 5 with meals and 1 with snacks Notes:  Received from: External Pharmacy ALLERGIES:  PENICILLIN G, SULFA DRUGS, VANCOMYCIN; CITRUS AND ORANGE JUICE. SOCIAL HISTORY:  Positive for smoking cigarettes, half a pack a day. Negative for alcohol and illicit drugs. She is . Ambulates unassisted, however, requires personal assistance. FAMILY HISTORY:  Peptic ulcer disease - mother. Alzheimer dementia - father. REVIEW OF SYSTEMS:  Pertinent positives as noted in HPI, otherwise negative.  
 
PHYSICAL EXAMINATION: 
 VITAL SIGNS:  Temperature maximum of 101 degrees Fahrenheit, temperature current of 97.9 degrees Fahrenheit, blood pressure 173/43, heart rate 64, respiratory rate of 14, O2 saturation 95% on room air. Recorded weight, not recorded. Recorded height of 5 feet 8 inches tall. GENERAL:  Elderly female, in no acute respiratory distress. Intermittent coughing. PSYCHIATRIC:  The patient is awake, alert, and oriented x3. NEUROLOGIC:  GCS 15, best response. Moves extremities x4. Sensation is grossly intact without slurred speech or facial droop. HEENT:  Normocephalic, atraumatic. PERRLA. EOMs intact. Sclerae anicteric. Conjunctivae clear. Nares are patent. Pharynx:  Tongue is midline, not edematous. NECK:  Supple without lymphadenopathy, JVD, carotid bruits, or thyromegaly. LYMPHATIC:  Negative for cervical or supraclavicular adenopathy. LUNGS:  Clear to auscultation bilaterally. CVS/HEART:  Regular rate and rhythm. Normal S1, S2 without murmurs, rubs, or gallops. GI:  Abdomen is soft, nontender, nondistended. Normoactive bowel sounds. No rebound, guarding, or rigidity. No auscultated abdominal bruits. No palpable abdominal mass. BACK:  No CVA tenderness. No step-off deformity. MUSCULOSKELETAL:  No acute palpable bony deformity. Negative calf tenderness. VASCULAR:  2+ radial, 1+ dorsalis pedis pulses without cyanosis. Positive for clubbing. 1+ pitting edema of bilateral lower extremities. SKIN:  Warm and dry. LABORATORY DATA:  Reviewed as follows:  Sodium 140, potassium 3.5, chloride 107, CO2 of 28, BUN of 13, creatinine 2.60, glucose 98, anion gap of 5, calcium is 8.3, GFR 18, total bilirubin is 0.4, total protein 6.6, albumin is 2.5, ALT of 15, AST of 24, alkaline phosphatase 119. WBC of 9.7, hemoglobin of 11.4, hematocrit of 40.9, platelets of 872, neutrophils 74%. Stool occult blood test was positive.   CT of the abdomen and pelvis without contrast, results are reviewed. Chest x-ray portable was also reviewed. A 12-lead EKG shows normal sinus rhythm, left ventricular hypertrophy at 64 beats per minute. IMPRESSION AND PLAN: 
1. Gastrointestinal bleed. Unfortunately, the patient is on Eliquis as long-term anticoagulation. Hold Eliquis tonight. Consult with gastroenterologist in the a.m. Notably, this is chronic, but she is heme-positive tonight. As the patient has had end-stage renal disease, on hemodialysis, no aggressive IV fluids for the patient is ordered. She may have just the clears and liquids. Plan for eventual endoscopy and colonoscopy. 2.  Pneumonia - based on chest x-ray and CT reports. Continue on levofloxacin for IV antibiotics. 3.  Fever. No criteria for systemic inflammatory response syndrome, has sepsis. As such, continue workup with plan as noted. Continue with levofloxacin IV for antibiotic coverage. 4.  Diarrhea. Check stools for Clostridium difficile, stool culture, ova and parasites. 5.  End-stage renal disease, hemodialysis on Mondays, Wednesdays, and Fridays. Consult nephrologist. 
6.  Chronic anemia. Repeat H and H. 
7.  Thrombocytopenia. Repeat platelet count. 8.  History of pulmonary embolism. The patient is on long-term anticoagulation. Hold Eliquis as mentioned for now. 9.  Uncontrolled hypertension. Per the patient and her , her blood pressure tonight is actually low compared to her baseline. Continue with monitoring blood pressure closely. Resume home medication. 10.  Gastroesophageal reflux disease. Order Protonix. 11.  Tobacco abuse. Encourage smoking cessation. Order nicotine patch. 12.  Gait abnormality. Place on fall precautions. 13.  Lower extremity edema. Order venous Duplex of the lower extremity to evaluate any deep venous thrombosis. If negative, then proceed with SCDs to lower extremities. 14.  Venous thromboembolic prophylaxis. Plan as noted above. 15.  Code status. Full code. 16.  Functional status. Had been ambulatory, albeit with personal assistance. Shyla Lilly MD [id number] 
D:  03/27/2019 22:20 
T:  03/28/2019 0:09 
JOB #:  [id number]

## 2019-03-28 NOTE — CONSULTS
[address] 
[address] [facility], [address] 
[phone] GI CONSULTATION NOTE Pradeep Serrato AGACNP-BC 
 
NAME:  Lorenzo Saucedo :   1945 MRN:   [id number] Referring Provider: Dr. Julian Hartmann Date: 3/28/2019 Chief Complaint: Diarrhea History of Present Illness:  75year old white elderly female with history of PE/DVT, ESRD, bowel perforation s/p ileostomy with repair, ovarian cancer seen in consultation for above complaint. Diarrhea has been ongoing x 1-2 months. Progressively worse past 2 weeks. No abdominal pain associated. No fever or chills. She has noticed bright red blood on tissue only and not in stool. She is going up to 10 x day some days and some days she will alternate with small round pellet like stools. She has lost weight but unable to quantify. Her PCP obtained stool sample just yesterday. The diarrhea does not change with eating or fasting. She does have constipation at times but no issue recently. She takes an oxycodone for pain as needed. Two colonoscopies in 3/2016 revealed 9 polyps, removed. Repeat in 2016 with 2 polyps removed. Path-tubular adenomas. Both procedures with poor prep. EGD in 3/2016 normal. 
 
Patient denies any recent travel, antibiotic use, hospitalizations or sick contacts. Work up thus far shows CT abd (noncontrast) with no acute process in abd/pelvis. PMH: 
Past Medical History:  
Diagnosis Date  Chronic kidney disease Stage 5 (16 BUN 79, Creatnine 4.53, K 6) Dr. Cody Breeding [phone]  GERD (gastroesophageal reflux disease)   
 well controlled with Rx   
 High cholesterol  Hyperkalemia  Hypertension  Hypomagnesemia   
 on daily Magnesium  Neuropathy   
 bilateral feet  Ovarian cancer ([facility] [address]  Hysterectomy with chemo, no radiation:  Dr. Watts Double  Thromboembolus ([facility] [address] 2015  
 blood clot in lung 2015  Thromboembolus ([facility] [address] 2004  
 in kidney \"many years ago\" PSH: 
Past Surgical History:  
Procedure Laterality Date  ABDOMEN SURGERY PROC UNLISTED  7/31/15 Lap exploratory small bowel obstruction  (ICU)  ABDOMEN SURGERY PROC UNLISTED  8/2/15 Abdmonial washout with wound vac (ICU)  ABDOMEN SURGERY PROC UNLISTED  8/18/15 Abdominal washout fascial closure (ICU)  ABDOMEN SURGERY PROC UNLISTED  11/11/15 Lap takedown of ileostomy  COLONOSCOPY N/A 8/1/2016 COLONOSCOPY performed by Stalin Johnson MD at . [facility] HX APPENDECTOMY  approx 2005  HX CASI AND BSO  2013  
 due to ovarian cancer  HX TONSILLECTOMY  age 10 Allergies: Allergies Allergen Reactions  Citrus And Derivatives Anaphylaxis Patient and her  reported  Penicillin G Anaphylaxis  Orange Juice Not Reported This Time  Sulfa (Sulfonamide Antibiotics) Other (comments) \"Dont remember\"  Vancomycin Hives Home Medications: 
Prior to Admission Medications Prescriptions Last Dose Informant Patient Reported? Taking? ELIQUIS 2.5 mg tablet 3/27/2019 at am  No Yes Sig: TAKE 1 TABLET TWICE A DAY TO PREVENT BLOOD CLOTS  
L. acidoph & paracasei- S therm- Bifido (TY-Q/RISAQUAD) 8 billion cell cap cap 3/27/2019 at am  Yes Yes Sig: Take 1 Cap by mouth daily. acetaminophen (TYLENOL) 500 mg tablet   Yes Yes Sig: Take 1,000 mg by mouth every six (6) hours as needed for Pain. amLODIPine (NORVASC) 10 mg tablet 3/26/2019 at pm  No Yes Sig: TAKE 1 TABLET EVERY DAY FOR BLOOD PRESSURE Patient taking differently: TAKE 1 TABLET EVERY night FOR BLOOD PRESSURE  
atorvastatin (LIPITOR) 20 mg tablet 3/26/2019 at pm  No Yes Sig: Take 1 Tab by mouth nightly. b complex-vitamin c-folic acid (NEPHROCAPS) 1 mg capsule 3/27/2019 at Unknown time  Yes Yes Sig: Take 1 Cap by mouth daily. buPROPion (WELLBUTRIN) 100 mg tablet 3/26/2019 at Unknown time  Yes Yes Sig: Take 100 mg by mouth nightly. diphenoxylate-atropine (LOMOTIL) 2.5-0.025 mg per tablet   No Yes Sig: Take 1 Tab by mouth four (4) times daily as needed for Diarrhea. Max Daily Amount: 4 Tabs. escitalopram oxalate (LEXAPRO) 10 mg tablet 3/26/2019 at hs  No Yes Sig: TAKE 1 TABLET AT BEDTIME TO HELP PREVENT ANXIETY  
famotidine (PEPCID) 20 mg tablet 3/26/2019 at hs  No Yes Sig: TAKE 1 TABLET BY MOUTH EVERY DAY  
gabapentin (NEURONTIN) 100 mg capsule 3/27/2019 at am  No Yes Sig: TAKE 1 CAPSULE BY MOUTH TWICE A DAY FOR NERVE PAIN  
hydrALAZINE (APRESOLINE) 100 mg tablet 3/27/2019 at am  Yes Yes Sig: Take 100 mg by mouth three (3) times daily. iron, carbonyl (FEOSOL) 45 mg tab 3/27/2019 at Unknown time  Yes Yes Sig: Take 1 Tab by mouth two (2) times a day. linaclotide (LINZESS) 290 mcg cap capsule   Yes No  
Sig: Take 290 mcg by mouth daily as needed. losartan (COZAAR) 100 mg tablet 3/26/2019 at hs  No Yes Sig: TAKE 1 TABLET EVERY MORNING Patient taking differently: TAKE 1 TABLET EVERY night  
memantine (NAMENDA) 10 mg tablet 3/26/2019 at Unknown time  Yes Yes Sig: Take 10 mg by mouth nightly. ondansetron (ZOFRAN ODT) 4 mg disintegrating tablet   No No  
Sig: TAKE 1 TABLET EVERY 8 HOURS IF NEEDED FOR NAUSEA  
oxybutynin (DITROPAN) 5 mg tablet 3/27/2019 at am  No Yes Sig: TAKE 1 TABLET BY MOUTH TWICE A DAY  
pramipexole (MIRAPEX) 0.25 mg tablet 3/20/2019 at Unknown time  Yes Yes Sig: TAKE 1 TABLET BEFORE EACH DIALYSIS  MWF  
sevelamer (RENAGEL) 800 mg tablet 3/27/2019 at Unknown time  Yes Yes Sig: Take 800 mg by mouth as needed (with snacks). Indications: Renal Osteodystrophy with Hyperphosphatemia  
sevelamer carbonate (RENVELA) 800 mg tab tab   Yes No  
Sig: Take 4,000 mg by mouth three (3) times daily (with meals). 5 with meals and 1 with snacks Facility-Administered Medications: None Hospital Medications: 
Current Facility-Administered Medications Medication Dose Route Frequency  sodium chloride (NS) flush 5-40 mL  5-40 mL IntraVENous Q8H  
 sodium chloride (NS) flush 5-40 mL  5-40 mL IntraVENous PRN  
 atorvastatin (LIPITOR) tablet 20 mg  20 mg Oral QHS  hydrALAZINE (APRESOLINE) tablet 100 mg  100 mg Oral TID  losartan (COZAAR) tablet 100 mg  100 mg Oral QHS  amLODIPine (NORVASC) tablet 10 mg  10 mg Oral QHS  gabapentin (NEURONTIN) capsule 100 mg  100 mg Oral BID  memantine (NAMENDA) tablet 10 mg  10 mg Oral QHS  oxybutynin (DITROPAN) tablet 5 mg  5 mg Oral DAILY  pramipexole (MIRAPEX) tablet 0.25 mg  0.25 mg Oral QHS  amLODIPine (NORVASC) tablet 10 mg  10 mg Oral NOW  losartan (COZAAR) tablet 100 mg  100 mg Oral ONCE  hydrALAZINE (APRESOLINE) 20 mg/mL injection 20 mg  20 mg IntraVENous Q6H PRN  
 acetaminophen (TYLENOL) tablet 650 mg  650 mg Oral Q6H PRN  
 nicotine (NICODERM CQ) 21 mg/24 hr patch 1 Patch  1 Patch TransDERmal DAILY  pantoprazole (PROTONIX) 40 mg in sodium chloride 0.9% 10 mL injection  40 mg IntraVENous DAILY  [START ON 3/29/2019] levoFLOXacin (LEVAQUIN) 500 mg in D5W IVPB  500 mg IntraVENous Q48H Social History: 
Social History Tobacco Use  Smoking status: Current Every Day Smoker Packs/day: 1.00 Last attempt to quit: 2012 Years since quittin.2  Smokeless tobacco: Never Used Substance Use Topics  Alcohol use: No  
 
 
Family History: 
Family History Problem Relation Age of Onset  Other Mother   
     peptic ulcer  Alzheimer Father Review of Systems: 
 
Constitutional: negative fever, negative chills, negative weight loss Eyes:   negative visual changes ENT:   negative sore throat, tongue or lip swelling Respiratory:  negative cough, negative dyspnea Cards:  negative for chest pain, palpitations, lower extremity edema GI:   See HPI 
:  negative for frequency, dysuria Integument:  negative for rash and pruritus Heme:  negative for easy bruising and gum/nose bleeding Musculoskel: negative for myalgias, back pain and muscle weakness Neuro:  negative for headaches, dizziness, vertigo Psych: negative for feelings of anxiety, depression Objective:  
 
Patient Vitals for the past 8 hrs: 
 BP Temp Pulse Resp SpO2 Height Weight  
03/28/19 0758 192/65 98.3 °F (36.8 °C) 77 18 91 %    
03/28/19 0202 190/66 98.4 °F (36.9 °C) 72 16 96 % 5' 9\" (1.753 m) 59.4 kg (131 lb) No intake/output data recorded. No intake/output data recorded. PHYSICAL EXAM: 
General: Well developed, well nourished, elderly female Alert, cooperative, no acute distress.   
HEENT: NC, Atraumatic. PERRLA, EOMI. Anicteric sclerae. Lungs:  CTA Bilaterally. No Wheezing/Rhonchi/Rales. Heart:  Regular rate and rhythm, No murmurs, No Rubs, No Gallops Abdomen: Soft, non-distended, non-tender.  +Bowel sounds, no hepatosplenomegaly. Previous abdominal scar. Extremities: No cyanosis, clubbing, or edema. Neurologic:  Alert and oriented X 3. No acute neurological distress. Psych:   Good insight. Not anxious nor agitated. Data Review Recent Labs  
  03/28/19 0213 03/27/19 
1645 WBC 9.7 9.7 HGB 10.8* 11.4* HCT 38.3 40.9 * 114* Recent Labs  
  03/28/19 
0213 03/27/19 
1645  140  
K 3.5 3.5  107 CO2 25 28 BUN 16 13 CREA 2.98* 2.60* GLU 72 98 CA 8.2* 8.3* Recent Labs  
  03/27/19 
1645 SGOT 24  
* TP 6.6 ALB 2.5*  
GLOB 4.1* No results for input(s): INR, PTP, APTT in the last 72 hours. No lab exists for component: INREXT Imaging studies reviewed Assessment: 1. Diarrhea- differentials include: infectious gastroenteritis, C. Diff, medications, possible overflow diarrhea. 2. Anemia -multifactorial w/ ESRD, chronic disease,  possible GI source given occult positive stool. Hgb stable. No overt signs of GI bleeding. 3. PNA 4. UTI 5. ESRD 6. Chronic coagulation Patient Active Problem List  
Diagnosis Code  Abdominal pain R10.9  E-coli UTI N39.0, B96.20  Continuous severe abdominal pain R10.9  Enteritis K52.9  Adrenal hemorrhage ([facility]) E27.49  Chronic renal insufficiency, stage V ([facility]) N18.5  Sepsis ([facility] [address] A41.9  Acute pancreatitis K85.90  Protein malnutrition ([facility] [address] E46  Small bowel perforation ([facility]) K63.1  Acute renal failure with lesion of tubular necrosis ([facility]) N17.0  Anemia of renal disease D63.1  SIRS (systemic inflammatory response syndrome) ([facility]) R65.10  
 HTN (hypertension) I10  
 History of peritonitis Z87.19  
 Physical debility R53.81  Chronic anticoagulation Z79.01  
 History of pulmonary embolism Z86.711  
 History of ovarian cancer Z85.43  
 Pericardial effusion I31.3  Diarrhea R19.7  Moderate protein-calorie malnutrition ([facility]) E44.0  Pericarditis with effusion I31.9  Hypertension, uncontrolled I10  
 ALAYNA (acute kidney injury) ([facility] [address] N17.9  Acute on chronic renal failure (HCC) N17.9, N18.9  Generalized rash R21  
 Heme positive stool R19.5  Electrolyte abnormality E87.8  Bilateral carotid artery stenosis I65.23  
 Acute renal failure (ARF) ([facility]) N17.9  Abnormal MRI of head R93.0  Stenosis of both internal carotid arteries I65.23  
 Cerebral microvascular disease I67.9  Convulsion disorder ([facility] [address] R56.9  Altered mental status, unspecified R41.82  
 Acute encephalopathy G93.40  Stenosis of left carotid artery without cerebral infarction I65.22  
 Disturbance of memory R41.3  Anxiety F41.9  Pneumonia J18.9  Thrombocytopenia ([facility] [address] D69.6  Hypertension I10  
 GI bleed K92.2  
 ESRD (end stage renal disease) ([facility] [address] N18.6  Fever R50.9 Plan:  
-Stool studies (culture, O&P, WBC, C. Diff) -Monitor labs 
-Consider colonoscopy after infection is ruled out. She will need a double prep 
-Discussed with Dr. Monse Martínez  
-Will follow along with you -Thank you kindly for allowing us to participate in the care of this patient I have personally reviewed the history and independently examined the patient. I have reviewed the chart and agree with the documentation recorded by the Mid Level Provider, including the assessment, treatment plan, and disposition.  
 
 
Earl Zhu MD

## 2019-03-28 NOTE — PROGRESS NOTES
Bedside and Verbal shift change report given to Warren Ortiz (oncoming nurse) by Dai Garza (offgoing nurse). Report included the following information SBAR, Kardex, Recent Results, Cardiac Rhythm NSR and Quality Measures.

## 2019-03-28 NOTE — PROGRESS NOTES
Care Management Interventions PCP Verified by CM: Yes 
Palliative Care Criteria Met (RRAT>21 & CHF Dx)?: No 
Mode of Transport at Discharge: Other (see comment) MyChart Signup: Yes Discharge Durable Medical Equipment: No 
Health Maintenance Reviewed: Yes Physical Therapy Consult: No 
Occupational Therapy Consult: No 
Speech Therapy Consult: No 
Current Support Network: Lives with Spouse, Own Home Confirm Follow Up Transport: Family Plan discussed with Pt/Family/Caregiver: Yes [employer] Information Provided?: No 
Discharge Location Discharge Placement: Home with family assistance Reason for Admission:   GI bleed, h/o PE, and CKD, ovarian ca. RRAT Score:     19 Do you (patient/family) have any concerns for transition/discharge? Plan for utilizing home health:     Not appropriate at this time. Likelihood of readmission?   moderate Transition of Care Plan:       
Chart reviewed for transitions of care, and discussed in rounds. CM met with patient at bedside to explain role and offer support. Patient is alert and oriented x4, and confirmed demographics. She lives at home with her supportive , Ankur Sands who can provide transportation upon discharge. Patient transports herself MWF to [facility] Dialysis unit in [place]. Her prescriptions are filled at [facility], and she wishes to have cm schedule follow-up appointments after discharge. CM will send referral to CM Specialist for the above. CM will follow for any other discharge needs.  
 
[facility]

## 2019-03-28 NOTE — PROGRESS NOTES
Admission Medication Reconciliation: 
Information obtained from: patient and her  Significant PMH/Disease States:  
Past Medical History:  
Diagnosis Date Chronic kidney disease Stage 5 (7/18/16 BUN 79, Creatnine 4.53, K 6) Dr. Sasha Harding [phone] GERD (gastroesophageal reflux disease)   
 well controlled with Rx High cholesterol Hyperkalemia Hypertension Hypomagnesemia   
 on daily Magnesium Neuropathy   
 bilateral feet Ovarian cancer [facility]) 2013 Hysterectomy with chemo, no radiation:  Dr. Rosio Galicia Thromboembolus ([address] 9/2015  
 blood clot in lung 9/2015 Thromboembolus ([address] 2004  
 in kidney \"many years ago\" Chief Complaint for this Admission:  gi bleed, diarrhea, pneumonia Allergies:  Citrus and derivatives; Penicillin g; Orange juice; Sulfa (sulfonamide antibiotics); and Vancomycin Prior to Admission Medications:  
Prior to Admission Medications Prescriptions Last Dose Informant Patient Reported? Taking? ELIQUIS 2.5 mg tablet 3/27/2019 at am  No Yes Sig: TAKE 1 TABLET TWICE A DAY TO PREVENT BLOOD CLOTS  
L. acidoph & paracasei- S therm- Bifido (TY-Q/RISAQUAD) 8 billion cell cap cap 3/27/2019 at am  Yes Yes Sig: Take 1 Cap by mouth daily. acetaminophen (TYLENOL) 500 mg tablet   Yes Yes Sig: Take 1,000 mg by mouth every six (6) hours as needed for Pain. amLODIPine (NORVASC) 10 mg tablet 3/26/2019 at pm  No Yes Sig: TAKE 1 TABLET EVERY DAY FOR BLOOD PRESSURE Patient taking differently: TAKE 1 TABLET EVERY night FOR BLOOD PRESSURE  
atorvastatin (LIPITOR) 20 mg tablet 3/26/2019 at pm  No Yes Sig: Take 1 Tab by mouth nightly. b complex-vitamin c-folic acid (NEPHROCAPS) 1 mg capsule 3/27/2019 at Unknown time  Yes Yes Sig: Take 1 Cap by mouth daily. buPROPion (WELLBUTRIN) 100 mg tablet 3/26/2019 at Unknown time  Yes Yes Sig: Take 100 mg by mouth nightly. diphenoxylate-atropine (LOMOTIL) 2.5-0.025 mg per tablet   No Yes Sig: Take 1 Tab by mouth four (4) times daily as needed for Diarrhea. Max Daily Amount: 4 Tabs. escitalopram oxalate (LEXAPRO) 10 mg tablet 3/26/2019 at hs  No Yes Sig: TAKE 1 TABLET AT BEDTIME TO HELP PREVENT ANXIETY  
famotidine (PEPCID) 20 mg tablet 3/26/2019 at hs  No Yes Sig: TAKE 1 TABLET BY MOUTH EVERY DAY  
gabapentin (NEURONTIN) 100 mg capsule 3/27/2019 at am  No Yes Sig: TAKE 1 CAPSULE BY MOUTH TWICE A DAY FOR NERVE PAIN  
hydrALAZINE (APRESOLINE) 100 mg tablet 3/27/2019 at am  Yes Yes Sig: Take 100 mg by mouth three (3) times daily. iron, carbonyl (FEOSOL) 45 mg tab 3/27/2019 at Unknown time  Yes Yes Sig: Take 1 Tab by mouth two (2) times a day. linaclotide (LINZESS) 290 mcg cap capsule   Yes No  
Sig: Take 290 mcg by mouth daily as needed. losartan (COZAAR) 100 mg tablet 3/26/2019 at hs  No Yes Sig: TAKE 1 TABLET EVERY MORNING Patient taking differently: TAKE 1 TABLET EVERY night  
memantine (NAMENDA) 10 mg tablet 3/26/2019 at Unknown time  Yes Yes Sig: Take 10 mg by mouth nightly. ondansetron (ZOFRAN ODT) 4 mg disintegrating tablet   No No  
Sig: TAKE 1 TABLET EVERY 8 HOURS IF NEEDED FOR NAUSEA  
oxybutynin (DITROPAN) 5 mg tablet 3/27/2019 at am  No Yes Sig: TAKE 1 TABLET BY MOUTH TWICE A DAY  
pramipexole (MIRAPEX) 0.25 mg tablet 3/20/2019 at Unknown time  Yes Yes Sig: TAKE 1 TABLET BEFORE EACH DIALYSIS  MWF  
sevelamer (RENAGEL) 800 mg tablet 3/27/2019 at Unknown time  Yes Yes Sig: Take 800 mg by mouth as needed (with snacks). Indications: Renal Osteodystrophy with Hyperphosphatemia  
sevelamer carbonate (RENVELA) 800 mg tab tab   Yes No  
Sig: Take 4,000 mg by mouth three (3) times daily (with meals). 5 with meals and 1 with snacks Facility-Administered Medications: None Comments/Recommendations: Medication review done with patient and her . They closely manage her medications. Changed bupropion from TID. Changed memantine from BID Removed epoetin and EMLA cream. 
Linaclotide is taken prn but hasn't been needed in several weeks. Losartan, amlodipine, atorvastatin, bupropion, escitalopram and famotidine are taken at HS. Pramipexole is given only on dialysis days (MWF). Allergies reviewed.

## 2019-03-28 NOTE — CONSULTS
Consult noted Will see pt this morning Keely Martinez MD 
[address] [facility] Office :[phone] Fax: [phone]'

## 2019-03-28 NOTE — PROGRESS NOTES
Hospitalist Progress Note Mariela Steele MD 
Answering service: [phone] or [phone] from in house phone Date of Service:  3/28/2019 NAME:  Sunitha Nesbitt YOB: 1945 MRN:  [id number] Admission Summary: A 68-year-old white female with past medical history of end-stage renal disease, on hemodialysis, GERD, hypertension, hyperlipidemia, peripheral neuropathy, ovarian cancer, PE, renal vein thrombosis, tobacco abuse, presented to the emergency department from [address] [place] [address] with reported chief complaint of diarrhea. The patient's  is present, provided some additional history Interval history / Subjective:  
No diarrhea overnight ,  
BP high probably missed her night meds Assessment & Plan: 1. Gastrointestinal bleed. the patient is on Eliquis as long-term anticoagulation for PE  
-  Hold Eliquis tonight. Consult with gastroenterologist in the a.m. -  Anemia  is chronic may be from Chronic blood loss anemia vs anemia of chronic dz from, but she is heme-positive - MGMT per GI 2. Pneumonia - based on chest x-ray and CT reports.   
- Continue on levofloxacin for IV antibiotics. 3.  Fever. Continue with levofloxacin IV for antibiotic coverage. Follow up cultures 4. Diarrhea. Check stools for Clostridium difficile, stool culture, ova and parasites. - no diarrhea as per RN 
 
5. End-stage renal disease, hemodialysis on , , and .   
- Consult nephrologist. 
 
6.  Anemia of chronic dz from ESRD. Repeat H and H. 
 
7.  Thrombocytopenia. Repeat platelet count. 8.  History of pulmonary embolism. The patient is on long-term anticoagulation. Hold Eliquis as mentioned for now. 9.  Uncontrolled hypertension. Resume home medication. 10.  Gastroesophageal reflux disease. Order Protonix. 11.  Tobacco abuse. Encourage smoking cessation. Order nicotine patch. 12.  Gait abnormality. Place on fall precautions. 13.  Lower extremity edema. No DVT per duplex Code status: Full DVT prophylaxis: SCD Care Plan discussed with: Patient/Family and Nurse Disposition: TBD Hospital Problems  Date Reviewed: 3/27/2019 Codes Class Noted POA Diarrhea ICD-10-CM: R19.7 ICD-9-CM: 787.91 Present on Admission 12/17/2015 Unknown Pneumonia ICD-10-CM: J18.9 ICD-9-CM: 296  3/27/2019 Unknown Thrombocytopenia ([facility] ICD-10-CM: D69.6 ICD-9-CM: 287.5  3/27/2019 Unknown Hypertension ICD-10-CM: I10 
ICD-9-CM: 401.9  3/27/2019 Unknown * (Principal) GI bleed ICD-10-CM: K92.2 ICD-9-CM: 578.9  3/27/2019 Unknown ESRD (end stage renal disease) ([facility] ICD-10-CM: N18.6 ICD-9-CM: 585.6  3/27/2019 Unknown Fever ICD-10-CM: R50.9 ICD-9-CM: 780.60  3/27/2019 Unknown Review of Systems: A comprehensive review of systems was negative. Vital Signs:  
 Last 24hrs VS reviewed since prior progress note. Most recent are: 
Visit Vitals /65 (BP 1 Location: Right arm, BP Patient Position: At rest) Pulse 77 Temp 98.3 °F (36.8 °C) Resp 18 Ht 5' 9\" (1.753 m) Wt 59.4 kg (131 lb) SpO2 91% BMI 19.35 kg/m² No intake or output data in the 24 hours ending 03/28/19 0819 Physical Examination:  
 
 
     
Constitutional:  No acute distress, cooperative, pleasant   
ENT:  Oral mucous moist, oropharynx benign. Neck supple, Resp:  CTA bilaterally. No wheezing/rhonchi/rales. No accessory muscle use CV:  Regular rhythm, normal rate, no murmurs, gallops, rubs GI:  Soft, non distended, non tender. normoactive bowel sounds, no hepatosplenomegaly Musculoskeletal:  No edema, warm, 2+ pulses throughout Neurologic:  Moves all extremities. AAOx3, CN II-XII reviewed Psych:  Good insight, Not anxious nor agitated. Data Review:  
 I personally reviewed  Image and labs Labs:  
 
Recent Labs 03/28/19 
3291 03/27/19 
1645 WBC 9.7 9.7 HGB 10.8* 11.4* HCT 38.3 40.9 * 114* Recent Labs  
  03/28/19 
0213 03/27/19 
1645  140  
K 3.5 3.5  107 CO2 25 28 BUN 16 13 CREA 2.98* 2.60* GLU 72 98 CA 8.2* 8.3* Recent Labs  
  03/27/19 
1645 SGOT 24 ALT 16  
* TBILI 0.4 TP 6.6 ALB 2.5*  
GLOB 4.1* No results for input(s): INR, PTP, APTT in the last 72 hours. No lab exists for component: INREXT No results for input(s): FE, TIBC, PSAT, FERR in the last 72 hours. No results found for: FOL, RBCF No results for input(s): PH, PCO2, PO2 in the last 72 hours. No results for input(s): CPK, CKNDX, TROIQ in the last 72 hours. No lab exists for component: CPKMB Lab Results Component Value Date/Time Cholesterol, total 116 03/30/2017 02:27 AM  
 HDL Cholesterol 55 03/30/2017 02:27 AM  
 LDL, calculated 31.2 03/30/2017 02:27 AM  
 Triglyceride 149 03/30/2017 02:27 AM  
 CHOL/HDL Ratio 2.1 03/30/2017 02:27 AM  
 
Lab Results Component Value Date/Time Glucose (POC) 111 (H) 03/31/2017 04:43 PM  
 Glucose (POC) 116 (H) 03/31/2017 11:18 AM  
 Glucose (POC) 93 03/31/2017 06:56 AM  
 Glucose (POC) 131 (H) 03/30/2017 09:12 PM  
 Glucose (POC) 94 03/30/2017 06:56 AM  
 
Lab Results Component Value Date/Time  Color YELLOW/STRAW 03/29/2017 05:30 PM  
 Appearance CLOUDY (A) 03/29/2017 05:30 PM  
 Specific gravity 1.020 03/29/2017 05:30 PM  
 Specific gravity 1.010 03/04/2016 09:00 PM  
 pH (UA) 7.0 03/29/2017 05:30 PM  
 Protein 100 (A) 03/29/2017 05:30 PM  
 Glucose NEGATIVE  03/29/2017 05:30 PM  
 Ketone NEGATIVE  03/29/2017 05:30 PM  
 Bilirubin NEGATIVE  03/29/2017 05:30 PM  
 Urobilinogen 0.2 03/29/2017 05:30 PM  
 Nitrites NEGATIVE  03/29/2017 05:30 PM  
 Leukocyte Esterase LARGE (A) 03/29/2017 05:30 PM  
 Epithelial cells FEW 03/29/2017 05:30 PM  
 Bacteria 4+ (A) 03/29/2017 05:30 PM  
 WBC 20-50 03/29/2017 05:30 PM  
 RBC  03/29/2017 05:30 PM  
 
 
 
Medications Reviewed:  
 
Current Facility-Administered Medications Medication Dose Route Frequency  sodium chloride (NS) flush 5-40 mL  5-40 mL IntraVENous Q8H  
 sodium chloride (NS) flush 5-40 mL  5-40 mL IntraVENous PRN  
 acetaminophen (TYLENOL) tablet 650 mg  650 mg Oral Q6H PRN  
 nicotine (NICODERM CQ) 21 mg/24 hr patch 1 Patch  1 Patch TransDERmal DAILY  pantoprazole (PROTONIX) 40 mg in sodium chloride 0.9% 10 mL injection  40 mg IntraVENous DAILY  [START ON 3/29/2019] levoFLOXacin (LEVAQUIN) 500 mg in D5W IVPB  500 mg IntraVENous Q48H  
 
______________________________________________________________________ EXPECTED LENGTH OF STAY: - - - 
ACTUAL LENGTH OF STAY:          1 Megan Hurt MD

## 2019-03-28 NOTE — PROGRESS NOTES
[facility] 
 [address] [facility], [address] [facility] Phone: [phone]   [phone]   
  
Nephrology Progress Note Shae Jiménez     1945     [id number] Date of Admission : 3/27/2019 
03/28/19 CC:  Follow up for ESRD Assessment and Plan ESRD- HD : 
- dialyzes MWF at [address] [place] [address] tomorrow per schedule Anemia in CKD  
- continue ANAHI at lower dose Chronic Diarrhea : 
- hx SB perf and ilesotomy  
- hx of Chronic Pancreatitis - GI following [facility] Hx of VTE Hx of Ovarian Ca : S/p Sx + Chemo Interval History: Ms Lennie Olsen has ESRD and dialysis dependent for a year She has an LUE AVF for access She was admitted after dialysis yesterday for worsening diarrhea for 2 weeks She has hx of bowel problems in the past and is being seen by GI now She has not been having any problems w/ dialysis Review of Systems: A comprehensive review of systems was negative. Current Medications:  
Current Facility-Administered Medications Medication Dose Route Frequency  sodium chloride (NS) flush 5-40 mL  5-40 mL IntraVENous Q8H  
 sodium chloride (NS) flush 5-40 mL  5-40 mL IntraVENous PRN  
 atorvastatin (LIPITOR) tablet 20 mg  20 mg Oral QHS  hydrALAZINE (APRESOLINE) tablet 100 mg  100 mg Oral TID  losartan (COZAAR) tablet 100 mg  100 mg Oral QHS  amLODIPine (NORVASC) tablet 10 mg  10 mg Oral QHS  gabapentin (NEURONTIN) capsule 100 mg  100 mg Oral BID  memantine (NAMENDA) tablet 10 mg  10 mg Oral QHS  oxybutynin (DITROPAN) tablet 5 mg  5 mg Oral DAILY  pramipexole (MIRAPEX) tablet 0.25 mg  0.25 mg Oral QHS  hydrALAZINE (APRESOLINE) 20 mg/mL injection 20 mg  20 mg IntraVENous Q6H PRN  
 acetaminophen (TYLENOL) tablet 650 mg  650 mg Oral Q6H PRN  
 nicotine (NICODERM CQ) 21 mg/24 hr patch 1 Patch  1 Patch TransDERmal DAILY  pantoprazole (PROTONIX) 40 mg in sodium chloride 0.9% 10 mL injection 40 mg IntraVENous DAILY  [START ON 3/29/2019] levoFLOXacin (LEVAQUIN) 500 mg in D5W IVPB  500 mg IntraVENous Q48H Allergies Allergen Reactions  Citrus And Derivatives Anaphylaxis Patient and her  reported  Penicillin G Anaphylaxis  Orange Juice Not Reported This Time  Sulfa (Sulfonamide Antibiotics) Other (comments) \"Dont remember\"  Vancomycin Hives Objective: 
Vitals:   
Vitals:  
 03/27/19 2200 03/28/19 0005 03/28/19 0202 03/28/19 1059 BP: 173/48 (!) 158/91 190/66 192/65 Pulse: 67 69 72 77 Resp: 15 15 16 18 Temp:  98 °F (36.7 °C) 98.4 °F (36.9 °C) 98.3 °F (36.8 °C) SpO2: 94% 97% 96% 91% Weight:   59.4 kg (131 lb) Height:   5' 9\" (1.753 m) Intake and Output: 
No intake/output data recorded. No intake/output data recorded. Physical Examination: 
General: NAD,Conversant Neck:  Supple, no mass Resp:  Lungs CTA B/L, no wheezing , normal respiratory effort CV:  RRR,  no murmur or rub, no LE edema GI:  Soft, NT, + Bowel sounds, no hepatosplenomegaly Neurologic:  Non focal 
Psych:             AAO x 3 appropriate affect Skin:  No Rash Ext                   LUE AVF + thrill  
 
[]    High complexity decision making was performed 
[]    Patient is at high-risk of decompensation with multiple organ involvement Lab Data Personally Reviewed: I have reviewed all the pertinent labs, microbiology data and radiology studies during assessment. Recent Labs  
  03/28/19 0213 03/27/19 
1645  140  
K 3.5 3.5  107 CO2 25 28 GLU 72 98 BUN 16 13 CREA 2.98* 2.60* CA 8.2* 8.3* ALB  --  2.5* SGOT  --  24 ALT  --  16 Recent Labs  
  03/28/19 
0213 03/27/19 
1645 WBC 9.7 9.7 HGB 10.8* 11.4* HCT 38.3 40.9 * 114* No results found for: SDES Lab Results Component Value Date/Time  Culture result: NO GROWTH AFTER 10 HOURS 03/27/2019 06:54 PM  
 Culture result: NO GROWTH AFTER 10 HOURS 03/27/2019 06:54 PM  
 Culture result: MRSA NOT PRESENT 03/08/2016 12:17 PM  
 Culture result:  03/08/2016 12:17 PM  
      Screening of patient nares for MRSA is for surveillance purposes and, if positive, to facilitate isolation considerations in high risk settings. It is not intended for automatic decolonization interventions per se as regimens are not sufficiently effective to warrant routine use. Culture result: ESCHERICHIA COLI 03/04/2016 09:00 PM  
 
Recent Results (from the past 24 hour(s)) SAMPLES BEING HELD Collection Time: 03/27/19  4:45 PM  
Result Value Ref Range SAMPLES BEING HELD 1BLU 1RED 1PST 1LAV   
 COMMENT Add-on orders for these samples will be processed based on acceptable specimen integrity and analyte stability, which may vary by analyte. METABOLIC PANEL, COMPREHENSIVE Collection Time: 03/27/19  4:45 PM  
Result Value Ref Range Sodium 140 136 - 145 mmol/L Potassium 3.5 3.5 - 5.1 mmol/L Chloride 107 97 - 108 mmol/L  
 CO2 28 21 - 32 mmol/L Anion gap 5 5 - 15 mmol/L Glucose 98 65 - 100 mg/dL BUN 13 6 - 20 MG/DL Creatinine 2.60 (H) 0.55 - 1.02 MG/DL  
 BUN/Creatinine ratio 5 (L) 12 - 20 GFR est AA 22 (L) >60 ml/min/1.73m2 GFR est non-AA 18 (L) >60 ml/min/1.73m2 Calcium 8.3 (L) 8.5 - 10.1 MG/DL Bilirubin, total 0.4 0.2 - 1.0 MG/DL  
 ALT (SGPT) 16 12 - 78 U/L  
 AST (SGOT) 24 15 - 37 U/L Alk. phosphatase 119 (H) 45 - 117 U/L Protein, total 6.6 6.4 - 8.2 g/dL Albumin 2.5 (L) 3.5 - 5.0 g/dL Globulin 4.1 (H) 2.0 - 4.0 g/dL A-G Ratio 0.6 (L) 1.1 - 2.2    
CBC WITH AUTOMATED DIFF Collection Time: 03/27/19  4:45 PM  
Result Value Ref Range WBC 9.7 3.6 - 11.0 K/uL  
 RBC 4.29 3.80 - 5.20 M/uL  
 HGB 11.4 (L) 11.5 - 16.0 g/dL HCT 40.9 35.0 - 47.0 % MCV 95.3 80.0 - 99.0 FL  
 MCH 26.6 26.0 - 34.0 PG  
 MCHC 27.9 (L) 30.0 - 36.5 g/dL  
 RDW 17.3 (H) 11.5 - 14.5 % PLATELET 744 (L) 081 - 400 K/uL  MPV 10.3 8.9 - 12.9 FL  
 NRBC 0.0 0  WBC ABSOLUTE NRBC 0.00 0.00 - 0.01 K/uL NEUTROPHILS 74 32 - 75 % LYMPHOCYTES 10 (L) 12 - 49 % MONOCYTES 15 (H) 5 - 13 % EOSINOPHILS 0 0 - 7 % BASOPHILS 0 0 - 1 % IMMATURE GRANULOCYTES 1 (H) 0.0 - 0.5 % ABS. NEUTROPHILS 7.1 1.8 - 8.0 K/UL  
 ABS. LYMPHOCYTES 1.0 0.8 - 3.5 K/UL  
 ABS. MONOCYTES 1.5 (H) 0.0 - 1.0 K/UL  
 ABS. EOSINOPHILS 0.0 0.0 - 0.4 K/UL  
 ABS. BASOPHILS 0.0 0.0 - 0.1 K/UL  
 ABS. IMM. GRANS. 0.1 (H) 0.00 - 0.04 K/UL  
 DF SMEAR SCANNED    
 RBC COMMENTS HYPOCHROMIA 1+ 
    
 RBC COMMENTS OVALOCYTES PRESENT 
    
 RBC COMMENTS ANISOCYTOSIS 
1+ POC LACTIC ACID Collection Time: 03/27/19  6:51 PM  
Result Value Ref Range Lactic Acid (POC) 0.68 0.40 - 2.00 mmol/L  
OCCULT BLOOD, STOOL Collection Time: 03/27/19  6:54 PM  
Result Value Ref Range Occult blood, stool POSITIVE (A) NEG    
CULTURE, BLOOD Collection Time: 03/27/19  6:54 PM  
Result Value Ref Range Special Requests: NO SPECIAL REQUESTS Culture result: NO GROWTH AFTER 10 HOURS    
CULTURE, BLOOD Collection Time: 03/27/19  6:54 PM  
Result Value Ref Range Special Requests: NO SPECIAL REQUESTS Culture result: NO GROWTH AFTER 10 HOURS INFLUENZA A & B AG (RAPID TEST) Collection Time: 03/27/19  6:55 PM  
Result Value Ref Range Influenza A Antigen NEGATIVE  NEG Influenza B Antigen NEGATIVE  NEG    
EKG, 12 LEAD, INITIAL Collection Time: 03/27/19  9:44 PM  
Result Value Ref Range Ventricular Rate 64 BPM  
 Atrial Rate 64 BPM  
 P-R Interval 158 ms QRS Duration 104 ms Q-T Interval 462 ms QTC Calculation (Bezet) 476 ms Calculated P Axis 58 degrees Calculated R Axis -2 degrees Calculated T Axis 22 degrees Diagnosis Normal sinus rhythm Left ventricular hypertrophy When compared with ECG of 29-MAR-2017 16:12, Non-specific change in ST segment in Anterior leads T wave inversion now evident in Inferior leads QT has lengthened Confirmed by Violette Stallings MD, Joesph Banerjee ([id number]) on 3/28/2019 9:23:40 AM 
  
METABOLIC PANEL, BASIC Collection Time: 03/28/19  2:13 AM  
Result Value Ref Range Sodium 140 136 - 145 mmol/L Potassium 3.5 3.5 - 5.1 mmol/L Chloride 108 97 - 108 mmol/L  
 CO2 25 21 - 32 mmol/L Anion gap 7 5 - 15 mmol/L Glucose 72 65 - 100 mg/dL BUN 16 6 - 20 MG/DL Creatinine 2.98 (H) 0.55 - 1.02 MG/DL  
 BUN/Creatinine ratio 5 (L) 12 - 20 GFR est AA 19 (L) >60 ml/min/1.73m2 GFR est non-AA 15 (L) >60 ml/min/1.73m2 Calcium 8.2 (L) 8.5 - 10.1 MG/DL  
CBC WITH AUTOMATED DIFF Collection Time: 03/28/19  2:13 AM  
Result Value Ref Range WBC 9.7 3.6 - 11.0 K/uL  
 RBC 4.03 3.80 - 5.20 M/uL  
 HGB 10.8 (L) 11.5 - 16.0 g/dL HCT 38.3 35.0 - 47.0 % MCV 95.0 80.0 - 99.0 FL  
 MCH 26.8 26.0 - 34.0 PG  
 MCHC 28.2 (L) 30.0 - 36.5 g/dL  
 RDW 17.4 (H) 11.5 - 14.5 % PLATELET 000 (L) 587 - 400 K/uL MPV 11.3 8.9 - 12.9 FL  
 NRBC 0.0 0  WBC ABSOLUTE NRBC 0.00 0.00 - 0.01 K/uL NEUTROPHILS 71 32 - 75 % LYMPHOCYTES 9 (L) 12 - 49 % MONOCYTES 17 (H) 5 - 13 % EOSINOPHILS 1 0 - 7 % BASOPHILS 1 0 - 1 % IMMATURE GRANULOCYTES 1 (H) 0.0 - 0.5 % ABS. NEUTROPHILS 6.9 1.8 - 8.0 K/UL  
 ABS. LYMPHOCYTES 0.9 0.8 - 3.5 K/UL  
 ABS. MONOCYTES 1.6 (H) 0.0 - 1.0 K/UL  
 ABS. EOSINOPHILS 0.1 0.0 - 0.4 K/UL  
 ABS. BASOPHILS 0.1 0.0 - 0.1 K/UL  
 ABS. IMM. GRANS. 0.1 (H) 0.00 - 0.04 K/UL  
 DF SMEAR SCANNED    
 RBC COMMENTS ANISOCYTOSIS 1+ 
    
 RBC COMMENTS POLYCHROMASIA PRESENT 
    
 RBC COMMENTS MACROCYTOSIS 
1+ I have reviewed the flowsheets. Chart and Pertinent Notes have been reviewed. No change in PMH ,family and social history from Consult note.  
 
 
Kim Choe MD

## 2019-03-28 NOTE — PROGRESS NOTES
NUTRITION 
 
RECOMMENDATIONS:  
 
1. Continue with current diet order, but add renal restriction if lab values fall out of desired ranges 2. Please weigh every Sunday RD INTERVENTION:   
Food/Nutrient Delivery:  No supplements desired but pt asking for a little salt and butter with meals--RD has Ok'ed this Nutrition Education: ,   
Nutrition Counseling:   
Coordination of Care:    
ASSESSMENT:  
Reason for Assessment:  [] MD Consult   [x]MST Referral/ BPA   []LOS   []Follow Up   [] Other PMHx:  Pt is a 75year old female admitted for diarrhea, PNA, GI bleed. Pt has h/o ESRD on HD 3x/week, current daily smoker, HTN, ovarian cancer (s/p hysterectomy), blood in stools. Per notes pt likely will need an EGD and colonoscopy to look for cause of bloody stools. Pt reports that she has had a good appetite, and she does not skip meals. She reports that she would have eaten more for breakfast and dinner last night, but she very much wanted a little salt to put on some items. RD has ok'ed salt packet x2, plus 2 butter packets, to be sent on each tray. In light of pt's weight loss (she is thin but not cachectic), I want her to eat what she can. Pt very appreciative, and voiced understanding of the importance of trying to prevent any further weight loss. Nutrition History: 
Usual Appetite: Good Vitamins/Supplements: No 
 
Current Nutritional Intake:  
Diet Order: Cardiac Appetite: Good PO Ability: Independent Documented %PO Intake: 
No data found. Avg %meal intake: Average supplements intake: Average kcal/pro intake:   
Estimated Nutrition Needs Met based on Average %meal intake: 
Calories %:   
Protein %:   
  
 
Anthropometrics:  
Last 3 Recorded Weights in this Encounter 03/28/19 0202 Weight: 59.4 kg (131 lb) Weight Source: Standing scale (comment) Height: 5' 9\" (175.3 cm), Body mass index is 19.35 kg/m².  
 ,  ,  ,   
 
 
 Estimated Daily Nutrition Requirements: Weight Used: Actual wt Kcals: (~ 3622-0098 kcals) Based on:Kcal/kg - specify (Comment)(30-35 kcals/kg for ESRD) Protein: (1.2-1.4 gm pro/kg) Fluid: (~ 1 ml/kcal) GI / Oral / Respiratory Concerns Abd:  Non-tender BM:  Multiple episodes of diarrhea GI Complaint(s): Diarrhea; Chew/Swallow: No issues;  ; 
 ;   
 
Skin Integrity: []Intact  [x]Other--has dialysis port Edema: [x]None []Other Food Allergies: []None [x]Other --citrus/orange juice Nutritionally Significant Labs & Medications: [x] Reviewed Education & Discharge Needs: 
 [x] None Identified 
 [] Identified and addressed [x] Participated in care plan, discharge planning, Interdisciplinary rounds Diet Restrictions: 
Cultural/Christianity Preference(s): None NUTRITION DIAGNOSIS:  
 
Unintended weight loss related to diarrhea for past 1-2 months as evidenced by pt reporting some weight loss but unsure of exact amount Pt is at Nutrition Risk:  [] No Nutrition Risk Identified:  [x] PT GOALS:  
 
1. Pt will tolerate at least 75% of meals over the next week MONITORING & EVALUATION:  
 Food/Nutrient Intake Outcomes: Total energy intake Physical Signs/Symptoms Outcomes: Weight/weight change, Electrolyte and renal profile Previous Nutrition Goals: 
Previous Goal Met: N/A Previous Recommendations:     
Previous Recommendations Implemented: N/A Grace Basurto, [address] [place] [address], [address]

## 2019-03-28 NOTE — ED NOTES
2047 Bedside shift change report given to Allen Victoria RN (oncoming nurse) by Shelley Stone RN (offgoing nurse). Report included the following information SBAR and ED Summary. 2145 Dr. Td Lewis @ bedside 0005 paged vascular regarding doppler; ETA ~30min 0034 TRANSFER - OUT REPORT: 
 
Verbal report given to Nish Vences RN (name) on Joana Screen  being transferred to 3N (unit) for routine progression of care Report consisted of patients Situation, Background, Assessment and  
Recommendations(SBAR). Information from the following report(s) SBAR and ED Summary was reviewed with the receiving nurse. Lines:  
Peripheral IV 03/27/19 Right Antecubital (Active) Opportunity for questions and clarification was provided. 2041 pt ambulated to restroom with steady gait and no assistance

## 2019-03-29 NOTE — PROGRESS NOTES
Deferred visit: Referral received and acknowledged. Patient is up ambulating independently in the hallway with no AD. Discussed PT with patient and  and they are both in agreement that therapy is not indicated. Patient hopes to be discharged after dialysis. Will complete the order.

## 2019-03-29 NOTE — DISCHARGE SUMMARY
Discharge Summary PATIENT ID: Sujey Hurtado MRN: [id number] YOB: 1945 DATE OF ADMISSION: 3/27/2019  6:06 PM   
DATE OF DISCHARGE: 3/29/19 PRIMARY CARE PROVIDER: Rafiq Rebolledo MD  
 
ATTENDING PHYSICIAN: Swathi Sheriff DISCHARGING PROVIDER: Zoya uRsh MD   
To contact this individual call [phone] and ask the  to page. If unavailable ask to be transferred the Adult Hospitalist Department. CONSULTATIONS: IP CONSULT TO HOSPITALIST 
IP CONSULT TO GASTROENTEROLOGY 
IP CONSULT TO NEPHROLOGY PROCEDURES/SURGERIES: * No surgery found * [address] [facility] COURSE:  
 A 71-year-old white female with past medical history of end-stage renal disease, on hemodialysis, GERD, hypertension, hyperlipidemia, peripheral neuropathy, ovarian cancer, PE, renal vein thrombosis, tobacco abuse, presented to the emergency department from [address] [place] [address] with reported chief complaint of diarrhea.  The patient's  is present, provided some additional history 
  
Interval history / Subjective:  
No diarrhea overnight , I ma dressed up to go home Awaiting HD and evaluation by GI  
  
Assessment & Plan:  
  
1.Gastrointestinal bleed. the patient is on Eliquis as long-term anticoagulation for PE  
-   Pt Hgb trend always to 10  Range no new blood seen -  Anemia  is chronic may be from Chronic blood loss anemia vs anemia of chronic dz from, but she is heme-positive will resume eliquis on d/c 
  
2.  Pneumonia - based on chest x-ray and CT reports.   
- Continue on levofloxacin for IV antibiotics. Complete course no SOB or respiratory complains 
  
3.  Fever.   Continue with levofloxacin IV for antibiotic coverage. Follow up cultures NGTD 
  
4.  Diarrhea.  Check stools for Clostridium difficile, stool culture, ova and parasites.  
- no diarrhea as per RN and per pt as well not on linzess for > 1 year 
  
 5.  End-stage renal disease, hemodialysis on Mondays, Wednesdays, and Fridays.   
- Consult nephrologist. 
  
6.  Anemia of chronic dz from ESRD.  Repeat H and H. 
  
7.  Thrombocytopenia.  Repeat platelet count. 
  
8.  History of pulmonary embolism.  The patient is on long-term anticoagulation. 
  
9.  Uncontrolled hypertension.   Resume home medication. 
  
10.  Gastroesophageal reflux disease.  Order Protonix. 
  
11.  Tobacco abuse.  Encourage smoking cessation.  Order nicotine patch. 
  
12.  Gait abnormality.  Place on fall precautions. 
  
13.  Lower extremity edema.  No DVT per duplex 
   
 
 
 
 
 
 
DISCHARGE DIAGNOSES / PLAN:   
 
1.  d/c home ADDITIONAL CARE RECOMMENDATIONS:  
 
PENDING TEST RESULTS:  
At the time of discharge the following test results are still pending: FOLLOW UP APPOINTMENTS:   
Follow-up Information Follow up With Specialties Details Why Contact Info Ree Jameson MD Internal Medicine In 1 week  [address] [facility]. 
[phone] DIET: Cardiac Diet and Renal Diet ACTIVITY: Activity as tolerated WOUND CARE:  
 
EQUIPMENT needed:  
 
 
DISCHARGE MEDICATIONS: 
Current Discharge Medication List  
  
START taking these medications Details  
levoFLOXacin (LEVAQUIN) 500 mg tablet Take 1 Tab by mouth every fourty-eight (48) hours for 2 doses. Qty: 2 Tab, Refills: 0 CONTINUE these medications which have NOT CHANGED Details  
oxybutynin (DITROPAN) 5 mg tablet TAKE 1 TABLET BY MOUTH TWICE A DAY Qty: 60 Tab, Refills: 6 Comments: N O T I C E    PRESCRIPTION PREVIOUSLY AUTHORIZED BY DOCTOR:RENETTA CACERES [phone]  
  
buPROPion (WELLBUTRIN) 100 mg tablet Take 100 mg by mouth nightly. memantine (NAMENDA) 10 mg tablet Take 10 mg by mouth nightly. b complex-vitamin c-folic acid (NEPHROCAPS) 1 mg capsule Take 1 Cap by mouth daily. sevelamer (RENAGEL) 800 mg tablet Take 800 mg by mouth as needed (with snacks). Indications: Renal Osteodystrophy with Hyperphosphatemia  
  
diphenoxylate-atropine (LOMOTIL) 2.5-0.025 mg per tablet Take 1 Tab by mouth four (4) times daily as needed for Diarrhea. Max Daily Amount: 4 Tabs. Qty: 60 Tab, Refills: 0 Associated Diagnoses: Diarrhea, unspecified type  
  
hydrALAZINE (APRESOLINE) 100 mg tablet Take 100 mg by mouth three (3) times daily. gabapentin (NEURONTIN) 100 mg capsule TAKE 1 CAPSULE BY MOUTH TWICE A DAY FOR NERVE PAIN Qty: 60 Cap, Refills: 6 Comments: Generic For:NEURONTIN  100MG   N O T I C E    PRESCRIPTION PREVIOUSLY AUTHORIZED BY Charanjit N MONSTER Santamaria [phone] ELIQUIS 2.5 mg tablet TAKE 1 TABLET TWICE A DAY TO PREVENT BLOOD CLOTS Qty: 60 Tab, Refills: 3  
  
losartan (COZAAR) 100 mg tablet TAKE 1 TABLET EVERY MORNING Qty: 30 Tab, Refills: 6 Comments: Generic For:*COZAAR    100MG Associated Diagnoses: Bilateral carotid artery stenosis; Stenosis of both internal carotid arteries; Cerebral microvascular disease; Disturbance of memory; Altered mental status, unspecified altered mental status type; Abnormal MRI of head  
  
pramipexole (MIRAPEX) 0.25 mg tablet TAKE 1 TABLET BEFORE EACH DIALYSIS  MWF Refills: 3 Associated Diagnoses: Bilateral carotid artery stenosis; Stenosis of both internal carotid arteries; Cerebral microvascular disease; Disturbance of memory; Altered mental status, unspecified altered mental status type; Abnormal MRI of head  
  
escitalopram oxalate (LEXAPRO) 10 mg tablet TAKE 1 TABLET AT BEDTIME TO HELP PREVENT ANXIETY Qty: 30 Tab, Refills: 11 Comments: Generic For:LEXAPRO   10MG   N O T I C E    PRESCRIPTION PREVIOUSLY AUTHORIZED BY DOCTOR:MONSTER CORDOBA [phone]  
  
amLODIPine (NORVASC) 10 mg tablet TAKE 1 TABLET EVERY DAY FOR BLOOD PRESSURE Qty: 30 Tab, Refills: 11 Comments: Generic For:NORVASC 10MG famotidine (PEPCID) 20 mg tablet TAKE 1 TABLET BY MOUTH EVERY DAY Qty: 30 Tab, Refills: 11 Comments: Generic For:PEPCID 20MG  
  
atorvastatin (LIPITOR) 20 mg tablet Take 1 Tab by mouth nightly. Qty: 30 Tab, Refills: 0  
  
L. acidoph & paracasei- S therm- Bifido (TY-Q/RISAQUAD) 8 billion cell cap cap Take 1 Cap by mouth daily. iron, carbonyl (FEOSOL) 45 mg tab Take 1 Tab by mouth two (2) times a day. acetaminophen (TYLENOL) 500 mg tablet Take 1,000 mg by mouth every six (6) hours as needed for Pain. ondansetron (ZOFRAN ODT) 4 mg disintegrating tablet TAKE 1 TABLET EVERY 8 HOURS IF NEEDED FOR NAUSEA Qty: 30 Tab, Refills: 3 Comments: Generic For:*ZOFRAN ODT 4MG  
  
sevelamer carbonate (RENVELA) 800 mg tab tab Take 4,000 mg by mouth three (3) times daily (with meals). 5 with meals and 1 with snacks Associated Diagnoses: Bilateral carotid artery stenosis; Stenosis of both internal carotid arteries; Cerebral microvascular disease; Disturbance of memory; Altered mental status, unspecified altered mental status type; Abnormal MRI of head  
  
linaclotide (LINZESS) 290 mcg cap capsule Take 290 mcg by mouth daily as needed. NOTIFY YOUR PHYSICIAN FOR ANY OF THE FOLLOWING:  
Fever over 101 degrees for 24 hours. Chest pain, shortness of breath, fever, chills, nausea, vomiting, diarrhea, change in mentation, falling, weakness, bleeding. Severe pain or pain not relieved by medications. Or, any other signs or symptoms that you may have questions about. DISPOSITION: 
x  Home With: 
 OT  PT  HH  RN  
  
 Long term SNF/Inpatient Rehab Independent/assisted living Hospice Other:  
 
 
PATIENT CONDITION AT DISCHARGE:  
 
Functional status Poor Deconditioned   
x Independent Cognition  
x  Lucid Forgetful Dementia Catheters/lines (plus indication) Alfredo PICC   
 PEG   
x None Code status  
x  Full code  DNR   
 
 PHYSICAL EXAMINATION AT DISCHARGE: 
 Refer to Progress Note CHRONIC MEDICAL DIAGNOSES: 
Problem List as of 3/29/2019 Date Reviewed: 3/29/2019 Codes Class Noted - Resolved Anemia of renal disease ICD-10-CM: D63.1 ICD-9-CM: 285.21  8/21/2015 - Present Acute renal failure with lesion of tubular necrosis (HCC) ICD-10-CM: N17.0 ICD-9-CM: 584.5  8/3/2015 - Present Chronic renal insufficiency, stage V (HCC) (Chronic) ICD-10-CM: N18.5 ICD-9-CM: 585.5  7/28/2015 - Present RESOLVED: MRSA bacteremia ICD-10-CM: R78.81 ICD-9-CM: 790.7, 041.12 Present on Admission 12/18/2015 - 1/22/2016 Generalized rash ICD-10-CM: R21 
ICD-9-CM: 782.1 Present on Admission 1/22/2016 - Present Heme positive stool ICD-10-CM: R19.5 ICD-9-CM: 792.1 Present on Admission 1/22/2016 - Present Hypertension, uncontrolled ICD-10-CM: I10 
ICD-9-CM: 401.9  12/21/2015 - Present Diarrhea ICD-10-CM: R19.7 ICD-9-CM: 787.91 Present on Admission 12/17/2015 - Present Pericardial effusion ICD-10-CM: I31.3 ICD-9-CM: 423.9 Present on Admission 12/16/2015 - Present Protein malnutrition ([facility] ICD-10-CM: E46 
ICD-9-CM: 260  7/30/2015 - Present RESOLVED: Pleural effusion ICD-10-CM: J90 ICD-9-CM: 511.9 Present on Admission 12/16/2015 - 1/22/2016 Acute on chronic renal failure (HCC) ICD-10-CM: N17.9, N18.9 ICD-9-CM: 584.9, 585.9 Acute 1/22/2016 - Present Moderate protein-calorie malnutrition ([facility] ICD-10-CM: E44.0 ICD-9-CM: 263.0 Chronic 12/17/2015 - Present HTN (hypertension) ICD-10-CM: I10 
ICD-9-CM: 401.9 Chronic 10/7/2015 - Present History of peritonitis ICD-10-CM: Z87.19 ICD-9-CM: V13.89 Present on Admission 10/7/2015 - Present Physical debility ICD-10-CM: R53.81 ICD-9-CM: 799.3 Present on Admission 10/7/2015 - Present Chronic anticoagulation ICD-10-CM: Z79.01 
ICD-9-CM: V58.61 Chronic 10/7/2015 - Present Overview Addendum 1/25/2018 10:04 AM by Bety Kennedy MD  
  Hx DVT/PE x2 RESOLVED: Ileostomy in place [facility]) ICD-10-CM: Z93.2 ICD-9-CM: V44.2 Chronic 10/7/2015 - 11/11/2015 Pneumonia ICD-10-CM: J18.9 ICD-9-CM: 666  3/27/2019 - Present Thrombocytopenia ([facility] ICD-10-CM: D69.6 ICD-9-CM: 287.5  3/27/2019 - Present Hypertension ICD-10-CM: I10 
ICD-9-CM: 401.9  3/27/2019 - Present * (Principal) GI bleed ICD-10-CM: K92.2 ICD-9-CM: 578.9  3/27/2019 - Present ESRD (end stage renal disease) ([facility].) ICD-10-CM: N18.6 ICD-9-CM: 585.6  3/27/2019 - Present Fever ICD-10-CM: R50.9 ICD-9-CM: 780.60  3/27/2019 - Present Anxiety ICD-10-CM: F41.9 ICD-9-CM: 300.00  8/21/2018 - Present Disturbance of memory ICD-10-CM: R41.3 ICD-9-CM: 780.93  2/5/2018 - Present Stenosis of left carotid artery without cerebral infarction ICD-10-CM: I65.22 
ICD-9-CM: 433.10  3/31/2017 - Present Acute renal failure (ARF) (HCC) ICD-10-CM: N17.9 ICD-9-CM: 584.9  3/30/2017 - Present Abnormal MRI of head ICD-10-CM: R93.0 ICD-9-CM: 793.0  3/30/2017 - Present Stenosis of both internal carotid arteries ICD-10-CM: I65.23 ICD-9-CM: 433.10, 433.30  3/30/2017 - Present Cerebral microvascular disease ICD-10-CM: I67.9 ICD-9-CM: 437.9  3/30/2017 - Present Convulsion disorder ([facility] ICD-10-CM: R56.9 ICD-9-CM: 780.39  3/30/2017 - Present Altered mental status, unspecified ICD-10-CM: R41.82 
ICD-9-CM: 780.97  3/30/2017 - Present Acute encephalopathy ICD-10-CM: G93.40 ICD-9-CM: 348.30  3/30/2017 - Present Bilateral carotid artery stenosis ICD-10-CM: I65.23 ICD-9-CM: 433.10, 433.30  1/19/2017 - Present Overview Signed 1/19/2017  5:29 PM by Bety Kennedy MD  
  Right < 50%, left > 70%--2016 Electrolyte abnormality ICD-10-CM: E87.8 ICD-9-CM: 276.9  3/4/2016 - Present ALAYNA (acute kidney injury) ([facility] ICD-10-CM: N17.9 ICD-9-CM: 584.9  1/22/2016 - Present Pericarditis with effusion ICD-10-CM: I31.9 ICD-9-CM: 423.9  12/18/2015 - Present History of ovarian cancer ICD-10-CM: Z85.43 
ICD-9-CM: V10.43 Present on Admission 12/16/2015 - Present Overview Addendum 3/31/2016 12:46 PM by Chase Denny MD  
  Ovarian--s/p nayan/bsp and chemotx 2012    Dr. Delaney Chapman History of pulmonary embolism ICD-10-CM: Z86.711 ICD-9-CM: V12.55  11/16/2015 - Present SIRS (systemic inflammatory response syndrome) (HCC) ICD-10-CM: R65.10 ICD-9-CM: 995.90  9/14/2015 - Present Small bowel perforation (HCC) ICD-10-CM: K63.1 ICD-9-CM: 258.08  8/1/2015 - Present Acute pancreatitis ICD-10-CM: K85.90 ICD-9-CM: 403.5  7/29/2015 - Present E-coli UTI ICD-10-CM: N39.0, B96.20 ICD-9-CM: 599.0, 041.49  7/28/2015 - Present Continuous severe abdominal pain ICD-10-CM: R10.9 ICD-9-CM: 789.00  7/28/2015 - Present Enteritis ICD-10-CM: K52.9 ICD-9-CM: 558.9  7/28/2015 - Present Adrenal hemorrhage ([facility].) ICD-10-CM: E27.49 
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  
Hernando Dc MD 
3/29/2019 
9:01 AM

## 2019-03-29 NOTE — DIALYSIS
[facility] [place]  [phone] Vitals   Pre   Post   Assessment   Pre   Post    
Temp  Temp: 98.1 °F (36.7 °C) (03/29/19 1125)  98.2 LOC  A&0x4 A&0x4 HR   Pulse (Heart Rate): 80 (03/29/19 1125) 80 Lungs   clear  clear B/P   BP: 156/66 (03/29/19 1125) 183/70 Cardiac   normal  normal  
Resp   Resp Rate: 18 (03/29/19 0748) 18 Skin   Dry and intact  dry and intact Pain level  Pain Intensity 1: 0 (03/29/19 0230) 0/10 Edema  pedal 
 
 none Orders: Duration:   Start:    1125 End:    1425 Total:   3h  
Dialyzer:   Dialyzer/Set Up Inspection: Kelvin Hernandez (03/29/19 1125) K Bath:   Dialysate K (mEq/L): 2 (03/29/19 1125) Ca Bath:   Dialysate CA (mEq/L): 2.5 (03/29/19 1125) Na/Bicarb:   Dialysate NA (mEq/L): 140 (03/29/19 1125) Target Fluid Removal:   Goal/Amount of Fluid to Remove (mL): 2000 mL (03/29/19 1125) Access  Lt Graft Type & Location:    Ltf graft no s/s infection, pos for bruit and thrill, cleaned used 15G, aspirated and flushed, good flow, secured with tape. Labs  Reviewed Obtained/Reviewed Critical Results Called   Date when labs were drawn- 
Hgb-   
HGB Date Value Ref Range Status 03/28/2019 10.8 (L) 11.5 - 16.0 g/dL Final  
 
K-   
Potassium Date Value Ref Range Status 03/28/2019 3.5 3.5 - 5.1 mmol/L Final  
  Comment: SPECIMEN HEMOLYZED, RESULTS MAY BE AFFECTED Ca-  
Calcium Date Value Ref Range Status 03/28/2019 8.2 (L) 8.5 - 10.1 MG/DL Final  
 
Bun-  
BUN Date Value Ref Range Status 03/28/2019 16 6 - 20 MG/DL Final  
 
Creat-  
Creatinine Date Value Ref Range Status 03/28/2019 2.98 (H) 0.55 - 1.02 MG/DL Final  
 
  
Medications/ Blood Products Given Name   Dose   Route and Time    
none Blood Volume Processed (BVP):    60.1 Net Fluid Removed:  2000ml Comments Time Out Done: yes Primary Nurse Rpt Pre: Nenita Ann@[employer] Primary Nurse Rpt Post: Nenita Gamboa RN @ 2373 Pt Education:yes Care Plan:continue diaylsis Tx Summary: tolerated trx well, rcvd all rinse back, applied pressure for 10 min each, pos bruit and thrill  and secured with tape. Admiting Diagnosis: Poss Infection Pt's previous clinic- 
Consent signed - Informed Consent Verified: Yes (03/29/19 1125) [facility] Consent - yes Hepatitis Status- neg 3/13/19 Machine #- Machine Number: [id number] (03/29/19 1125) Telemetry status-n/a Pre-dialysis wt. -  n/a

## 2019-03-29 NOTE — PROGRESS NOTES
Hospital follow-up PCP transitional care appointment has been scheduled with Dr. Laurita Robb for Thursday, 4/4/19 at 11:45 a.m. Pending patient discharge.   Don Chan, Care Management Specialist.

## 2019-03-29 NOTE — PROGRESS NOTES
[address] 
[address] [place], [address] 
[phone] GI PROGRESS Avi Chamorro [facility] 
 
 
NAME:   Shanika Green :    1945 MRN:    [id number] Assessment/Plan 1. Diarrhea- negative C. Diff. This is possible overflow diarrhea. Had a formed bm this am. 
-Stable for dc from GI standpoint to f/u with Dr. Maninder Cedeno (GI) in 2-4 weeks. 2. Anemia -multifactorial w/ ESRD, chronic disease,  possible GI source given occult positive stool. Hgb stable. No overt signs of GI bleeding. 3. PNA 4. UTI 5. ESRD 6. Chronic coagulation Patient Active Problem List  
Diagnosis Code  Abdominal pain R10.9  E-coli UTI N39.0, B96.20  Continuous severe abdominal pain R10.9  Enteritis K52.9  Adrenal hemorrhage ([facility]) E27.49  Chronic renal insufficiency, stage V ([facility]) N18.5  Sepsis ([address] A41.9  Acute pancreatitis K85.90  Protein malnutrition ([address] E46  Small bowel perforation ([facility]) K63.1  Acute renal failure with lesion of tubular necrosis ([facility]) N17.0  Anemia of renal disease D63.1  SIRS (systemic inflammatory response syndrome) ([facility]) R65.10  
 HTN (hypertension) I10  
 History of peritonitis Z87.19  
 Physical debility R53.81  Chronic anticoagulation Z79.01  
 History of pulmonary embolism Z86.711  
 History of ovarian cancer Z85.43  
 Pericardial effusion I31.3  Diarrhea R19.7  Moderate protein-calorie malnutrition (HCC) E44.0  Pericarditis with effusion I31.9  Hypertension, uncontrolled I10  
 ALAYNA (acute kidney injury) ([address] N17.9  Acute on chronic renal failure (HCC) N17.9, N18.9  Generalized rash R21  
 Heme positive stool R19.5  Electrolyte abnormality E87.8  Bilateral carotid artery stenosis I65.23  
 Acute renal failure (ARF) (HCC) N17.9  Abnormal MRI of head R93.0  Stenosis of both internal carotid arteries I65.23  
 Cerebral microvascular disease I67.9  [facility] R56.9  Altered mental status, unspecified R41.82  
 Acute encephalopathy G93.40  Stenosis of left carotid artery without cerebral infarction I65.22  
 Disturbance of memory R41.3  Anxiety F41.9  Pneumonia J18.9  Thrombocytopenia ([facility] D69.6  Hypertension I10  
 GI bleed K92.2  
 ESRD (end stage renal disease) ([facility] N18.6  Fever R50.9 Subjective: No complaints. Objective: VITALS:  
Last 24hrs VS reviewed since prior hospitalist progress note. Most recent are: 
Visit Vitals /67 (BP 1 Location: Right arm, BP Patient Position: Sitting) Pulse 94 Temp 98.1 °F (36.7 °C) Resp 18 Ht 5' 9\" (1.753 m) Wt 59.4 kg (131 lb) SpO2 95% BMI 19.35 kg/m² No intake or output data in the 24 hours ending 03/29/19 1110 PHYSICAL EXAM: 
General:          Well developed, well nourished, elderly female Alert, cooperative, no acute distress.   
HEENT:           NC, Atraumatic. PERRLA, EOMI. Anicteric sclerae. Lungs:             CTA Bilaterally. No Wheezing/Rhonchi/Rales. Heart:              Regular rate and rhythm, No murmurs, No Rubs, No Gallops Abdomen:        Soft, non-distended, non-tender.  +Bowel sounds, no hepatosplenomegaly. Previous abdominal scar. Extremities:     No cyanosis, clubbing, or edema. Left arm fistula. +bruit and thrill Neurologic:      Alert and oriented X 3. No acute neurological distress. Psych:             Good insight. Not anxious nor agitated. 
  
 
 
  
Lab Data No results found for this or any previous visit (from the past 12 hour(s)). Medications: Reviewed PMH/SH reviewed - no change compared to H&P Mid-Level Provider: Gilda Villegas NP Date/Time:  3/29/2019

## 2019-03-29 NOTE — PROGRESS NOTES
[facility] 
 [address] [facility], [address] [facility] [address] [facility] Phone: [phone]   [phone]   
  
Nephrology Progress Note Mario Rodriguez     1945     [id number] Date of Admission : 3/27/2019 
03/29/19 CC:  Follow up for ESRD Assessment and Plan ESRD- HD : 
- [facility]zes [facility]  
- HD now Anemia in CKD  
- continue ANAHI Chronic Diarrhea : 
- hx SB perf and ilesotomy  
- hx of Chronic Pancreatitis - GI following - Phos binder might a culprit Sec HPTH Hx of VTE Hx of Ovarian Ca : S/p Sx + Chemo Interval History: 
Seen and examined Diarrhea resolved Starting miralax for constipation Review of Systems: A comprehensive review of systems was negative. Current Medications:  
Current Facility-Administered Medications Medication Dose Route Frequency  apixaban (ELIQUIS) tablet 2.5 mg  2.5 mg Oral BID  albumin human 25% (BUMINATE) solution 12.5 g  12.5 g IntraVENous ONCE  
 sodium chloride (NS) flush 5-40 mL  5-40 mL IntraVENous Q8H  
 sodium chloride (NS) flush 5-40 mL  5-40 mL IntraVENous PRN  
 atorvastatin (LIPITOR) tablet 20 mg  20 mg Oral QHS  hydrALAZINE (APRESOLINE) tablet 100 mg  100 mg Oral TID  losartan (COZAAR) tablet 100 mg  100 mg Oral QHS  amLODIPine (NORVASC) tablet 10 mg  10 mg Oral QHS  gabapentin (NEURONTIN) capsule 100 mg  100 mg Oral BID  memantine (NAMENDA) tablet 10 mg  10 mg Oral QHS  oxybutynin (DITROPAN) tablet 5 mg  5 mg Oral DAILY  pramipexole (MIRAPEX) tablet 0.25 mg  0.25 mg Oral QHS  hydrALAZINE (APRESOLINE) 20 mg/mL injection 20 mg  20 mg IntraVENous Q6H PRN  
 acetaminophen (TYLENOL) tablet 650 mg  650 mg Oral Q6H PRN  
 nicotine (NICODERM CQ) 21 mg/24 hr patch 1 Patch  1 Patch TransDERmal DAILY  pantoprazole (PROTONIX) 40 mg in sodium chloride 0.9% 10 mL injection  40 mg IntraVENous DAILY  levoFLOXacin (LEVAQUIN) 500 mg in D5W IVPB  500 mg IntraVENous Q48H Allergies Allergen Reactions  Citrus And Derivatives Anaphylaxis Patient and her  reported  Penicillin G Anaphylaxis  Orange Juice Not Reported This Time  Sulfa (Sulfonamide Antibiotics) Other (comments) \"Dont remember\"  Vancomycin Hives Objective: 
Vitals:   
Vitals:  
 03/29/19 1125 03/29/19 1145 03/29/19 1200 03/29/19 1203 BP: 156/66 161/65 152/51 Pulse: 80 75 80 Resp:      
Temp: 98.1 °F (36.7 °C) SpO2:    96% Weight:      
Height:      
 
Intake and Output: 
03/29 0701 - 03/29 1900 In: 480 [P.O.:480] Out: - No intake/output data recorded. Physical Examination: 
General: NAD,Conversant Neck:  Supple, no mass Resp:  Lungs CTA B/L, no wheezing , normal respiratory effort CV:  RRR,  no murmur or rub, no LE edema GI:  Soft, NT, + Bowel sounds, no hepatosplenomegaly Neurologic:  Non focal 
Psych:             AAO x 3 appropriate affect Skin:  No Rash Ext                   LUE AVF + thrill  
 
[]    High complexity decision making was performed 
[]    Patient is at high-risk of decompensation with multiple organ involvement Lab Data Personally Reviewed: I have reviewed all the pertinent labs, microbiology data and radiology studies during assessment. Recent Labs  
  03/28/19 
1138 03/28/19 0213 03/27/19 
1645 NA  --  140 140 K  --  3.5 3.5 CL  --  108 107 CO2  --  25 28 GLU  --  72 98 BUN  --  16 13 CREA  --  2.98* 2.60* CA  --  8.2* 8.3* PHOS 3.3  --   --   
ALB  --   --  2.5* SGOT  --   --  24 ALT  --   --  16 Recent Labs  
  03/28/19 
0213 03/27/19 
1645 WBC 9.7 9.7 HGB 10.8* 11.4* HCT 38.3 40.9 * 114* No results found for: SDES Lab Results Component Value Date/Time  Culture result: NO GROWTH 2 DAYS 03/27/2019 06:54 PM  
 Culture result: NO GROWTH 2 DAYS 03/27/2019 06:54 PM  
 Culture result: MRSA NOT PRESENT 03/08/2016 12:17 PM  
 Culture result:  03/08/2016 12:17 PM  
      Screening of patient nares for MRSA is for surveillance purposes and, if positive, to facilitate isolation considerations in high risk settings. It is not intended for automatic decolonization interventions per se as regimens are not sufficiently effective to warrant routine use. Culture result: ESCHERICHIA COLI 03/04/2016 09:00 PM  
 
Recent Results (from the past 24 hour(s)) GLUCOSE, POC Collection Time: 03/29/19 11:48 AM  
Result Value Ref Range Glucose (POC) 111 (H) 65 - 100 mg/dL Performed by Taniya Palacios I have reviewed the flowsheets. Chart and Pertinent Notes have been reviewed. No change in PMH ,family and social history from Consult note.  
 
 
Candance Kanaris, MD

## 2019-03-29 NOTE — PROGRESS NOTES
Orders received, chart reviewed and patient with pending discharge after dialysis. Patient with no skilled acute OT services at this time as she is completing functional mobility and ADLs here at hospital.  
 
PLOF: Prior Level of Function/Home Situation: Independent with ADLs, IADLs, ambulation and driving. She lives with her   
  
Home Situation Home Environment: Private residence One/Two Story Residence: Two story Living Alone: No 
Support Systems: Family member(s), Spouse/Significant Other/Partner Patient Expects to be Discharged to[de-identified] Private residence Current DME Used/Available at Home:  (handicapped height toilet) [x]  Right hand dominant      []  Left hand dominant

## 2019-03-29 NOTE — DISCHARGE INSTRUCTIONS
DISCHARGE SUMMARY from Nurse    PATIENT INSTRUCTIONS:    After general anesthesia or intravenous sedation, for 24 hours or while taking prescription Narcotics:  · Limit your activities  · Do not drive and operate hazardous machinery  · Do not make important personal or business decisions  · Do  not drink alcoholic beverages  · If you have not urinated within 8 hours after discharge, please contact your surgeon on call. Report the following to your surgeon:  · Excessive pain, swelling, redness or odor of or around the surgical area  · Temperature over 100.5  · Nausea and vomiting lasting longer than 4 hours or if unable to take medications  · Any signs of decreased circulation or nerve impairment to extremity: change in color, persistent  numbness, tingling, coldness or increase pain  · Any questions    What to do at Home:  Recommended activity: Activity as tolerated and Activity as tolerated and no driving for today,     If you experience any of the following symptoms , please follow up with . *  Please give a list of your current medications to your Primary Care Provider. *  Please update this list whenever your medications are discontinued, doses are      changed, or new medications (including over-the-counter products) are added. *  Please carry medication information at all times in case of emergency situations. These are general instructions for a healthy lifestyle:    No smoking/ No tobacco products/ Avoid exposure to second hand smoke  Surgeon General's Warning:  Quitting smoking now greatly reduces serious risk to your health.     Obesity, smoking, and sedentary lifestyle greatly increases your risk for illness    A healthy diet, regular physical exercise & weight monitoring are important for maintaining a healthy lifestyle    You may be retaining fluid if you have a history of heart failure or if you experience any of the following symptoms:  Weight gain of 3 pounds or more overnight or 5 pounds in a week, increased swelling in our hands or feet or shortness of breath while lying flat in bed. Please call your doctor as soon as you notice any of these symptoms; do not wait until your next office visit. Recognize signs and symptoms of STROKE:    F-face looks uneven    A-arms unable to move or move unevenly    S-speech slurred or non-existent    T-time-call 911 as soon as signs and symptoms begin-DO NOT go       Back to bed or wait to see if you get better-TIME IS BRAIN. Warning Signs of HEART ATTACK     Call 911 if you have these symptoms:   Chest discomfort. Most heart attacks involve discomfort in the center of the chest that lasts more than a few minutes, or that goes away and comes back. It can feel like uncomfortable pressure, squeezing, fullness, or pain.  Discomfort in other areas of the upper body. Symptoms can include pain or discomfort in one or both arms, the back, neck, jaw, or stomach.  Shortness of breath with or without chest discomfort.  Other signs may include breaking out in a cold sweat, nausea, or lightheadedness. Don't wait more than five minutes to call 911 - MINUTES MATTER! Fast action can save your life. Calling 911 is almost always the fastest way to get lifesaving treatment. Emergency Medical Services staff can begin treatment when they arrive -- up to an hour sooner than if someone gets to the hospital by car. The discharge information has been reviewed with the patient. The patient verbalized understanding. Discharge medications reviewed with the patient and appropriate educational materials and side effects teaching were provided.   ___________________________________________________________________________________________________________________________________   Discharge Instructions       PATIENT ID: Joana Fry  MRN: [id number]   YOB: 1945    DATE OF ADMISSION: 3/27/2019  6:06 PM    DATE OF DISCHARGE: 3/29/2019    PRIMARY CARE PROVIDER: Oswaldo Casper MD     ATTENDING PHYSICIAN: Tierney Butler MD  DISCHARGING PROVIDER: Megan Hurt MD    To contact this individual call [phone] and ask the  to page. If unavailable ask to be transferred the Adult Hospitalist Department. DISCHARGE DIAGNOSES ESRD / diarrhea resolved non infectious     CONSULTATIONS: IP CONSULT TO HOSPITALIST  IP CONSULT TO GASTROENTEROLOGY  IP CONSULT TO NEPHROLOGY    PROCEDURES/SURGERIES: * No surgery found *    PENDING TEST RESULTS:   At the time of discharge the following test results are still pending:     FOLLOW UP APPOINTMENTS:   Follow-up Information     Follow up With Specialties Details Why Contact Info    Oswaldo Casper MD Internal Medicine In 1 week  [address]  [facility]  [phone]             ADDITIONAL CARE RECOMMENDATIONS:     DIET: Cardiac Diet and Renal Diet    ACTIVITY: Activity as tolerated    WOUND CARE:     EQUIPMENT needed:       DISCHARGE MEDICATIONS:   See Medication Reconciliation Form    · It is important that you take the medication exactly as they are prescribed. · Keep your medication in the bottles provided by the pharmacist and keep a list of the medication names, dosages, and times to be taken in your wallet. · Do not take other medications without consulting your doctor. NOTIFY YOUR PHYSICIAN FOR ANY OF THE FOLLOWING:   Fever over 101 degrees for 24 hours. Chest pain, shortness of breath, fever, chills, nausea, vomiting, diarrhea, change in mentation, falling, weakness, bleeding. Severe pain or pain not relieved by medications. Or, any other signs or symptoms that you may have questions about.       DISPOSITION:  x  Home With:   OT  PT  HH  RN       SNF/Inpatient Rehab/LTAC    Independent/assisted living    Hospice    Other:     CDMP Checked:   Yes x     PROBLEM LIST Updated:  Yes x       Signed:   Megan Hurt MD  3/29/2019  9:00 AM

## 2019-03-29 NOTE — PROGRESS NOTES
CM noted of consult for disposition. Patient is ambulatory at home, with supportive . Therapy has no recommendations. Patient will discharge today after dialysis. CM to fax progress notes to HD unit, [phone]. [phone]: CM sent referral to [facility] for [facility] visit, they can accept.  
 
[facility]

## 2019-03-29 NOTE — PROGRESS NOTES
Hospitalist Progress Note Jt Prieto MD 
Answering service: [phone] OR [phone] from in house phone Date of Service:  3/29/2019 NAME:  Akash Chaparro YOB: 1945 MRN:  [id number] Admission Summary: A 74-year-old white female with past medical history of end-stage renal disease, on hemodialysis, GERD, hypertension, hyperlipidemia, peripheral neuropathy, ovarian cancer, PE, renal vein thrombosis, tobacco abuse, presented to the emergency department from [address] [place] [address] with reported chief complaint of diarrhea. The patient's  is present, provided some additional history Interval history / Subjective:  
No diarrhea overnight , I ma dressed up to go home Awaiting HD and evaluation by GI Assessment & Plan: 1. Gastrointestinal bleed. the patient is on Eliquis as long-term anticoagulation for PE  
-   Pt Hgb trend always to 10  Range no new blood seen -  Anemia  is chronic may be from Chronic blood loss anemia vs anemia of chronic dz from, but she is heme-positive - MGMT per GI may resume eliquis 2. Pneumonia - based on chest x-ray and CT reports.   
- Continue on levofloxacin for IV antibiotics. Complete course no SOB or respiratory complains 3. Fever. Continue with levofloxacin IV for antibiotic coverage. Follow up cultures NGTD 4. Diarrhea. Check stools for Clostridium difficile, stool culture, ova and parasites. - no diarrhea as per RN and per pt as well not on linzess for > 1 year 5. End-stage renal disease, hemodialysis on , , and .   
- Consult nephrologist. 
 
6.  Anemia of chronic dz from ESRD. Repeat H and H. 
 
7.  Thrombocytopenia. Repeat platelet count. 8.  History of pulmonary embolism. The patient is on long-term anticoagulation. 9.  Uncontrolled hypertension. Resume home medication. 10.  Gastroesophageal reflux disease. Order Protonix. 11.  Tobacco abuse. Encourage smoking cessation. Order nicotine patch. 12.  Gait abnormality. Place on fall precautions. 13.  Lower extremity edema. No DVT per duplex Code status: Full DVT prophylaxis: SCD Care Plan discussed with: Patient/Family and Nurse Disposition: TBD plan to d/c after HD if no further C scope planned as inpatient d/w GI Hospital Problems  Date Reviewed: 3/27/2019 Codes Class Noted POA Diarrhea ICD-10-CM: R19.7 ICD-9-CM: 787.91 Present on Admission 12/17/2015 Unknown Pneumonia ICD-10-CM: J18.9 ICD-9-CM: 780  3/27/2019 Unknown Thrombocytopenia ([facility] ICD-10-CM: D69.6 ICD-9-CM: 287.5  3/27/2019 Unknown Hypertension ICD-10-CM: I10 
ICD-9-CM: 401.9  3/27/2019 Unknown * (Principal) GI bleed ICD-10-CM: K92.2 ICD-9-CM: 578.9  3/27/2019 Unknown ESRD (end stage renal disease) ([facility].) ICD-10-CM: N18.6 ICD-9-CM: 585.6  3/27/2019 Unknown Fever ICD-10-CM: R50.9 ICD-9-CM: 780.60  3/27/2019 Unknown Review of Systems: A comprehensive review of systems was negative. Vital Signs:  
 Last 24hrs VS reviewed since prior progress note. Most recent are: 
Visit Vitals /67 (BP 1 Location: Right arm, BP Patient Position: Sitting) Pulse 94 Temp 98.1 °F (36.7 °C) Resp 18 Ht 5' 9\" (1.753 m) Wt 59.4 kg (131 lb) SpO2 95% BMI 19.35 kg/m² No intake or output data in the 24 hours ending 03/29/19 0816 Physical Examination:  
 
 
     
Constitutional:  No acute distress, cooperative, pleasant   
ENT:  Oral mucous moist, oropharynx benign. Neck supple, Resp:  CTA bilaterally. No wheezing/rhonchi/rales. No accessory muscle use CV:  Regular rhythm, normal rate, no murmurs, gallops, rubs GI:  Soft, non distended, non tender. normoactive bowel sounds, no hepatosplenomegaly Musculoskeletal:  No edema, warm, 2+ pulses throughout Neurologic:  Moves all extremities. AAOx3, CN II-XII reviewed Psych:  Good insight, Not anxious nor agitated. Data Review:  
 I personally reviewed  Image and labs Labs:  
 
Recent Labs  
  03/28/19 0213 03/27/19 
1645 WBC 9.7 9.7 HGB 10.8* 11.4* HCT 38.3 40.9 * 114* Recent Labs  
  03/28/19 
1138 03/28/19 
0213 03/27/19 
1645 NA  --  140 140 K  --  3.5 3.5 CL  --  108 107 CO2  --  25 28 BUN  --  16 13 CREA  --  2.98* 2.60* GLU  --  72 98 CA  --  8.2* 8.3* PHOS 3.3  --   --   
 
Recent Labs  
  03/27/19 1645 SGOT 24 ALT 16  
* TBILI 0.4 TP 6.6 ALB 2.5*  
GLOB 4.1* No results for input(s): INR, PTP, APTT in the last 72 hours. No lab exists for component: INREXT, INREXT Recent Labs  
  03/27/19 1645 TIBC 156* PSAT 17* FERR 1,186* Lab Results Component Value Date/Time Folate 18.9 03/27/2019 04:45 PM  
  
No results for input(s): PH, PCO2, PO2 in the last 72 hours. No results for input(s): CPK, CKNDX, TROIQ in the last 72 hours. No lab exists for component: CPKMB Lab Results Component Value Date/Time Cholesterol, total 116 03/30/2017 02:27 AM  
 HDL Cholesterol 55 03/30/2017 02:27 AM  
 LDL, calculated 31.2 03/30/2017 02:27 AM  
 Triglyceride 149 03/30/2017 02:27 AM  
 CHOL/HDL Ratio 2.1 03/30/2017 02:27 AM  
 
Lab Results Component Value Date/Time Glucose (POC) 111 (H) 03/31/2017 04:43 PM  
 Glucose (POC) 116 (H) 03/31/2017 11:18 AM  
 Glucose (POC) 93 03/31/2017 06:56 AM  
 Glucose (POC) 131 (H) 03/30/2017 09:12 PM  
 Glucose (POC) 94 03/30/2017 06:56 AM  
 
Lab Results Component Value Date/Time  Color YELLOW/STRAW 03/29/2017 05:30 PM  
 Appearance CLOUDY (A) 03/29/2017 05:30 PM  
 Specific gravity 1.020 03/29/2017 05:30 PM  
 Specific gravity 1.010 03/04/2016 09:00 PM  
 pH (UA) 7.0 03/29/2017 05:30 PM  
 Protein 100 (A) 03/29/2017 05:30 PM  
 Glucose NEGATIVE  03/29/2017 05:30 PM  
 Ketone NEGATIVE  03/29/2017 05:30 PM  
 Bilirubin NEGATIVE  03/29/2017 05:30 PM  
 Urobilinogen 0.2 03/29/2017 05:30 PM  
 Nitrites NEGATIVE  03/29/2017 05:30 PM  
 Leukocyte Esterase LARGE (A) 03/29/2017 05:30 PM  
 Epithelial cells FEW 03/29/2017 05:30 PM  
 Bacteria 4+ (A) 03/29/2017 05:30 PM  
 WBC 20-50 03/29/2017 05:30 PM  
 RBC  03/29/2017 05:30 PM  
 
 
 
Medications Reviewed:  
 
Current Facility-Administered Medications Medication Dose Route Frequency  sodium chloride (NS) flush 5-40 mL  5-40 mL IntraVENous Q8H  
 sodium chloride (NS) flush 5-40 mL  5-40 mL IntraVENous PRN  
 atorvastatin (LIPITOR) tablet 20 mg  20 mg Oral QHS  hydrALAZINE (APRESOLINE) tablet 100 mg  100 mg Oral TID  losartan (COZAAR) tablet 100 mg  100 mg Oral QHS  amLODIPine (NORVASC) tablet 10 mg  10 mg Oral QHS  gabapentin (NEURONTIN) capsule 100 mg  100 mg Oral BID  memantine (NAMENDA) tablet 10 mg  10 mg Oral QHS  oxybutynin (DITROPAN) tablet 5 mg  5 mg Oral DAILY  pramipexole (MIRAPEX) tablet 0.25 mg  0.25 mg Oral QHS  hydrALAZINE (APRESOLINE) 20 mg/mL injection 20 mg  20 mg IntraVENous Q6H PRN  
 acetaminophen (TYLENOL) tablet 650 mg  650 mg Oral Q6H PRN  
 nicotine (NICODERM CQ) 21 mg/24 hr patch 1 Patch  1 Patch TransDERmal DAILY  pantoprazole (PROTONIX) 40 mg in sodium chloride 0.9% 10 mL injection  40 mg IntraVENous DAILY  levoFLOXacin (LEVAQUIN) 500 mg in D5W IVPB  500 mg IntraVENous Q48H  
 
______________________________________________________________________ EXPECTED LENGTH OF STAY: 4d 12h ACTUAL LENGTH OF STAY:          2 Osmin Wolf MD

## 2019-03-29 NOTE — PROGRESS NOTES
[id number] Pt alert and oriented. OOB independently, steady on feet. No complaints this shift, pt appears to be resting comfortably. Enteric precautions maintained. Good PO intake. Pt having formed bowel movements. Left arm fistula +thrill +bruit. Call bell within reach, safety maintained, will ctm. Addendum 2300: /86, pt asymptomatic. Hydralazine x 1 given with positive effect . See flowsheet for details. Bedside and Verbal shift change report given to Nichol Carrasquillo (oncoming nurse) by Rosanne Zimmerman (offgoing nurse). Report included the following information SBAR, Kardex and MAR.

## 2019-04-01 NOTE — PROGRESS NOTES
Hospital Discharge Follow-Up Date/Time:  2019 1:18 PM 
 
Patient was admitted to [facility] on 3/27/19 and discharged on 3/29/19 for Gi Bleed & PNA. The physician discharge summary was available at the time of outreach. Patient was contacted within 1 business days of discharge. Top Challenges reviewed with the provider Admitted with GI bleed & PNA. IV levaquin changed to PO at d/c No ACP, current smoker Method of communication with provider :chart routing Inpatient RRAT score: 19 Was this a readmission? no  
Patient stated reason for the readmission: n/a Nurse Navigator (NN) contacted the patient by telephone to perform post hospital discharge assessment. Verified name and  with patient as identifiers. Provided introduction to self, and explanation of the Nurse Navigator role. Reviewed discharge instructions and red flags with patient who verbalized understanding. Patient given an opportunity to ask questions and does not have any further questions or concerns at this time. The patient agrees to contact the PCP office for questions related to their healthcare. NN provided contact information for future reference. Disease Specific:   N/A Summary of patient's top problems: 
1. GI Bleed: presented to ED with c/o diarrhea for a few weeks, BLE edema, .  patient on Eliquis as long term anticoagulation for PE. HgB 11.4 on admission. PMH: Sb perf, ilesotomy, chronic pancreatitis. Occult positive stool. Per Md note, Nathan Fuchs  is chronic may be from Chronic blood loss anemia vs anemia of chronic dz from\". Hgb 10.8 on discharge, baseline around 10-11. Stool & blood  culture negative. C diff negative. Doppler WNL.  Iv levaquin changed to PO.  
2. Pneumonia: chest x-ray portable showing lower lobe airspace disease and CT of the abdomen and pelvis without contrast showing no acute abdominal or pelvic process, however, showing lower lobe airspace disease that may represent pneumonia - per ED note. Patient started on levaquin. 3. ESRD: Dialysis, M-W-F, LUE AVF for access. 4. Social: current smoker. No ACP on file. Discharge BMP/CBC Component Latest Ref Rng & Units 3/28/2019 3/28/2019  
 
      2:13 AM  2:13 AM  
WBC 
    3.6 - 11.0 K/uL 9.7   
RBC 
    3.80 - 5.20 M/uL 4.03   
HGB 
    11.5 - 16.0 g/dL 10.8 (L) HCT 
    35.0 - 47.0 % 38.3 MCV 80.0 - 99.0 FL 95.0 MCH 
    26.0 - 34.0 PG 26.8 MCHC 30.0 - 36.5 g/dL 28.2 (L) RDW 
    11.5 - 14.5 % 17.4 (H) PLATELET 
    003 - 719 K/uL 116 (L) MPV 
    8.9 - 12.9 FL 11.3 NRBC 
    0  WBC 0.0 ABSOLUTE NRBC 
    0.00 - 0.01 K/uL 0.00 NEUTROPHILS 
    32 - 75 % 71 LYMPHOCYTES 
    12 - 49 % 9 (L) MONOCYTES 
    5 - 13 % 17 (H) EOSINOPHILS 
    0 - 7 % 1   
BASOPHILS 
    0 - 1 % 1 IMMATURE GRANULOCYTES 
    0.0 - 0.5 % 1 (H)   
ABS. NEUTROPHILS 
    1.8 - 8.0 K/UL 6.9   
ABS. LYMPHOCYTES 
    0.8 - 3.5 K/UL 0.9   
ABS. MONOCYTES 
    0.0 - 1.0 K/UL 1.6 (H)   
ABS. EOSINOPHILS 
    0.0 - 0.4 K/UL 0.1 ABS. BASOPHILS 
    0.0 - 0.1 K/UL 0.1 ABS. IMM. GRANS. 0.00 - 0.04 K/UL 0.1 (H) DF SMEAR SCANNED   
RBC COMMENTS 
     ANISOCYTOSIS . . .   
Sodium 136 - 145 mmol/L  140 Potassium 3.5 - 5.1 mmol/L  3.5 Chloride 97 - 108 mmol/L  108 CO2 
    21 - 32 mmol/L  25 Anion gap 5 - 15 mmol/L  7 Glucose 65 - 100 mg/dL  72 BUN 
    6 - 20 MG/DL  16  
Creatinine 
    0.55 - 1.02 MG/DL  2.98 (H) BUN/Creatinine ratio 12 - 20    5 (L)  
GFR est AA 
    >60 ml/min/1.73m2  19 (L) GFR est non-AA 
    >60 ml/min/1.73m2  15 (L) Calcium 8.5 - 10.1 MG/DL  8.2 (L) Home Health orders at discharge: [facility] company: [facility] Date of initial visit:4/2 Durable Medical Equipment ordered/company: none Durable Medical Equipment received: n/a Barriers to care? lack of knowledge about disease Advance Care Planning:  
Does patient have an Advance Directive:  patient declined education Medication(s):  
New Medications at Discharge:  
levoFLOXacin (LEVAQUIN) 500 mg tablet Take 1 Tab by mouth every fourty-eight (48) hours for 2 doses. Qty: 2 Tab, Refills: 0 Changed Medications at Discharge: n/a Discontinued Medications at Discharge: n/a Medication reconciliation was performed with patient, who verbalizes understanding of administration of home medications. There were no barriers to obtaining medications identified at this time. Referral to Pharm D needed: no  
 
Current Outpatient Medications Medication Sig  levoFLOXacin (LEVAQUIN) 500 mg tablet Take 1 Tab by mouth every fourty-eight (48) hours for 2 doses.  oxybutynin (DITROPAN) 5 mg tablet TAKE 1 TABLET BY MOUTH TWICE A DAY  buPROPion (WELLBUTRIN) 100 mg tablet Take 100 mg by mouth nightly.  memantine (NAMENDA) 10 mg tablet Take 10 mg by mouth nightly.  b complex-vitamin c-folic acid (NEPHROCAPS) 1 mg capsule Take 1 Cap by mouth daily.  sevelamer (RENAGEL) 800 mg tablet Take 800 mg by mouth as needed (with snacks). Indications: Renal Osteodystrophy with Hyperphosphatemia  diphenoxylate-atropine (LOMOTIL) 2.5-0.025 mg per tablet Take 1 Tab by mouth four (4) times daily as needed for Diarrhea. Max Daily Amount: 4 Tabs.  hydrALAZINE (APRESOLINE) 100 mg tablet Take 100 mg by mouth three (3) times daily.  gabapentin (NEURONTIN) 100 mg capsule TAKE 1 CAPSULE BY MOUTH TWICE A DAY FOR NERVE PAIN  
 ELIQUIS 2.5 mg tablet TAKE 1 TABLET TWICE A DAY TO PREVENT BLOOD CLOTS  losartan (COZAAR) 100 mg tablet TAKE 1 TABLET EVERY MORNING (Patient taking differently: TAKE 1 TABLET EVERY night)  ondansetron (ZOFRAN ODT) 4 mg disintegrating tablet TAKE 1 TABLET EVERY 8 HOURS IF NEEDED FOR NAUSEA  pramipexole (MIRAPEX) 0.25 mg tablet TAKE 1 TABLET BEFORE EACH DIALYSIS  MWF  
  sevelamer carbonate (RENVELA) 800 mg tab tab Take 4,000 mg by mouth three (3) times daily (with meals). 5 with meals and 1 with snacks  escitalopram oxalate (LEXAPRO) 10 mg tablet TAKE 1 TABLET AT BEDTIME TO HELP PREVENT ANXIETY  amLODIPine (NORVASC) 10 mg tablet TAKE 1 TABLET EVERY DAY FOR BLOOD PRESSURE (Patient taking differently: TAKE 1 TABLET EVERY night FOR BLOOD PRESSURE)  famotidine (PEPCID) 20 mg tablet TAKE 1 TABLET BY MOUTH EVERY DAY  atorvastatin (LIPITOR) 20 mg tablet Take 1 Tab by mouth nightly.  L. acidoph & paracasei- S therm- Bifido (TY-Q/RISAQUAD) 8 billion cell cap cap Take 1 Cap by mouth daily.  linaclotide (LINZESS) 290 mcg cap capsule Take 290 mcg by mouth daily as needed.  iron, carbonyl (FEOSOL) 45 mg tab Take 1 Tab by mouth two (2) times a day.  acetaminophen (TYLENOL) 500 mg tablet Take 1,000 mg by mouth every six (6) hours as needed for Pain. No current facility-administered medications for this visit. There are no discontinued medications. [facility] follow up appointment(s):  
Future Appointments Date Time Provider Juliana Sotoi 4/2/2019 To Be Determined Marquis Ulloa  [address]  
4/4/2019 11:45 AM MD BONI [address]  
4/16/2019  3:40 PM Sheila Reno MD [address] [place] [address]  
8/12/2019 11:15 AM MD Rosario [address] [facility] follow up appointment(s): n/a [facility]:  information provided as a resource Goals  Supportive resources in place to maintain patient in the community (ie. Home Health, DME equipment, refer to, medication assistant plan, etc.)   
  04/01/19 Reviewed cardiac/renal diet with patient, patient verbalized that she understands and does not need info sent. Briefly discussed smoking cessation and patient not interested at this time, provided education and encouraged patient to think about it.  
 
- Dialysis M-W-F  
- Finish Abx 
- Cardiac/renal diet - [facility] 4/2 
- Dr. Susana Tavarez 4/4 Patient verbalized understanding. NN to f/u 1 week. AR 
 
  
  Understands red flags post discharge. 04/01/19 Spoke to patient today. Reviewed red flag s/s with patient, nausea, vomiting, inability to pass urine/stool, SOB, mental status change, chest pain, fever, bleeding, blood in stool. Patient will contact MD/NN if red flag s/s arise. NN to f/u 1 week.  AR

## 2019-04-04 NOTE — PROGRESS NOTES
HISTORY OF PRESENT ILLNESS  Mario Rodriguez is a 75 y.o. female.   [facility]   Hospital f/u for diarrhea and left lower CAP  Had been feeeling weak and sent to ED by dialysis nurse  Stool c.diff and cx were negative  CT and CXR-left lower lobe asdz  Has 2 doses of levaquin left-denies f/o cough sob  Diarrhea was better on lomotil but restarted 4-5 times per day last 2-3 days  No abdominal pain      Patient Active Problem List    Diagnosis Date Noted    Anemia of renal disease 08/21/2015     Priority: 1 - One    Acute renal failure with lesion of tubular necrosis ([address] 08/03/2015     Priority: 1 - One    Chronic renal insufficiency, stage V ([address] 07/28/2015     Priority: 1 - One    Generalized rash 01/22/2016     Priority: 2 - Two     Class: Present on Admission    Heme positive stool 01/22/2016     Priority: 2 - Two     Class: Present on Admission    Hypertension, uncontrolled 12/21/2015     Priority: 2 - Two    Diarrhea 12/17/2015     Priority: 2 - Two     Class: Present on Admission    Pericardial effusion 12/16/2015     Priority: 2 - Two     Class: Present on Admission    Protein malnutrition ([address] 07/30/2015     Priority: 2 - Two    Acute on chronic renal failure ([address] 01/22/2016     Priority: 3 - Three     Class: Acute    Moderate protein-calorie malnutrition ([address] 12/17/2015     Priority: 3 - Three     Class: Chronic    HTN (hypertension) 10/07/2015     Priority: 3 - Three     Class: Chronic    History of peritonitis 10/07/2015     Priority: 3 - Three     Class: Present on Admission    Physical debility 10/07/2015     Priority: 3 - Three     Class: Present on Admission    Chronic anticoagulation 10/07/2015     Priority: 3 - Three     Class: Chronic    Pneumonia 03/27/2019    Thrombocytopenia ([address] 03/27/2019    Hypertension 03/27/2019    GI bleed 03/27/2019    ESRD (end stage renal disease) [facility] 03/27/2019    Fever 03/27/2019    Anxiety 08/21/2018    Disturbance of memory 02/05/2018    Stenosis of left carotid artery without cerebral infarction 03/31/2017    Acute renal failure (ARF) ([facility] 03/30/2017    Abnormal MRI of head 03/30/2017    Stenosis of both internal carotid arteries 03/30/2017    Cerebral microvascular disease 03/30/2017    Convulsion disorder ([facility] 03/30/2017    Altered mental status, unspecified 03/30/2017    Acute encephalopathy 03/30/2017    Bilateral carotid artery stenosis 01/19/2017    Electrolyte abnormality 03/04/2016    ALAYNA (acute kidney injury) ([facility] 01/22/2016    Pericarditis with effusion 12/18/2015    History of ovarian cancer 12/16/2015     Class: Present on Admission    History of pulmonary embolism 11/16/2015    SIRS (systemic inflammatory response syndrome) ([facility] 09/14/2015    Small bowel perforation (HCC) 08/01/2015    Acute pancreatitis 07/29/2015    E-coli UTI 07/28/2015    Continuous severe abdominal pain 07/28/2015    Enteritis 07/28/2015    Adrenal hemorrhage ([facility] 07/28/2015    Sepsis ([facility] 07/28/2015    Abdominal pain 07/24/2015     Current Outpatient Medications   Medication Sig Dispense Refill    levoFLOXacin (LEVAQUIN) 500 mg tablet Take  by mouth daily.  ondansetron (ZOFRAN ODT) 4 mg disintegrating tablet TAKE 1 TABLET EVERY 8 HOURS IF NEEDED FOR NAUSEA 30 Tab 3    oxybutynin (DITROPAN) 5 mg tablet TAKE 1 TABLET BY MOUTH TWICE A DAY 60 Tab 6    buPROPion (WELLBUTRIN) 100 mg tablet Take 100 mg by mouth nightly.  memantine (NAMENDA) 10 mg tablet Take 10 mg by mouth nightly.  b complex-vitamin c-folic acid (NEPHROCAPS) 1 mg capsule Take 1 Cap by mouth daily.  sevelamer (RENAGEL) 800 mg tablet Take 800 mg by mouth as needed (with snacks). Indications: Renal Osteodystrophy with Hyperphosphatemia      diphenoxylate-atropine (LOMOTIL) 2.5-0.025 mg per tablet Take 1 Tab by mouth four (4) times daily as needed for Diarrhea. Max Daily Amount: 4 Tabs.  60 Tab 0    hydrALAZINE (APRESOLINE) 100 mg tablet Take 100 mg by mouth three (3) times daily.  gabapentin (NEURONTIN) 100 mg capsule TAKE 1 CAPSULE BY MOUTH TWICE A DAY FOR NERVE PAIN 60 Cap 6    ELIQUIS 2.5 mg tablet TAKE 1 TABLET TWICE A DAY TO PREVENT BLOOD CLOTS 60 Tab 3    losartan (COZAAR) 100 mg tablet TAKE 1 TABLET EVERY MORNING (Patient taking differently: TAKE 1 TABLET EVERY night) 30 Tab 6    pramipexole (MIRAPEX) 0.25 mg tablet TAKE 1 TABLET BEFORE EACH DIALYSIS  MWF  3    escitalopram oxalate (LEXAPRO) 10 mg tablet TAKE 1 TABLET AT BEDTIME TO HELP PREVENT ANXIETY 30 Tab 11    amLODIPine (NORVASC) 10 mg tablet TAKE 1 TABLET EVERY DAY FOR BLOOD PRESSURE (Patient taking differently: TAKE 1 TABLET EVERY night FOR BLOOD PRESSURE) 30 Tab 11    famotidine (PEPCID) 20 mg tablet TAKE 1 TABLET BY MOUTH EVERY DAY 30 Tab 11    atorvastatin (LIPITOR) 20 mg tablet Take 1 Tab by mouth nightly. 30 Tab 0    L. acidoph & paracasei- S therm- Bifido (TY-Q/RISAQUAD) 8 billion cell cap cap Take 1 Cap by mouth daily.  iron, carbonyl (FEOSOL) 45 mg tab Take 1 Tab by mouth daily.  acetaminophen (TYLENOL) 500 mg tablet Take 1,000 mg by mouth every six (6) hours as needed for Pain.  sevelamer carbonate (RENVELA) 800 mg tab tab Take 4,000 mg by mouth three (3) times daily (with meals). 5 with meals and 1 with snacks      linaclotide (LINZESS) 290 mcg cap capsule Take 290 mcg by mouth daily as needed.        Allergies   Allergen Reactions    Citrus And Derivatives Anaphylaxis     Patient and her  reported    Penicillin G Anaphylaxis    Orange Juice Not Reported This Time    Sulfa (Sulfonamide Antibiotics) Other (comments)     \"Dont remember\"    Vancomycin Hives      Lab Results   Component Value Date/Time    WBC 9.7 03/28/2019 02:13 AM    HGB 10.8 (L) 03/28/2019 02:13 AM    Hemoglobin (POC) 7.8 (L) 08/01/2016 09:56 AM    HCT 38.3 03/28/2019 02:13 AM    Hematocrit (POC) 23 (L) 08/01/2016 09:56 AM    PLATELET 227 (L) 68/08/5123 02:13 AM    MCV 95.0 03/28/2019 02:13 AM     Lab Results   Component Value Date/Time    Hemoglobin A1c 5.0 03/30/2017 02:27 AM    Hemoglobin A1c 5.8 08/22/2015 04:52 AM    Glucose 72 03/28/2019 02:13 AM    Glucose (POC) 111 (H) 03/29/2019 11:48 AM    Microalbumin/Creat ratio (mg/g creat) 4,648 (H) 03/07/2016 02:36 PM    Microalbumin,urine random 198.00 03/07/2016 02:36 PM    LDL, calculated 31.2 03/30/2017 02:27 AM    Creatinine (POC) 4.1 (H) 08/01/2016 09:56 AM    Creatinine 2.98 (H) 03/28/2019 02:13 AM      Lab Results   Component Value Date/Time    Cholesterol, total 116 03/30/2017 02:27 AM    HDL Cholesterol 55 03/30/2017 02:27 AM    LDL, calculated 31.2 03/30/2017 02:27 AM    Triglyceride 149 03/30/2017 02:27 AM    CHOL/HDL Ratio 2.1 03/30/2017 02:27 AM     Lab Results   Component Value Date/Time    ALT (SGPT) 16 03/27/2019 04:45 PM    AST (SGOT) 24 03/27/2019 04:45 PM    Alk.  phosphatase 119 (H) 03/27/2019 04:45 PM    Bilirubin, direct 0.1 03/10/2016 02:50 PM    Bilirubin, total 0.4 03/27/2019 04:45 PM    Albumin 2.5 (L) 03/27/2019 04:45 PM    Protein, total 6.6 03/27/2019 04:45 PM    Ammonia 13 10/07/2015 03:39 PM    INR 1.6 (H) 03/05/2016 11:07 AM    Prothrombin time 15.5 (H) 03/05/2016 11:07 AM    PLATELET 967 (L) 10/13/1611 02:13 AM     Lab Results   Component Value Date/Time    GFR est non-AA 15 (L) 03/28/2019 02:13 AM    GFRNA, POC 11 (L) 08/01/2016 09:56 AM    GFR est AA 19 (L) 03/28/2019 02:13 AM    GFRAA, POC 13 (L) 08/01/2016 09:56 AM    Creatinine 2.98 (H) 03/28/2019 02:13 AM    Creatinine (POC) 4.1 (H) 08/01/2016 09:56 AM    BUN 16 03/28/2019 02:13 AM    BUN (POC) 65 (H) 08/01/2016 09:56 AM    Sodium 140 03/28/2019 02:13 AM    Sodium (POC) 138 08/01/2016 09:56 AM    Potassium 3.5 03/28/2019 02:13 AM    Potassium (POC) 5.7 (H) 08/01/2016 09:56 AM    Chloride 108 03/28/2019 02:13 AM    Chloride (POC) 112 (H) 08/01/2016 09:56 AM    CO2 25 03/28/2019 02:13 AM    Magnesium 2.9 (H) 03/31/2017 05:26 AM    Phosphorus 3.3 03/28/2019 11:38 AM    PTH, Intact 484.6 (H) 03/07/2016 12:51 PM        ROS    Physical Exam   Constitutional: She appears well-developed and well-nourished. Appears stated age   Cardiovascular: Normal rate, regular rhythm and normal heart sounds. Exam reveals no gallop and no friction rub. No murmur heard. Pulmonary/Chest: Effort normal and breath sounds normal. No respiratory distress. She has no wheezes. Abdominal: Soft. Bowel sounds are normal.   Musculoskeletal: She exhibits no edema. Neurological: She is alert. Psychiatric: She has a normal mood and affect. Nursing note and vitals reviewed. ASSESSMENT and PLAN  Diagnoses and all orders for this visit:    1. Diarrhea, unspecified type  -     REFERRAL TO GASTROENTEROLOGY-Dr Edwards-will try to get patient seen sooner than next month   Lomotil prn for now   abd CT unremarkable  2. Community acquired pneumonia of left lower lobe of lung ([facility]   sats 98% RA, clinically resolving   Complete course of levaquin   cxr in 4 weeks    3. ESRD    On HD    Follow-up and Dispositions    · Return in about 1 month (around 5/4/2019), or if symptoms worsen or fail to improve, for CAP.

## 2019-04-04 NOTE — PROGRESS NOTES
Chief Complaint   Patient presents with   [facility] Follow Up     GI bleed , pneumonia,

## 2019-04-12 NOTE — PROGRESS NOTES
Goals  Supportive resources in place to maintain patient in the community (ie. Home Health, DME equipment, refer to, medication assistant plan, etc.)   
  04/01/19 Reviewed cardiac/renal diet with patient, patient verbalized that she understands and does not need info sent. Briefly discussed smoking cessation and patient not interested at this time, provided education and encouraged patient to think about it.  
 
- Dialysis M-W-F  
- Finish Abx 
- Cardiac/renal diet  
- H2H 4/2 
- Dr. Femi Lynn 4/4 Patient verbalized understanding. NN to f/u 1 week. AR 
 
04/12/19 Spoke to patient today. Patient attended f/u appt with Dr. Femi Lynn. Patient completed course of antibiotics. - Dialysis M-W-F  
- Finish Abx 
- Cardiac/renal diet - Immodium PRN 
- Dr. Deep Schmidt 4/15 
- Dr. Afia Ryder (neuro) 4/15 
- GI 5/2 - F/u CXR 4 weeks Patient verbalized understanding of the above plan. NN to f/u 1 week. AR 
 
  
  Understands red flags post discharge. 04/01/19 Spoke to patient today. Reviewed red flag s/s with patient, nausea, vomiting, inability to pass urine/stool, SOB, mental status change, chest pain, fever, bleeding, blood in stool. Patient will contact MD/NN if red flag s/s arise. NN to f/u 1 week. AR 
 
04/12/19 Spoke to patient. Patient c/o diarrhea at her appt with Dr. Sharon De La Fuente, she states that it is improving. She is taking imodium PRN. Reminded patient of red flag s/s that warrant f/u and to monitor diarrhea and to contact MD if s/s worsen or imodium does not provide relief. Patient verbalized understanding. NN to f/u 1 week.  AR

## 2019-04-19 PROBLEM — J18.9 HCAP (HEALTHCARE-ASSOCIATED PNEUMONIA): Status: ACTIVE | Noted: 2019-01-01

## 2019-04-19 PROBLEM — R09.02 HYPOXIA: Status: ACTIVE | Noted: 2019-01-01

## 2019-04-19 PROBLEM — R06.00 DYSPNEA: Status: ACTIVE | Noted: 2019-01-01

## 2019-04-19 PROBLEM — D64.9 ANEMIA: Status: ACTIVE | Noted: 2019-01-01

## 2019-04-19 NOTE — DIALYSIS
[facility] Dialysis Team [place]  [phone] Vitals   Pre   Post   Assessment   Pre   Post    
Temp  Temp: 98.2 °F (36.8 °C) (04/19/19 1718)  97.9 LOC  Alert, arousable resting with eyes closed  Resting with eyes closed HR   Pulse (Heart Rate): 100 (04/19/19 1718) 90 Lungs   On bipap satruation 100%   breathing even and unlabored B/P   BP: 185/76 (04/19/19 1718) 161/80 Cardiac   Tachycardia regular on bedside telemetry   regular bedside telemetry Resp   Resp Rate: 22 (04/19/19 1523) 21 Skin   Warm and dry   warm and dry Pain level  Pain Intensity 1: 7 (04/19/19 1015) 0 Edema  Trace ble Trace ble Orders: Duration:   Start:    1718 End:    1951 Total:   2.5 Dialyzer:   Dialyzer/Set Up Inspection: Abhishek Fish (04/19/19 1718) K Bath:   Dialysate K (mEq/L): (sequential) (04/19/19 1718) Ca Bath:   Dialysate CA (mEq/L): (sequential) (04/19/19 1718) Na/Bicarb:   Dialysate NA (mEq/L): 138 (04/19/19 1718) Target Fluid Removal:   Goal/Amount of Fluid to Remove (mL): 2500 mL (04/19/19 1718) Access Type & Location:   juhi graft,site without redness or drainage, prepped with alcohol per policy and procedure,cannulated with two 15 gauge needles without incident, secured with tape Labs Obtained/Reviewed Critical Results Called   Date when labs were drawn- 
Hgb-   
HGB Date Value Ref Range Status 04/19/2019 8.7 (L) 11.5 - 16.0 g/dL Final  
 
K-   
Potassium Date Value Ref Range Status 04/19/2019 3.6 3.5 - 5.1 mmol/L Final  
 
Ca-  
Calcium Date Value Ref Range Status 04/19/2019 8.0 (L) 8.5 - 10.1 MG/DL Final  
 
Bun-  
BUN Date Value Ref Range Status 04/19/2019 13 6 - 20 MG/DL Final  
 
Creat-  
Creatinine Date Value Ref Range Status 04/19/2019 1.89 (H) 0.55 - 1.02 MG/DL Final  
 
  
Medications/ Blood Products Given Name   Dose   Route and Time    
albumin 25% 25 grams Iv drip 1815 for hypotension Blood Volume Processed (BVP):    0 Net Fluid Removed:  2400 Comments Time Out Done: 8059 Primary Nurse Rpt Pre: Edison Sicard Primary Nurse Rpt Post: Fay Purcell Pt Education: informed consent with family Care Plan: continue current HD plan of care Tx Summary: 6313 UF initiated as ordered 1815 blood pressure 106/53, skin warm and dry, head lowered slightly rechecked 107/57 albumin administered for hypotension as ordered 1824 patient resting with eyes closed, blood pressure reevaluated 158/64. Skin warm and dry 1930 patient with complaints of lower leg cramping UF decreased with improvement 1951 all possible blood returned, hemostasis achieved <20 minutes dressing dry and intact. SBAR to primary nurse Admiting Diagnosis: ? Sepsis Pt's previous clinic- [facility] Consent signed -  obtained Rajesh Consent - obtained Hepatitis Status- pending Machine #- Machine Number: [id number] (04/19/19 9988) Telemetry status-bedside telemetry Pre-dialysis wt. -  n/a

## 2019-04-19 NOTE — PROGRESS NOTES
Physical therapy: 
 
Orders received. Chart reviewed. Rapid response called on this patient a few minutes ago. Pt is not medically stable for therapy at this time. Will defer and follow up tomorrow if appropriate. Thank you Dee Atkinson, PT, DPT

## 2019-04-19 NOTE — PROGRESS NOTES
Pharmacy Automatic Renal Dosing Protocol - Antimicrobials Indication for Antimicrobials: CAP Current Regimen of Each Antimicrobial: 
Levofloxacin 500mg IV once in ED, Levofloxacin 750 mg IV Q48H (Start , Day 1) Significant Cultures:  
None Eduarda  Bcx, Stools an MRSA surveillance -  
 
Radiology / Imaging results: (X-ray, CT scan or MRI): 
 CXR - New bilateral airspace disease superimposed on chronic underlying findings of emphysema and numerous bilateral calcified granulomas. Top differential includes multifocal pneumonia or edema. Paralysis, amputations, malnutrition:  
none Labs: 
Recent Labs 19 
1033 CREA 1.89* BUN 13 WBC 12.8* Temp (24hrs), Av.2 °F (37.9 °C), Min:98.6 °F (37 °C), Max:101.7 °F (38.7 °C) Creatinine Clearance (mL/min) or Dialysis:  
Estimated Creatinine Clearance: 24.8 mL/min (A) (based on SCr of 1.89 mg/dL (H)). Estimated Creatinine Clearance (using IBW):27.3 mL/min Impression/Plan:  
Levofloxacin 750 mg Q48H per renal dosing protocol. BMP daily Antimicrobial stop date - pending Pharmacy will follow daily and adjust medications as appropriate for renal function and/or serum levels.  
 
Thank you, 
Mei Ramos, [facility]

## 2019-04-19 NOTE — ED NOTES
Bedside and Verbal shift change report given to Emmanuelle Esquivel RN  (oncoming nurse) by Francisco Hinojosa  (offgoing nurse). Report included the following information SBAR, Kardex, ED Summary, Intake/Output, MAR, Recent Results and Med Rec Status.

## 2019-04-19 NOTE — PROGRESS NOTES
Pharmacy Automatic Renal Dosing Protocol - Antimicrobials Indication for Antimicrobials: CAP Current Regimen of Each Antimicrobial: 
Levofloxacin 500mg IV once in ED, Levofloxacin 750 mg IV Q48H (Start , Day 1 of 7) Linezolid 600 mg Q12H x 7 days (Start , Day 1 of 7) Significant Cultures:  
Significant Cultures:  
None 2019 BCx, Stools an MRSA surveillance -  
  
Radiology / Imaging results: (X-ray, CT scan or MRI): 
 CXR - New bilateral airspace disease superimposed on chronic underlying findings of emphysema and numerous bilateral calcified granulomas. Top differential includes multifocal pneumonia or edema. Paralysis, amputations, malnutrition:  
none Labs: 
Recent Labs 19 
1033 CREA 1.89* BUN 13 WBC 12.8* Temp (24hrs), Av.2 °F (37.3 °C), Min:98 °F (36.7 °C), Max:101.7 °F (38.7 °C) Creatinine Clearance (mL/min) or Dialysis:   HD MWF Allergy:  Vancomycin = hives Impression/Plan:  
Adjusted Levofloxacin to 500mg IV Q48H HD dosing protocol Continue Linezolid 600mg IV q12h BMP daily, CBC already ordered for  Antimicrobial stop date - 7 days Pharmacy will follow daily and adjust medications as appropriate for renal function and/or serum levels.  
 
Thank you, 
Jv Vieyra, [facility]

## 2019-04-19 NOTE — PROGRESS NOTES
TRANSFER - IN REPORT: 
 
Verbal report received from Alta(name) on Joana Screen  being received from ED(unit) for routine progression of care Report consisted of patients Situation, Background, Assessment and  
Recommendations(SBAR). Information from the following report(s) SBAR, Kardex, Intake/Output, MAR and Recent Results was reveiwed with the receiving nurse. Opportunity for questions and clarification was provided. Assessment completed upon patients arrival to unit and care assumed. 1522  MEWS 6  Pt arrived on unit in respiratory distress  Hypertensive, diaphoretic, ashen color, tachy, SPO2 mid 70s on 4L O2 NC   RRT called  MDs at bedside  Pt placed on non-rebreather and then bipap, lasix 40mg IV administered,  ABGs done, stat chest xray ordered, cardiac and neph consults called   Continue to monitor 1553  Echo being done at bedside 1630  Report given to Elza Guerrero (dialysis) 1718  MEWS 1  
1735  PO meds held at this time  Pt remains on bipap  Dialysis being done at bedside 1825  Lab called with critical troponin 0.31  Called Dr Lamberto Marr office [phone]  Dr Steffany Morris advised 1900  Report given to [facility]   Advised that dual skin assessment was deferred d/t pt condition on admission and then started on dialysis

## 2019-04-19 NOTE — ED NOTES
Pt being transported to floor with this RN, pt appears sob and o2 sats 80's% on 4L o2. Respiratory paged to meet this RN on pcu.

## 2019-04-19 NOTE — PROGRESS NOTES
04/19/19 NN was to perform f/u ADRIA call. Chart review completed and patient currently in ED for chest pressure, cough and SOB. NN to continue to follow.  AR

## 2019-04-19 NOTE — PROGRESS NOTES
Rapid Response Team: 
 
Responded to RRT called for 2262, patient received from ED to PCU, alert, in clear respiratory distress, wet lung sounds audible, saturation 79-81% on nasal cannula with a good pleth, hypertensive and tachycardic. Patient reports being dialyzed today, continues to make urine. ABG obtained, patient placed on BiPAP, nephro consult placed/called, pulm consult placed/called, cardiology consult placed/called. Patient medicated per [place], dialysis to be ordered, PCXR ordered, and stat ECHO ordered. RRT ended with patient remaining in room awaiting dialysis, BRIE Lara aware of plan of care. Patient remains A&Ox3, GCS 15. Will continue to monitor/round on patient. Nacho Henriquez RN aware to call if needed. Andrea Gan BSN, RN, CCRN, SOPHIA, CPEN Rapid Response RN

## 2019-04-19 NOTE — CONSULTS
Consult/Admission NAME: Eduard Shaikh :  1945 MRN:  [id number] Date/Time:  2019 4:23 PM 
 
Patient PCP: Janie Bradford MD 
________________________________________________________________________ Assessment:  
 
Acute Respiratory failure with Hypoxia. Volume overload with pulmonary edema Pneumonia Elevated BNP / normal Troponin ESRD on Dialysis. For urgent ultrafiltration today for volume removal and management Malignant Hypertension. Pneumonia Elevated WBC  / Febrile. Anemia. Hx of Pulmonary embolism. S/p IVC filter. Chronic Eliquis Hx of inta abdominal bleeding Hx of ovarian CA Left Ventricular hypertrophy with normal systolic function. No hx of heart disease. Plan:  
 
Echo is pending . Will review when available. Dialysis to manage fluid volume. []           High complexity decision making was performed Subjective: CHIEF COMPLAINT:  SOB HISTORY OF PRESENT ILLNESS:    
Hero Taylor is a 75 y.o.  female who admitted with SOB , hypoxia and progressive bilateral pulmonary infiltrates. Treated for pneumonia . Dialysis / UF for ESRD and volume overload with pulmonary edema. Elevated BNP Troponin is 0.08, which is fine not elevated and there is no evidence of an MI. This is not an MI. Past Medical History:  
Diagnosis Date  Chronic kidney disease Stage 5 (16 BUN 79, Creatnine 4.53, K 6) Dr. Guevara Ash [phone]  GERD (gastroesophageal reflux disease)   
 well controlled with Rx   
 High cholesterol  Hyperkalemia  Hypertension  Hypomagnesemia   
 on daily Magnesium  Neuropathy   
 bilateral feet  Ovarian cancer ([address]  Hysterectomy with chemo, no radiation:  Dr. Brigette Tuttle  Thromboembolus ([address] 2015  
 blood clot in lung 2015  Thromboemb[address] ([facility]   
 in kidney \"many years ago\" Past Surgical History: Procedure Laterality Date  ABDOMEN SURGERY PROC UNLISTED  7/31/15 Lap exploratory small bowel obstruction  (ICU)  ABDOMEN SURGERY PROC UNLISTED  8/2/15 Abdmonial washout with wound vac (ICU)  ABDOMEN SURGERY PROC UNLISTED  8/18/15 Abdominal washout fascial closure (ICU)  ABDOMEN SURGERY PROC UNLISTED  11/11/15 Lap takedown of ileostomy  COLONOSCOPY N/A 2016 COLONOSCOPY performed by Fabrice Sun MD at . [facility] HX APPENDECTOMY  approx   HX CASI AND BSO    
 due to ovarian cancer  HX TONSILLECTOMY  age 10 Allergies Allergen Reactions  Citrus And Derivatives Anaphylaxis Patient and her  reported  Penicillin G Anaphylaxis  Orange Juice Not Reported This Time  Sulfa (Sulfonamide Antibiotics) Other (comments) \"Dont remember\"  Vancomycin Hives Meds:  See below Social History Tobacco Use  Smoking status: Current Every Day Smoker Packs/day: 1.00 Last attempt to quit: 2012 Years since quittin.2  Smokeless tobacco: Never Used Substance Use Topics  Alcohol use: No  
  
Family History Problem Relation Age of Onset  Other Mother   
     peptic ulcer  Alzheimer Father REVIEW OF SYSTEMS:   
 []            Unable to obtain  ROS due to --- 
 [x]            Total of 12 systems reviewed as follows: 
 
Constitutional: negative fever, negative chills, negative weight loss Eyes:   negative visual changes ENT:   negative sore throat, tongue or lip swelling Respiratory:  negative cough, negative dyspnea Cards:  negative for chest pain, palpitations, lower extremity edema GI:   negative for nausea, vomiting, diarrhea, and abdominal pain Genitourinary: negative for frequency, dysuria Integument:  negative for rash Hematologic:  negative for easy bruising and gum/nose bleeding Musculoskel: negative for myalgias,  back pain Neurological:  negative for headaches, dizziness, vertigo, weakness Behavl/Psych: negative for feelings of anxiety, depression Pertinent Positives include : 
 
Objective:  
  
Physical Exam: 
 
Last 24hrs VS reviewed since prior progress note. Most recent are: 
 
Visit Vitals /79 Pulse (!) 113 Temp 100.2 °F (37.9 °C) Resp 22 Wt 60.1 kg (132 lb 7.9 oz) SpO2 96% BMI 19.57 kg/m² No intake or output data in the 24 hours ending 04/19/19 1623 General Appearance: Well developed, well nourished, alert & oriented x 3,  
 no acute distress. Ears/Nose/Mouth/Throat: Pupils equal and round, Hearing grossly normal. 
Neck: Supple. JVP within normal limits. Carotids good upstrokes, with no bruit. Chest: Lungs clear to auscultation bilaterally. Cardiovascular: Regular rate and rhythm, S1S2 normal, no murmur, rubs, gallops. Abdomen: Soft, non-tender, bowel sounds are active. No organomegaly. Extremities: No edema bilaterally. Femoral pulses +2, Distal Pulses +1. Skin: Warm and dry. Neuro: CN II-XII grossly intact, Strength and sensation grossly intact. Data:  
  
Prior to Admission medications Medication Sig Start Date End Date Taking? Authorizing Provider  
polyethylene glycol (MIRALAX) 17 gram/dose powder Take 17 g by mouth daily. Yes Other, MD Jess  
amLODIPine (NORVASC) 10 mg tablet Take 10 mg by mouth daily. Yes Provider, Historical  
trolamine salicylate (ASPERCREME EX) by Apply Externally route daily. Yes Provider, Historical  
carboxymethylcellulose sodium (REFRESH LIQUIGEL) 1 % dlgl ophthalmic solution Administer 1 Drop to both eyes daily as needed.    Yes Provider, Historical  
ondansetron (ZOFRAN ODT) 4 mg disintegrating tablet TAKE 1 TABLET EVERY 8 HOURS IF NEEDED FOR NAUSEA 4/2/19  Yes Carlo Berman III, DO  
oxybutynin (DITROPAN) 5 mg tablet TAKE 1 TABLET BY MOUTH TWICE A DAY 3/27/19  Yes Jami Mondragon MD  
 buPROPion (WELLBUTRIN) 100 mg tablet Take 100 mg by mouth nightly. Yes Provider, Historical  
memantine (NAMENDA) 10 mg tablet Take 10 mg by mouth nightly. Yes Provider, Historical  
b complex-vitamin c-folic acid (NEPHROCAPS) 1 mg capsule Take 1 Cap by mouth every Monday, Wednesday, Friday. Yes Provider, Historical  
diphenoxylate-atropine (LOMOTIL) 2.5-0.025 mg per tablet Take 1 Tab by mouth four (4) times daily as needed for Diarrhea. Max Daily Amount: 4 Tabs. 3/25/19  Yes Janie Bradford MD  
hydrALAZINE (APRESOLINE) 100 mg tablet Take 100 mg by mouth three (3) times daily. 3/4/19  Yes Provider, Historical  
gabapentin (NEURONTIN) 100 mg capsule TAKE 1 CAPSULE BY MOUTH TWICE A DAY FOR NERVE PAIN 1/16/19  Yes Janie Bradford MD  
ELIQUIS 2.5 mg tablet TAKE 1 TABLET TWICE A DAY TO PREVENT BLOOD CLOTS 1/5/19  Yes Janie Bradford MD  
losartan (COZAAR) 100 mg tablet TAKE 1 TABLET EVERY MORNING Patient taking differently: TAKE 1 TABLET EVERY night 12/17/18  Yes Janie Bradford MD  
pramipexole (MIRAPEX) 0.25 mg tablet TAKE 1 TABLET BEFORE EACH DIALYSIS  MWF 7/15/18  Yes Provider, Historical  
sevelamer carbonate (RENVELA) 800 mg tab tab Take 4,000 mg by mouth three (3) times daily (with meals). 5 with meals and 1 with snacks 8/16/18  Yes Provider, Historical  
escitalopram oxalate (LEXAPRO) 10 mg tablet TAKE 1 TABLET AT BEDTIME TO HELP PREVENT ANXIETY 8/20/18  Yes Janie Bardford MD  
famotidine (PEPCID) 20 mg tablet TAKE 1 TABLET BY MOUTH EVERY DAY 4/23/18  Yes Janie Bradford MD  
atorvastatin (LIPITOR) 20 mg tablet Take 1 Tab by mouth nightly. 3/31/17  Yes MD RACHELLE Calloway acidoph & paracasei- S therm- Bifido (TY-Q/RISAQUAD) 8 billion cell cap cap Take 1 Cap by mouth daily. Yes Provider, Historical  
IRON, CARBONYL PO Take 280 mg by mouth daily. Yes Provider, Historical  
acetaminophen (TYLENOL) 500 mg tablet Take 1,000 mg by mouth every six (6) hours as needed for Pain.    Yes Provider, Historical  
 
 Recent Results (from the past 24 hour(s)) EKG, 12 LEAD, INITIAL Collection Time: 04/19/19 10:10 AM  
Result Value Ref Range Ventricular Rate 107 BPM  
 Atrial Rate 107 BPM  
 P-R Interval 146 ms  
 QRS Duration 94 ms Q-T Interval 354 ms QTC Calculation (Bezet) 472 ms Calculated P Axis 75 degrees Calculated R Axis 28 degrees Calculated T Axis 74 degrees Diagnosis Sinus tachycardia Left atrial enlargement Left ventricular hypertrophy with repolarization abnormality When compared with ECG of 27-MAR-2019 21:44, 
Vent. rate has increased BY  43 BPM 
ST now depressed in Inferior leads ST now depressed in Lateral leads T wave inversion no longer evident in Inferior leads Nonspecific T wave abnormality no longer evident in Anterior leads T wave inversion now evident in Lateral leads Confirmed by Allegra Bettencourt ([id number]) on 4/19/2019 10:22:37 AM 
  
CBC WITH AUTOMATED DIFF Collection Time: 04/19/19 10:33 AM  
Result Value Ref Range WBC 12.8 (H) 3.6 - 11.0 K/uL  
 RBC 3.38 (L) 3.80 - 5.20 M/uL HGB 8.7 (L) 11.5 - 16.0 g/dL HCT 30.6 (L) 35.0 - 47.0 % MCV 90.5 80.0 - 99.0 FL  
 MCH 25.7 (L) 26.0 - 34.0 PG  
 MCHC 28.4 (L) 30.0 - 36.5 g/dL  
 RDW 17.6 (H) 11.5 - 14.5 % PLATELET 571 161 - 828 K/uL MPV 10.0 8.9 - 12.9 FL  
 NRBC 0.0 0  WBC ABSOLUTE NRBC 0.00 0.00 - 0.01 K/uL NEUTROPHILS 74 32 - 75 % LYMPHOCYTES 8 (L) 12 - 49 % MONOCYTES 17 (H) 5 - 13 % EOSINOPHILS 0 0 - 7 % BASOPHILS 0 0 - 1 % IMMATURE GRANULOCYTES 1 (H) 0.0 - 0.5 % ABS. NEUTROPHILS 9.5 (H) 1.8 - 8.0 K/UL  
 ABS. LYMPHOCYTES 1.0 0.8 - 3.5 K/UL  
 ABS. MONOCYTES 2.2 (H) 0.0 - 1.0 K/UL  
 ABS. EOSINOPHILS 0.0 0.0 - 0.4 K/UL  
 ABS. BASOPHILS 0.0 0.0 - 0.1 K/UL  
 ABS. IMM. GRANS. 0.1 (H) 0.00 - 0.04 K/UL  
 DF AUTOMATED    
 RBC COMMENTS HYPOCHROMIA 1+ 
    
 RBC COMMENTS POLYCHROMASIA 1+ 
    
 RBC COMMENTS ANISOCYTOSIS 
1+ METABOLIC PANEL, COMPREHENSIVE  
 Collection Time: 04/19/19 10:33 AM  
Result Value Ref Range Sodium 138 136 - 145 mmol/L Potassium 3.6 3.5 - 5.1 mmol/L Chloride 105 97 - 108 mmol/L  
 CO2 25 21 - 32 mmol/L Anion gap 8 5 - 15 mmol/L Glucose 98 65 - 100 mg/dL BUN 13 6 - 20 MG/DL Creatinine 1.89 (H) 0.55 - 1.02 MG/DL  
 BUN/Creatinine ratio 7 (L) 12 - 20 GFR est AA 32 (L) >60 ml/min/1.73m2 GFR est non-AA 26 (L) >60 ml/min/1.73m2 Calcium 8.0 (L) 8.5 - 10.1 MG/DL Bilirubin, total 0.6 0.2 - 1.0 MG/DL  
 ALT (SGPT) 13 12 - 78 U/L  
 AST (SGOT) 29 15 - 37 U/L Alk. phosphatase 104 45 - 117 U/L Protein, total 6.7 6.4 - 8.2 g/dL Albumin 2.4 (L) 3.5 - 5.0 g/dL Globulin 4.3 (H) 2.0 - 4.0 g/dL A-G Ratio 0.6 (L) 1.1 - 2.2 PROTHROMBIN TIME + INR Collection Time: 04/19/19 10:33 AM  
Result Value Ref Range INR 1.5 (H) 0.9 - 1.1 Prothrombin time 15.0 (H) 9.0 - 11.1 sec TROPONIN I Collection Time: 04/19/19 10:33 AM  
Result Value Ref Range Troponin-I, Qt. 0.08 (H) <0.05 ng/mL NT-PRO BNP Collection Time: 04/19/19 10:33 AM  
Result Value Ref Range NT pro-BNP >35,000 (H) <125 PG/ML  
LACTIC ACID Collection Time: 04/19/19 12:00 PM  
Result Value Ref Range  Lactic acid 0.9 0.4 - 2.0 MMOL/L

## 2019-04-19 NOTE — PROGRESS NOTES
Patient has been seen by [facility] in the past.  Consult called to their service and PCU updated that consult has been transferred to them.  
 
 
 
Claudette Pabon NP 
DNP, RN, AGAP-BC

## 2019-04-19 NOTE — CONSULTS
[address] [facility]  FYU:[id number]   :1945 Patient came to er with sob and chest pain She had hd this am 
She has h/o cramps on hd. No fluid is removed in hd She is in pulmonary edema Plan to do stat ultrafiltration today She will likely need UF TOMORROW. Tammi Murray MD 
[facility] [address] [facility] [place], [address] [facility] Phone - [phone] Fax - [phone] . [address] [facility] Phone - [phone] Fax - [phone]    
www. [facility]

## 2019-04-19 NOTE — PROGRESS NOTES
RRR for hypoxia. Patient noted with sats in 70's On exam has bilateral crackles and resp distress On NRB  
CTA chest shows BL Pleural effusion ABG noted with PO2 51 A/P Pleural edema r/o CHF/ Pulm edema Nephrology on board will need urgent dialysis ECHO pending BIPAP repeat ABG after 2-3  hr 
S/p lasix 80 mg in ER , give another 40 mg IV Pulmonology and cardiology on board . Appreciated

## 2019-04-19 NOTE — PROGRESS NOTES
Nephrology Progress Note Patrice Marion  
 
www. [facility]                  Phone - [phone] Patient: Michelle Philippe Date- 4/19/2019 Admit Date: 4/19/2019 YOB: 1945 CC: Follow up for esrd Subjective: Interval History:  
-  Ms. Sukumar Baxter is white female with pmh of esrd, htn. She came to er with chest pain She in resp. Distress. She has malignant hypertension She had hd today. She didn't get any fluid removed on hd due to h/o cramping No fever C/o chest pain , sob ROS:- as above Assessment:  
· esrd - mwf - allegra Chetek · Anemia of ckd · RESP DISTRESS · Pulmonary edema, chf 
· Malignant hypertension · Sec. Bishop Anderosn Plan:  
· Stat ultrafiltration today - remove 3 kg · Continue home bp meds · She doesn't need po iron. She gets iv iron with hd · She will farida UF again tomorrow · bipap per pulmonary Physical Exam:  
GEN: mod. Distress on bipap NECK- Supple, no mass RESP: coarse  b/l, no wheezing,Increased accessory muscle use CVS: RRR,S1,S2 ABDO: soft , non tender, No mass EXT: left arm avf + NEURO: normal speech, non focal 
 
Care Plan discussed with: pt, nurse,  Objective:  
Patient Vitals for the past 24 hrs: 
 Temp Pulse Resp BP SpO2  
04/19/19 1545  (!) 113  193/79 96 % 04/19/19 1536     95 % 04/19/19 1530  (!) 121  (!) 215/83 (!) 79 % 04/19/19 1523  (!) 125  (!) 227/76 (!) 82 % 04/19/19 1521  (!) 125  (!) 214/76 (!) 78 % 04/19/19 1305  (!) 118 27  92 % 04/19/19 1219 (!) 101.7 °F (38.7 °C)      
04/19/19 1028  99 27 184/64 94 % 04/19/19 1026  99 28 184/64 95 % 04/19/19 1015 98.6 °F (37 °C) (!) 105 20 181/56 (!) 82 % Last 3 Recorded Weights in this Encounter 04/19/19 1015 Weight: 60.1 kg (132 lb 7.9 oz) No intake/output data recorded. Chart reviewed. Pertinent Notes reviewed. Medication list  reviewed Current Facility-Administered Medications Medication  acetaminophen (TYLENOL) tablet 650 mg  [START ON 4/20/2019] amLODIPine (NORVASC) tablet 10 mg  
 atorvastatin (LIPITOR) tablet 20 mg  
 [START ON 4/20/2019] B complex-vitaminC-folic acid (NEPHROCAP) cap  buPROPion [facility]) tablet 100 mg  
 escitalopram oxalate (LEXAPRO) tablet 10 mg  
 gabapentin (NEURONTIN) capsule 100 mg  hydrALAZINE (APRESOLINE) tablet 100 mg  [START ON 4/20/2019] ferrous sulfate tablet 325 mg  [START ON 4/20/2019] lactobac ac& pc-s.therm-b.anim (TY Q/RISAQUAD)  [START ON 4/20/2019] losartan (COZAAR) tablet 100 mg  
 memantine (NAMENDA) tablet 10 mg  
 sevelamer carbonate (RENVELA) tab 800 mg  
 sevelamer carbonate (RENVELA) tab 4,000 mg  
 sodium chloride (NS) flush 5-40 mL  sodium chloride (NS) flush 5-40 mL  acetaminophen (TYLENOL) tablet 650 mg  
 naloxone (NARCAN) injection 0.4 mg  
 morphine injection 2 mg  [START ON 4/21/2019] levoFLOXacin (LEVAQUIN) 750 mg in D5W IVPB  linezolid in dextrose 5% (ZYVOX) IVPB premix in D5W 600 mg  
 guaiFENesin ER (MUCINEX) tablet 600 mg  furosemide (LASIX) 10 mg/mL injection  albuterol-ipratropium (DUO-NEB) 2.5 MG-0.5 MG/3 ML  
 albuterol-ipratropium (DUO-NEB) 2.5 MG-0.5 MG/3 ML  
 albumin human 25% (BUMINATE) solution 25 g Data Review : 
Recent Labs 04/19/19 
1033 WBC 12.8* HGB 8.7*  ANEU 9.5* INR 1.5*   
K 3.6 GLU 98 BUN 13  
CREA 1.89* ALT 13 SGOT 29 TBILI 0.6  CA 8.0* Lab Results Component Value Date/Time Culture result:  03/28/2019 02:12 PM  
  NO ROUTINE ENTERIC PATHOGENS ISOLATED INCLUDING SALMONELLA, SHIGELLA, YERSINIA, VIBRIO OR SHIGA TOXIN PRODUCING E. COLI Culture result: NO GROWTH 6 DAYS 03/27/2019 06:54 PM  
 Culture result: NO GROWTH 6 DAYS 03/27/2019 06:54 PM  
 
No results found for: SDES No results for input(s): FE, TIBC, PSAT, FERR in the last 72 hours. Lab Results Component Value Date/Time Sodium,urine random 17 12/15/2015 10:02 PM  
 Creatinine, urine 42.60 03/07/2016 02:36 PM  
 Creatinine, urine 41.80 03/07/2016 02:36 PM  
 
Kala Becker MD 
[facility] 
 www. [facility] [employer] [address], [address] [place], [address] Phone - [phone] Fax - [phone]

## 2019-04-19 NOTE — ED NOTES
TRANSFER - OUT REPORT: 
 
Verbal report given to BRIE Burnham (name) on Camille Ladd  being transferred to PCU (unit) for routine progression of care Report consisted of patients Situation, Background, Assessment and  
Recommendations(SBAR). Information from the following report(s) SBAR, Kardex, ED Summary, Intake/Output, MAR, Recent Results and Med Rec Status was reviewed with the receiving nurse. Lines:  
Peripheral IV 04/19/19 Right Hand (Active) Site Assessment Clean, dry, & intact 4/19/2019 10:36 AM  
Phlebitis Assessment 0 4/19/2019 10:36 AM  
Infiltration Assessment 0 4/19/2019 10:36 AM  
Dressing Status Clean, dry, & intact 4/19/2019 10:36 AM  
Dressing Type Transparent 4/19/2019 10:36 AM  
Hub Color/Line Status Flushed 4/19/2019 10:36 AM  
Action Taken Blood drawn 4/19/2019 10:36 AM  
  
 
Opportunity for questions and clarification was provided. Patient transported with: 
 Registered Nurse Pt to have US guided IV placed for CT, then to go to floor.

## 2019-04-19 NOTE — CONSULTS
[facility], Critical Care, and Sleep Medicine Initial Patient Consult Name: Fran Rose MRN: [id number] : 1945 Hospital: [place] [address] Date: 2019 IMPRESSION:  
· Acute respiratory failure · CHF · BNP 24161!!! 
· Pulmonary edema · Uncontrolled HTN 
· ESRD  
  
RECOMMENDATIONS:  
· BIPAP 
· O2 
· Needs nephrology to manage diuretics, RRT 
· Needs nephrology to manage BP medications · Jet nebs · Needs to quit smoking · Hospitalist team placed pt on abx Subjective: This patient has been seen and evaluated at the request of hospitalist for respiratory failure. Patient is a 75 y.o. female smoker with ESRD on RRT admitted with pulmonary edema RAT called for worsening respiratory distress, HTN Now pt on bipap Significant respiratory distress Past Medical History:  
Diagnosis Date  Chronic kidney disease Stage 5 (16 BUN 79, Creatnine 4.53, K 6) Dr. Sasha Harding [phone]  GERD (gastroesophageal reflux disease)   
 well controlled with Rx   
 High cholesterol  Hyperkalemia  Hypertension  Hypomagnesemia   
 on daily Magnesium  Neuropathy   
 bilateral feet  Ovarian cancer ([address]  Hysterectomy with chemo, no radiation:  Dr. Rosio Galicia  Thromboembolus ([facility] 2015  
 blood clot in lung 2015  Thromboembolus ([facility] 2004  
 in kidney \"many years ago\" Past Surgical History:  
Procedure Laterality Date  ABDOMEN SURGERY PROC UNLISTED  7/31/15 Lap exploratory small bowel obstruction  (ICU)  ABDOMEN SURGERY PROC UNLISTED  8/2/15 Abdmonial washout with wound vac (ICU)  ABDOMEN SURGERY PROC UNLISTED  8/18/15 Abdominal washout fascial closure (ICU)  ABDOMEN SURGERY PROC UNLISTED  11/11/15 Lap takedown of ileostomy  COLONOSCOPY N/A 2016 [facility] HX APPENDECTOMY  approx   HX CASI AND BSO  2013  
 due to ovarian cancer  HX TONSILLECTOMY  age 10 Prior to Admission medications Medication Sig Start Date End Date Taking? Authorizing Provider  
polyethylene glycol (MIRALAX) 17 gram/dose powder Take 17 g by mouth daily. Yes Other, MD Jess  
amLODIPine (NORVASC) 10 mg tablet Take 10 mg by mouth daily. Yes Provider, Historical  
trolamine salicylate (ASPERCREME EX) by Apply Externally route daily. Yes Provider, Historical  
carboxymethylcellulose sodium (REFRESH LIQUIGEL) 1 % dlgl ophthalmic solution Administer 1 Drop to both eyes daily as needed. Yes Provider, Historical  
ondansetron (ZOFRAN ODT) 4 mg disintegrating tablet TAKE 1 TABLET EVERY 8 HOURS IF NEEDED FOR NAUSEA 4/2/19  Yes Carlo Berman III, DO  
oxybutynin (DITROPAN) 5 mg tablet TAKE 1 TABLET BY MOUTH TWICE A DAY 3/27/19  Yes Lawyer Nadia MD  
buPROPion [facility]) 100 mg tablet Take 100 mg by mouth nightly. Yes Provider, Historical  
memantine (NAMENDA) 10 mg tablet Take 10 mg by mouth nightly. Yes Provider, Historical  
b complex-vitamin c-folic acid (NEPHROCAPS) 1 mg capsule Take 1 Cap by mouth every Monday, Wednesday, Friday. Yes Provider, Historical  
diphenoxylate-atropine (LOMOTIL) 2.5-0.025 mg per tablet Take 1 Tab by mouth four (4) times daily as needed for Diarrhea. Max Daily Amount: 4 Tabs. 3/25/19  Yes Lawyer Nadia MD  
hydrALAZINE (APRESOLINE) 100 mg tablet Take 100 mg by mouth three (3) times daily. 3/4/19  Yes Provider, Historical  
gabapentin (NEURONTIN) 100 mg capsule TAKE 1 CAPSULE BY MOUTH TWICE A DAY FOR NERVE PAIN 1/16/19  Yes Lawyer Nadia MD  
ELIQUIS 2.5 mg tablet TAKE 1 TABLET TWICE A DAY TO PREVENT BLOOD CLOTS 1/5/19  Yes Lawyer Nadia MD  
losartan (COZAAR) 100 mg tablet TAKE 1 TABLET EVERY MORNING Patient taking differently: TAKE 1 TABLET EVERY night 12/17/18  Yes Lawyer Nadia MD  
pramipexole (MIRAPEX) 0.25 mg tablet TAKE 1 TABLET BEFORE EACH DIALYSIS MWF 7/15/18  Yes Provider, Historical  
sevelamer carbonate (RENVELA) 800 mg tab tab Take 4,000 mg by mouth three (3) times daily (with meals). 5 with meals and 1 with snacks 18  Yes Provider, Historical  
escitalopram oxalate (LEXAPRO) 10 mg tablet TAKE 1 TABLET AT BEDTIME TO HELP PREVENT ANXIETY 18  Yes Juliocesar Lopez MD  
famotidine (PEPCID) 20 mg tablet TAKE 1 TABLET BY MOUTH EVERY DAY 18  Yes Juliocesar Lopez MD  
atorvastatin (LIPITOR) 20 mg tablet Take 1 Tab by mouth nightly. 3/31/17  Yes Jakob Merritt MD  
L. acidoph & paracasei- S therm- Bifido (TY-Q/RISAQUAD) 8 billion cell cap cap Take 1 Cap by mouth daily. Yes Provider, Historical  
IRON, CARBONYL PO Take 280 mg by mouth daily. Yes Provider, Historical  
acetaminophen (TYLENOL) 500 mg tablet Take 1,000 mg by mouth every six (6) hours as needed for Pain. Yes Provider, Historical  
 
Allergies Allergen Reactions  Citrus And Derivatives Anaphylaxis Patient and her  reported  Penicillin G Anaphylaxis  Orange Juice Not Reported This Time  Sulfa (Sulfonamide Antibiotics) Other (comments) \"Dont remember\"  Vancomycin Hives Social History Tobacco Use  Smoking status: Current Every Day Smoker Packs/day: 1.00 Last attempt to quit: 2012 Years since quittin.2  Smokeless tobacco: Never Used Substance Use Topics  Alcohol use: No  
  
Family History Problem Relation Age of Onset  Other Mother   
     peptic ulcer  Alzheimer Father Current Facility-Administered Medications Medication Dose Route Frequency  [START ON 2019] amLODIPine (NORVASC) tablet 10 mg  10 mg Oral DAILY  atorvastatin (LIPITOR) tablet 20 mg  20 mg Oral QHS  [START ON 2019] B complex-vitaminC-folic acid (NEPHROCAP) cap  1 Cap Oral DAILY  buPROPion (WELLBUTRIN) tablet 100 mg  100 mg Oral QHS  escitalopram oxalate (LEXAPRO) tablet 10 mg  10 mg Oral QPM  
  gabapentin (NEURONTIN) capsule 100 mg  100 mg Oral TID  hydrALAZINE (APRESOLINE) tablet 100 mg  100 mg Oral TID  [START ON 2019] ferrous sulfate tablet 325 mg  325 mg Oral DAILY  [START ON 2019] lactobac ac& pc-s.therm-b.anim (TY Q/RISAQUAD)  1 Cap Oral DAILY  [START ON 2019] losartan (COZAAR) tablet 100 mg  100 mg Oral DAILY  memantine (NAMENDA) tablet 10 mg  10 mg Oral QHS  sevelamer carbonate (RENVELA) tab 4,000 mg  4,000 mg Oral TID WITH MEALS  sodium chloride (NS) flush 5-40 mL  5-40 mL IntraVENous Q8H  
 0.9% sodium chloride infusion  50 mL/hr IntraVENous CONTINUOUS  
 [START ON 2019] levoFLOXacin (LEVAQUIN) 750 mg in D5W IVPB  750 mg IntraVENous Q48H  
 linezolid in dextrose 5% (ZYVOX) IVPB premix in D5W 600 mg  600 mg IntraVENous Q12H  
 guaiFENesin ER (MUCINEX) tablet 600 mg  600 mg Oral Q12H  furosemide (LASIX) 10 mg/mL injection Review of Systems: A comprehensive review of systems was negative except for: Respiratory: positive for dyspnea on exertion Objective: 
Vital Signs:   
Visit Vitals /79 Pulse (!) 113 Temp (!) 101.7 °F (38.7 °C) Resp 27 Wt 60.1 kg (132 lb 7.9 oz) SpO2 96% BMI 19.57 kg/m² O2 Device: BIPAP  
O2 Flow Rate (L/min): 4 l/min Temp (24hrs), Av.2 °F (37.9 °C), Min:98.6 °F (37 °C), Max:101.7 °F (38.7 °C) Intake/Output:  
Last shift:      No intake/output data recorded. Last 3 shifts: No intake/output data recorded. No intake or output data in the 24 hours ending 19 2148 Physical Exam:  
General:  Alert, cooperative, in distress, appears stated age. Head:  Normocephalic, without obvious abnormality, atraumatic. Eyes:  Conjunctivae/corneas clear. PERRL, EOMs intact. Nose: Nares normal. Septum midline. Mucosa normal. No drainage or sinus tenderness.   
Throat: Lips, mucosa, and tongue normal. Teeth and gums normal.  
 Neck: Supple, symmetrical, trachea midline, no adenopathy, thyroid: no enlargment/tenderness/nodules, no carotid bruit and no JVD. Back:   Symmetric, no curvature. ROM normal.  
Lungs:   Rales bilaterally. Chest wall:  No tenderness or deformity. Heart:  Regular rate and rhythm, S1, S2 normal, no murmur, click, rub or gallop. Abdomen:   Soft, non-tender. Bowel sounds normal. No masses,  No organomegaly. Extremities: Extremities normal, atraumatic, no cyanosis or edema. Pulses: 2+ and symmetric all extremities. Skin: Skin color, texture, turgor normal. No rashes or lesions Lymph nodes: Cervical, supraclavicular, and axillary nodes normal.  
Neurologic: Grossly nonfocal  
 
Data review:  
 
Recent Results (from the past 24 hour(s)) EKG, 12 LEAD, INITIAL Collection Time: 04/19/19 10:10 AM  
Result Value Ref Range Ventricular Rate 107 BPM  
 Atrial Rate 107 BPM  
 P-R Interval 146 ms  
 QRS Duration 94 ms Q-T Interval 354 ms QTC Calculation (Bezet) 472 ms Calculated P Axis 75 degrees Calculated R Axis 28 degrees Calculated T Axis 74 degrees Diagnosis Sinus tachycardia Left atrial enlargement Left ventricular hypertrophy with repolarization abnormality When compared with ECG of 27-MAR-2019 21:44, 
Vent. rate has increased BY  43 BPM 
ST now depressed in Inferior leads ST now depressed in Lateral leads T wave inversion no longer evident in Inferior leads Nonspecific T wave abnormality no longer evident in Anterior leads T wave inversion now evident in Lateral leads Confirmed by Allegra Bettencourt ([id number]) on 4/19/2019 10:22:37 AM 
  
CBC WITH AUTOMATED DIFF Collection Time: 04/19/19 10:33 AM  
Result Value Ref Range WBC 12.8 (H) 3.6 - 11.0 K/uL  
 RBC 3.38 (L) 3.80 - 5.20 M/uL HGB 8.7 (L) 11.5 - 16.0 g/dL HCT 30.6 (L) 35.0 - 47.0 % MCV 90.5 80.0 - 99.0 FL  
 MCH 25.7 (L) 26.0 - 34.0 PG  
 MCHC 28.4 (L) 30.0 - 36.5 g/dL  
 RDW 17.6 (H) 11.5 - 14.5 % PLATELET 335 408 - 509 K/uL MPV 10.0 8.9 - 12.9 FL  
 NRBC 0.0 0  WBC ABSOLUTE NRBC 0.00 0.00 - 0.01 K/uL NEUTROPHILS 74 32 - 75 % LYMPHOCYTES 8 (L) 12 - 49 % MONOCYTES 17 (H) 5 - 13 % EOSINOPHILS 0 0 - 7 % BASOPHILS 0 0 - 1 % IMMATURE GRANULOCYTES 1 (H) 0.0 - 0.5 % ABS. NEUTROPHILS 9.5 (H) 1.8 - 8.0 K/UL  
 ABS. LYMPHOCYTES 1.0 0.8 - 3.5 K/UL  
 ABS. MONOCYTES 2.2 (H) 0.0 - 1.0 K/UL  
 ABS. EOSINOPHILS 0.0 0.0 - 0.4 K/UL  
 ABS. BASOPHILS 0.0 0.0 - 0.1 K/UL  
 ABS. IMM. GRANS. 0.1 (H) 0.00 - 0.04 K/UL  
 DF AUTOMATED    
 RBC COMMENTS HYPOCHROMIA 1+ 
    
 RBC COMMENTS POLYCHROMASIA 1+ 
    
 RBC COMMENTS ANISOCYTOSIS 
1+ METABOLIC PANEL, COMPREHENSIVE Collection Time: 04/19/19 10:33 AM  
Result Value Ref Range Sodium 138 136 - 145 mmol/L Potassium 3.6 3.5 - 5.1 mmol/L Chloride 105 97 - 108 mmol/L  
 CO2 25 21 - 32 mmol/L Anion gap 8 5 - 15 mmol/L Glucose 98 65 - 100 mg/dL BUN 13 6 - 20 MG/DL Creatinine 1.89 (H) 0.55 - 1.02 MG/DL  
 BUN/Creatinine ratio 7 (L) 12 - 20 GFR est AA 32 (L) >60 ml/min/1.73m2 GFR est non-AA 26 (L) >60 ml/min/1.73m2 Calcium 8.0 (L) 8.5 - 10.1 MG/DL Bilirubin, total 0.6 0.2 - 1.0 MG/DL  
 ALT (SGPT) 13 12 - 78 U/L  
 AST (SGOT) 29 15 - 37 U/L Alk. phosphatase 104 45 - 117 U/L Protein, total 6.7 6.4 - 8.2 g/dL Albumin 2.4 (L) 3.5 - 5.0 g/dL Globulin 4.3 (H) 2.0 - 4.0 g/dL A-G Ratio 0.6 (L) 1.1 - 2.2 PROTHROMBIN TIME + INR Collection Time: 04/19/19 10:33 AM  
Result Value Ref Range INR 1.5 (H) 0.9 - 1.1 Prothrombin time 15.0 (H) 9.0 - 11.1 sec TROPONIN I Collection Time: 04/19/19 10:33 AM  
Result Value Ref Range Troponin-I, Qt. 0.08 (H) <0.05 ng/mL NT-PRO BNP Collection Time: 04/19/19 10:33 AM  
Result Value Ref Range NT pro-BNP >35,000 (H) <125 PG/ML  
LACTIC ACID Collection Time: 04/19/19 12:00 PM  
Result Value Ref Range  Lactic acid 0.9 0.4 - 2.0 MMOL/L  
 
 Imaging: 
I have personally reviewed the patients radiographs and have reviewed the reports: CXR and Chest CT c/w pulmonary edema Mallory Handley MD

## 2019-04-19 NOTE — PROGRESS NOTES
OT order acknowledged to start at 1530, patient is however off the floor for testing/procedure. Will follow up for evaluation tomorrow.

## 2019-04-19 NOTE — PROGRESS NOTES
Pharmacy Clarification of Prior to Admission Medication Regimen The patient was interviewed regarding clarification of the prior to admission medication regimen. Patient's  was present in room and obtained permission from patient to discuss drug regimen with visitor(s) present. Patient's  was questioned regarding use of any other inhalers, topical products, over the counter medications, herbal medications, vitamin products or ophthalmic/nasal/otic medication use. Information Obtained From: Danii Ruiz, Patient's  ADDENDUM NOTE:   
PTA regimen noted with patient borderline for reduced dosing critieria (wt, SCr) thus would recommend continue Apixaban 2.5mg on admission orders. Per admission physician progress note Hold Eliquis for now until cleared by GI Pt qualifies for LQ 500mg Q48h if reordered on admission. Admission orders pending Pertinent Pharmacy Findings: 
? During interview patient's  stated that the patient receives dialysis every Monday, Wednesday, and Friday at [facility] in [place]. She was last dialyzed on 4/19/19. 
? Identified High Alert Medication Information 
o Current Anticoagulants: 
? Name: Liyah Dickey PTA medication list was corrected to the following:  
 
Prior to Admission Medications Prescriptions Last Dose Informant Patient Reported? Taking? ELIQUIS 2.5 mg tablet 4/19/2019 at Unknown time Significant Other No Yes Sig: TAKE 1 TABLET TWICE A DAY TO PREVENT BLOOD CLOTS  
IRON, CARBONYL PO 4/19/2019 at Unknown time Significant Other Yes Yes Sig: Take 280 mg by mouth daily. L. acidoph & paracasei- S therm- Bifido (TY-Q/RISAQUAD) 8 billion cell cap cap 4/19/2019 at Unknown time Significant Other Yes Yes Sig: Take 1 Cap by mouth daily. acetaminophen (TYLENOL) 500 mg tablet 4/19/2019 at Unknown time Significant Other Yes Yes Sig: Take 1,000 mg by mouth every six (6) hours as needed for Pain. amLODIPine (NORVASC) 10 mg tablet 4/19/2019 at Unknown time Significant Other Yes Yes Sig: Take 10 mg by mouth daily. atorvastatin (LIPITOR) 20 mg tablet 4/18/2019 at Unknown time Significant Other No Yes Sig: Take 1 Tab by mouth nightly. b complex-vitamin c-folic acid (NEPHROCAPS) 1 mg capsule 4/19/2019 at Unknown time Significant Other Yes Yes Sig: Take 1 Cap by mouth every Monday, Wednesday, Friday. buPROPion (WELLBUTRIN) 100 mg tablet 4/18/2019 at Unknown time Significant Other Yes Yes Sig: Take 100 mg by mouth nightly. carboxymethylcellulose sodium (REFRESH LIQUIGEL) 1 % dlgl ophthalmic solution 4/18/2019 at Unknown time Significant Other Yes Yes Sig: Administer 1 Drop to both eyes daily as needed. diphenoxylate-atropine (LOMOTIL) 2.5-0.025 mg per tablet 4/17/2019 at Unknown time Significant Other No Yes Sig: Take 1 Tab by mouth four (4) times daily as needed for Diarrhea. Max Daily Amount: 4 Tabs. escitalopram oxalate (LEXAPRO) 10 mg tablet 4/18/2019 at Unknown time Significant Other No Yes Sig: TAKE 1 TABLET AT BEDTIME TO HELP PREVENT ANXIETY  
famotidine (PEPCID) 20 mg tablet 4/18/2019 at Unknown time Significant Other No Yes Sig: TAKE 1 TABLET BY MOUTH EVERY DAY  
gabapentin (NEURONTIN) 100 mg capsule 4/19/2019 at Unknown time Significant Other No Yes Sig: TAKE 1 CAPSULE BY MOUTH TWICE A DAY FOR NERVE PAIN  
hydrALAZINE (APRESOLINE) 100 mg tablet 4/19/2019 at Unknown time Significant Other Yes Yes Sig: Take 100 mg by mouth three (3) times daily. losartan (COZAAR) 100 mg tablet 4/18/2019 at Unknown time Significant Other No Yes Sig: TAKE 1 TABLET EVERY MORNING Patient taking differently: TAKE 1 TABLET EVERY night  
memantine (NAMENDA) 10 mg tablet 4/18/2019 at Unknown time Significant Other Yes Yes Sig: Take 10 mg by mouth nightly. ondansetron (ZOFRAN ODT) 4 mg disintegrating tablet 4/19/2019 at Unknown time Significant Other No Yes Sig: TAKE 1 TABLET EVERY 8 HOURS IF NEEDED FOR NAUSEA  
oxybutynin (DITROPAN) 5 mg tablet 4/19/2019 at Unknown time Significant Other No Yes Sig: TAKE 1 TABLET BY MOUTH TWICE A DAY  
polyethylene glycol (MIRALAX) 17 gram/dose powder 4/19/2019 at Unknown time Significant Other Yes Yes Sig: Take 17 g by mouth daily. pramipexole (MIRAPEX) 0.25 mg tablet 4/19/2019 at Unknown time Significant Other Yes Yes Sig: TAKE 1 TABLET BEFORE EACH DIALYSIS  MWF  
sevelamer carbonate (RENVELA) 800 mg tab tab 4/19/2019 at Unknown time Significant Other Yes Yes Sig: Take 4,000 mg by mouth three (3) times daily (with meals). 5 with meals and 1 with snacks  
trolamine salicylate (ASPERCREME EX) 4/18/2019 at Unknown time Significant Other Yes Yes Sig: by Apply Externally route daily. Facility-Administered Medications: None Thank you, 
Susu Mustafa Medication History Pharmacy Technician

## 2019-04-19 NOTE — DIALYSIS
TRANSFER - IN REPORT: 
 
Verbal report received from Hu Benjamin RN on Nahomi Perish   for ordered procedure Report consisted of patients Situation, Background, Assessment and  
Recommendations(SBAR). Information from the following report(s) SBAR was reviewed with the receiving nurse. Opportunity for questions and clarification was provided. Assessment completed upon patients arrival to unit and care assumed.

## 2019-04-19 NOTE — PROGRESS NOTES
Pharmacy Automatic Renal Dosing Protocol - Antimicrobials Indication for Antimicrobials: CAP Current Regimen of Each Antimicrobial: 
Levofloxacin 500mg IV once in ED Significant Cultures:  
None Eduarda 2019 Bcx, Stools an MRSA surveillance -  
 
Radiology / Imaging results: (X-ray, CT scan or MRI): 
 CXR - New bilateral airspace disease superimposed on chronic underlying findings of emphysema and numerous bilateral calcified granulomas. Top differential includes multifocal pneumonia or edema. Paralysis, amputations, malnutrition:  
none Labs: 
Recent Labs 19 
1033 CREA 1.89* BUN 13 WBC 12.8* Temp (24hrs), Av.2 °F (37.9 °C), Min:98.6 °F (37 °C), Max:101.7 °F (38.7 °C) Creatinine Clearance (mL/min) or Dialysis:  
Estimated Creatinine Clearance: 24.8 mL/min (A) (based on SCr of 1.89 mg/dL (H)). Estimated Creatinine Clearance (using IBW):27.3 mL/min Impression/Plan:  
 Patient was recently in the hospital for pneumonia and was discharged home on Levofloxacin 500mg PO daily. Pt states she has not taken LQ PO  thus will continue LQ 500mg IV once in ED  as ordered. If ordered on admission, pt qualifies for 500mg q48h HD regimen. Febrile this am 
March SCr 2.6 -3.0 Will leave I-vent open for abx coarse follow up Antimicrobial stop date - pending Pharmacy will follow daily and adjust medications as appropriate for renal function and/or serum levels.  
 
Thank you, 
Mariela Draper, [facility]

## 2019-04-19 NOTE — H&P
Hospitalist Admission NoteNAME: Sindy Palm :  1945 MRN:  [id number] Date/Time:  2019 12:20 PM 
 
Patient PCP: Meli Pena MD 
______________________________________________________________________ Given the patient's current clinical presentation, I have a high level of concern for decompensation if discharged from the emergency department. Complex decision making was performed, which includes reviewing the patient's available past medical records, laboratory results, and x-ray films. My assessment of this patient's clinical condition and my plan of care is as follows. Assessment / Plan: 
Sepsis  
- likely 2/2 pneumonia  
- on Levaquin and I will add Xyvox. Hx Vancomycin allergy noted - blood cx pending  
- continue IVF judicious rate given hx ESRD Pneumonia - CXR noted New bilateral airspace disease superimposed on chronic underlying findings 
of emphysema and numerous bilateral calcified granulomas - Levaquin and Xyvox x 7 days  
- suppl O2 prn for hypoxia  
- duoneb as needed  
- mucinex bid - Sputum and blood cx pending Anemia 
- likely due to chronic kidney disease and GI bleed 
- hemodynamically stable - GI consult in ED will follow recommendations - plan to transfuse if hgb < 8 Chest Pain  
- r/o ACS  
- Troponin elevated , will trend troponin q6h x 2  
- pain control as needed - CTA chest pending r/o PE in setting of CP and hypoxia - Cardiology consult GI bleed - Likely Lower GI  
- Hold Eliquis for now until cleared by GI  
- GI consulting Elevated PRObnp 
- s/p Lasix in ER 
- Echo pending r/o CHF /Pulm edema Chronic PE/VTE  
- on Eliquis PTA now held due to GI bleed - s/p IVF filter ESRD 
- HD MWF  
- continue home meds - Nephrology consulted Elevated Troponin  
- r/o ACS as above - trend troponin  
- cardiology consult pending Current Smoker  
- advise cessation HTN  
- resume home med Code Status: full code Surrogate Decision Maker: DVT Prophylaxis: [facility] GI Prophylaxis: not indicated Baseline: home w/family Subjective: CHIEF COMPLAINT: chest pain HISTORY OF PRESENT ILLNESS:    
Ken Ortega is a 75 y.o.  female with pmhx of ESRD on HD MWF, PE, ovarian CA, hypertension , VTE who presents with worsening chest pain . Patient reports having chest pain/pressure for a couple of weeks but now worse especially when she rolls over to her right side. The pain is mid sternal and yesterday has associated bilateral arn numbness. She also has sob , subjective fever and chills and cough. She was recently discharged from [facility] 3/29/[facility]. She also was treated for CAP in 3/29 with Levaquin. She has ESRD on HD and was dialyzed today. In the ER she was noted to be hypoxic sating 82% on room air which improved on 4L NC to 94%. She had CXR noted new opacities. EKG reviewed. Troponin 0.08. She also has fever T 101.7. She was started on Levaquin after blood cx were taken. Patient was noted to have wbc 12.8 and a drop in her Hgb 8.7. She has hx GI bleed and was scheduled for an appointment with Dr Judit Darling 5/2/19. Patient is on Eliquis for PE/VTE she also has an IVC filter. Patient will be admitted for further evaluation. I requested a CTA chest pending prior to admission and GI consult. We were asked to admit for work up and evaluation of the above problems. Past Medical History:  
Diagnosis Date  Chronic kidney disease Stage 5 (7/18/16 BUN 79, Creatnine 4.53, K 6) Dr. Curtis Lori [phone]  GERD (gastroesophageal reflux disease)   
 well controlled with Rx   
 High cholesterol  Hyperkalemia  Hypertension  Hypomagnesemia   
 on daily Magnesium  Neuropathy   
 bilateral feet  Ovarian cancer ([facility] [address] 2013 Hysterectomy with chemo, no radiation:  Dr. Lizbet Sam  Thromboembolus ([facility] [address] 9/2015  
 blood clot in lung 9/2015  Thromboembolus ([facility] [address] 2004  
 in kidney \"many years ago\" Past Surgical History:  
Procedure Laterality Date  ABDOMEN SURGERY PROC UNLISTED  7/31/15 Lap exploratory small bowel obstruction  (ICU)  ABDOMEN SURGERY PROC UNLISTED  8/2/15 Abdmonial washout with wound vac (ICU)  ABDOMEN SURGERY PROC UNLISTED  8/18/15 Abdominal washout fascial closure (ICU)  ABDOMEN SURGERY PROC UNLISTED  11/11/15 Lap takedown of ileostomy  COLONOSCOPY N/A 2016 COLONOSCOPY performed by Kenan Hardy MD at . [facility] [address] HX APPENDECTOMY  approx 2005  HX CASI AND BSO  2013  
 due to ovarian cancer  HX TONSILLECTOMY  age 10 Social History Tobacco Use  Smoking status: Current Every Day Smoker Packs/day: 1.00 Last attempt to quit: 2012 Years since quittin.2  Smokeless tobacco: Never Used Substance Use Topics  Alcohol use: No  
  
 
Family History Problem Relation Age of Onset  Other Mother   
     peptic ulcer  Alzheimer Father Allergies Allergen Reactions  Citrus And Derivatives Anaphylaxis Patient and her  reported  Penicillin G Anaphylaxis  Orange Juice Not Reported This Time  Sulfa (Sulfonamide Antibiotics) Other (comments) \"Dont remember\"  Vancomycin Hives Prior to Admission medications Medication Sig Start Date End Date Taking? Authorizing Provider  
polyethylene glycol (MIRALAX) 17 gram/dose powder Take 17 g by mouth daily. Yes Other, MD Jess  
amLODIPine (NORVASC) 10 mg tablet Take 10 mg by mouth daily. Yes Provider, Historical  
trolamine salicylate (ASPERCREME EX) by Apply Externally route daily. Yes Provider, Historical  
carboxymethylcellulose sodium (REFRESH LIQUIGEL) 1 % dlgl ophthalmic solution Administer 1 Drop to both eyes daily as needed.    Yes Provider, Historical  
ondansetron (ZOFRAN ODT) 4 mg disintegrating tablet TAKE 1 TABLET EVERY 8 HOURS IF NEEDED FOR NAUSEA 4/2/19  Yes Carlo Berman III, DO  
oxybutynin (DITROPAN) 5 mg tablet TAKE 1 TABLET BY MOUTH TWICE A DAY 3/27/19  Yes Lorna Antunez MD  
buPROPion [facility]) 100 mg tablet Take 100 mg by mouth nightly. Yes Provider, Historical  
memantine (NAMENDA) 10 mg tablet Take 10 mg by mouth nightly. Yes Provider, Historical  
b complex-vitamin c-folic acid (NEPHROCAPS) 1 mg capsule Take 1 Cap by mouth every Monday, Wednesday, Friday. Yes Provider, Historical  
diphenoxylate-atropine (LOMOTIL) 2.5-0.025 mg per tablet Take 1 Tab by mouth four (4) times daily as needed for Diarrhea. Max Daily Amount: 4 Tabs. 3/25/19  Yes Lorna Antunez MD  
hydrALAZINE (APRESOLINE) 100 mg tablet Take 100 mg by mouth three (3) times daily. 3/4/19  Yes Provider, Historical  
gabapentin (NEURONTIN) 100 mg capsule TAKE 1 CAPSULE BY MOUTH TWICE A DAY FOR NERVE PAIN 1/16/19  Yes Lorna Antunez MD  
ELIQUIS 2.5 mg tablet TAKE 1 TABLET TWICE A DAY TO PREVENT BLOOD CLOTS 1/5/19  Yes Lorna Antunez MD  
losartan (COZAAR) 100 mg tablet TAKE 1 TABLET EVERY MORNING Patient taking differently: TAKE 1 TABLET EVERY night 12/17/18  Yes Lorna Antunez MD  
pramipexole (MIRAPEX) 0.25 mg tablet TAKE 1 TABLET BEFORE EACH DIALYSIS  MWF 7/15/18  Yes Provider, Historical  
sevelamer carbonate (RENVELA) 800 mg tab tab Take 4,000 mg by mouth three (3) times daily (with meals). 5 with meals and 1 with snacks 8/16/18  Yes Provider, Historical  
escitalopram oxalate (LEXAPRO) 10 mg tablet TAKE 1 TABLET AT BEDTIME TO HELP PREVENT ANXIETY 8/20/18  Yes Lorna Antunez MD  
famotidine (PEPCID) 20 mg tablet TAKE 1 TABLET BY MOUTH EVERY DAY 4/23/18  Yes Lorna Antunez MD  
atorvastatin (LIPITOR) 20 mg tablet Take 1 Tab by mouth nightly. 3/31/17  Yes Amee Stone MD  
L. acidoph & paracasei- S therm- Bifido (TY-Q/RISAQUAD) 8 billion cell cap cap Take 1 Cap by mouth daily.    Yes Provider, Historical  
 IRON, CARBONYL PO Take 280 mg by mouth daily. Yes Provider, Historical  
acetaminophen (TYLENOL) 500 mg tablet Take 1,000 mg by mouth every six (6) hours as needed for Pain. Yes Provider, Historical  
 
 
REVIEW OF SYSTEMS:    
I am not able to complete the review of systems because: The patient is intubated and sedated The patient has altered mental status due to his acute medical problems The patient has baseline aphasia from prior stroke(s) The patient has baseline dementia and is not reliable historian The patient is in acute medical distress and unable to provide information Total of 12 systems reviewed as follows:   
   POSITIVE= underlined text  Negative = text not underlined General:  fever, chills, sweats, generalized weakness, weight loss/gain,  
   loss of appetite Eyes:    blurred vision, eye pain, loss of vision, double vision ENT:    rhinorrhea, pharyngitis Respiratory:   cough, sputum production, SOB, MONTANEZ, wheezing, pleuritic pain  
Cardiology:   chest pain, palpitations, orthopnea, PND, edema, syncope Gastrointestinal:  abdominal pain , N/V, diarrhea, dysphagia, constipation, bleeding Genitourinary:  frequency, urgency, dysuria, hematuria, incontinence Muskuloskeletal :  arthralgia, myalgia, back pain Hematology:  easy bruising, nose or gum bleeding, lymphadenopathy Dermatological: rash, ulceration, pruritis, color change / jaundice Endocrine:   hot flashes or polydipsia Neurological:  headache, dizziness, confusion, focal weakness, paresthesia, Speech difficulties, memory loss, gait difficulty Psychological: Feelings of anxiety, depression, agitation Objective: VITALS:   
Visit Vitals /64 (BP 1 Location: Right arm, BP Patient Position: At rest) Pulse 99 Temp (!) 101.7 °F (38.7 °C) Resp 27 Wt 60.1 kg (132 lb 7.9 oz) SpO2 94% BMI 19.57 kg/m² PHYSICAL EXAM: 
 
 General:    Alert, cooperative, no distress, appears stated age. HEENT: Atraumatic, anicteric sclerae, pink conjunctivae No oral ulcers, mucosa moist, throat clear, dentition fair Neck:  Supple, symmetrical,  thyroid: non tender Lungs:   Clear to auscultation bilaterally. Bilateral  rales. Chest wall:  No tenderness  No Accessory muscle use. Heart:   Regular  rhythm,  No  murmur   No edema Abdomen:   Soft, non-tender. Not distended. Bowel sounds normal 
Extremities: No cyanosis. No clubbing,   
  Skin turgor normal, Capillary refill normal, Radial dial pulse 2+ Skin:     Not pale. Not Jaundiced  No rashes Psych:  Good insight. Not depressed. Not anxious or agitated. Neurologic: EOMs intact. No facial asymmetry. No aphasia or slurred speech. Symmetrical strength, Sensation grossly intact. Alert and oriented X 4.  
 
_______________________________________________________________________ Care Plan discussed with: 
  Comments Patient x Family  x   
RN x Care Manager Consultant:  x   
_______________________________________________________________________ Expected  Disposition:  
Home with Family x HH/PT/OT/RN   
SNF/LTC   
[facility]   
________________________________________________________________________ TOTAL TIME:  30 Minutes Critical Care Provided     Minutes non procedure based Comments Reviewed previous records  
>50% of visit spent in counseling and coordination of care  Discussion with patient and/or family and questions answered 
  
 
________________________________________________________________________ Signed: Sanford Haas MD 
 
Procedures: see electronic medical records for all procedures/Xrays and details which were not copied into this note but were reviewed prior to creation of Plan. LAB DATA REVIEWED:   
Recent Results (from the past 24 hour(s)) EKG, 12 LEAD, INITIAL  Collection Time: 04/19/19 10:10 AM  
 Result Value Ref Range Ventricular Rate 107 BPM  
 Atrial Rate 107 BPM  
 P-R Interval 146 ms  
 QRS Duration 94 ms Q-T Interval 354 ms QTC Calculation (Bezet) 472 ms Calculated P Axis 75 degrees Calculated R Axis 28 degrees Calculated T Axis 74 degrees Diagnosis Sinus tachycardia Left atrial enlargement Left ventricular hypertrophy with repolarization abnormality When compared with ECG of 27-MAR-2019 21:44, 
Vent. rate has increased BY  43 BPM 
ST now depressed in Inferior leads ST now depressed in Lateral leads T wave inversion no longer evident in Inferior leads Nonspecific T wave abnormality no longer evident in Anterior leads T wave inversion now evident in Lateral leads Confirmed by Akilah Phillips ([id number]) on 4/19/2019 10:22:37 AM 
  
CBC WITH AUTOMATED DIFF Collection Time: 04/19/19 10:33 AM  
Result Value Ref Range WBC 12.8 (H) 3.6 - 11.0 K/uL  
 RBC 3.38 (L) 3.80 - 5.20 M/uL HGB 8.7 (L) 11.5 - 16.0 g/dL HCT 30.6 (L) 35.0 - 47.0 % MCV 90.5 80.0 - 99.0 FL  
 MCH 25.7 (L) 26.0 - 34.0 PG  
 MCHC 28.4 (L) 30.0 - 36.5 g/dL  
 RDW 17.6 (H) 11.5 - 14.5 % PLATELET 718 394 - 036 K/uL MPV 10.0 8.9 - 12.9 FL  
 NRBC 0.0 0  WBC ABSOLUTE NRBC 0.00 0.00 - 0.01 K/uL NEUTROPHILS 74 32 - 75 % LYMPHOCYTES 8 (L) 12 - 49 % MONOCYTES 17 (H) 5 - 13 % EOSINOPHILS 0 0 - 7 % BASOPHILS 0 0 - 1 % IMMATURE GRANULOCYTES 1 (H) 0.0 - 0.5 % ABS. NEUTROPHILS 9.5 (H) 1.8 - 8.0 K/UL  
 ABS. LYMPHOCYTES 1.0 0.8 - 3.5 K/UL  
 ABS. MONOCYTES 2.2 (H) 0.0 - 1.0 K/UL  
 ABS. EOSINOPHILS 0.0 0.0 - 0.4 K/UL  
 ABS. BASOPHILS 0.0 0.0 - 0.1 K/UL  
 ABS. IMM. GRANS. 0.1 (H) 0.00 - 0.04 K/UL  
 DF AUTOMATED    
 RBC COMMENTS HYPOCHROMIA 1+ 
    
 RBC COMMENTS POLYCHROMASIA 1+ 
    
 RBC COMMENTS ANISOCYTOSIS 
1+ METABOLIC PANEL, COMPREHENSIVE Collection Time: 04/19/19 10:33 AM  
Result Value Ref Range  Sodium 138 136 - 145 mmol/L  
 Potassium 3.6 3.5 - 5.1 mmol/L Chloride 105 97 - 108 mmol/L  
 CO2 25 21 - 32 mmol/L Anion gap 8 5 - 15 mmol/L Glucose 98 65 - 100 mg/dL BUN 13 6 - 20 MG/DL Creatinine 1.89 (H) 0.55 - 1.02 MG/DL  
 BUN/Creatinine ratio 7 (L) 12 - 20 GFR est AA 32 (L) >60 ml/min/1.73m2 GFR est non-AA 26 (L) >60 ml/min/1.73m2 Calcium 8.0 (L) 8.5 - 10.1 MG/DL Bilirubin, total 0.6 0.2 - 1.0 MG/DL  
 ALT (SGPT) 13 12 - 78 U/L  
 AST (SGOT) 29 15 - 37 U/L Alk. phosphatase 104 45 - 117 U/L Protein, total 6.7 6.4 - 8.2 g/dL Albumin 2.4 (L) 3.5 - 5.0 g/dL Globulin 4.3 (H) 2.0 - 4.0 g/dL A-G Ratio 0.6 (L) 1.1 - 2.2 PROTHROMBIN TIME + INR Collection Time: 04/19/19 10:33 AM  
Result Value Ref Range INR 1.5 (H) 0.9 - 1.1 Prothrombin time 15.0 (H) 9.0 - 11.1 sec TROPONIN I Collection Time: 04/19/19 10:33 AM  
Result Value Ref Range Troponin-I, Qt. 0.08 (H) <0.05 ng/mL NT-PRO BNP Collection Time: 04/19/19 10:33 AM  
Result Value Ref Range NT pro-BNP >35,000 (H) <125 PG/ML  
LACTIC ACID Collection Time: 04/19/19 12:00 PM  
Result Value Ref Range  Lactic acid 0.9 0.4 - 2.0 MMOL/L

## 2019-04-19 NOTE — PROGRESS NOTES
Spiritual Care Assessment/Progress Note [address] 
 
 
NAME: Lester Burrows      MRN: [id number] AGE: 75 y.o. SEX: female Jain Affiliation: No Caodaism Language: [place] 4/19/2019     Total Time (in minutes): 20 Spiritual Assessment begun in [facility] PROGRESSIVE CARE through conversation with: 
  
    []Patient        [x] Family    [] Friend(s) Reason for Consult: Rapid response team  
 
Spiritual beliefs: (Please include comment if needed) 
   [] Identifies with a alejandro tradition:     
   [] Supported by a alejandro community:        
   [] Claims no spiritual orientation:       
   [] Seeking spiritual identity:            
   [] Adheres to an individual form of spirituality:       
   [x] Not able to assess:                   
 
    
Identified resources for coping:  
   [] Prayer                           
   [] Music                  [] Guided Imagery [x] Family/friends                 [] Pet visits [] Devotional reading                         [] Unknown 
   [] Other:                                      
 
 
Interventions offered during this visit: (See comments for more details) Family/Friend(s): Affirmation of emotions/emotional suffering, Iconic (affirming the presence of God/Higher Power) Plan of Care: 
 
 [x] Support spiritual and/or cultural needs  
 [] Support AMD and/or advance care planning process    
 [] Support grieving process 
 [] Coordinate Rites and/or Rituals  
 [] Coordination with community clergy 
 [x] No spiritual needs identified at this time 
 [] Detailed Plan of Care below (See Comments)  [] Make referral to Music Therapy 
[] Make referral to Pet Therapy    
[] Make referral to Addiction services 
[] Make referral to [facility] 
[] Make referral to Spiritual Care Partner 
[] No future visits requested       
[x] Follow up visits as needed Comments: Sixteen minutes after the RR call things began to settle down. Two physicians and several staff members began to slowly exit the room. The spouse of the patient stepped out into the hallway where I was standing. I approached the spouse, introduced myself, and engaged him in light conversation. He said things were settling down and that it was scary. The patient informed me that he was attempting to reach his daughter. I asked if I could help and asked where his daughter was traveling from. The spouse indicated that it was a local area. I expressed relief because of the threatening weather. The spouse said that when he was outside a minute ago, it wasn't even raining. I re-directed the conversation to the medical challenge that he is experiencing and assured the spouse that there will be spiritual support available. The spouse expressed gratitude for the information. I found the nurse who will be attending to the patient and shared with her that spiritual support is available to the patient and family as needed. Rev. Jeaneth Lewis EdD  [facility] Palliative  Fellow For [facility] Page [phone])

## 2019-04-19 NOTE — CONSULTS
[address] Gastroenterology Consult Note Mian VANESSA Rivera covering for Dr. Greg Peoples Admitting: Dr. Arturo Mills Consult Date: 4/19/2019 Subjective: Chief Complaint: CP/SOB History of Present Illness: Lilia Bowen is a 75 y.o. female who is seen in consultation for GI bleed. Pt has PMHx of PE/DVT on Eliquis, CKD on dialysis, and ovarian cancer. Was recently seen by Ms. Pancho Jeronimo NP and Dr. Brant Grigsby at [facility] on 3/28-3/29 for diarrhea believed to possibly be secondary to overflow, C diff was negative, stool was heme positive. Presenting today with chest pain that has been ongoing for a while with worsening SOB. CP startes at night when she is going to bed and she rolls on her right side, pain \"hitts\". Last PM she laid down on the couch and the pain hit, both arms went numb. Lasted 15 min or so and eventually resolved. Pain returned this morning, was slow to get ready for dialysis. She has been having SOB for a couple of days. Now getting fatigued more easily, worse with moving around and activity. She is rarely coughing but more so last evening.  has been needing to drive her around the last couple of days, normally she can drive herself. She was recommended to do go the hospital from dialysis secondary to sx. She reports she has continued to have BRB in her stool, started a while ago, prior to being seen at [facility]. Reports blood is always BRB. Blood is on the exterior portion of the stool. Will sometimes have no blood on exterior portion of stool and sometimes will have a small to medium amount, no blood filling toilet. She has occasional straining with BM, no rectal pain. Some days she will have diarrhea, 2-3 episodes some days and 4-5 some days. Will skip 1-2 days without a BM at most.  No abdominal pain, no N/V. No dark tarry stools.   Has been steadily loosing weight. Down maybe 10 lb in the last month. No reflux or indigestion. No oily or greasy stools, has urgency with some accidents of BM. Will wake in the middle of the night with diarrhea. Appetite is good, eating healthy meals, finishing serving. Last BM with BRB was last evening, nothing further today. 3/2016 revealed 9 polyps, removed. Repeat in 8/2016 with 2 polyps removed. Path-tubular adenomas. Both procedures with poor prep. 
  
EGD in 3/2016 normal. 
 
Smokes 6 cigarettes daily for 30 years, no EtOH use Mother- stomach cancer, pancreatitis, no family hx of colon cancer or colon polyps, esophageal cancer or liver disease Past Medical History:  
Diagnosis Date  Chronic kidney disease Stage 5 (7/18/16 BUN 79, Creatnine 4.53, K 6) Dr. Hampton Pickgustavo [phone]  GERD (gastroesophageal reflux disease)   
 well controlled with Rx   
 High cholesterol  Hyperkalemia  Hypertension  Hypomagnesemia   
 on daily Magnesium  Neuropathy   
 bilateral feet  Ovarian cancer ([address] 2013 Hysterectomy with chemo, no radiation:  Dr. Kwan Cotter  Thromboembolus ([address] 9/2015  
 blood clot in lung 9/2015  Thromboembolus ([address] 2004  
 in kidney \"many years ago\" Past Surgical History:  
Procedure Laterality Date  ABDOMEN SURGERY PROC UNLISTED  7/31/15 Lap exploratory small bowel obstruction  (ICU)  ABDOMEN SURGERY PROC UNLISTED  8/2/15 Abdmonial washout with wound vac (ICU)  ABDOMEN SURGERY PROC UNLISTED  8/18/15 Abdominal washout fascial closure (ICU)  ABDOMEN SURGERY PROC UNLISTED  11/11/15 Lap takedown of ileostomy  COLONOSCOPY N/A 8/1/2016 COLONOSCOPY performed by Loretta Aparicio MD at [address] HX APPENDECTOMY  approx 2005  HX CASI AND BSO  2013  
 due to ovarian cancer  HX TONSILLECTOMY  age 10 Family History Problem Relation Age of Onset  Other Mother   
     peptic ulcer  Alzheimer Father Social History Tobacco Use  Smoking status: Current Every Day Smoker Packs/day: 1.00 Last attempt to quit: 2012 Years since quittin.2  Smokeless tobacco: Never Used Substance Use Topics  Alcohol use: No  
  
Allergies Allergen Reactions  Citrus And Derivatives Anaphylaxis Patient and her  reported  Penicillin G Anaphylaxis  Orange Juice Not Reported This Time  Sulfa (Sulfonamide Antibiotics) Other (comments) \"Dont remember\"  Vancomycin Hives Current Facility-Administered Medications Medication Dose Route Frequency  levoFLOXacin (LEVAQUIN) 500 mg in D5W IVPB  500 mg IntraVENous NOW  furosemide (LASIX) injection 80 mg  80 mg IntraVENous NOW  acetaminophen (TYLENOL) tablet 650 mg  650 mg Oral Q6H PRN  
 acetaminophen (TYLENOL) tablet 1,000 mg  1,000 mg Oral NOW  iopamidol (ISOVUE-370) 76 % injection 100 mL  100 mL IntraVENous RAD ONCE  
 sodium chloride (NS) flush 10 mL  10 mL IntraVENous RAD ONCE Current Outpatient Medications Medication Sig  polyethylene glycol (MIRALAX) 17 gram/dose powder Take 17 g by mouth daily.  ondansetron (ZOFRAN ODT) 4 mg disintegrating tablet TAKE 1 TABLET EVERY 8 HOURS IF NEEDED FOR NAUSEA  oxybutynin (DITROPAN) 5 mg tablet TAKE 1 TABLET BY MOUTH TWICE A DAY  buPROPion (WELLBUTRIN) 100 mg tablet Take 100 mg by mouth nightly.  memantine (NAMENDA) 10 mg tablet Take 10 mg by mouth nightly.  b complex-vitamin c-folic acid (NEPHROCAPS) 1 mg capsule Take 1 Cap by mouth daily.  sevelamer (RENAGEL) 800 mg tablet Take 800 mg by mouth as needed (with snacks). Indications: Renal Osteodystrophy with Hyperphosphatemia  diphenoxylate-atropine (LOMOTIL) 2.5-0.025 mg per tablet Take 1 Tab by mouth four (4) times daily as needed for Diarrhea. Max Daily Amount: 4 Tabs.  hydrALAZINE (APRESOLINE) 100 mg tablet Take 100 mg by mouth three (3) times daily.  gabapentin (NEURONTIN) 100 mg capsule TAKE 1 CAPSULE BY MOUTH TWICE A DAY FOR NERVE PAIN  
 ELIQUIS 2.5 mg tablet TAKE 1 TABLET TWICE A DAY TO PREVENT BLOOD CLOTS  losartan (COZAAR) 100 mg tablet TAKE 1 TABLET EVERY MORNING (Patient taking differently: TAKE 1 TABLET EVERY night)  pramipexole (MIRAPEX) 0.25 mg tablet TAKE 1 TABLET BEFORE EACH DIALYSIS  MWF  
 sevelamer carbonate (RENVELA) 800 mg tab tab Take 4,000 mg by mouth three (3) times daily (with meals). 5 with meals and 1 with snacks  escitalopram oxalate (LEXAPRO) 10 mg tablet TAKE 1 TABLET AT BEDTIME TO HELP PREVENT ANXIETY  amLODIPine (NORVASC) 10 mg tablet TAKE 1 TABLET EVERY DAY FOR BLOOD PRESSURE (Patient taking differently: TAKE 1 TABLET EVERY night FOR BLOOD PRESSURE)  famotidine (PEPCID) 20 mg tablet TAKE 1 TABLET BY MOUTH EVERY DAY  atorvastatin (LIPITOR) 20 mg tablet Take 1 Tab by mouth nightly.  L. acidoph & paracasei- S therm- Bifido (TY-Q/RISAQUAD) 8 billion cell cap cap Take 1 Cap by mouth daily.  linaclotide (LINZESS) 290 mcg cap capsule Take 290 mcg by mouth daily as needed.  iron, carbonyl (FEOSOL) 45 mg tab Take 1 Tab by mouth daily.  acetaminophen (TYLENOL) 500 mg tablet Take 1,000 mg by mouth every six (6) hours as needed for Pain. Review of Systems: A detailed review of systems was performed as follows: 
Constitutional:  Negative Eyes:  No ocular sensitivity to the sun, blurred vision or double vision. ENMT:  No nose or sinus problems. Respiratory: +coughing, wheezing, + sob Cardiac:  +chest pain, no  exertional chest pain or palpitations Gastrointestinal:  See history of the present illness :   No pain with urination or hematuria Musculoskeletal:  +arthritis, +swollen joints. Endocrine:  No thyroid disease or diabetes Psychiatric: No depression or feeling blue Integumentary:  No skin rash or sensitivity to the sun. Neurologic: +TIA , no seizure; + numbness or tingling of the extremities. Heme-Lymphatic:  + history of anemia, no unexplained lumps or bumps Objective:  
 
Physical Exam: 
Visit Vitals /64 (BP 1 Location: Right arm, BP Patient Position: At rest) Pulse 99 Temp (!) 101.7 °F (38.7 °C) Resp 27 Wt 60.1 kg (132 lb 7.9 oz) SpO2 94% BMI 19.57 kg/m² GEN: WF, lethargic,mild respiratory distress Skin:  Extremities and face reveal no rashes. No bond erythema. No telangiectasias on the chest wall. HEENT: Sclerae anicteric. Extra-occular muscles are intact. +dentures Cardiovascular: Tachycardic, holosystolic murmur Respiratory: Uncomfortable, + breathing with accessory muscla. Tac hypnic, crackles in bilateral lung bases GI:  Abdomen nondistended, soft, and nontender. Normal active bowel sounds. No enlargement of the liver or spleen. No masses palpable. Rectal:  Deferred Musculoskeletal:  + pitting edema of the lower legs. Extremities have good range of motion. Neurological:  Gross memory appears intact. Patient is lethargic. Psychiatric:  Mood appears appropriate with judgement intact. Lymphatic:  No cervical or supraclavicular adenopathy. Laboratory:   
Recent Results (from the past 24 hour(s)) EKG, 12 LEAD, INITIAL Collection Time: 04/19/19 10:10 AM  
Result Value Ref Range Ventricular Rate 107 BPM  
 Atrial Rate 107 BPM  
 P-R Interval 146 ms  
 QRS Duration 94 ms Q-T Interval 354 ms QTC Calculation (Bezet) 472 ms Calculated P Axis 75 degrees Calculated R Axis 28 degrees Calculated T Axis 74 degrees Diagnosis Sinus tachycardia Left atrial enlargement Left ventricular hypertrophy with repolarization abnormality When compared with ECG of 27-MAR-2019 21:44, 
Vent. rate has increased BY  43 BPM 
ST now depressed in Inferior leads ST now depressed in Lateral leads T wave inversion no longer evident in Inferior leads Nonspecific T wave abnormality no longer evident in Anterior leads T wave inversion now evident in Lateral leads Confirmed by Isabel Olmos ([id number]) on 4/19/2019 10:22:37 AM 
  
CBC WITH AUTOMATED DIFF Collection Time: 04/19/19 10:33 AM  
Result Value Ref Range WBC 12.8 (H) 3.6 - 11.0 K/uL  
 RBC 3.38 (L) 3.80 - 5.20 M/uL HGB 8.7 (L) 11.5 - 16.0 g/dL HCT 30.6 (L) 35.0 - 47.0 % MCV 90.5 80.0 - 99.0 FL  
 MCH 25.7 (L) 26.0 - 34.0 PG  
 MCHC 28.4 (L) 30.0 - 36.5 g/dL  
 RDW 17.6 (H) 11.5 - 14.5 % PLATELET 352 663 - 831 K/uL MPV 10.0 8.9 - 12.9 FL  
 NRBC 0.0 0  WBC ABSOLUTE NRBC 0.00 0.00 - 0.01 K/uL NEUTROPHILS 74 32 - 75 % LYMPHOCYTES 8 (L) 12 - 49 % MONOCYTES 17 (H) 5 - 13 % EOSINOPHILS 0 0 - 7 % BASOPHILS 0 0 - 1 % IMMATURE GRANULOCYTES 1 (H) 0.0 - 0.5 % ABS. NEUTROPHILS 9.5 (H) 1.8 - 8.0 K/UL  
 ABS. LYMPHOCYTES 1.0 0.8 - 3.5 K/UL  
 ABS. MONOCYTES 2.2 (H) 0.0 - 1.0 K/UL  
 ABS. EOSINOPHILS 0.0 0.0 - 0.4 K/UL  
 ABS. BASOPHILS 0.0 0.0 - 0.1 K/UL  
 ABS. IMM. GRANS. 0.1 (H) 0.00 - 0.04 K/UL  
 DF AUTOMATED    
 RBC COMMENTS HYPOCHROMIA 1+ 
    
 RBC COMMENTS POLYCHROMASIA 1+ 
    
 RBC COMMENTS ANISOCYTOSIS 
1+ METABOLIC PANEL, COMPREHENSIVE Collection Time: 04/19/19 10:33 AM  
Result Value Ref Range Sodium 138 136 - 145 mmol/L Potassium 3.6 3.5 - 5.1 mmol/L Chloride 105 97 - 108 mmol/L  
 CO2 25 21 - 32 mmol/L Anion gap 8 5 - 15 mmol/L Glucose 98 65 - 100 mg/dL BUN 13 6 - 20 MG/DL Creatinine 1.89 (H) 0.55 - 1.02 MG/DL  
 BUN/Creatinine ratio 7 (L) 12 - 20 GFR est AA 32 (L) >60 ml/min/1.73m2 GFR est non-AA 26 (L) >60 ml/min/1.73m2 Calcium 8.0 (L) 8.5 - 10.1 MG/DL Bilirubin, total 0.6 0.2 - 1.0 MG/DL  
 ALT (SGPT) 13 12 - 78 U/L  
 AST (SGOT) 29 15 - 37 U/L Alk. phosphatase 104 45 - 117 U/L Protein, total 6.7 6.4 - 8.2 g/dL Albumin 2.4 (L) 3.5 - 5.0 g/dL Globulin 4.3 (H) 2.0 - 4.0 g/dL A-G Ratio 0.6 (L) 1.1 - 2.2 PROTHROMBIN TIME + INR Collection Time: 04/19/19 10:33 AM  
Result Value Ref Range INR 1.5 (H) 0.9 - 1.1 Prothrombin time 15.0 (H) 9.0 - 11.1 sec TROPONIN I Collection Time: 04/19/19 10:33 AM  
Result Value Ref Range Troponin-I, Qt. 0.08 (H) <0.05 ng/mL NT-PRO BNP Collection Time: 04/19/19 10:33 AM  
Result Value Ref Range NT pro-BNP >35,000 (H) <125 PG/ML EXAM:  XR CHEST PORT 
  
INDICATION: Chest Pain 
  
COMPARISON: 3/27/2019. 
  
TECHNIQUE: Single frontal view of the chest. 
  
FINDINGS: Bilateral mid to lower lung predominant pulmonary opacities, new 
compared to March 2019. This is superimposed on a background of emphysema and 
numerous bilateral calcified granulomas. . No definite effusion. No visualized 
pneumothorax. Unchanged heart and mediastinal silhouette. Left upper extremity 
vascular stent. No acute or aggressive osseous lesion. 
  
IMPRESSION IMPRESSION:  
1.  New bilateral airspace disease superimposed on chronic underlying findings 
of emphysema and numerous bilateral calcified granulomas. Top differential 
includes multifocal pneumonia or edema. Assessment/Plan: Active Problems: 
  Sepsis ([address] (7/28/2015) GI bleed (3/27/2019) Anemia (4/19/2019) Hypoxia (4/19/2019) HCAP (healthcare-associated pneumonia) (4/19/2019) Dyspnea (4/19/2019) Overall pt is presenting with CP progressive SOB. BNP as well as troponin elevated, would recommend cardiology consult, defer to primary care team.  She admits to ongoing hematochezia with a drop in her hgb from 10.8 back in March to 8.7. Continue to monitor hgb, defer to primary care team/cardiology regarding Eliquis, we would prefer to hold. We will hold off on repeating colonoscopy until pt's sepsis/respiratory status improves and cardiology has evaluated and cleared pt. Discussed plan with pt and she is in agreement. RADHA Graham 
 
04/19/19 12:33 PM 
 
[address] [facility], [address] [address] Loc: [phone] The patient was seen and examined independently. Please see above note for details. family( and daughter) are at bedside Physical exam: 
General:AAO x 3,  In moderate resp distress HEENT:  EOMI, on Bipap Chest:  cannot hear through above , Heart: tachy GI: Soft, as above Data Review:  reviewed Impression: Anemia Blood MS Sepsis PNA Pulm edema Plan and discussion: 
Agree with holding of any GI invasive work up until respiratory/ sepsis related issues are addressed. We will follow with you. Plan d/w family and RNs. Signed By: Andi Garcia.  Madison Gay MD 
 
4/19/2019  4:20 PM

## 2019-04-20 PROBLEM — J96.01 ACUTE RESPIRATORY FAILURE WITH HYPOXIA (HCC): Status: ACTIVE | Noted: 2019-01-01

## 2019-04-20 NOTE — DIALYSIS
HD TRANSFER - OUT REPORT: 
 
Verbal report given to Taniya Mensah on [facility] for routine progression of care Report consisted of patient's Situation, Background, Assessment and  
Recommendations(SBAR). Information from the following report(s) SBAR was reviewed with the receiving nurse. Method:  $$ Method: Hemodialysis (04/19/19 1718) Fluid Removed  NET Fluid Removed (mL): 2400 ml (04/19/19 1951) Patient response to treatment:  Stable End Time  Hemodialysis End Time: 1951 (04/19/19 1951) If not documented, dialysis nurse to update post-dialysis row in HD/Filtration flowsheet Medications /Volume expansion agents or Fluid boluses administered during treatment? yes Post-dialysis medication administration due?  no 
Remind nurse to administer post-HD medication upon return to unit. Fistula hemostasis? yes Opportunity for questions and clarification was provided.

## 2019-04-20 NOTE — PROGRESS NOTES
PT note:  
 
Chart reviewed and spoke with nursing. Patient requesting PT to hold evaluation due to unstable respiratory status. Will follow up for evaluation on Monday.   
 
Stalin Lowe, PT, DPT

## 2019-04-20 NOTE — PROGRESS NOTES
ADULT PROTOCOL: JET AEROSOL  REASSESSMENT Patient  Ellen Hobson     75 y.o.   female     4/20/2019  3:23 AM 
 
Breath Sounds Pre Procedure: Right Breath Sounds: Diminished Left Breath Sounds: Diminished Breath Sounds Post Procedure:   
                                 
 
Breathing pattern: Pre procedure Breathing Pattern: Regular Post procedure Breathing Pattern: Regular Heart Rate: Pre procedure Pulse: 93 
         Post procedure Pulse: 95 Resp Rate: Pre procedure Respirations: 24 
         Post procedure Respirations: 23 Peak Flow: Pre bronchodilator Post bronchodilator FVC/FEV1:   
 
Incentive Spirometry:    
     
 
Cough: Pre procedure Cough: Non-productive Post procedure Cough: Non-productive Suctioned: NO Sputum: Pre procedure Post procedure Oxygen: O2 Device: Nasal cannula   Flow rate (L/min) 4 Changed: NO SpO2: Pre procedure SpO2: 100 %   with oxygen Post procedure SpO2: 100 %  with oxygen Nebulizer Therapy: Current medications Aerosolized Medications: DuoNeb Changed: NO Smoking History: packs, 1 per day Problem List:  
Patient Active Problem List  
Diagnosis Code  Abdominal pain R10.9  E-coli UTI N39.0, B96.20  Continuous severe abdominal pain R10.9  Enteritis K52.9  Adrenal hemorrhage (HCC) E27.49  Chronic renal insufficiency, stage V (HCC) N18.5  Sepsis ([address] A41.9  Acute pancreatitis K85.90  Protein malnutrition ([address] E46  Small bowel perforation (HCC) K63.1  Acute renal failure with lesion of tubular necrosis (HCC) N17.0  Anemia of renal disease N18.9, D63.1  SIRS (systemic inflammatory response syndrome) ([facility]) R65.10  
 HTN (hypertension) I10  
 History of peritonitis Z87.19  
 Physical debility R53.81  Chronic anticoagulation Z79.01  
 History of pulmonary embolism Z86.711  History of ovarian cancer Z85.43  
 Pericardial effusion I31.3  Diarrhea R19.7  Moderate protein-calorie malnutrition (HCC) E44.0  Pericarditis with effusion I31.9  Hypertension, uncontrolled I10  
 ALAYNA (acute kidney injury) ([facility] N17.9  Acute on chronic renal failure (HCC) N17.9, N18.9  Generalized rash R21  
 Heme positive stool R19.5  Electrolyte abnormality E87.8  Bilateral carotid artery stenosis I65.23  
 Acute renal failure (ARF) ([facility]) N17.9  Abnormal MRI of head R93.0  Stenosis of both internal carotid arteries I65.23  
 Cerebral microvascular disease I67.9  Convulsion disorder ([facility] R56.9  Altered mental status, unspecified R41.82  
 Acute encephalopathy G93.40  Stenosis of left carotid artery without cerebral infarction I65.22  
 Disturbance of memory R41.3  Anxiety F41.9  Pneumonia J18.9  Thrombocytopenia ([facility] D69.6  Hypertension I10  
 GI bleed K92.2  
 ESRD (end stage renal disease) ([facility] N18.6  Fever R50.9  Anemia D64.9  Hypoxia R09.02  
 HCAP (healthcare-associated pneumonia) J18.9  Dyspnea R06.00 Respiratory Therapist: Kendall Preston RT

## 2019-04-20 NOTE — PROCEDURES
[facility] Dialysis Team [place]  [phone] Vitals   Pre   Post   Assessment   Pre   Post    
Temp  Temp: 99 °F (37.2 °C) (04/20/19 1321)  98.0 @1600 LOC  A/OX3 A/Ox3 HR   Pulse (Heart Rate): 75 (04/20/19 1321) 86 Lungs   Diminish Bases bilateral clear upper lobes  Clear Upper lobes diminish bases B/P   BP: 159/50 (04/20/19 1321) 159/46 Cardiac   NSR BEDSIDE MONITORED  NSR beside monitored Resp   Resp Rate: 20 (04/20/19 1321) 20 Skin   DRY INTACT  dry intact Pain level  Pain Intensity 1: 0 (04/20/19 1059) 0 out of 10 Edema  GENERALIZED 
 
 generalized Orders: Duration:   Start:    1321 End:    1551 Total:   2. 5HRS Dialyzer:   Dialyzer/Set Up Inspection: Radha Abdi (04/20/19 1321) K Bath:   Dialysate K (mEq/L): (sequential) (04/19/19 1718) Ca Bath:   Dialysate CA (mEq/L): (sequential) (04/19/19 1718) Na/Bicarb:   Dialysate NA (mEq/L): 138 (04/19/19 1718) Target Fluid Removal:   Goal/Amount of Fluid to Remove (mL): 3000 mL (04/20/19 1321) Access Type & Location:   ASSESS LUAVG +BRUIT/THRILL GOOD ASPIRATION, PATENT WHEN FLUSHED Labs Obtained/Reviewed Critical Results Called   Date when labs were drawn- 
Hgb-   
HGB Date Value Ref Range Status 04/20/2019 7.2 (L) 11.5 - 16.0 g/dL Final  
 
K-   
Potassium Date Value Ref Range Status 04/20/2019 3.7 3.5 - 5.1 mmol/L Final  
 
Ca-  
Calcium Date Value Ref Range Status 04/20/2019 8.2 (L) 8.5 - 10.1 MG/DL Final  
 
Bun-  
BUN Date Value Ref Range Status 04/20/2019 26 (H) 6 - 20 MG/DL Final  
 
Creat-  
Creatinine Date Value Ref Range Status 04/20/2019 3.43 (H) 0.55 - 1.02 MG/DL Final  
  Comment:  
  INVESTIGATED PER DELTA CHECK PROTOCOL Medications/ Blood Products Given Name   Dose   Route and Time Blood Volume Processed (BVP):    N/A Net Fluid Removed:  2500mls Comments Time Out Done: @6491 Primary Nurse Rpt Pre: WILL Primary Nurse Rpt Post: WILL Pt Education: PROCEDURAL,S/S OF INFECTION OF AVGRAFT, FLUID MANAGEMENT Care Plan:Continue to follow the orders of the nephrologsist 
 
Tx Summary: 
@1321: start of treatment. VSS lines visible and intact. Patient no complaints, Dr. Saturnino Camacho at bedside. Patient has blood ordered to be transfused on dialysis. Dr. Saturnino Camacho advised that if blood doesn't come that patient can received blood during MWF HD, which would be 03/22/19. Primary RN aware. @1330: VSS lines visible and intact  at bedside 
@1345: VSS patient has no complaints 
@1400: lines visible and intact 
@1415: VSS patient resting  at bedside 
@ 1430: all blood lines are visible and intact @1445: VSS patient resting 
@1500: all bloodlines visible and intact 
@1515: patient resting, blood pressure trending down. Turned back goal. 
@1530: respiratory at bedside giving breathing treatment. Inquired with Primary RN to see if Blood is ready patient has only approx 20 minutes on HD machine. @5405: lines visible and intact. @1551 end of treatment. Patient tolerated ultrafiltration well. Rinse back all possible blood back to patient. Decannulated 15g needles x2 w/o difficulty. Achieved homeostasis. Dressing CDI. Primary RN received report. No blood given because of circumstances of getting the type of blood the patient needed, per primary rn. SBAR given. Admiting Diagnosis: Acute respiratory failure hypoxia, Acute pulmonary edema, Anemia,Sepsis due to pneumonia,ESRD Pt's previous clinic-[facility] Consent signed - Informed Consent Verified: Yes (04/20/19 1321) [place] Consent - Yes Hepatitis Status- 04/19/19 negative antigen 01/16/19 antibody <3 Machine #- Machine Number: [id number] (04/20/19 1321) Telemetry status- Bedside Pre-dialysis wt. - Pre-Dialysis Weight: 60.1 kg (132 lb 7.9 oz) (04/20/19 1321)

## 2019-04-20 NOTE — PROGRESS NOTES
Gastroenterology Progress Note 4/20/2019 Admit Date: 4/19/2019 Subjective: Follow up for: anemia; blood TX Breathing a little better. Not sure if she has had any blood TX. Patient was seen in rounds by me today. Family is at bedside Current Facility-Administered Medications Medication Dose Route Frequency  aspirin chewable tablet 81 mg  81 mg Oral DAILY  metoprolol tartrate (LOPRESSOR) tablet 25 mg  25 mg Oral BID  
 0.9% sodium chloride infusion 250 mL  250 mL IntraVENous PRN  
 acetaminophen (TYLENOL) tablet 650 mg  650 mg Oral Q6H PRN  
 amLODIPine (NORVASC) tablet 10 mg  10 mg Oral DAILY  atorvastatin (LIPITOR) tablet 20 mg  20 mg Oral QHS  B complex-vitaminC-folic acid (NEPHROCAP) cap  1 Cap Oral DAILY  buPROPion (WELLBUTRIN) tablet 100 mg  100 mg Oral QHS  escitalopram oxalate (LEXAPRO) tablet 10 mg  10 mg Oral QPM  
 gabapentin (NEURONTIN) capsule 100 mg  100 mg Oral TID  hydrALAZINE (APRESOLINE) tablet 100 mg  100 mg Oral TID  ferrous sulfate tablet 325 mg  325 mg Oral DAILY  lactobac ac& pc-s.therm-b.anim (TY Q/RISAQUAD)  1 Cap Oral DAILY  losartan (COZAAR) tablet 100 mg  100 mg Oral DAILY  memantine (NAMENDA) tablet 10 mg  10 mg Oral QHS  sevelamer carbonate (RENVELA) tab 800 mg  800 mg Oral PRN  
 sevelamer carbonate (RENVELA) tab 4,000 mg  4,000 mg Oral TID WITH MEALS  sodium chloride (NS) flush 5-40 mL  5-40 mL IntraVENous Q8H  
 sodium chloride (NS) flush 5-40 mL  5-40 mL IntraVENous PRN  
 acetaminophen (TYLENOL) tablet 650 mg  650 mg Oral Q4H PRN  
 naloxone (NARCAN) injection 0.4 mg  0.4 mg IntraVENous PRN  
 morphine injection 2 mg  2 mg IntraVENous Q4H PRN  
 linezolid in dextrose 5% (ZYVOX) IVPB premix in D5W 600 mg  600 mg IntraVENous Q12H  
 guaiFENesin ER (MUCINEX) tablet 600 mg  600 mg Oral Q12H  
 albuterol-ipratropium (DUO-NEB) 2.5 MG-0.5 MG/3 ML  3 mL Nebulization QID RT  
  albuterol-ipratropium (DUO-NEB) 2.5 MG-0.5 MG/3 ML  3 mL Nebulization Q1H PRN  
 albumin human 25% (BUMINATE) solution 25 g  25 g IntraVENous DIALYSIS PRN  
 [START ON 4/21/2019] levoFLOXacin (LEVAQUIN) 500 mg in D5W IVPB  500 mg IntraVENous Q48H Objective:  
 
Blood pressure 174/60, pulse (!) 102, temperature 98.6 °F (37 °C), resp. rate 22, weight 60.1 kg (132 lb 7.9 oz), SpO2 100 %. No intake/output data recorded. 04/18 1901 - 04/20 0700 In: 300 [I.V.:300] Out: 2400 Physical Examination:  
 
 
General:AAO x 3, Weak and pale HEENT:  EOMI, Chest:   rhonchi Heart: S1, S2, RRR 
GI: Soft, NT, ND + bowel sounds CNS: CNs grossly normal. 
 
Data Review Recent Results (from the past 24 hour(s)) CBC WITH AUTOMATED DIFF Collection Time: 04/19/19 10:33 AM  
Result Value Ref Range WBC 12.8 (H) 3.6 - 11.0 K/uL  
 RBC 3.38 (L) 3.80 - 5.20 M/uL HGB 8.7 (L) 11.5 - 16.0 g/dL HCT 30.6 (L) 35.0 - 47.0 % MCV 90.5 80.0 - 99.0 FL  
 MCH 25.7 (L) 26.0 - 34.0 PG  
 MCHC 28.4 (L) 30.0 - 36.5 g/dL  
 RDW 17.6 (H) 11.5 - 14.5 % PLATELET 390 496 - 859 K/uL MPV 10.0 8.9 - 12.9 FL  
 NRBC 0.0 0  WBC ABSOLUTE NRBC 0.00 0.00 - 0.01 K/uL NEUTROPHILS 74 32 - 75 % LYMPHOCYTES 8 (L) 12 - 49 % MONOCYTES 17 (H) 5 - 13 % EOSINOPHILS 0 0 - 7 % BASOPHILS 0 0 - 1 % IMMATURE GRANULOCYTES 1 (H) 0.0 - 0.5 % ABS. NEUTROPHILS 9.5 (H) 1.8 - 8.0 K/UL  
 ABS. LYMPHOCYTES 1.0 0.8 - 3.5 K/UL  
 ABS. MONOCYTES 2.2 (H) 0.0 - 1.0 K/UL  
 ABS. EOSINOPHILS 0.0 0.0 - 0.4 K/UL  
 ABS. BASOPHILS 0.0 0.0 - 0.1 K/UL  
 ABS. IMM. GRANS. 0.1 (H) 0.00 - 0.04 K/UL  
 DF AUTOMATED    
 RBC COMMENTS HYPOCHROMIA 1+ 
    
 RBC COMMENTS POLYCHROMASIA 1+ 
    
 RBC COMMENTS ANISOCYTOSIS 
1+ METABOLIC PANEL, COMPREHENSIVE Collection Time: 04/19/19 10:33 AM  
Result Value Ref Range Sodium 138 136 - 145 mmol/L Potassium 3.6 3.5 - 5.1 mmol/L  Chloride 105 97 - 108 mmol/L  
 CO2 25 21 - 32 mmol/L Anion gap 8 5 - 15 mmol/L Glucose 98 65 - 100 mg/dL BUN 13 6 - 20 MG/DL Creatinine 1.89 (H) 0.55 - 1.02 MG/DL  
 BUN/Creatinine ratio 7 (L) 12 - 20 GFR est AA 32 (L) >60 ml/min/1.73m2 GFR est non-AA 26 (L) >60 ml/min/1.73m2 Calcium 8.0 (L) 8.5 - 10.1 MG/DL Bilirubin, total 0.6 0.2 - 1.0 MG/DL  
 ALT (SGPT) 13 12 - 78 U/L  
 AST (SGOT) 29 15 - 37 U/L Alk. phosphatase 104 45 - 117 U/L Protein, total 6.7 6.4 - 8.2 g/dL Albumin 2.4 (L) 3.5 - 5.0 g/dL Globulin 4.3 (H) 2.0 - 4.0 g/dL A-G Ratio 0.6 (L) 1.1 - 2.2 PROTHROMBIN TIME + INR Collection Time: 04/19/19 10:33 AM  
Result Value Ref Range INR 1.5 (H) 0.9 - 1.1 Prothrombin time 15.0 (H) 9.0 - 11.1 sec TROPONIN I Collection Time: 04/19/19 10:33 AM  
Result Value Ref Range Troponin-I, Qt. 0.08 (H) <0.05 ng/mL NT-PRO BNP Collection Time: 04/19/19 10:33 AM  
Result Value Ref Range NT pro-BNP >35,000 (H) <125 PG/ML  
LACTIC ACID Collection Time: 04/19/19 12:00 PM  
Result Value Ref Range Lactic acid 0.9 0.4 - 2.0 MMOL/L  
POC G3 - PUL Collection Time: 04/19/19  3:28 PM  
Result Value Ref Range FIO2 (POC) 100 % pH (POC) 7.347 (L) 7.35 - 7.45    
 pCO2 (POC) 37.1 35.0 - 45.0 MMHG  
 pO2 (POC) 51 (L) 80 - 100 MMHG  
 HCO3 (POC) 20.3 (L) 22 - 26 MMOL/L  
 sO2 (POC) 84 (L) 92 - 97 % Base deficit (POC) 5 mmol/L Site RIGHT RADIAL Device: Non rebreather Flow rate (POC) 15 L/M Allens test (POC) YES Specimen type (POC) ARTERIAL Total resp. rate 25 ECHO ADULT COMPLETE Collection Time: 04/19/19  4:00 PM  
Result Value Ref Range Aortic Valve Systolic Peak Velocity 749.29 cm/s Aortic Valve Area by Continuity of Peak Velocity 0.9 cm2 AoV PG 18.4 mmHg LVIDd 4.19 3.9 - 5.3 cm  
 LVPWd 1.30 (A) 0.6 - 0.9 cm LVIDs 3.31 cm IVSd 1.68 (A) 0.6 - 0.9 cm  
 LVOT d 1.35 cm  
 LVOT Peak Velocity 129.86 cm/s LVOT Peak Gradient 6.7 mmHg LVOT VTI 23.46 cm  
 MV A Gaurav 0.00 cm/s  
 MV E Gaurav 0.00 cm/s Left Atrium to Aortic Root Ratio 1.18 LV Mass .1 (A) 67 - 162 g  
 LVPWs 1.27 cm Mitral Regurgitant Peak Velocity 140.96 cm/s Mitral Valve E Wave Deceleration Time 0.0 ms  
 Mitral Valve Pressure Half-time 0.0 ms Left Atrium Major Axis 3.32 cm Triscuspid Valve Regurgitation Peak Gradient 18.2 mmHg LVOT SV 33.5 ml  
 TR Max Velocity 213.14 cm/s  
 MR Peak Gradient 7.9 mmHg Ao Root D 2.82 cm  
 AV Cusp 0.00 cm TROPONIN I Collection Time: 04/19/19  5:13 PM  
Result Value Ref Range Troponin-I, Qt. 0.31 (H) <0.05 ng/mL HEP B SURFACE AG Collection Time: 04/19/19  5:13 PM  
Result Value Ref Range Hepatitis B surface Ag 0.62 Index Hep B surface Ag Interp. NEGATIVE  NEG    
METABOLIC PANEL, BASIC Collection Time: 04/20/19  5:14 AM  
Result Value Ref Range Sodium 138 136 - 145 mmol/L Potassium 3.7 3.5 - 5.1 mmol/L Chloride 105 97 - 108 mmol/L  
 CO2 22 21 - 32 mmol/L Anion gap 11 5 - 15 mmol/L Glucose 83 65 - 100 mg/dL BUN 26 (H) 6 - 20 MG/DL Creatinine 3.43 (H) 0.55 - 1.02 MG/DL  
 BUN/Creatinine ratio 8 (L) 12 - 20 GFR est AA 16 (L) >60 ml/min/1.73m2 GFR est non-AA 13 (L) >60 ml/min/1.73m2 Calcium 8.2 (L) 8.5 - 10.1 MG/DL  
CBC W/O DIFF Collection Time: 04/20/19  5:14 AM  
Result Value Ref Range WBC 12.3 (H) 3.6 - 11.0 K/uL  
 RBC 2.75 (L) 3.80 - 5.20 M/uL HGB 7.2 (L) 11.5 - 16.0 g/dL HCT 25.7 (L) 35.0 - 47.0 % MCV 93.5 80.0 - 99.0 FL  
 MCH 26.2 26.0 - 34.0 PG  
 MCHC 28.0 (L) 30.0 - 36.5 g/dL  
 RDW 17.6 (H) 11.5 - 14.5 % PLATELET 711 509 - 900 K/uL MPV 10.4 8.9 - 12.9 FL  
 NRBC 0.0 0  WBC ABSOLUTE NRBC 0.00 0.00 - 0.01 K/uL  
TROPONIN I Collection Time: 04/20/19  5:14 AM  
Result Value Ref Range Troponin-I, Qt. 0.43 (H) <0.05 ng/mL Recent Labs  
  04/20/19 
0514 04/19/19 
1033 WBC 12.3* 12.8* HGB 7.2* 8.7* HCT 25.7* 30.6*  
  179 Recent Labs  
  04/20/19 
0514 04/19/19 
1033  138  
K 3.7 3.6  105 CO2 22 25 BUN 26* 13  
CREA 3.43* 1.89* GLU 83 98 CA 8.2* 8.0* Recent Labs 04/19/19 
1033 SGOT 29   
TP 6.7 ALB 2.4*  
GLOB 4.3* Recent Labs 04/19/19 
1033 INR 1.5* PTP 15.0* No results for input(s): FE, TIBC, PSAT, FERR in the last 72 hours. Lab Results Component Value Date/Time Folate 18.9 03/27/2019 04:45 PM  
  
No results for input(s): PH, PCO2, PO2 in the last 72 hours. Recent Labs  
  04/20/19 
0514 04/19/19 
1713 04/19/19 
1033 TROIQ 0.43* 0.31* 0.08* Lab Results Component Value Date/Time Cholesterol, total 116 03/30/2017 02:27 AM  
 HDL Cholesterol 55 03/30/2017 02:27 AM  
 LDL, calculated 31.2 03/30/2017 02:27 AM  
 Triglyceride 149 03/30/2017 02:27 AM  
 CHOL/HDL Ratio 2.1 03/30/2017 02:27 AM  
 
No components found for: [place] Lab Results Component Value Date/Time Color YELLOW/STRAW 03/29/2017 05:30 PM  
 Appearance CLOUDY (A) 03/29/2017 05:30 PM  
 Specific gravity 1.020 03/29/2017 05:30 PM  
 Specific gravity 1.010 03/04/2016 09:00 PM  
 pH (UA) 7.0 03/29/2017 05:30 PM  
 Protein 100 (A) 03/29/2017 05:30 PM  
 Glucose NEGATIVE  03/29/2017 05:30 PM  
 Ketone NEGATIVE  03/29/2017 05:30 PM  
 Bilirubin NEGATIVE  03/29/2017 05:30 PM  
 Urobilinogen 0.2 03/29/2017 05:30 PM  
 Nitrites NEGATIVE  03/29/2017 05:30 PM  
 Leukocyte Esterase LARGE (A) 03/29/2017 05:30 PM  
 Epithelial cells FEW 03/29/2017 05:30 PM  
 Bacteria 4+ (A) 03/29/2017 05:30 PM  
 WBC 20-50 03/29/2017 05:30 PM  
 RBC  03/29/2017 05:30 PM  
 
  
ROS: -CP, SOB, Dysuria, palpitations, cough. Assessment: 
 
Active Problems: 
  Sepsis ([address] (7/28/2015) GI bleed (3/27/2019) Anemia (4/19/2019) Hypoxia (4/19/2019) HCAP (healthcare-associated pneumonia) (4/19/2019) Dyspnea (4/19/2019) Plan/Discussion: · Her hgb has dropped to 7.2. She is to get HD today. Will transfuse 1 unit PRBC during HD. D/w her RN and family. Follw hgb daily. · Cardiology/CC also following. · I am Ok with full liquid diet. · GI invasive work up on hold secondary to resp/cardiac/sepsis related issues. Signed By: Mirza Bradford.  Damon Metcalf MD 
 
4/20/2019  10:24 AM

## 2019-04-20 NOTE — PROGRESS NOTES
Occupational Therapy Completed chart review and discussed patient with nursing. Patient on hi flow O2 at the time. Requesting hold OT services at this time. Will follow up Monday for eval.  
 
Thank you, Heraclio Zuleta, OT

## 2019-04-20 NOTE — PROGRESS NOTES
Nephrology Progress Note Patrice Marion  
 
[email or web address]. [email or web address]                  Phone - [phone] Patient: Akash Chaparro Date- 4/20/2019 Admit Date: 4/19/2019 YOB: 1945 CC: Follow up for esrd Subjective: Interval History:  
Seen and examined on dialysis. For isolated UF today. For PRBCs today as well. Feeling better. Sob improving. bp stable. No cp, n/v/d. 
 
ROS:- as above Assessment: 
· esrd - f - [facility] · Anemia of ckd · RESP DISTRESS · Pulmonary edema, chf 
· Malignant hypertension · Sec. Diaz Amen Plan: · Isolated UF today · Plan transfusion on dialysis today · Cont current BP meds Physical Exam:  
GEN: in NAD NECK- Supple, no mass RESP: lungs mostly clear CVS: RRR,S1,S2 ABDO: soft , non tender, No mass EXT: left arm avf + NEURO: normal speech, non focal 
 
Care Plan discussed with: pt, nurse,  Objective:  
Patient Vitals for the past 24 hrs: 
 Temp Pulse Resp BP SpO2  
04/20/19 1321 99 °F (37.2 °C) 75 20 159/50 95 % 04/20/19 1059 98.6 °F (37 °C) 93 20 145/47 91 % 04/20/19 0731     100 % 04/20/19 0725 98.6 °F (37 °C) (!) 102 22 174/60 94 % 04/19/19 2349 98.7 °F (37.1 °C) (!) 103 20 150/46 92 % 04/19/19 2200  (!) 102  169/56 91 % 04/19/19 2147 98 °F (36.7 °C) (!) 103 20 157/55 94 % 04/19/19 2100    157/55   
04/19/19 2051     100 % 04/19/19 2049     100 % 04/19/19 2000  93  (!) 176/96 100 % 04/19/19 1951 97.9 °F (36.6 °C) 90 21 161/80 100 % 04/19/19 1945  90 22 153/66 100 % 04/19/19 1930  92 26 152/70 100 % 04/19/19 1915  93 24 152/90 100 % 04/19/19 1900  91 22 173/63 100 % 04/19/19 1845 97.9 °F (36.6 °C) 91 26 (!) 152/93 100 % 04/19/19 1830  91 24 175/66 100 % 04/19/19 1824  92  158/64 100 % 04/19/19 1815  89  107/57 99 % 04/19/19 1800  99 22 144/56 99 % 04/19/19 1745   23 167/82  04/19/19 1730  (!) 101 22 177/66 100 % 04/19/19 1718 98.2 °F (36.8 °C) 100 20 185/76 100 % 04/19/19 1545 98 °F (36.7 °C) (!) 113 18 193/79 96 % 04/19/19 1536     95 % 04/19/19 1530 98.2 °F (36.8 °C) (!) 121 22 (!) 215/83 (!) 79 % 04/19/19 1523 100.2 °F (37.9 °C) (!) 125 22 (!) 227/76 (!) 82 % 04/19/19 1521  (!) 125  (!) 214/76 (!) 78 % Last 3 Recorded Weights in this Encounter 04/19/19 1015 Weight: 60.1 kg (132 lb 7.9 oz) 04/18 1901 - 04/20 0700 In: 300 [I.V.:300] Out: 2400 Chart reviewed. Pertinent Notes reviewed. Medication list  reviewed Current Facility-Administered Medications Medication  aspirin chewable tablet 81 mg  
 metoprolol tartrate (LOPRESSOR) tablet 25 mg  
 0.9% sodium chloride infusion 250 mL  amLODIPine (NORVASC) tablet 10 mg  
 atorvastatin (LIPITOR) tablet 20 mg  B complex-vitaminC-folic acid (NEPHROCAP) cap  buPROPion [facility]) tablet 100 mg  
 escitalopram oxalate (LEXAPRO) tablet 10 mg  
 gabapentin (NEURONTIN) capsule 100 mg  hydrALAZINE (APRESOLINE) tablet 100 mg  ferrous sulfate tablet 325 mg  
 lactobac ac& pc-s.therm-b.anim (TY Q/RISAQUAD)  losartan (COZAAR) tablet 100 mg  
 memantine (NAMENDA) tablet 10 mg  
 sevelamer carbonate (RENVELA) tab 800 mg  
 sevelamer carbonate (RENVELA) tab 4,000 mg  
 sodium chloride (NS) flush 5-40 mL  sodium chloride (NS) flush 5-40 mL  acetaminophen (TYLENOL) tablet 650 mg  
 naloxone (NARCAN) injection 0.4 mg  
 morphine injection 2 mg  linezolid in dextrose 5% (ZYVOX) IVPB premix in D5W 600 mg  
 guaiFENesin ER (MUCINEX) tablet 600 mg  
 albuterol-ipratropium (DUO-NEB) 2.5 MG-0.5 MG/3 ML  
 albuterol-ipratropium (DUO-NEB) 2.5 MG-0.5 MG/3 ML  
 albumin human 25% (BUMINATE) solution 25 g  [START ON 4/21/2019] levoFLOXacin (LEVAQUIN) 500 mg in D5W IVPB Data Review : 
Recent Labs  
  04/20/19 
0514 04/19/19 
1033 WBC 12.3* 12.8*  
 HGB 7.2* 8.7*  179 ANEU  --  9.5* INR  --  1.5*  138  
K 3.7 3.6 GLU 83 98 BUN 26* 13  
CREA 3.43* 1.89* ALT  --  13 SGOT  --  29  
TBILI  --  0.6 AP  --  104 CA 8.2* 8.0* Lab Results Component Value Date/Time Culture result:  03/28/2019 02:12 PM  
  NO ROUTINE ENTERIC PATHOGENS ISOLATED INCLUDING SALMONELLA, SHIGELLA, YERSINIA, VIBRIO OR SHIGA TOXIN PRODUCING E. COLI Culture result: NO GROWTH 6 DAYS 03/27/2019 06:54 PM  
 Culture result: NO GROWTH 6 DAYS 03/27/2019 06:54 PM  
 
No results found for: SDES No results for input(s): FE, TIBC, PSAT, FERR in the last 72 hours. Lab Results Component Value Date/Time Sodium,urine random 17 12/15/2015 10:02 PM  
 Creatinine, urine 42.60 03/07/2016 02:36 PM  
 Creatinine, urine 41.80 03/07/2016 02:36 PM  
 
Aman Kinsey MD 
[facility] 
 [email or web address] [place] [address] [employer] [address] [place], [address] Phone - [phone] Fax - [phone]

## 2019-04-20 NOTE — PROGRESS NOTES
Progress Note 4/20/2019 8:40 AM 
NAME: Bernadine Lubin MRN:  [id number] Admit Diagnosis: Sepsis ([address] [A41.9] HCAP (healthcare-associated pneumonia) [J18.9] Anemia [D64.9] Hypoxia [R09.02] GI bleed [K92.2] Assessment:   
  
Acute Respiratory failure with Hypoxia. Volume overload with pulmonary edema Pneumonia Elevated BNP / normal Troponin ESRD on Dialysis. For urgent ultrafiltration today for volume removal and management Malignant Hypertension. Pneumonia Elevated WBC  / Febrile. Anemia. Hx of Pulmonary embolism. S/p IVC filter. Chronic Eliquis Hx of inta abdominal bleeding Hx of ovarian CA Left Ventricular hypertrophy with normal systolic function. No hx of heart disease.  
  
   
  
            Plan:    
  
Increased troponin to 0.4 No chest pain Breathing improving with HD 
CTA with no PE Echo with normal EF, no sig valve disease, but does have murmur on exam 
Hg down to 7 
 
- Holding eliquis due to GI bleed, add aspirin 81mg daily for now - Add metoprolol 25mg bid 
- Cont HD for volume Likely proceed with GI evaluation once euvolemic. 
  
  
 []           High complexity decision making was performed 
  
 
 
 
 
Subjective:  
 
Bernadine Lubin denies chest pain, +dyspnea. Discussed with RN events overnight. Review of Systems: 
 
Symptom Y/N Comments  Symptom Y/N Comments Fever/Chills N   Chest Pain N Poor Appetite N   Edema N   
Cough N   Abdominal Pain N Sputum N   Joint Pain N   
SOB/MONTANEZ Y   Pruritis/Rash N   
Nausea/vomit N   Tolerating PT/OT Y Diarrhea N   Tolerating Diet Y Constipation N   Other Could NOT obtain due to:   
 
Objective:  
  
Physical Exam: 
 
Last 24hrs VS reviewed since prior progress note. Most recent are: 
 
Visit Vitals /60 (BP 1 Location: Right arm, BP Patient Position: At rest) Pulse (!) 102 Temp 98.6 °F (37 °C) Resp 22 Wt 60.1 kg (132 lb 7.9 oz) SpO2 100% BMI 19.57 kg/m² Intake/Output Summary (Last 24 hours) at 4/20/2019 0840 Last data filed at 4/20/2019 5442 Gross per 24 hour Intake 300 ml Output 2400 ml Net -2100 ml General Appearance: Well developed, well nourished, alert & oriented x 3,  
 no acute distress. Ears/Nose/Mouth/Throat: Hearing grossly normal. 
Neck: Supple. Chest: Lungs clear to auscultation bilaterally. Cardiovascular: Regular rate and rhythm, S1S2 normal, III/VI BEENA. Abdomen: Soft, non-tender, bowel sounds are active. Extremities: No edema bilaterally. Skin: Warm and dry. PMH/SH reviewed - no change compared to H&P Data Review Telemetry: normal sinus rhythm Lab Data Personally Reviewed: 
 
Recent Labs  
  04/20/19 
0514 04/19/19 
1033 WBC 12.3* 12.8* HGB 7.2* 8.7* HCT 25.7* 30.6*  179 Recent Labs 04/19/19 
1033 INR 1.5* PTP 15.0* Recent Labs  
  04/20/19 
0514 04/19/19 
1033  138  
K 3.7 3.6  105 CO2 22 25 BUN 26* 13  
CREA 3.43* 1.89* GLU 83 98 CA 8.2* 8.0* Recent Labs  
  04/20/19 
0514 04/19/19 
1713 04/19/19 
1033 TROIQ 0.43* 0.31* 0.08* Lab Results Component Value Date/Time Cholesterol, total 116 03/30/2017 02:27 AM  
 HDL Cholesterol 55 03/30/2017 02:27 AM  
 LDL, calculated 31.2 03/30/2017 02:27 AM  
 Triglyceride 149 03/30/2017 02:27 AM  
 CHOL/HDL Ratio 2.1 03/30/2017 02:27 AM  
 
 
Recent Labs 04/19/19 
1033 SGOT 29   
TP 6.7 ALB 2.4*  
GLOB 4.3* No results for input(s): PH, PCO2, PO2 in the last 72 hours. Medications Personally Reviewed: 
 
Current Facility-Administered Medications Medication Dose Route Frequency  aspirin chewable tablet 81 mg  81 mg Oral DAILY  metoprolol tartrate (LOPRESSOR) tablet 25 mg  25 mg Oral BID  acetaminophen (TYLENOL) tablet 650 mg  650 mg Oral Q6H PRN  
 amLODIPine (NORVASC) tablet 10 mg  10 mg Oral DAILY  atorvastatin (LIPITOR) tablet 20 mg  20 mg Oral QHS  B complex-vitaminC-folic acid (NEPHROCAP) cap  1 Cap Oral DAILY  buPROPion (WELLBUTRIN) tablet 100 mg  100 mg Oral QHS  escitalopram oxalate (LEXAPRO) tablet 10 mg  10 mg Oral QPM  
 gabapentin (NEURONTIN) capsule 100 mg  100 mg Oral TID  hydrALAZINE (APRESOLINE) tablet 100 mg  100 mg Oral TID  ferrous sulfate tablet 325 mg  325 mg Oral DAILY  lactobac ac& pc-s.therm-b.anim (TY Q/RISAQUAD)  1 Cap Oral DAILY  losartan (COZAAR) tablet 100 mg  100 mg Oral DAILY  memantine (NAMENDA) tablet 10 mg  10 mg Oral QHS  sevelamer carbonate (RENVELA) tab 800 mg  800 mg Oral PRN  
 sevelamer carbonate (RENVELA) tab 4,000 mg  4,000 mg Oral TID WITH MEALS  sodium chloride (NS) flush 5-40 mL  5-40 mL IntraVENous Q8H  
 sodium chloride (NS) flush 5-40 mL  5-40 mL IntraVENous PRN  
 acetaminophen (TYLENOL) tablet 650 mg  650 mg Oral Q4H PRN  
 naloxone (NARCAN) injection 0.4 mg  0.4 mg IntraVENous PRN  
 morphine injection 2 mg  2 mg IntraVENous Q4H PRN  
 linezolid in dextrose 5% (ZYVOX) IVPB premix in D5W 600 mg  600 mg IntraVENous Q12H  
 guaiFENesin ER (MUCINEX) tablet 600 mg  600 mg Oral Q12H  
 albuterol-ipratropium (DUO-NEB) 2.5 MG-0.5 MG/3 ML  3 mL Nebulization QID RT  
 albuterol-ipratropium (DUO-NEB) 2.5 MG-0.5 MG/3 ML  3 mL Nebulization Q1H PRN  
 albumin human 25% (BUMINATE) solution 25 g  25 g IntraVENous DIALYSIS PRN  
 [START ON 4/21/2019] levoFLOXacin (LEVAQUIN) 500 mg in D5W IVPB  500 mg IntraVENous Q48H Patel Morfin MD

## 2019-04-20 NOTE — ROUTINE PROCESS
..Report given to  Estela Livingston RN. SBAR was discussed. Danial Heredia RN assumed care of the pt. Radha Cedeno RN  
 
0386 Report, wick and pad changed 1930 Assessment, Pt on dialysis 2146 Meds given. Pt tolerated well 
2300 Wick and pad change 
0000 Pt appears to be sleeping 
0200 Pt still appears sleeping

## 2019-04-20 NOTE — PROGRESS NOTES
HD TRANSFER - OUT REPORT: 
 
Verbal report given to Walt Reyna on [facility] [employer] [facility] Report consisted of patient's Situation, Background, Assessment and  
Recommendations(SBAR). Information from the following report(s) was reviewed with the receiving nurse. Method:  Hemodialysis Ultrafiltration Fluid Removed  NET Fluid Removed (mL): 2500 ml (04/20/19 1600) Patient response to treatment:  Patient responded well End Time  Hemodialysis End Time: 7210 (04/20/19 1600) If not documented, dialysis nurse to update post-dialysis row in HD/Filtration flowsheet Medications /Volume expansion agents or Fluid boluses administered during treatment? No 
 
Post-dialysis medication administration due? No 
Remind nurse to administer post-HD medication upon return to unit. Fistula hemostasis? yes Line heparinization? no 
 
Lines: Left Upper AV Graft Opportunity for questions and clarification was provided.    
 
Patient transported with: N/A

## 2019-04-20 NOTE — PROGRESS NOTES
RAPID RESPONSE TEAM 
 
Rounded on patient due to recent rapid response for pulmonary edema. Patient's oxygen saturation noted to be 88% on 3L. Increased to 6L and added humidification. Oxygen saturation increased to 94%. Patient denies feeling short of breath. Discussed with primary RN Taniya Mensah. No acute concerns. MEWS 2. No RRT interventions indicated at this time. RN encouraged to call with any questions or concerns. Jenaro Mccoy Rapid Response RN Bobby Garrido

## 2019-04-20 NOTE — PROGRESS NOTES
PCU SHIFT NURSING NOTE Bedside shift change report given to Mary Caldwell (oncoming nurse) by Leroy Cote (offgoing nurse). Report included the following information SBAR, Kardex, Intake/Output, MAR and Recent Results. Shift Summary: 0725  MEWS 3  Pt in bed resting  No complaints of pain except with deep inspiration she has some mild chest discomfort On 5L NC  SCDs in place LAVF  ST on monitor  Plan for dialysis this afternoon 0755  PT SPO2 82% on 4L  Increased to 6L  Recovered to 92% 1025 Difficulty maintaining sats on 6L NC  RT consulted  Pt placed on high flow 8L  Will monitor [facility] with [facility] RN and advised need for another IV site  RUE IV is no longer patent  Need to send T&S to lab asap as blood needs to be administered with dialysis this afternoon [facility] team at bedside for IV access and labs 1200  T&S sent to lab 1230  Rec'd call from lab that specimen had hemolyzed  Sent 2nd specimen 12  Dr Manas Tolliver at bedside  Does not want blood administered unless with dialysis due to pt respiratory status   Blood was ordered by Dr Pau Linda, GI   Per blood bank, there may be a delay in obtaining blood due to antibodies that were present in a previous specimen   ML for Dr Pau Linda to advise [address] of Dr Dea Velasco recommendation   Will need to draw repeat H&H this evening is blood is not administered, otherwise draw every day with am labs 1600  MEWS 1  Pt is completing dialysis  No complaints at this time 1744  Pt on clear liquid diet   Requests to only take 3 Renvela with meals until diet is upgraded [facility]  Blood has been allocated and will be kept x 3 days 1810  H&H sent to lab   
1900  Report given to [address] [place] [address] Admission Date 4/19/2019 Admission Diagnosis Sepsis ([address] [A41.9] HCAP (healthcare-associated pneumonia) [J18.9] Anemia [D64.9] Hypoxia [R09.02] GI bleed [K92.2] Consults IP CONSULT TO HOSPITALIST IP CONSULT TO GASTROENTEROLOGY 
IP CONSULT TO NEPHROLOGY 
IP CONSULT TO CARDIOLOGY 
IP CONSULT TO PULMONOLOGY Consults []PT []OT []Speech  
[]Case Management  
  
[] Palliative Cardiac Monitoring Order []Yes []No  
 
IV drips []Yes Drip:                            Dose: 
Drip:                            Dose: 
Drip:                            Dose:  
[]No  
 
GI Prophylaxis []Yes []No  
 
 
 
DVT Prophylaxis SCDs:  Sequential Compression Device: Bilateral  
     
 Fadi stockings:     
  
[] Medication []Contraindicated []None Activity Level Activity Level: Bed Rest   
 Activity Assistance: Partial (two people) Purposeful Rounding every 1-2 hour? []Yes Orta Score  Total Score: 2 Bed Alarm (If score 3 or >) []Yes  
[] Refused (See signed refusal form in chart) Yinka Score  Yinka Score: 16 Yinka Score (if score 14 or less) []PMT consult  
[]Wound Care consult []Specialty bed  
[] Nutrition consult Needs prior to discharge:  
Home O2 required:   
[]Yes []No  
 If yes, how much O2 required? Other:  
 Last Bowel Movement: Last Bowel Movement Date: 04/18/19 Influenza Vaccine Received Flu Vaccine for Current Season (usually Sept-March): Yes Pneumonia Vaccine Diet Active Orders Diet DIET NPO  
  
LDAs Peripheral IV 04/19/19 Right Hand (Active) Site Assessment Clean, dry, & intact 4/20/2019  4:38 AM  
Phlebitis Assessment 0 4/20/2019  4:38 AM  
Infiltration Assessment 0 4/20/2019  4:38 AM  
Dressing Status Clean, dry, & intact 4/20/2019  4:38 AM  
Dressing Type Transparent 4/20/2019  4:38 AM  
Hub Color/Line Status Blue 4/20/2019  4:38 AM  
Action Taken Blood drawn 4/19/2019 10:36 AM  
   
Peripheral IV 04/19/19 Right;Upper Arm (Active) Site Assessment Clean, dry, & intact 4/20/2019  4:38 AM  
Phlebitis Assessment 0 4/20/2019  4:38 AM  
Infiltration Assessment 0 4/20/2019  4:38 AM  
 Dressing Status Clean, dry, & intact 4/20/2019  4:38 AM  
Dressing Type Transparent 4/20/2019  4:38 AM  
Hub Color/Line Status Pink 4/20/2019  4:38 AM  
      
External Female Catheter 04/19/19 (Active) Site Assessment Clean, dry, & intact 4/20/2019 12:30 AM  
Repositioned Yes 4/20/2019 12:30 AM  
Perineal Care Yes 4/20/2019 12:30 AM  
Wick Changed Yes 4/20/2019 12:30 AM  
Suction Canister/Tubing Changed Yes 4/20/2019 12:30 AM  
            
Urinary Catheter Intake & Output Date 04/19/19 0700 - 04/20/19 0659 04/20/19 0700 - 04/21/19 7513 Shift 7654-0476 6445-6833 24 Hour Total 1205-5365 5366-1626 24 Hour Total  
INTAKE  
P.O. 0  0     
  P. O. 0  0     
I. V.(mL/kg/hr)  300(0.4) 300(0.2) Volume (linezolid in dextrose 5% (ZYVOX) IVPB premix in D5W 600 mg)  300 300 Shift Total(mL/kg) 0(0) 300(5) 300(5) OUTPUT Dialysis  2400 2400 NET Fluid Removed (mL)  2400 2400 Shift Total(mL/kg)  3191(26.1) [id number]) NET 0 -2100 -2100 Weight (kg) 60.1 60.1 60.1 60.1 60.1 60.1 Readmission Risk Assessment Tool Score High Risk   
      
 21 Total Score 3 Has Seen PCP in Last 6 Months (Yes=3, No=0)  
 4 IP Visits Last 12 Months (1-3=4, 4=9, >4=11) 5 Pt. Coverage (Medicare=5 , Medicaid, or Self-Pay=4) 9 Charlson Comorbidity Score (Age + Comorbid Conditions) Criteria that do not apply:  
 . Living with Significant Other. Assisted Living. LTAC. SNF. or  
Rehab Patient Length of Stay (>5 days = 3) Expected Length of Stay - - - Actual Length of Stay 1

## 2019-04-20 NOTE — PROGRESS NOTES
Reason for Admission: Chest pain RRAT Score:  21 Resources/supports as identified by patient/family:   
The patient resides in a 2 level home with 3 steps to enter with her . She has 1 daughter that lives locally. She reports her  and daughter are very supportive. Top Challenges facing patient (as identified by patient/family and CM): Finances/Medication cost?  The patient and her  are retired. She reports no financial concerns. She uses [employer] [facility] for her prescriptions. Transportation? The patient is able to drive. Her  assists her with transportation needs. She normally drives herself to HD on M/W/F to [facility] in [place] Support system or lack thereof? The patient reports that her  and daughter are very supportive Living arrangements? The patient lives in a 2 level home with 3 steps to enter with her . She has a stair-glide to the second floor of the home Self-care/ADLs/Cognition? The patient is independent with her ADLs. She uses no assistive device when ambulating. Her  manages her medications Current Advanced Directive/Advance Care Plan:  The patient is a full code and she does not have a MPOA/AD on-file Plan for utilizing home health: Unknown at this time, the patient may potentially benefit from HHPT/OT/SN pending medical improvement Likelihood of readmission: Low Transition of Care Plan:   
CM met with the patient and her daughter at bedside to discuss dispo plan. The patient lives in a 2 level home with 3 steps to enter with her . She has a stair glide to the second floor of the home. She has 1 daughter that resides locally.  She is independent in her ADLs and uses no assistive device when ambulating. Her  manages her medications. She is still able to drive and her  also assists with transportation needs. The patient is on HD M/W/F at [facility] in [place]. She is first shift HD. She uses [facility] for her medications. She reports no use of ETOH and she smokes about 1/2 PPD. CM will continue to monitor PT/OT notes for dispo planning. Care Management Interventions PCP Verified by CM: Yes(The patient saw her PCP about 2 weeks ago ) Mode of Transport at Discharge: Other (see comment)(The patient's  will assist with d/c transportation ) Transition of Care Consult (CM Consult): Discharge Planning MyChart Signup: No 
Discharge Durable Medical Equipment: No 
Physical Therapy Consult: Yes Occupational Therapy Consult: Yes Speech Therapy Consult: No 
Current Support Network: Lives with Spouse Confirm Follow Up Transport: Self Plan discussed with Pt/Family/Caregiver: Yes Freedom of Choice Offered: Yes [employer] Information Provided?: No 
 
Marcia Smyth LCSW

## 2019-04-20 NOTE — PROGRESS NOTES
ADULT PROTOCOL: JET AEROSOL  REASSESSMENT Patient  Nahomi Chery     75 y.o.   female     4/20/2019  4:37 PM 
 
Breath Sounds Pre Procedure: Right Breath Sounds: Diminished Left Breath Sounds: Diminished Breath Sounds Post Procedure: Right Breath Sounds: Diminished Left Breath Sounds: Diminished Breathing pattern: Pre procedure Breathing Pattern: Regular Post procedure Breathing Pattern: Regular Heart Rate: Pre procedure Pulse: 77 Post procedure Pulse: 79 Resp Rate: Pre procedure Respirations: 20 
         Post procedure Respirations: 20 
 
Oxygen: O2 Device: Hi flow nasal cannula   8 SpO2: Pre procedure SpO2: 99 % Post procedure SpO2: 99 % Nebulizer Therapy: Current medications Aerosolized Medications: DuoNeb Changed: yes. Made TID per protocol. Problem List:  
Patient Active Problem List  
Diagnosis Code  Abdominal pain R10.9  E-coli UTI N39.0, B96.20  Continuous severe abdominal pain R10.9  Enteritis K52.9  Adrenal hemorrhage (HCC) E27.49  Chronic renal insufficiency, stage V (HCC) N18.5  Sepsis ([address] A41.9  Acute pancreatitis K85.90  Protein malnutrition ([facility] E46  Small bowel perforation (HCC) K63.1  Acute renal failure with lesion of tubular necrosis (HCC) N17.0  Anemia of renal disease N18.9, D63.1  SIRS (systemic inflammatory response syndrome) ([facility]) R65.10  
 HTN (hypertension) I10  
 History of peritonitis Z87.19  
 Physical debility R53.81  Chronic anticoagulation Z79.01  
 History of pulmonary embolism Z86.711  
 History of ovarian cancer Z85.43  
 Pericardial effusion I31.3  Diarrhea R19.7  Moderate protein-calorie malnutrition (HCC) E44.0  Pericarditis with effusion I31.9  Hypertension, uncontrolled I10  
 ALAYNA (acute kidney injury) ([facility] N17.9  Acute on chronic renal failure (HCC) N17.9, N18.9  Generalized rash R21  
 Heme positive stool R19.5  Electrolyte abnormality E87.8  Bilateral carotid artery stenosis I65.23  
 Acute renal failure (ARF) (HCC) N17.9  Abnormal MRI of head R93.0  Stenosis of both internal carotid arteries I65.23  
 Cerebral microvascular disease I67.9  Convulsion disorder ([facility] R56.9  Altered mental status, unspecified R41.82  
 Acute encephalopathy G93.40  Stenosis of left carotid artery without cerebral infarction I65.22  
 Disturbance of memory R41.3  Anxiety F41.9  Pneumonia J18.9  Thrombocytopenia ([facility] D69.6  Hypertension I10  
 GI bleed K92.2  
 ESRD (end stage renal disease) ([facility] N18.6  Fever R50.9  Anemia D64.9  Hypoxia R09.02  
 HCAP (healthcare-associated pneumonia) J18.9  Dyspnea R06.00  
 Acute respiratory failure with hypoxia (HCC) J96.01 Respiratory Therapist: Najma Pascual

## 2019-04-20 NOTE — PROGRESS NOTES
Hospitalist Progress Note NAME: Teresa Gibson :  1945 MRN:  [id number] Assessment / Plan: 
Acute resp failure with Hypoxia POA Likely due to Acute Pulm edema POA Sepsis POA/probable pneumonia POA- cannot be ruled out at this time ESRD on HD MWF 
CXR noted New bilateral airspace disease superimposed on chronic underlying findings of emphysema and numerous bilateral calcified granulomas Cont Levaquin + Zyvox for now (Vancomycin allergy), de-escalate in 24 hrs if blood Cx remains negative - blood cx - no growth till now No continuous IVF due to ESRD status Oxygen to keep sats >90%, taper as able after serial UF/HD per nephrology Cont duoneb as needed  
- mucinex bid Serial UF as per nephrology 
  
Anemia/GI bleed? POA 
- likely due to chronic kidney disease and GI bleed 
- hemodynamically stable - GI consulted- following - plan to transfuse with HD today Hold eliquis 
  
Chest Pain with elevated troponins POA 
- r/o ACS Echo noted for normal EF, no sig valv disease Troponin elevated , remains flat below 0.5 CTA chest neg PE Cont pain control as needed - Cardiology consulted- recommends ASA (holding eliquis for GI bleed) Started metoprolol 25mg BID 
  
  
Chronic PE/VTE  
- on Eliquis PTA now held due to GI bleed - s/p IVF filter  
  
 
Current Smoker  
- advise cessation  
  
HTN  
- resume home med 
  
Code Status: full code Surrogate Decision Maker: 
  
DVT Prophylaxis: [facility]d's GI Prophylaxis: not indicated 
  
Baseline: home w/family , drives at home, not on oxygen at home,lives with  Subjective: Chief Complaint / Reason for Physician Visit: F/U resp failure/SOB, CHF, ESRD, Anemia, ? GI bleed \"I am much better\". Discussed with RN events overnight. Review of Systems: 
Symptom Y/N Comments  Symptom Y/N Comments Fever/Chills n   Chest Pain n   
Poor Appetite n   Edema y Cough n   Abdominal Pain n   
Sputum n   Joint Pain SOB/MONTANEZ y improved  Pruritis/Rash Nausea/vomit n   Tolerating PT/OT y Diarrhea n   Tolerating Diet y Clear liquids Constipation    Other Could NOT obtain due to:   
 
Objective: VITALS:  
Last 24hrs VS reviewed since prior progress note. Most recent are: 
Patient Vitals for the past 24 hrs: 
 Temp Pulse Resp BP SpO2  
04/20/19 1345  80  157/43   
04/20/19 1330  78  169/56   
04/20/19 1321 99 °F (37.2 °C) 75 20 159/50 95 % 04/20/19 1059 98.6 °F (37 °C) 93 20 145/47 91 % 04/20/19 0731     100 % 04/20/19 0725 98.6 °F (37 °C) (!) 102 22 174/60 94 % 04/19/19 2349 98.7 °F (37.1 °C) (!) 103 20 150/46 92 % 04/19/19 2200  (!) 102  169/56 91 % 04/19/19 2147 98 °F (36.7 °C) (!) 103 20 157/55 94 % 04/19/19 2100    157/55   
04/19/19 2051     100 % 04/19/19 2049     100 % 04/19/19 2000  93  (!) 176/96 100 % 04/19/19 1951 97.9 °F (36.6 °C) 90 21 161/80 100 % 04/19/19 1945  90 22 153/66 100 % 04/19/19 1930  92 26 152/70 100 % 04/19/19 1915  93 24 152/90 100 % 04/19/19 1900  91 22 173/63 100 % 04/19/19 1845 97.9 °F (36.6 °C) 91 26 (!) 152/93 100 % 04/19/19 1830  91 24 175/66 100 % 04/19/19 1824  92  158/64 100 % 04/19/19 1815  89  107/57 99 % 04/19/19 1800  99 22 144/56 99 % 04/19/19 1745   23 167/82   
04/19/19 1730  (!) 101 22 177/66 100 % 04/19/19 1718 98.2 °F (36.8 °C) 100 20 185/76 100 % 04/19/19 1545 98 °F (36.7 °C) (!) 113 18 193/79 96 % 04/19/19 1536     95 % 04/19/19 1530 98.2 °F (36.8 °C) (!) 121 22 (!) 215/83 (!) 79 % 04/19/19 1523 100.2 °F (37.9 °C) (!) 125 22 (!) 227/76 (!) 82 % 04/19/19 1521  (!) 125  (!) 214/76 (!) 78 % Intake/Output Summary (Last 24 hours) at 4/20/2019 1356 Last data filed at 4/20/2019 1028 Gross per 24 hour Intake 780 ml Output 2400 ml Net -1620 ml PHYSICAL EXAM: 
General: WD, WN.  Alert, cooperative, no acute distress, Hi flow oxygen noted +   
 EENT:  EOMI. Anicteric sclerae. MMM Resp:  Bilateral crackles noted +,  No accessory muscle use CV:  Regular  rhythm,  No edema GI:  Soft, Non distended, Non tender.  +Bowel sounds Neurologic:  Alert and oriented X 3, normal speech, Psych:   Good insight. Not anxious nor agitated Skin:  No rashes. No jaundice Reviewed most current lab test results and cultures  YES Reviewed most current radiology test results   YES Review and summation of old records today    NO Reviewed patient's current orders and MAR    YES 
PMH/SH reviewed - no change compared to H&P 
________________________________________________________________________ Care Plan discussed with: 
  Comments Patient x Family RN x Care Manager Consultant Multidiciplinary team rounds were held today with , nursing, pharmacist and clinical coordinator. Patient's plan of care was discussed; medications were reviewed and discharge planning was addressed. ________________________________________________________________________ Total NON critical care TIME:  36   Minutes Total CRITICAL CARE TIME Spent:   Minutes non procedure based Comments >50% of visit spent in counseling and coordination of care    
________________________________________________________________________ Fadia Doshi MD  
 
Procedures: see electronic medical records for all procedures/Xrays and details which were not copied into this note but were reviewed prior to creation of Plan. LABS: 
I reviewed today's most current labs and imaging studies. Pertinent labs include: 
Recent Labs  
  04/20/19 
0514 04/19/19 
1033 WBC 12.3* 12.8* HGB 7.2* 8.7* HCT 25.7* 30.6*  179 Recent Labs  
  04/20/19 
0514 04/19/19 
1033  138  
K 3.7 3.6  105 CO2 22 25 GLU 83 98 BUN 26* 13  
CREA 3.43* 1.89* CA 8.2* 8.0* ALB  --  2.4* TBILI  --  0.6 SGOT  --  29 ALT  --  13  
 INR  --  1.5* Signed: Azeb Caro MD

## 2019-04-21 NOTE — PROGRESS NOTES
1200: Medium brown/black pasty stool incontinent 1400: Up to bathroom medium brown stool with streaks of bright red blood, few small clots. 1550: Up to bathroom patient flushed before able to assess.

## 2019-04-21 NOTE — ROUTINE PROCESS
..Report given to  Claudy Avalos RN. SBAR was discussed. Claudy Avalos RN assumed care of the pt. Estrella Beauchamp RN  
 
3417 Report received from [facility] Assessment  
2000 Chely Mijares and thierry changed. Pt tolerated well. 
2200 Meds given . Pt tolerated well.  at bedside. 0000 Pt appears asleep. 
0200 Pt still appears to be sleeping. 
0400 Pt alert with lights off. Pt stated she was doing fine. 0500 Lab work drawn. Pt tolerated well.

## 2019-04-21 NOTE — PROGRESS NOTES
Hospitalist Progress Note NAME: Lester Burrows :  1945 MRN:  [id number] Assessment / Plan: 
Acute resp failure with Hypoxia POA- still on high flow oxygen, SOB much improved now Likely due to Acute Pulm edema POA- improving with UFx2 Sepsis POA/probable pneumonia POA- cannot be ruled out at this time ESRD on HD MWF 
CXR noted New bilateral airspace disease superimposed on chronic underlying findings of emphysema and numerous bilateral calcified granulomas Cont Levaquin + Zyvox for now (Vancomycin allergy), de-escalate in next 24 hrs if blood Cx remains negative - blood cx - no growth in 2 days No continuous IVF due to ESRD status Oxygen to keep sats >90%, taper as able after serial UF/HD per nephrology Cont duoneb as needed  
- mucinex bid Serial UF as per nephrology over the weekend appreciated Now HD/UF in AM per regular schedule Repeat CXR in AM 
  
Anemia/GI bleed? POA 
- likely due to chronic kidney disease and GI bleed 
- hemodynamically stable - GI consulted- following, awaiting stability for further recommendations- ?colonsocopy - plan to transfuse with UF yesterday- didn't get PRBCs yesterday- will likely happen with HD in AM 
Hold eliquis for now till cleared by GI 
  
Chest Pain with elevated troponins POA 
- r/o ACS Echo noted for normal EF, no sig valve disease Troponin elevated , remains flat below 0.5 CTA chest neg PE Cont pain control as needed - Cardiology consulted- recommends ASA (holding eliquis for GI bleed) Started metoprolol 25mg BID 
  
Chronic PE/VTE  
- on Eliquis PTA now held due to GI bleed - s/p IVF filter  
  
 
Current Smoker  
- advise cessation  
  
HTN  
- resume home med 
  
Code Status: full code Surrogate Decision Maker: 
  
DVT Prophylaxis: [facility]d's GI Prophylaxis: not indicated 
  
Baseline: home w/family , drives at home, not on oxygen at home,lives with  Subjective: Chief Complaint / Reason for Physician Visit: F/U resp failure/SOB, CHF, ESRD, Anemia, ? GI bleed \"I am much better\". Discussed with RN events overnight. Review of Systems: 
Symptom Y/N Comments  Symptom Y/N Comments Fever/Chills n   Chest Pain n   
Poor Appetite n   Edema y Cough n   Abdominal Pain n   
Sputum n   Joint Pain SOB/MONTANEZ y improved  Pruritis/Rash Nausea/vomit n   Tolerating PT/OT y Diarrhea n   Tolerating Diet y Clear liquids Constipation    Other Could NOT obtain due to:   
 
Objective: VITALS:  
Last 24hrs VS reviewed since prior progress note. Most recent are: 
Patient Vitals for the past 24 hrs: 
 Temp Pulse Resp BP SpO2  
04/21/19 0809     98 % 04/21/19 0726 98.1 °F (36.7 °C) 72 18 143/44 100 % 04/21/19 0725  72   100 % 04/21/19 0319 98.4 °F (36.9 °C) 72 18 152/46 98 % 04/21/19 0015 98.2 °F (36.8 °C) 69 20 140/45 97 % 04/20/19 1952     95 % 04/20/19 1943 98.5 °F (36.9 °C) 73 20 (!) 149/36 95 % 04/20/19 1742  89  139/48   
04/20/19 1600 98 °F (36.7 °C) 89 20 159/46 98 % 04/20/19 1545  80     
04/20/19 1536     99 % 04/20/19 1530  74  124/54   
04/20/19 1519  74  117/41   
04/20/19 1500  79  153/45   
04/20/19 1445  81  156/51   
04/20/19 1430  78  163/47   
04/20/19 1415  80  161/49   
04/20/19 1400  78  165/43   
04/20/19 1345  80  157/43   
04/20/19 1330  78  169/56   
04/20/19 1321 99 °F (37.2 °C) 75 20 159/50 95 % 04/20/19 1059 98.6 °F (37 °C) 93 20 145/47 91 % Intake/Output Summary (Last 24 hours) at 4/21/2019 1000 Last data filed at 4/21/2019 1898 Gross per 24 hour Intake 1800 ml Output 2700 ml Net -900 ml PHYSICAL EXAM: 
General: WD, WN. Alert, cooperative, no acute distress, Hi flow oxygen noted +   
EENT:  EOMI. Anicteric sclerae. MMM Resp:  Bilateral crackles noted +,  No accessory muscle use CV:  Regular  rhythm,  No edema GI:  Soft, Non distended, Non tender.  +Bowel sounds Neurologic:  Alert and oriented X 3, normal speech, Psych:   Good insight. Not anxious nor agitated Skin:  No rashes. No jaundice Reviewed most current lab test results and cultures  YES Reviewed most current radiology test results   YES Review and summation of old records today    NO Reviewed patient's current orders and MAR    YES 
PMH/SH reviewed - no change compared to H&P 
________________________________________________________________________ Care Plan discussed with: 
  Comments Patient x Family RN x Care Manager Consultant  x Dr Kristi Loza ([facility]) Multidiciplinary team rounds were held today with , nursing, pharmacist and clinical coordinator. Patient's plan of care was discussed; medications were reviewed and discharge planning was addressed. ________________________________________________________________________ Total NON critical care TIME:  26   Minutes Total CRITICAL CARE TIME Spent:   Minutes non procedure based Comments >50% of visit spent in counseling and coordination of care    
________________________________________________________________________ Fadia Doshi MD  
 
Procedures: see electronic medical records for all procedures/Xrays and details which were not copied into this note but were reviewed prior to creation of Plan. LABS: 
I reviewed today's most current labs and imaging studies. Pertinent labs include: 
Recent Labs  
  04/21/19 
0508 04/20/19 
1803 04/20/19 
0514 04/19/19 
1033 WBC 10.9  --  12.3* 12.8* HGB 7.6* 8.1* 7.2* 8.7* HCT 26.3* 27.9* 25.7* 30.6*   --  158 179 Recent Labs  
  04/21/19 
0508 04/20/19 
0514 04/19/19 
1033 * 138 138  
K 3.9 3.7 3.6  105 105 CO2 21 22 25 GLU 84 83 98 BUN 42* 26* 13  
CREA 4.88* 3.43* 1.89* CA 8.4* 8.2* 8.0* ALB  --   --  2.4* TBILI  --   --  0.6 SGOT  --   --  29 ALT  --   --  13 INR  --   --  1.5* Signed: Daryle Crosser, MD

## 2019-04-21 NOTE — PROGRESS NOTES
Progress Note 4/21/2019 8:40 AM 
NAME: Ellen Hobson MRN:  [id number] Admit Diagnosis: Sepsis ([address] [A41.9] HCAP (healthcare-associated pneumonia) [J18.9] Anemia [D64.9] Hypoxia [R09.02] GI bleed [K92.2] Assessment:   
  
Acute Respiratory failure with Hypoxia. Volume overload with pulmonary edema Pneumonia Elevated BNP / normal Troponin ESRD on Dialysis. For urgent ultrafiltration today for volume removal and management Malignant Hypertension. Pneumonia Elevated WBC  / Febrile. Anemia. Hx of Pulmonary embolism. S/p IVC filter. Chronic Eliquis Hx of inta abdominal bleeding Hx of ovarian CA Left Ventricular hypertrophy with normal systolic function. No hx of heart disease.  
  
   
  
            Plan:    
  
Increased troponin to 0.4 No chest pain Breathing improving with HD 
CTA with no PE Echo with normal EF, no sig valve disease, but does have murmur on exam 
Hg down to 7 
 
- Holding eliquis due to GI bleed, add aspirin 81mg daily for now - Add metoprolol 25mg bid 
- Cont HD for volume Likely proceed with GI evaluation once euvolemic, currently still on high flow O2. 
  
  
 []           High complexity decision making was performed 
  
 
 
 
 
Subjective:  
 
Ellen Hobson denies chest pain, +dyspnea. Discussed with RN events overnight. Review of Systems: 
 
Symptom Y/N Comments  Symptom Y/N Comments Fever/Chills N   Chest Pain N Poor Appetite N   Edema N   
Cough N   Abdominal Pain N Sputum N   Joint Pain N   
SOB/MONTANEZ Y   Pruritis/Rash N   
Nausea/vomit N   Tolerating PT/OT Y Diarrhea N   Tolerating Diet Y Constipation N   Other Could NOT obtain due to:   
 
Objective:  
  
Physical Exam: 
 
Last 24hrs VS reviewed since prior progress note. Most recent are: 
 
Visit Vitals /44 (BP 1 Location: Right arm, BP Patient Position: At rest) Pulse 72 Temp 98.1 °F (36.7 °C) Resp 18 Wt 60.1 kg (132 lb 7.9 oz) SpO2 98% BMI 19.57 kg/m² Intake/Output Summary (Last 24 hours) at 4/21/2019 5462 Last data filed at 4/21/2019 1519 Gross per 24 hour Intake 1800 ml Output 2700 ml Net -900 ml General Appearance: Well developed, well nourished, alert & oriented x 3,  
 no acute distress. Ears/Nose/Mouth/Throat: Hearing grossly normal. 
Neck: Supple. Chest: Lungs clear to auscultation bilaterally. Cardiovascular: Regular rate and rhythm, S1S2 normal, III/VI BEENA. Abdomen: Soft, non-tender, bowel sounds are active. Extremities: No edema bilaterally. Skin: Warm and dry. PMH/SH reviewed - no change compared to H&P Data Review Telemetry: normal sinus rhythm Lab Data Personally Reviewed: 
 
Recent Labs  
  04/21/19 
1208 04/20/19 
1803 04/20/19 
3981 WBC 10.9  --  12.3* HGB 7.6* 8.1* 7.2* HCT 26.3* 27.9* 25.7*  
  --  158 Recent Labs 04/19/19 
1033 INR 1.5* PTP 15.0* Recent Labs  
  04/21/19 
0508 04/20/19 
0514 04/19/19 
1033 * 138 138  
K 3.9 3.7 3.6  105 105 CO2 21 22 25 BUN 42* 26* 13  
CREA 4.88* 3.43* 1.89* GLU 84 83 98 CA 8.4* 8.2* 8.0* Recent Labs  
  04/20/19 
0514 04/19/19 
1713 04/19/19 
1033 TROIQ 0.43* 0.31* 0.08* Lab Results Component Value Date/Time Cholesterol, total 116 03/30/2017 02:27 AM  
 HDL Cholesterol 55 03/30/2017 02:27 AM  
 LDL, calculated 31.2 03/30/2017 02:27 AM  
 Triglyceride 149 03/30/2017 02:27 AM  
 CHOL/HDL Ratio 2.1 03/30/2017 02:27 AM  
 
 
Recent Labs 04/19/19 
1033 SGOT 29   
TP 6.7 ALB 2.4*  
GLOB 4.3* No results for input(s): PH, PCO2, PO2 in the last 72 hours. Medications Personally Reviewed: 
 
Current Facility-Administered Medications Medication Dose Route Frequency  aspirin chewable tablet 81 mg  81 mg Oral DAILY  metoprolol tartrate (LOPRESSOR) tablet 25 mg  25 mg Oral BID  
  0.9% sodium chloride infusion 250 mL  250 mL IntraVENous PRN  
 albuterol-ipratropium (DUO-NEB) 2.5 MG-0.5 MG/3 ML  3 mL Nebulization TID RT  
 amLODIPine (NORVASC) tablet 10 mg  10 mg Oral DAILY  atorvastatin (LIPITOR) tablet 20 mg  20 mg Oral QHS  B complex-vitaminC-folic acid (NEPHROCAP) cap  1 Cap Oral DAILY  buPROPion (WELLBUTRIN) tablet 100 mg  100 mg Oral QHS  escitalopram oxalate (LEXAPRO) tablet 10 mg  10 mg Oral QPM  
 gabapentin (NEURONTIN) capsule 100 mg  100 mg Oral TID  hydrALAZINE (APRESOLINE) tablet 100 mg  100 mg Oral TID  ferrous sulfate tablet 325 mg  325 mg Oral DAILY  lactobac ac& pc-s.therm-b.anim (TY Q/RISAQUAD)  1 Cap Oral DAILY  losartan (COZAAR) tablet 100 mg  100 mg Oral DAILY  memantine (NAMENDA) tablet 10 mg  10 mg Oral QHS  sevelamer carbonate (RENVELA) tab 800 mg  800 mg Oral PRN  
 sevelamer carbonate (RENVELA) tab 4,000 mg  4,000 mg Oral TID WITH MEALS  sodium chloride (NS) flush 5-40 mL  5-40 mL IntraVENous Q8H  
 sodium chloride (NS) flush 5-40 mL  5-40 mL IntraVENous PRN  
 acetaminophen (TYLENOL) tablet 650 mg  650 mg Oral Q4H PRN  
 naloxone (NARCAN) injection 0.4 mg  0.4 mg IntraVENous PRN  
 morphine injection 2 mg  2 mg IntraVENous Q4H PRN  
 linezolid in dextrose 5% (ZYVOX) IVPB premix in D5W 600 mg  600 mg IntraVENous Q12H  
 guaiFENesin ER (MUCINEX) tablet 600 mg  600 mg Oral Q12H  
 albuterol-ipratropium (DUO-NEB) 2.5 MG-0.5 MG/3 ML  3 mL Nebulization Q1H PRN  
 albumin human 25% (BUMINATE) solution 25 g  25 g IntraVENous DIALYSIS PRN  
 levoFLOXacin (LEVAQUIN) 500 mg in D5W IVPB  500 mg IntraVENous Q48H Chikis Vincent MD

## 2019-04-21 NOTE — PROGRESS NOTES
Gastroenterology Progress Note 4/21/2019 Admit Date: 4/19/2019 Subjective: Follow up for: anemia; blood MA No new bleeding seen or reported. Hgb is stable but still mid 7 range. Current Facility-Administered Medications Medication Dose Route Frequency  aspirin chewable tablet 81 mg  81 mg Oral DAILY  metoprolol tartrate (LOPRESSOR) tablet 25 mg  25 mg Oral BID  
 0.9% sodium chloride infusion 250 mL  250 mL IntraVENous PRN  
 albuterol-ipratropium (DUO-NEB) 2.5 MG-0.5 MG/3 ML  3 mL Nebulization TID RT  
 amLODIPine (NORVASC) tablet 10 mg  10 mg Oral DAILY  atorvastatin (LIPITOR) tablet 20 mg  20 mg Oral QHS  B complex-vitaminC-folic acid (NEPHROCAP) cap  1 Cap Oral DAILY  buPROPion (WELLBUTRIN) tablet 100 mg  100 mg Oral QHS  escitalopram oxalate (LEXAPRO) tablet 10 mg  10 mg Oral QPM  
 gabapentin (NEURONTIN) capsule 100 mg  100 mg Oral TID  hydrALAZINE (APRESOLINE) tablet 100 mg  100 mg Oral TID  ferrous sulfate tablet 325 mg  325 mg Oral DAILY  lactobac ac& pc-s.therm-b.anim (TY Q/RISAQUAD)  1 Cap Oral DAILY  losartan (COZAAR) tablet 100 mg  100 mg Oral DAILY  memantine (NAMENDA) tablet 10 mg  10 mg Oral QHS  sevelamer carbonate (RENVELA) tab 800 mg  800 mg Oral PRN  
 sevelamer carbonate (RENVELA) tab 4,000 mg  4,000 mg Oral TID WITH MEALS  sodium chloride (NS) flush 5-40 mL  5-40 mL IntraVENous Q8H  
 sodium chloride (NS) flush 5-40 mL  5-40 mL IntraVENous PRN  
 acetaminophen (TYLENOL) tablet 650 mg  650 mg Oral Q4H PRN  
 naloxone (NARCAN) injection 0.4 mg  0.4 mg IntraVENous PRN  
 morphine injection 2 mg  2 mg IntraVENous Q4H PRN  
 linezolid in dextrose 5% (ZYVOX) IVPB premix in D5W 600 mg  600 mg IntraVENous Q12H  
 guaiFENesin ER (MUCINEX) tablet 600 mg  600 mg Oral Q12H  
 albuterol-ipratropium (DUO-NEB) 2.5 MG-0.5 MG/3 ML  3 mL Nebulization Q1H PRN  
 albumin human 25% (BUMINATE) solution 25 g  25 g IntraVENous DIALYSIS PRN  
 levoFLOXacin (LEVAQUIN) 500 mg in D5W IVPB  500 mg IntraVENous Q48H Objective:  
 
Blood pressure 143/44, pulse 72, temperature 98.1 °F (36.7 °C), resp. rate 18, weight 60.1 kg (132 lb 7.9 oz), SpO2 98 %. No intake/output data recorded. 04/19 1901 - 04/21 0700 In: 2100 [P.O.:1200; I.V.:900] Out: 5100 [Urine:200] Physical Examination:  
 
 
General:AAO x 3, Weak HEENT:  EOMI, Chest:   rhonchi Heart: S1, S2, RRR 
GI: Soft, NT, ND + bowel sounds Data Review Recent Results (from the past 24 hour(s)) HGB & HCT Collection Time: 04/20/19  6:03 PM  
Result Value Ref Range HGB 8.1 (L) 11.5 - 16.0 g/dL HCT 27.9 (L) 35.0 - 47.0 % CBC W/O DIFF Collection Time: 04/21/19  5:08 AM  
Result Value Ref Range WBC 10.9 3.6 - 11.0 K/uL  
 RBC 2.85 (L) 3.80 - 5.20 M/uL HGB 7.6 (L) 11.5 - 16.0 g/dL HCT 26.3 (L) 35.0 - 47.0 % MCV 92.3 80.0 - 99.0 FL  
 MCH 26.7 26.0 - 34.0 PG  
 MCHC 28.9 (L) 30.0 - 36.5 g/dL  
 RDW 17.2 (H) 11.5 - 14.5 % PLATELET 405 186 - 949 K/uL MPV 10.1 8.9 - 12.9 FL  
 NRBC 0.0 0  WBC ABSOLUTE NRBC 0.00 0.00 - 0.01 K/uL METABOLIC PANEL, BASIC Collection Time: 04/21/19  5:08 AM  
Result Value Ref Range Sodium 134 (L) 136 - 145 mmol/L Potassium 3.9 3.5 - 5.1 mmol/L Chloride 102 97 - 108 mmol/L  
 CO2 21 21 - 32 mmol/L Anion gap 11 5 - 15 mmol/L Glucose 84 65 - 100 mg/dL BUN 42 (H) 6 - 20 MG/DL Creatinine 4.88 (H) 0.55 - 1.02 MG/DL  
 BUN/Creatinine ratio 9 (L) 12 - 20 GFR est AA 11 (L) >60 ml/min/1.73m2 GFR est non-AA 9 (L) >60 ml/min/1.73m2 Calcium 8.4 (L) 8.5 - 10.1 MG/DL Recent Labs  
  04/21/19 
0508 04/20/19 
1803 04/20/19 
3709 WBC 10.9  --  12.3* HGB 7.6* 8.1* 7.2* HCT 26.3* 27.9* 25.7*  
  --  158 Recent Labs  
  04/21/19 
0508 04/20/19 
0514 04/19/19 
1033 * 138 138  
K 3.9 3.7 3.6  105 105 CO2 21 22 25 BUN 42* 26* 13  
CREA 4.88* 3.43* 1.89* GLU 84 83 98 CA 8.4* 8.2* 8.0* Recent Labs 04/19/19 
1033 SGOT 29   
TP 6.7 ALB 2.4*  
GLOB 4.3* Recent Labs 04/19/19 
1033 INR 1.5* PTP 15.0* No results for input(s): FE, TIBC, PSAT, FERR in the last 72 hours. Lab Results Component Value Date/Time Folate 18.9 03/27/2019 04:45 PM  
  
No results for input(s): PH, PCO2, PO2 in the last 72 hours. Recent Labs  
  04/20/19 
0514 04/19/19 
1713 04/19/19 
1033 TROIQ 0.43* 0.31* 0.08* Lab Results Component Value Date/Time Cholesterol, total 116 03/30/2017 02:27 AM  
 HDL Cholesterol 55 03/30/2017 02:27 AM  
 LDL, calculated 31.2 03/30/2017 02:27 AM  
 Triglyceride 149 03/30/2017 02:27 AM  
 CHOL/HDL Ratio 2.1 03/30/2017 02:27 AM  
 
No components found for: [place] Lab Results Component Value Date/Time Color YELLOW/STRAW 03/29/2017 05:30 PM  
 Appearance CLOUDY (A) 03/29/2017 05:30 PM  
 Specific gravity 1.020 03/29/2017 05:30 PM  
 Specific gravity 1.010 03/04/2016 09:00 PM  
 pH (UA) 7.0 03/29/2017 05:30 PM  
 Protein 100 (A) 03/29/2017 05:30 PM  
 Glucose NEGATIVE  03/29/2017 05:30 PM  
 Ketone NEGATIVE  03/29/2017 05:30 PM  
 Bilirubin NEGATIVE  03/29/2017 05:30 PM  
 Urobilinogen 0.2 03/29/2017 05:30 PM  
 Nitrites NEGATIVE  03/29/2017 05:30 PM  
 Leukocyte Esterase LARGE (A) 03/29/2017 05:30 PM  
 Epithelial cells FEW 03/29/2017 05:30 PM  
 Bacteria 4+ (A) 03/29/2017 05:30 PM  
 WBC 20-50 03/29/2017 05:30 PM  
 RBC  03/29/2017 05:30 PM  
 
  
ROS: -CP, SOB, Dysuria, palpitations, cough. Assessment: 
 
Active Problems: 
  Sepsis ([address] (7/28/2015) GI bleed (3/27/2019) Anemia (4/19/2019) Hypoxia (4/19/2019) HCAP (healthcare-associated pneumonia) (4/19/2019) Dyspnea (4/19/2019) Acute respiratory failure with hypoxia ([facility] (4/20/2019) Plan/Discussion:  
 
· Her hgb  is 7.6. Plans for HD tomorrow.  Will transfuse 1 unit PRBC during HD. D/w  Dr Bora Vences, her RN and family. · Follw hgb daily. · Cardiology/CC also following. · GI lite diet. · Colonoscopy  4.23/ 4.24 depending on her other symptoms. Signed By: Rojas Gomez. Yoni Conroy MD 
 
4/21/2019

## 2019-04-22 NOTE — PROGRESS NOTES
CM informed that pt is currently on High Flow O2. Pt is an dialysis pt. Pt has a plan to receive colonoscopy on 4/23/19. CM will continue to follow. KAYLEE Chairez, [facility]

## 2019-04-22 NOTE — PROGRESS NOTES
Occupational Therapy EVALUATION/discharge Patient: Darrel Doherty (89 y.o. female) Date: 4/22/2019 Primary Diagnosis: Sepsis ([address] [A41.9] HCAP (healthcare-associated pneumonia) [J18.9] Anemia [D64.9] Hypoxia [R09.02] GI bleed [K92.2] Precautions:   
 
ASSESSMENT:  
Based on the objective data described below, the patient is functioning at her independent baseline with ADL tasks w/o use of AD, yet demonstrates decreased functional activity tolerance d/t decreased BP this session. She is asymptomatic. HGB 7.5 this morning and Pt is awaiting transfer to HD. Pt not on Home O2 at baseline and is currently on 7 L/min of Hi Flow O2. Pt donned her slip on shoes with backs independently sitting EOB. Pt performed toileting with independence and only required SBA with standing aspects of andreas care 2/2 decreased BP for safety. Demonstrated no LOB or unsteadiness during mobility to bathroom or with transfers. Pt returned supine in bed to eat breakfast,  present, all needs met. Recommend Pt discharge home without further OT services. Further skilled acute occupational therapy is not indicated at this time. D/C OT. Vitals:  
 04/22/19 0949 04/22/19 0958 04/22/19 1002 04/22/19 1003 BP: (!) 101/33 (!) 125/39 (!) 126/36 123/42 BP 1 Location: Right arm Right arm BP Patient Position: Sitting Standing Sitting;Post activity Supine; Post activity Pulse:      
Resp:      
Temp:      
TempSrc:      
SpO2:      
Weight:      
 
 
Discharge Recommendations: Home without OT services Further Equipment Recommendations for Discharge: None for OT SUBJECTIVE:  
Patient stated I just felt a little off\" OBJECTIVE DATA SUMMARY:  
HISTORY:  
Past Medical History:  
Diagnosis Date  Chronic kidney disease Stage 5 (7/18/16 BUN 79, Creatnine 4.53, K 6) Dr. Ani Nunez [phone]  GERD (gastroesophageal reflux disease)   
 well controlled with Rx   
 High cholesterol  Hyperkalemia  Hypertension  Hypomagnesemia   
 on daily Magnesium  Neuropathy   
 bilateral feet  Ovarian cancer ([facility] [address] 2013 Hysterectomy with chemo, no radiation:  Dr. Karen Marcelino  Thromboembolus ([facility] [address] 9/2015  
 blood clot in lung 9/2015  Thromboembolus ([facility] [address] 2004  
 in kidney \"many years ago\" Past Surgical History:  
Procedure Laterality Date  ABDOMEN SURGERY PROC UNLISTED  7/31/15 Lap exploratory small bowel obstruction  (ICU)  ABDOMEN SURGERY PROC UNLISTED  8/2/15 Abdmonial washout with wound vac (ICU)  ABDOMEN SURGERY PROC UNLISTED  8/18/15 Abdominal washout fascial closure (ICU)  ABDOMEN SURGERY PROC UNLISTED  11/11/15 Lap takedown of ileostomy  COLONOSCOPY N/A 8/1/2016 COLONOSCOPY performed by Ehsan Yates MD at . [facility] 89 HX APPENDECTOMY  approx 2005  HX CASI AND BSO  2013  
 due to ovarian cancer  HX TONSILLECTOMY  age 10 Prior Level of Function/Environment/Context: Retired, lives at home with  in 2 story home with 3 CESAR. Has stair glide to 2nd floor. Drives herself to dialysis on MWFs. Has RW and SPC, yet does not use AD for functional mobility at baseline.  manages Pts medications. Showers seated on tub transfer bench. Dtr lives locally and can assist PRN. Pt not on Home O2 at baseline and is currently on 7 L/min of Hi Flow O2. Home Situation Home Environment: Private residence # Steps to Enter: 3 Rails to Enter: Yes Hand Rails : Right Wheelchair Ramp: No 
One/Two Story Residence: Two story # of Interior Steps: 12(has stairglide to 2nd floor) Interior Rails: Both Lift Chair Available: Yes Living Alone: No 
Support Systems: Spouse/Significant Other/Partner Patient Expects to be Discharged to[de-identified] Private residence Current DME Used/Available at Home: Cane, straight, Walker, rolling, Tub transfer bench Tub or Shower Type: Tub/Shower combination Hand dominance: Right EXAMINATION OF PERFORMANCE DEFICITS: 
 Cognitive/Behavioral Status: 
Neurologic State: Alert Orientation Level: Oriented X4 Cognition: Follows commands; Appropriate decision making; Appropriate for age attention/concentration; Appropriate safety awareness Perception: Appears intact Perseveration: No perseveration noted Safety/Judgement: Awareness of environment;Good awareness of safety precautions Skin: Intact Edema: None observed; pt reports decreased edema in legs this week Hearing: Auditory Auditory Impairment: Hard of hearing, bilateral 
Vision/Perceptual:   
    
Acuity: Within Defined Limits Corrective Lenses: Glasses Range of Motion: 
AROM: Within functional limits PROM: Within functional limits Strength: 
Strength: Within functional limits Coordination: 
Coordination: Within functional limits Fine Motor Skills-Upper: Left Intact; Right Intact Gross Motor Skills-Upper: Left Intact; Right Intact Tone & Sensation: 
Tone: Normal 
Balance: 
Sitting: Intact Standing: Impaired Standing - Static: Good Standing - Dynamic : Fair;Occassional 
 
Functional Mobility and Transfers for ADLs:Bed Mobility: 
Rolling: Independent Supine to Sit: Independent Sit to Supine: Independent Scooting: Independent Transfers: 
Sit to Stand: Stand-by assistance Stand to Sit: Stand-by assistance Bed to Chair: Contact guard assistance ADL Assessment: 
Feeding: Independent Oral Facial Hygiene/Grooming: Independent Bathing: Modified independent(increased time; seated) Upper Body Dressing: Independent Lower Body Dressing: Modified independent(increased time) Toileting: Supervision(d/t decreased BP only) ADL Intervention and task modifications: 
  
Lower Body Dressing Assistance Slip on Shoes with Back: Supervision/set-up(sitting EOB d/t decreased BP yet indepenednt w/ donning) Toileting Bladder Hygiene: Independent Bowel Hygiene: Independent Clothing Management: Stand-by assistance(d/t decreased BP only) Cognitive Retraining Safety/Judgement: Awareness of environment;Good awareness of safety precautions Functional Measure: 
Barthel Index: 
 
Bathin Bladder: 10 Bowels: 10 
Groomin Dressing: 10 Feeding: 10 Mobility: 0(unable to assess d/t decreased BP) Stairs: 0(unable to assess d/t decreased BP) Toilet Use: 10 Transfer (Bed to Chair and Back): 15 Total: 75/100 Percentage of impairment  
0% 1-19% 20-39% 40-59% 60-79% 80-99% 100% Barthel Score 0-100 100 99-80 79-60 59-40 20-39 1-19 
 0 The Barthel ADL Index: Guidelines 1. The index should be used as a record of what a patient does, not as a record of what a patient could do. 2. The main aim is to establish degree of independence from any help, physical or verbal, however minor and for whatever reason. 3. The need for supervision renders the patient not independent. 4. A patient's performance should be established using the best available evidence. Asking the patient, friends/relatives and nurses are the usual sources, but direct observation and common sense are also important. However direct testing is not needed. 5. Usually the patient's performance over the preceding 24-48 hours is important, but occasionally longer periods will be relevant. 6. Middle categories imply that the patient supplies over 50 per cent of the effort. 7. Use of aids to be independent is allowed. Elisa Acharya., Barthel DHudsonW. ([id number]). Functional evaluation: the Barthel Index. [address] [facility] (14)2. [facility] Pauloutronaldo, PHILIPJHudsonMSOCRATES, Mallika TylerUniversity Hospitals [facility]., Christine Pool Burdick, [address] [place] [address] (). Measuring the change indisability after inpatient rehabilitation; comparison of the responsiveness of the Barthel Index and Functional Cottle Measure. Journal of Neurology, Neurosurgery, and Psychiatry, 66(4), 501-388.  
Margarito Machado [place].TERRI, SOLIS Espinoza, & Obdulio Gottlieb MHudsonA. (2004.) Assessment of post-stroke quality of life in cost-effectiveness studies: The usefulness of the Barthel Index and the EuroQoL-5D. [facility], 87, [phone] Occupational Therapy Evaluation Charge Determination History Examination Decision-Making LOW Complexity : Brief history review  LOW Complexity : 1-3 performance deficits relating to physical, cognitive , or psychosocial skils that result in activity limitations and / or participation restrictions  LOW Complexity : No comorbidities that affect functional and no verbal or physical assistance needed to complete eval tasks Based on the above components, the patient evaluation is determined to be of the following complexity level: LOW Activity Tolerance:  
Decreased 2/2 to decreased BP this morning, yet Pt awaiting HD today. Please refer to the flowsheet for vital signs taken during this treatment. After treatment:  
[]  Patient left in no apparent distress sitting up in chair 
[x]  Patient left in no apparent distress in bed 
[x]  Call bell left within reach 
[]  Nursing notified 
[x]  Caregiver present 
[]  Bed alarm activated COMMUNICATION/EDUCATION:  
Communication/Collaboration: 
[x]      Home safety education was provided and the patient/caregiver indicated understanding. [x]      Patient/family have participated as able and agree with findings and recommendations. []      Patient is unable to participate in plan of care at this time. Findings and recommendations were discussed with: Physical Therapist and Patient and Patient's  Monica Brower OTR/L Time Calculation: 35 mins

## 2019-04-22 NOTE — PROGRESS NOTES
Hospitalist Progress Note NAME: Fran Rose :  1945 MRN:  [id number] Assessment / Plan: 
Acute resp failure with Hypoxia POA- still on high flow oxygen, SOB much improved now & is 100 % on 7L/m Likely due to Acute Pulm edema POA- improving with UFx2 Sepsis POA/probable pneumonia POA- cannot be ruled out at this time ESRD on HD MWF 
CXR noted New bilateral airspace disease superimposed on chronic underlying findings of emphysema and numerous bilateral calcified granulomas Cont Levaquin, DC Zyvox today (Vancomycin allergy), de-escalating as blood Cx remains negative x 3 days Follow blood cx - no growth in 3 days No continuous IVF due to ESRD status Oxygen to keep sats >90%, taper as able after serial UF/HD per nephrology Cont duoneb as needed  
- mucinex bid Serial UF as per nephrology over the weekend appreciated Now HD/UF today per regular schedule Repeat CXR noted- improved patchy L perihilar & R basilar ASD (likely Pulm edema), persistent diffuse Interstitial opacities noted (likely chronic) 
  Anemia/GI bleed- likely lower POA 
- likely due to chronic kidney disease and GI bleed 
- hemodynamically stable - GI consulted- following, awaiting stability for colonsocopy on  or  depending on resp status - plan to transfuse with HD today Hold eliquis for now till cleared by GI 
  
Chest Pain with elevated troponins POA 
- r/o ACS Echo noted for normal EF, no sig valve disease Troponin elevated , remains flat below 0.5 CTA chest neg PE Cont pain control as needed - Cardiology consulted- recommends ASA (holding eliquis for GI bleed) Started metoprolol 25mg BID 
  
Chronic PE/VTE  
- on Eliquis PTA now held due to GI bleed - s/p IVF filter  
  
 
Current Smoker  
- advise cessation  
  
HTN  
- resume home med 
  
Code Status: full code 
  
DVT Prophylaxis: scd's GI Prophylaxis: not indicated 
  
 Baseline: home w/family , drives at home, not on oxygen at home,lives with  Subjective: Chief Complaint / Reason for Physician Visit: F/U resp failure/SOB, CHF, ESRD, Anemia, ? GI bleed \"I am much better\". Discussed with RN events overnight. Review of Systems: 
Symptom Y/N Comments  Symptom Y/N Comments Fever/Chills n   Chest Pain n   
Poor Appetite n   Edema y Cough n   Abdominal Pain n   
Sputum n   Joint Pain SOB/MONTANEZ y improved  Pruritis/Rash Nausea/vomit n   Tolerating PT/OT y Diarrhea n   Tolerating Diet y GI lite per Dr Garza Bone  
Constipation    Other Could NOT obtain due to:   
 
Objective: VITALS:  
Last 24hrs VS reviewed since prior progress note. Most recent are: 
Patient Vitals for the past 24 hrs: 
 Temp Pulse Resp BP SpO2  
04/22/19 0822     100 % 04/22/19 0805 98 °F (36.7 °C) 64 18 (!) 134/39 95 % 04/21/19 2319 97.9 °F (36.6 °C) 67 18 (!) 129/39 99 % 04/21/19 2047     100 % 04/21/19 2002 98.9 °F (37.2 °C) 64 18 121/48 99 % 04/21/19 1727 98.7 °F (37.1 °C) 74 18 (!) 135/39 97 % 04/21/19 1457     97 % 04/21/19 1200 98.7 °F (37.1 °C) 78 18 132/45 98 % Intake/Output Summary (Last 24 hours) at 4/22/2019 0940 Last data filed at 4/22/2019 8564 Gross per 24 hour Intake 600 ml Output  Net 600 ml PHYSICAL EXAM: 
General: WD, WN. Alert, cooperative, no acute distress, Hi flow oxygen noted +   
EENT:  EOMI. Anicteric sclerae. MMM Resp:  Bilateral crackles noted +,  No accessory muscle use CV:  Regular  rhythm,  No edema GI:  Soft, Non distended, Non tender.  +Bowel sounds Neurologic:  Alert and oriented X 3, normal speech, Psych:   Good insight. Not anxious nor agitated Skin:  No rashes. No jaundice Reviewed most current lab test results and cultures  YES Reviewed most current radiology test results   YES Review and summation of old records today    NO Reviewed patient's current orders and MAR    YES 
 PMH/SH reviewed - no change compared to H&P 
________________________________________________________________________ Care Plan discussed with: 
  Comments Patient x Family RN x Care Manager Consultant  x Dr Whitney Ayers (GI) yesterday Multidiciplinary team rounds were held today with , nursing, pharmacist and clinical coordinator. Patient's plan of care was discussed; medications were reviewed and discharge planning was addressed. ________________________________________________________________________ Total NON critical care TIME:  26   Minutes Total CRITICAL CARE TIME Spent:   Minutes non procedure based Comments >50% of visit spent in counseling and coordination of care    
________________________________________________________________________ Heide Chen MD  
 
Procedures: see electronic medical records for all procedures/Xrays and details which were not copied into this note but were reviewed prior to creation of Plan. LABS: 
I reviewed today's most current labs and imaging studies. Pertinent labs include: 
Recent Labs  
  04/22/19 
0434 04/21/19 
0508 04/20/19 
1803 04/20/19 
6065 WBC 13.4* 10.9  --  12.3* HGB 7.5* 7.6* 8.1* 7.2* HCT 26.2* 26.3* 27.9* 25.7*  
 224  --  158 Recent Labs  
  04/22/19 
0434 04/21/19 
9552 04/20/19 
0514 04/19/19 
1033 * 134* 138 138  
K 4.5 3.9 3.7 3.6  102 105 105 CO2 22 21 22 25 GLU 91 84 83 98 BUN 62* 42* 26* 13  
CREA 6.06* 4.88* 3.43* 1.89* CA 8.3* 8.4* 8.2* 8.0* ALB  --   --   --  2.4* TBILI  --   --   --  0.6 SGOT  --   --   --  29 ALT  --   --   --  13 INR  --   --   --  1.5* Signed: Heide Chen MD

## 2019-04-22 NOTE — PROGRESS NOTES
04/22/19 NN performed chart review. Patient currently admitted. Pt is currently on high flow 02. Pt has a plan to receive colonoscopy on 4/23/19. NN to continue to follow patient.  AR

## 2019-04-22 NOTE — ROUTINE PROCESS
..Report given to  Tish Bradford RN. SBAR was discussed. Tish Bradford RN assumed care of the pt. Emile Valdivia RN  
 
2200 Report received from [address] [place] [address] Assessment completed 2140 Meds given . Pt tolerated well.  at bedside. 0000 Pt appears asleep. 
0200 Pt up to bathroom. Pt tolerated ambulating well. 
0400 Lab work drawn. Pt tolerated well 
0500 Pt appears to be sleeping 
0600 Pt still sleeping

## 2019-04-22 NOTE — PROGRESS NOTES
Bedside shift change report given to me (oncoming nurse) by Mariel Delgado RN (offgoing nurse). Report included the following information SBAR, Kardex, Intake/Output, MAR and Recent Results. 2206: Spoke to MD regarding pt diastolic BP and missed BP meds combined with current due meds. Will hold previously scheduled BP meds. MD will put in one time dose for systolic BP. Will cont to monitor.

## 2019-04-22 NOTE — PROGRESS NOTES
0719: Confirmed with blood bank. Two units allocated and in house for patient. Up ambulating to restroom x2 
 
1042: Dialysis call will start around 1300 
 
1800: Peripheral IV no longer working. Attempted with no luck at [address]: Charge nurse attempt to get PIV with no luck. Another nurse will attempt to obtain access.

## 2019-04-22 NOTE — PROGRESS NOTES
Progress Note 4/22/2019 8:40 AM 
NAME: Brittany Cooley MRN:  [id number] Admit Diagnosis: Sepsis ([address] [A41.9] HCAP (healthcare-associated pneumonia) [J18.9] Anemia [D64.9] Hypoxia [R09.02] GI bleed [K92.2] Assessment:   
  
Acute Respiratory failure with Hypoxia. Volume overload with pulmonary edema Pneumonia Elevated BNP / normal Troponin ESRD on Dialysis. For urgent ultrafiltration today for volume removal and management Malignant Hypertension. Pneumonia Elevated WBC  / Febrile. Anemia. Hx of Pulmonary embolism. S/p IVC filter. Chronic Eliquis Hx of inta abdominal bleeding Hx of ovarian CA Hx of perforated Colon with colon resection and colostomy, s/p reversal of colostomy. Left Ventricular hypertrophy with normal systolic function. No hx of heart disease. Echo reviewed. Normal LV function. Heavy MAC. Moderately calcified Aortic Valve leaflets with normal motion. Max gradient 18 mmHg . No Mean gradient recorded by the tech . C/W  Aortic valve sclerosis /  Mild Stenosis.   
   
  
            Plan:    
  
 
 
- Holding eliquis due to GI bleed, add aspirin 81mg daily for now 
-Continue  metoprolol 25mg bid 
- Cont HD for volume Likely proceed with GI evaluation once euvolemic. 
  
  
 []           High complexity decision making was performed 
  
 
 
 
 
Subjective:  
 
Brittany Cooley denies chest pain, +dyspnea. Discussed with RN events overnight. Review of Systems: 
 
Symptom Y/N Comments  Symptom Y/N Comments Fever/Chills N   Chest Pain N Poor Appetite N   Edema N   
Cough N   Abdominal Pain N Sputum N   Joint Pain N   
SOB/MONTANEZ Y   Pruritis/Rash N   
Nausea/vomit N   Tolerating PT/OT Y Diarrhea N   Tolerating Diet Y Constipation N   Other Could NOT obtain due to:   
 
Objective:  
  
Physical Exam: 
 
Last 24hrs VS reviewed since prior progress note. Most recent are: 
 
Visit Vitals BP (!) 134/39 (BP 1 Location: Right arm, BP Patient Position: At rest) Pulse 64 Temp 98 °F (36.7 °C) Resp 18 Wt 55.6 kg (122 lb 9.2 oz) SpO2 100% BMI 18.10 kg/m² Intake/Output Summary (Last 24 hours) at 4/22/2019 8630 Last data filed at 4/22/2019 2856 Gross per 24 hour Intake 600 ml Output  Net 600 ml General Appearance: Well developed, well nourished, alert & oriented x 3,  
 no acute distress. Ears/Nose/Mouth/Throat: Hearing grossly normal. 
Neck: Supple. Chest: Lungs clear to auscultation bilaterally. Cardiovascular: Regular rate and rhythm, S1S2 normal, III/VI BEENA. Abdomen: Soft, non-tender, bowel sounds are active. Extremities: No edema bilaterally. Skin: Warm and dry. PMH/SH reviewed - no change compared to H&P Data Review Telemetry: normal sinus rhythm Lab Data Personally Reviewed: 
 
Recent Labs  
  04/22/19 
0434 04/21/19 
7147 WBC 13.4* 10.9 HGB 7.5* 7.6* HCT 26.2* 26.3*  
 224 Recent Labs 04/19/19 
1033 INR 1.5* PTP 15.0* Recent Labs  
  04/22/19 
0434 04/21/19 
4636 04/20/19 
7548 * 134* 138  
K 4.5 3.9 3.7  102 105 CO2 22 21 22 BUN 62* 42* 26* CREA 6.06* 4.88* 3.43* GLU 91 84 83 CA 8.3* 8.4* 8.2* Recent Labs  
  04/20/19 
0514 04/19/19 
1713 04/19/19 
1033 TROIQ 0.43* 0.31* 0.08* Lab Results Component Value Date/Time Cholesterol, total 116 03/30/2017 02:27 AM  
 HDL Cholesterol 55 03/30/2017 02:27 AM  
 LDL, calculated 31.2 03/30/2017 02:27 AM  
 Triglyceride 149 03/30/2017 02:27 AM  
 CHOL/HDL Ratio 2.1 03/30/2017 02:27 AM  
 
 
Recent Labs 04/19/19 
1033 SGOT 29   
TP 6.7 ALB 2.4*  
GLOB 4.3* No results for input(s): PH, PCO2, PO2 in the last 72 hours. Medications Personally Reviewed: 
 
Current Facility-Administered Medications Medication Dose Route Frequency  epoetin roverto-epbx (RETACRIT) injection 20,000 Units  20,000 Units SubCUTAneous Q MON, WED & FRI  aspirin chewable tablet 81 mg  81 mg Oral DAILY  metoprolol tartrate (LOPRESSOR) tablet 25 mg  25 mg Oral BID  
 0.9% sodium chloride infusion 250 mL  250 mL IntraVENous PRN  
 albuterol-ipratropium (DUO-NEB) 2.5 MG-0.5 MG/3 ML  3 mL Nebulization TID RT  
 amLODIPine (NORVASC) tablet 10 mg  10 mg Oral DAILY  atorvastatin (LIPITOR) tablet 20 mg  20 mg Oral QHS  B complex-vitaminC-folic acid (NEPHROCAP) cap  1 Cap Oral DAILY  buPROPion (WELLBUTRIN) tablet 100 mg  100 mg Oral QHS  escitalopram oxalate (LEXAPRO) tablet 10 mg  10 mg Oral QPM  
 gabapentin (NEURONTIN) capsule 100 mg  100 mg Oral TID  hydrALAZINE (APRESOLINE) tablet 100 mg  100 mg Oral TID  ferrous sulfate tablet 325 mg  325 mg Oral DAILY  lactobac ac& pc-s.therm-b.anim (TY Q/RISAQUAD)  1 Cap Oral DAILY  losartan (COZAAR) tablet 100 mg  100 mg Oral DAILY  memantine (NAMENDA) tablet 10 mg  10 mg Oral QHS  sevelamer carbonate (RENVELA) tab 800 mg  800 mg Oral PRN  
 sevelamer carbonate (RENVELA) tab 4,000 mg  4,000 mg Oral TID WITH MEALS  sodium chloride (NS) flush 5-40 mL  5-40 mL IntraVENous Q8H  
 sodium chloride (NS) flush 5-40 mL  5-40 mL IntraVENous PRN  
 acetaminophen (TYLENOL) tablet 650 mg  650 mg Oral Q4H PRN  
 naloxone (NARCAN) injection 0.4 mg  0.4 mg IntraVENous PRN  
 morphine injection 2 mg  2 mg IntraVENous Q4H PRN  
 linezolid in dextrose 5% (ZYVOX) IVPB premix in D5W 600 mg  600 mg IntraVENous Q12H  
 guaiFENesin ER (MUCINEX) tablet 600 mg  600 mg Oral Q12H  
 albuterol-ipratropium (DUO-NEB) 2.5 MG-0.5 MG/3 ML  3 mL Nebulization Q1H PRN  
 albumin human 25% (BUMINATE) solution 25 g  25 g IntraVENous DIALYSIS PRN  
 levoFLOXacin (LEVAQUIN) 500 mg in D5W IVPB  500 mg IntraVENous Q48H Melissa España MD

## 2019-04-22 NOTE — PROGRESS NOTES
Nephrology Progress Note Patrice Marion  
 
www. [email or web address]                  Phone - [phone] Patient: Christofer Ventura Date- 4/22/2019 Admit Date: 4/19/2019 YOB: 1945 CC: Follow up for esrd Subjective: Interval History: On 4/20: For isolated UF today. For PRBCs today as well. Feeling better. Sob improving. bp stable. No cp, n/v/d. On 4/22:  Pt says her SOB is gone, but she still has a cough. She remains on nasal O2. ROS:- as above, plus: no HEENT complaints. No fever/chills. No chest pain or palpitations. No abd pain. No N/V.  ++left hip pain which she says started with the ECHO the other night. No skin rashes/lesions. Assessment: 
· esrd - [facility] · Anemia. In part secondary to renal failure, but pt may also have GI blood loss. Her Hb was 10.6 on 10/6/19 · RESP DISTRESS in part due to volume overload. · Pulmonary edema, chf 
· Malignant hypertension · Sec. Shelley Nguyễn Plan: · Dialysis today. Pull fluid as tolerated · Cont current BP meds Physical Exam:  
GEN: elderly woman in no distress NECK- Supple, no mass RESP: lungs mostly clear; normal resp effort CVS: RRR; no gallop or rub ABDO: soft , non tender, No mass EXT: left arm avf + NEURO: normal speech, non focal 
Psych: normal affect and mood Care Plan discussed with: pt and dialysis team 
Objective:  
Patient Vitals for the past 24 hrs: 
 Temp Pulse Resp BP SpO2  
04/22/19 1055 98.1 °F (36.7 °C) 73 19 (!) 131/38 98 % 04/22/19 1003    123/42   
04/22/19 1002    (!) 126/36   
04/22/19 0958    (!) 125/39   
04/22/19 0949    (!) 101/33   
04/22/19 0946    117/40   
04/22/19 0822     100 % 04/22/19 0805 98 °F (36.7 °C) 64 18 (!) 134/39 95 % 04/21/19 2319 97.9 °F (36.6 °C) 67 18 (!) 129/39 99 % 04/21/19 2047     100 % 04/21/19 2002 98.9 °F (37.2 °C) 64 18 121/48 99 % 04/21/19 1727 98.7 °F (37.1 °C) 74 18 (!) 135/39 97 % 04/21/19 1457     97 % Last 3 Recorded Weights in this Encounter 04/19/19 1015 04/22/19 0505 Weight: 60.1 kg (132 lb 7.9 oz) 55.6 kg (122 lb 9.2 oz) 04/20 1901 - 04/22 0700 In: 1200 [I.V.:1200] Out: 200 [Urine:200] Chart reviewed. Pertinent Notes reviewed. Medication list  reviewed Current Facility-Administered Medications Medication  epoetin roverto-epbx (RETACRIT) injection 20,000 Units  albuterol-ipratropium (DUO-NEB) 2.5 MG-0.5 MG/3 ML  
 aspirin chewable tablet 81 mg  
 metoprolol tartrate (LOPRESSOR) tablet 25 mg  
 0.9% sodium chloride infusion 250 mL  amLODIPine (NORVASC) tablet 10 mg  
 atorvastatin (LIPITOR) tablet 20 mg  B complex-vitaminC-folic acid (NEPHROCAP) cap  buPROPion [facility]) tablet 100 mg  
 escitalopram oxalate (LEXAPRO) tablet 10 mg  
 gabapentin (NEURONTIN) capsule 100 mg  hydrALAZINE (APRESOLINE) tablet 100 mg  ferrous sulfate tablet 325 mg  
 lactobac ac& pc-s.therm-b.anim (TY Q/RISAQUAD)  losartan (COZAAR) tablet 100 mg  
 memantine (NAMENDA) tablet 10 mg  
 sevelamer carbonate (RENVELA) tab 800 mg  
 sevelamer carbonate (RENVELA) tab 4,000 mg  
 sodium chloride (NS) flush 5-40 mL  sodium chloride (NS) flush 5-40 mL  acetaminophen (TYLENOL) tablet 650 mg  
 naloxone (NARCAN) injection 0.4 mg  
 morphine injection 2 mg  
 guaiFENesin ER (MUCINEX) tablet 600 mg  
 albuterol-ipratropium (DUO-NEB) 2.5 MG-0.5 MG/3 ML  
 albumin human 25% (BUMINATE) solution 25 g  levoFLOXacin (LEVAQUIN) 500 mg in D5W IVPB Data Review : 
Recent Labs  
  04/22/19 
0434 04/21/19 
0909 04/20/19 
1803 04/20/19 
6712 WBC 13.4* 10.9  --  12.3* HGB 7.5* 7.6* 8.1* 7.2*  
 224  --  158 * 134*  --  138  
K 4.5 3.9  --  3.7 GLU 91 84  --  83 BUN 62* 42*  --  26* CREA 6.06* 4.88*  --  3.43* CA 8.3* 8.4*  --  8.2* Lab Results Component Value Date/Time Culture result: NO GROWTH 3 DAYS 04/19/2019 12:23 PM  
 Culture result:  03/28/2019 02:12 PM  
  NO ROUTINE ENTERIC PATHOGENS ISOLATED INCLUDING SALMONELLA, SHIGELLA, YERSINIA, VIBRIO OR SHIGA TOXIN PRODUCING E. COLI Culture result: NO GROWTH 6 DAYS 03/27/2019 06:54 PM  
 Culture result: NO GROWTH 6 DAYS 03/27/2019 06:54 PM  
 
No results found for: SDES No results for input(s): FE, TIBC, PSAT, FERR in the last 72 hours. Lab Results Component Value Date/Time Sodium,urine random 17 12/15/2015 10:02 PM  
 Creatinine, urine 42.60 03/07/2016 02:36 PM  
 Creatinine, urine 41.80 03/07/2016 02:36 PM  
 
Dell Landaverde MD 
[address] [facility] 
 www. [facility] [employer] [address] [place], [address] Phone - [phone] Fax - [phone]

## 2019-04-22 NOTE — PROGRESS NOTES
Gastroenterology Progress Note Lilia Bragg PA-C covering for Dr. Vernon Soliz 
 
4/22/2019 Admit Date: 4/19/2019 Subjective: Follow up for: anemia; blood NE Pt reports she is feeling a lot better. Planning for dialysis with transfusion of one unit of blood. She reports she has minor rectal bleeding when passing BM, no blood mixed with stool. Only on TP, no melena. Had 4 smaller formed stools yesterday without diarrhea or straining. No abdominal pain, N/V.  
 
Hgb 7.5 today. Current Facility-Administered Medications Medication Dose Route Frequency  epoetin roverto-epbx (RETACRIT) injection 20,000 Units  20,000 Units SubCUTAneous Q MON, WED & FRI  aspirin chewable tablet 81 mg  81 mg Oral DAILY  metoprolol tartrate (LOPRESSOR) tablet 25 mg  25 mg Oral BID  
 0.9% sodium chloride infusion 250 mL  250 mL IntraVENous PRN  
 albuterol-ipratropium (DUO-NEB) 2.5 MG-0.5 MG/3 ML  3 mL Nebulization TID RT  
 amLODIPine (NORVASC) tablet 10 mg  10 mg Oral DAILY  atorvastatin (LIPITOR) tablet 20 mg  20 mg Oral QHS  B complex-vitaminC-folic acid (NEPHROCAP) cap  1 Cap Oral DAILY  buPROPion (WELLBUTRIN) tablet 100 mg  100 mg Oral QHS  escitalopram oxalate (LEXAPRO) tablet 10 mg  10 mg Oral QPM  
 gabapentin (NEURONTIN) capsule 100 mg  100 mg Oral TID  hydrALAZINE (APRESOLINE) tablet 100 mg  100 mg Oral TID  ferrous sulfate tablet 325 mg  325 mg Oral DAILY  lactobac ac& pc-s.therm-b.anim (TY Q/RISAQUAD)  1 Cap Oral DAILY  losartan (COZAAR) tablet 100 mg  100 mg Oral DAILY  memantine (NAMENDA) tablet 10 mg  10 mg Oral QHS  sevelamer carbonate (RENVELA) tab 800 mg  800 mg Oral PRN  
 sevelamer carbonate (RENVELA) tab 4,000 mg  4,000 mg Oral TID WITH MEALS  sodium chloride (NS) flush 5-40 mL  5-40 mL IntraVENous Q8H  
 sodium chloride (NS) flush 5-40 mL  5-40 mL IntraVENous PRN  
 acetaminophen (TYLENOL) tablet 650 mg  650 mg Oral Q4H PRN  
  naloxone (NARCAN) injection 0.4 mg  0.4 mg IntraVENous PRN  
 morphine injection 2 mg  2 mg IntraVENous Q4H PRN  
 linezolid in dextrose 5% (ZYVOX) IVPB premix in D5W 600 mg  600 mg IntraVENous Q12H  
 guaiFENesin ER (MUCINEX) tablet 600 mg  600 mg Oral Q12H  
 albuterol-ipratropium (DUO-NEB) 2.5 MG-0.5 MG/3 ML  3 mL Nebulization Q1H PRN  
 albumin human 25% (BUMINATE) solution 25 g  25 g IntraVENous DIALYSIS PRN  
 levoFLOXacin (LEVAQUIN) 500 mg in D5W IVPB  500 mg IntraVENous Q48H Objective:  
 
Blood pressure (!) 134/39, pulse 64, temperature 98 °F (36.7 °C), resp. rate 18, weight 55.6 kg (122 lb 9.2 oz), SpO2 100 %. No intake/output data recorded. 04/20 1901 - 04/22 0700 In: 1200 [I.V.:1200] Out: 200 [Urine:200] Physical Examination:  
 
 
General:AAO x 3, on NC O2 
HEENT:  EOMI, sclera anicteric Chest:   Rhonchi at lung bases Heart: S1, S2, RRR 
GI: Soft, NT, ND, normal bowel sounds Ext: no edema Data Review Recent Results (from the past 24 hour(s)) CBC W/O DIFF Collection Time: 04/22/19  4:34 AM  
Result Value Ref Range WBC 13.4 (H) 3.6 - 11.0 K/uL  
 RBC 2.83 (L) 3.80 - 5.20 M/uL HGB 7.5 (L) 11.5 - 16.0 g/dL HCT 26.2 (L) 35.0 - 47.0 % MCV 92.6 80.0 - 99.0 FL  
 MCH 26.5 26.0 - 34.0 PG  
 MCHC 28.6 (L) 30.0 - 36.5 g/dL  
 RDW 17.0 (H) 11.5 - 14.5 % PLATELET 450 243 - 260 K/uL MPV 9.8 8.9 - 12.9 FL  
 NRBC 0.0 0  WBC ABSOLUTE NRBC 0.00 0.00 - 0.01 K/uL METABOLIC PANEL, BASIC Collection Time: 04/22/19  4:34 AM  
Result Value Ref Range Sodium 134 (L) 136 - 145 mmol/L Potassium 4.5 3.5 - 5.1 mmol/L Chloride 104 97 - 108 mmol/L  
 CO2 22 21 - 32 mmol/L Anion gap 8 5 - 15 mmol/L Glucose 91 65 - 100 mg/dL BUN 62 (H) 6 - 20 MG/DL Creatinine 6.06 (H) 0.55 - 1.02 MG/DL  
 BUN/Creatinine ratio 10 (L) 12 - 20 GFR est AA 8 (L) >60 ml/min/1.73m2 GFR est non-AA 7 (L) >60 ml/min/1.73m2 Calcium 8.3 (L) 8.5 - 10.1 MG/DL Recent Labs  
  04/22/19 
0434 04/21/19 
4398 WBC 13.4* 10.9 HGB 7.5* 7.6* HCT 26.2* 26.3*  
 224 Recent Labs  
  04/22/19 
0434 04/21/19 
8508 04/20/19 
1521 * 134* 138  
K 4.5 3.9 3.7  102 105 CO2 22 21 22 BUN 62* 42* 26* CREA 6.06* 4.88* 3.43* GLU 91 84 83 CA 8.3* 8.4* 8.2* Recent Labs 04/19/19 
1033 SGOT 29   
TP 6.7 ALB 2.4*  
GLOB 4.3* Recent Labs 04/19/19 
1033 INR 1.5* PTP 15.0* No results for input(s): FE, TIBC, PSAT, FERR in the last 72 hours. Lab Results Component Value Date/Time Folate 18.9 03/27/2019 04:45 PM  
  
No results for input(s): PH, PCO2, PO2 in the last 72 hours. Recent Labs  
  04/20/19 
0514 04/19/19 
1713 04/19/19 
1033 TROIQ 0.43* 0.31* 0.08* Lab Results Component Value Date/Time Cholesterol, total 116 03/30/2017 02:27 AM  
 HDL Cholesterol 55 03/30/2017 02:27 AM  
 LDL, calculated 31.2 03/30/2017 02:27 AM  
 Triglyceride 149 03/30/2017 02:27 AM  
 CHOL/HDL Ratio 2.1 03/30/2017 02:27 AM  
 
No components found for: [place] Lab Results Component Value Date/Time Color YELLOW/STRAW 03/29/2017 05:30 PM  
 Appearance CLOUDY (A) 03/29/2017 05:30 PM  
 Specific gravity 1.020 03/29/2017 05:30 PM  
 Specific gravity 1.010 03/04/2016 09:00 PM  
 pH (UA) 7.0 03/29/2017 05:30 PM  
 Protein 100 (A) 03/29/2017 05:30 PM  
 Glucose NEGATIVE  03/29/2017 05:30 PM  
 Ketone NEGATIVE  03/29/2017 05:30 PM  
 Bilirubin NEGATIVE  03/29/2017 05:30 PM  
 Urobilinogen 0.2 03/29/2017 05:30 PM  
 Nitrites NEGATIVE  03/29/2017 05:30 PM  
 Leukocyte Esterase LARGE (A) 03/29/2017 05:30 PM  
 Epithelial cells FEW 03/29/2017 05:30 PM  
 Bacteria 4+ (A) 03/29/2017 05:30 PM  
 WBC 20-50 03/29/2017 05:30 PM  
 RBC  03/29/2017 05:30 PM  
 
XR Results (most recent): 
Results from Hospital Encounter encounter on 04/19/19 XR CHEST PORT  
 Narrative history: Follow-up edema and pneumonia COMPARISON: 4/19/2019 FINDINGS: 
 
Frontal chest radiograph submitted for review. Decreased patchy left perihilar and right basilar consolidation with persistent 
diffuse interstitial opacities. Probable tiny effusions. No pneumothorax. No new 
lung abnormality. Stable heart size Impression IMPRESSION: 
 
Decreased patchy left perihilar and right basilar consolidation with persistent 
diffuse interstitial opacities. Probable tiny effusions ROS: -CP, SOB, Dysuria, palpitations, cough. Assessment: 
 
Active Problems: 
  Sepsis ([address] (7/28/2015) GI bleed (3/27/2019) Anemia (4/19/2019) Hypoxia (4/19/2019) HCAP (healthcare-associated pneumonia) (4/19/2019) Dyspnea (4/19/2019) Acute respiratory failure with hypoxia ([address] (4/20/2019) Plan/Discussion:  
 
· Her hgb  is 7.5. Plans for HD today with transfusion of one unit of blood. · Follow hgb daily. · Cardiology/CC also following. Holding Eliquis, added 81 mg of ASA as well as metoprolol. No other intervention planned. · CXR showing improvement · GI lite diet. · Colonoscopy  4.23/ 4.24 depending on her other symptoms. Defer to Dr. Jayden Orozco for scheduling. Signed By: RADHA Angulo 
 
4/22/2019   
8:42 AM 
 
Looks much better to me. Tentative colonoscopy tomorrow. Liquid only today; PEG 3350 prep. I have personally reviewed the history. I have reviewed the chart and agree with the documentation recorded by the Mid Level Provider, including the assessment, treatment plan, and disposition. Daly Orozco MD

## 2019-04-22 NOTE — PROGRESS NOTES
Orders received, chart reviewed and patient evaluated by Occupational Therapy. Patient is functioning at her independent baseline with ADL asks yet is limited in activity tolerance at this time d/t decreased BP prior to HD today. Recommend patient to discharge home without OT services.   
 
Full evaluation to follow.  
  
[facility], OTR/L

## 2019-04-22 NOTE — PROGRESS NOTES
Problem: Mobility Impaired (Adult and Pediatric) Goal: *Acute Goals and Plan of Care (Insert Text) Description Physical Therapy Goals Initiated 4/22/2019 1. Patient will transfer from bed to chair and chair to bed with independence using the least restrictive device within 7 day(s). 2.  Patient will perform sit to stand with independence within 7 day(s). 3.  Patient will ambulate with independence for 300 feet with the least restrictive device within 7 day(s). 4.  Patient will ascend/descend 3 stairs with 1 handrail(s) with supervision/set-up within 7 day(s). Outcome: Progressing Towards Goal 
 PHYSICAL THERAPY EVALUATION Patient: Bernadine Lubin (50 y.o. female) Date: 4/22/2019 Primary Diagnosis: Sepsis ([address] [A41.9] HCAP (healthcare-associated pneumonia) [J18.9] Anemia [D64.9] Hypoxia [R09.02] GI bleed [K92.2] Precautions:  
 
ASSESSMENT : 
Based on the objective data described below, the patient presents with patient was lying in bed with spouse at bedside when PT arrived. Agreed to assessment and cleared by her nurse. PTA - lives with spouse in 2 story home with 3 steps to enter and uses a stair lift to get to upstairs bed and bathroom. She was independent with all ADLs and mobility skills without devices. Has a tub transfer bench to get into the tub/shower combo. She drove herself to dialysis. No falls reported. Currently demonstrates independent skill with bed mobility and supine to sit transfers. Sit to stand is sba and bed to chair needs cg assistance due to mild unsteadiness when walking. Gait was limited to 15 feet x 2 with cg assistance. Gait was slow, narrow with decreased step lengths and intermittent path deviations. BP in standing was stable but low with patient being asymptomatic. She was returned to bed with all needs met when the session ended. Vitals:  
 04/22/19 0949 04/22/19 0958 04/22/19 1002 04/22/19 1003 BP: (!) 101/33 (!) 125/39 (!) 126/36 123/42 BP 1 Location: Right arm Right arm BP Patient Position: Sitting Standing Sitting;Post activity Supine; Post activity Pulse:      
Resp:      
Temp:      
TempSrc:      
SpO2:      
Weight:      
  
Recommend 1-2 more acute care PT sessions to further assess gait, stairs, and standing balance skills. Unlikely she will need PT post hospitalization. Patient will benefit from skilled intervention to address the above impairments. Patient?s rehabilitation potential is considered to be Good Factors which may influence rehabilitation potential include:  
? None noted ? Mental ability/status ? Medical condition ? Home/family situation and support systems ? Safety awareness 
? Pain tolerance/management 
? Other: PLAN : 
Recommendations and Planned Interventions: 
?           Bed Mobility Training             ? Neuromuscular Re-Education ? Transfer Training                   ? Orthotic/Prosthetic Training 
? Gait Training                         ? Modalities ? Therapeutic Exercises           ? Edema Management/Control ? Therapeutic Activities            ? Patient and Family Training/Education ? Other (comment): Frequency/Duration: Patient will be followed by physical therapy  3 times a week to address goals. Discharge Recommendations: Home with spouse. No HH PT anticipated. Further Equipment Recommendations for Discharge: None - has all DME SUBJECTIVE:  
Patient stated ? I feel pretty good - no dizzinesss. ? OBJECTIVE DATA SUMMARY:  
HISTORY:   
Past Medical History:  
Diagnosis Date Chronic kidney disease Stage 5 (7/18/16 BUN 79, Creatnine 4.53, K 6) Dr. Irma Cosby [phone] GERD (gastroesophageal reflux disease)   
 well controlled with Rx High cholesterol Hyperkalemia Hypertension Hypomagnesemia   
 on daily Magnesium Neuropathy bilateral feet Ovarian cancer [facility]) 2013 Hysterectomy with chemo, no radiation:  Dr. Cheyenne Simpson Thromboembolus ([facility] 9/2015  
 blood clot in lung 9/2015 Thromboembolus ([facility] 2004  
 in kidney \"many years ago\" Past Surgical History:  
Procedure Laterality Date ABDOMEN SURGERY PROC UNLISTED  7/31/15 Lap exploratory small bowel obstruction  (ICU) ABDOMEN SURGERY PROC UNLISTED  8/2/15 Abdmonial washout with wound vac (ICU) ABDOMEN SURGERY PROC UNLISTED  8/18/15 Abdominal washout fascial closure (ICU) ABDOMEN SURGERY PROC UNLISTED  11/11/15 Lap takedown of ileostomy COLONOSCOPY N/A 8/1/2016 COLONOSCOPY performed by Goldy Bustamante MD at [address] [place]  approx 2005 HX CASI AND BSO  2013  
 due to ovarian cancer HX TONSILLECTOMY  age 10 Prior Level of Function/Home Situation:  lives with spouse in 2 story home with 3 steps to enter and uses a stair lift to get to upstairs bed and bathroom. She was independent with all ADLs and mobility skills without devices. Has a tub transfer bench to get into the tub/shower combo. She drove herself to dialysis. No falls reported. Personal factors and/or comorbidities impacting plan of care: None Home Situation Home Environment: Private residence # Steps to Enter: 3 Rails to Enter: Yes Hand Rails : Right Wheelchair Ramp: No 
One/Two Story Residence: Two story # of Interior Steps: 12(has stairglide to 2nd floor) Interior Rails: Both Lift Chair Available: Yes Living Alone: No 
Support Systems: Spouse/Significant Other/Partner Patient Expects to be Discharged to[de-identified] Private residence Current DME Used/Available at Home: Cane, straight, Walker, rolling, Tub transfer bench Tub or Shower Type: Tub/Shower combination EXAMINATION/PRESENTATION/DECISION MAKING:  
Critical Behavior: 
Neurologic State: Alert Orientation Level: Oriented X4 
 Cognition: Follows commands, Appropriate decision making, Appropriate for age attention/concentration, Appropriate safety awareness Safety/Judgement: Awareness of environment, Good awareness of safety precautions Hearing: Auditory Auditory Impairment: Hard of hearing, bilateral 
Skin:   
Edema:  
Range Of Motion: 
AROM: Within functional limits PROM: Within functional limits Strength:   
Strength: Within functional limits Tone & Sensation:  
Tone: Normal 
  
  
  
  
  
  
  
  
   
Coordination: 
Coordination: Within functional limits Vision:  
Acuity: Within Defined Limits Corrective Lenses: Glasses Functional Mobility: 
Bed Mobility: 
Rolling: Independent Supine to Sit: Independent Sit to Supine: Independent Scooting: Independent Transfers: 
Sit to Stand: Stand-by assistance Stand to Sit: Stand-by assistance Bed to Chair: Contact guard assistance Balance:  
Sitting: Intact Standing: Impaired Standing - Static: Good Standing - Dynamic : Fair;Occassional 
Ambulation/Gait Training: 
Distance (ft): 30 Feet (ft) Assistive Device: Gait belt Ambulation - Level of Assistance: Contact guard assistance Gait Description (WDL): Exceptions to [facility] Gait Abnormalities: Path deviations Base of Support: Narrowed Speed/Rita: Slow Step Length: Right shortened;Left shortened Functional Measure: 
Barthel Index: 
Bathin Bladder: 10 Bowels: 10 
Groomin Dressing: 10 Feeding: 10 Mobility: 0(unable to assess d/t decreased BP) Stairs: 0(unable to assess d/t decreased BP) Toilet Use: 10 Transfer (Bed to Chair and Back): 15 Total: 75/100 Percentage of impairment  
0% 1-19% 20-39% 40-59% 60-79% 80-99% 100% Barthel Score 0-100 100 99-80 79-60 59-40 20-39 1-19 
 0 The Barthel ADL Index: Guidelines 1.  The index should be used as a record of what a patient does, not as a record of what a patient could do. 2. The main aim is to establish degree of independence from any help, physical or verbal, however minor and for whatever reason. 3. The need for supervision renders the patient not independent. 4. A patient's performance should be established using the best available evidence. Asking the patient, friends/relatives and nurses are the usual sources, but direct observation and common sense are also important. However direct testing is not needed. 5. Usually the patient's performance over the preceding 24-48 hours is important, but occasionally longer periods will be relevant. 6. Middle categories imply that the patient supplies over 50 per cent of the effort. 7. Use of aids to be independent is allowed. Skylar Tracy., Barthel, D.W. (6697). Functional evaluation: the Barthel Index. [facility] (14)2. Lianna Mcfadden mike GISELL Benton, Adam aEston., Amanda Choe., Shonna, [facility] (1999). Measuring the change indisability after inpatient rehabilitation; comparison of the responsiveness of the Barthel Index and Functional [place] Measure. Journal of Neurology, Neurosurgery, and Psychiatry, 66(4), 203-917. Mague Keenan, N.J.A, SOLIS Espinoza, & Presley Blood MRACHAEL. (2004.) Assessment of post-stroke quality of life in cost-effectiveness studies: The usefulness of the Barthel Index and the EuroQoL-5D. [facility], 13, 849-30 Physical Therapy Evaluation Charge Determination History Examination Presentation Decision-Making HIGH Complexity :3+ comorbidities / personal factors will impact the outcome/ POC  MEDIUM Complexity : 3 Standardized tests and measures addressing body structure, function, activity limitation and / or participation in recreation  MEDIUM Complexity : Evolving with changing characteristics  Other outcome measures Barthel  LOW Based on the above components, the patient evaluation is determined to be of the following complexity level: LOW Pain: 
Pain Scale 1: Numeric (0 - 10) Pain Intensity 1: 0 Activity Tolerance:  
Limited by low BP - asymptomatic Please refer to the flowsheet for vital signs taken during this treatment. After treatment:  
?         Patient left in no apparent distress sitting up in chair ? Patient left in no apparent distress in bed 
? Call bell left within reach ? Nursing notified ? Caregiver present ? Bed alarm activated COMMUNICATION/EDUCATION:  
The patient?s plan of care was discussed with: Occupational Therapist and Registered Nurse. ?         Fall prevention education was provided and the patient/caregiver indicated understanding. ? Patient/family have participated as able in goal setting and plan of care. ?         Patient/family agree to work toward stated goals and plan of care. ?         Patient understands intent and goals of therapy, but is neutral about his/her participation. ? Patient is unable to participate in goal setting and plan of care. Thank you for this referral. 
Herber Fraser, PT Time Calculation: 30 mins

## 2019-04-22 NOTE — PROGRESS NOTES
ADULT PROTOCOL: JET AEROSOL  REASSESSMENT Patient  Chyna Kraft     75 y.o.   female     4/22/2019  10:19 AM 
 
Breath Sounds Pre Procedure: Right Breath Sounds: Diminished Left Breath Sounds: Diminished Breath Sounds Post Procedure: Right Breath Sounds: Diminished Left Breath Sounds: Diminished Breathing pattern: Pre procedure Breathing Pattern: Regular Post procedure Breathing Pattern: Regular Heart Rate: Pre procedure Pulse: 64 
         Post procedure Pulse: 73 Resp Rate: Pre procedure Respirations: 20 
         Post procedure Respirations: 20 
 
Oxygen: O2 Device: Hi flow nasal cannula  6L SpO2: Pre procedure SpO2: 100 % Post procedure SpO2: 100 % Nebulizer Therapy: Current medications Aerosolized Medications: DuoNeb Changed: yes. Made PRN per protocol and Yimi Marley MD notes. Problem List:  
Patient Active Problem List  
Diagnosis Code  Abdominal pain R10.9  E-coli UTI N39.0, B96.20  Continuous severe abdominal pain R10.9  Enteritis K52.9  Adrenal hemorrhage (HCC) E27.49  Chronic renal insufficiency, stage V (HCC) N18.5  Sepsis ([address] A41.9  Acute pancreatitis K85.90  Protein malnutrition ([address] E46  Small bowel perforation (HCC) K63.1  Acute renal failure with lesion of tubular necrosis (HCC) N17.0  Anemia of renal disease N18.9, D63.1  SIRS (systemic inflammatory response syndrome) (HCC) R65.10  
 HTN (hypertension) I10  
 History of peritonitis Z87.19  
 Physical debility R53.81  Chronic anticoagulation Z79.01  
 History of pulmonary embolism Z86.711  
 History of ovarian cancer Z85.43  
 Pericardial effusion I31.3  Diarrhea R19.7  Moderate protein-calorie malnutrition (HCC) E44.0  Pericarditis with effusion I31.9  Hypertension, uncontrolled I10  
 ALAYNA (acute kidney injury) ([address] N17.9  Acute on chronic renal failure (HCC) N17.9, N18.9  Generalized rash R21  
 Heme positive stool R19.5  Electrolyte abnormality E87.8  Bilateral carotid artery stenosis I65.23  
 Acute renal failure (ARF) (HCC) N17.9  Abnormal MRI of head R93.0  Stenosis of both internal carotid arteries I65.23  
 Cerebral microvascular disease I67.9  Convulsion disorder ([facility] R56.9  Altered mental status, unspecified R41.82  
 Acute encephalopathy G93.40  Stenosis of left carotid artery without cerebral infarction I65.22  
 Disturbance of memory R41.3  Anxiety F41.9  Pneumonia J18.9  Thrombocytopenia ([facility] D69.6  Hypertension I10  
 GI bleed K92.2  
 ESRD (end stage renal disease) ([facility] N18.6  Fever R50.9  Anemia D64.9  Hypoxia R09.02  
 HCAP (healthcare-associated pneumonia) J18.9  Dyspnea R06.00  
 Acute respiratory failure with hypoxia ([facility]) J96.01 Respiratory Therapist: Octavio Avilez

## 2019-04-22 NOTE — PROCEDURES
TRANSFER - IN REPORT: 
 
Verbal report received from Ayesha Borrego on [facility] Report consisted of patients Situation, Background, Assessment and  
Recommendations(SBAR). Information from the following report(s)kardex,labs, meds was reviewed with the receiving nurse. Opportunity for questions and clarification was provided. Assessment completed upon patients arrival to unit and care assumed. [facility] Dialysis Team [facility]  [phone] Vitals   Pre   Post   Assessment   Pre   Post    
Temp  98.3  98.1 LOC  ao4 [id number] HR   73 89 Lungs   Diminished Diminished B/P  168/49 138/49 Cardiac   RR  RR Resp   19 16 Skin   Loose and dry  Loose and dry Pain level  0 0 Edema   
none none Orders: Duration:   Start:    1630 End:    1945 Total:   3.25 Dialyzer:   Dialyzer/Set Up Inspection: Javi Friendly (04/22/19 1645) K Bath:   Dialysate K (mEq/L): 2 (04/22/19 1645) Ca Bath:   Dialysate CA (mEq/L): 2.5 (04/22/19 1645) Na/Bicarb:   Dialysate NA (mEq/L): 138 (04/22/19 1645) Target Fluid Removal:   Goal/Amount of Fluid to Remove (mL): 2000 mL (04/22/19 1645) Access  graft Type & Location:   RAYNA graft. +b/t. Cannulated with 15Gneedles with no problems No s/s of infection. Labs Obtained/Reviewed Critical Results Called   Date when labs were drawn- 
Hgb-   
HGB Date Value Ref Range Status 04/22/2019 7.5 (L) 11.5 - 16.0 g/dL Final  
 
K-   
Potassium Date Value Ref Range Status 04/22/2019 4.5 3.5 - 5.1 mmol/L Final  
 
Ca-  
Calcium Date Value Ref Range Status 04/22/2019 8.3 (L) 8.5 - 10.1 MG/DL Final  
 
Bun-  
BUN Date Value Ref Range Status 04/22/2019 62 (H) 6 - 20 MG/DL Final  
 
Creat-  
Creatinine Date Value Ref Range Status 04/22/2019 6.06 (H) 0.55 - 1.02 MG/DL Final  
 
  
Medications/ Blood Products Given Name   Dose   Route and Time PRBC 1u/310mL  IVPB 1717 and ended Blood Volume Processed (BVP):   63.4L Net Fluid Removed:  6452 Comments Time Out Done: 1640 Primary Nurse Rpt Pre:NAYA Lara Primary Nurse Rpt Post: JODEE Ayers Pt Education: giving blood on HD Care Plan: ongoing Tx Summary: 
1600 arrived to patient room. sbar received from primary rn.  and test. 
1640 ICEBOAT completed 1645 tx started. Access visible and lines intact. 1700 access visible and lines intact. 1715 access visible and lines intact. 9388 [id number] blood transfusion started. 1730 access visible and lines intact. 1745 access visible and lines intact. 1750 pt reports left hip pain 7/10 notified primary rn 
1800 access visible andlines intact. 1815 access visible and lines intact. 1830 access visible and lines intact. Blood transfusion completed. 1845 access visible and lines intact. Primary rn bedside . 1900access visible and lines intact. 1915 access visible and lines intact. 1930 access visible and lines intact. 1945 access visible and lines intact. 2000 tx completed. All blood in circuit returned to patient. RAYNA AVF: skin CDI. No s/s of infection. Hemostasis achieved without issue. Bed locked and in the lowest position, call bell and belongings in reach. SBAR given to Primary, RN. Patient is stable at time of their/ my departure. All Dialysis related medications have been reviewed. Admiting Diagnosis: Acute resp.failure Pt's previous clinic- [facility] Consent signed - Informed Consent Verified: Yes (04/22/19 1477) [facility] Consent - yes Hepatitis Status- 4/19/19 HBsAg negative, HBsAB <3 Machine #- Machine Number: [id number] (04/22/19 6315) Telemetry status-remote Pre-dialysis wt. - Pre-Dialysis Weight: 60.1 kg (132 lb 7.9 oz) (04/20/19 1321) HD TRANSFER - OUT REPORT: 
 
Verbal report given to JODEE Ayers on [facility] Report consisted of patient's Situation, Background, Assessment and  
Recommendations(SBAR). Information from the following report(s) dialysis flowsheet and blood administration was reviewed with the receiving nurse. Method:  HD Fluid Removed  0478 45 67 83 Patient response to treatment tolerated End Time  2000 If not documented, dialysis nurse to update post-dialysis row in HD/Filtration flowsheet Medications /Volume expansion agents or Fluid boluses administered during treatment? No blood administration Post-dialysis medication administration due?  n 
Remind nurse to administer post-HD medication upon return to unit. Fistula hemostasis? yes Line heparinization? no 
 
 
 
Opportunity for questions and clarification was provided.

## 2019-04-23 NOTE — PROGRESS NOTES
Hospitalist Progress Note NAME: Tiffani Padilla :  1945 MRN:  [id number] Assessment / Plan: 
Acute resp failure with Hypoxia POA Secondary to HCAP and pulmonary edema Sepsis POA 
- high flow oxygen on 7L/m Wean O2 as long as O2 sat is greater than or equal to 93% CTA of chest: 1. No pulmonary embolism. 2. Acute bilateral airspace disease superimposed on chronic lung disease. 3. New pleural effusions. 4. Progressive adenopathy, with neoplasia not excluded 
- continue with I  Levo for 7 days which was started on admission. Added Linezolid IV which will complete 7 days also (allergic to Vanc) - T-max in early am was 101; send blood cx WBC down to 12K from 13.4 Blood cx () no growth so far. Will follow  blood cx before deescalate ABX ESRD on HD MWF 
- pulmonary edema improved with HD 
  CXR (): Decreased patchy left perihilar and right basilar consolidation with persistent diffuse interstitial opacities. Probable tiny effusions - appreciate nephrology input; continue HD on M,W, F Chest Pain with elevated troponins POA Malignant hypertension Hyperlipidemia - appreciate cardiology input;  
  Echo: EF 55-60% Continue with ASA (hold due to GIB), Norvasc, losartan, BB, and hydrlazine - elevated troponin and BNP secondary to pulmonary edema 
  
Hx of iron deficient Anemia Anemia of chronic disease 
- continue with iron supplement Continue with epo GI bleed 
- received 2 units of PRBC for hgb 7.2. Hgb 9.6 this am 
- appreciate GI input; scheduled for colonscopy in am. NPO after midnight Holding Eliquis and ASA Chronic PE/VTE  
- on Eliquis PTA now held due to GI bleed - s/p IVF filter Current Smoker  
- advise cessation. Nicotine patch when she is ready Dementia 
- continue namenda;  was very upset with worsening of confusion in the evening time.  Discussed about worsening mentation secondary to being hospitalization, excessive noise, and constant new people in and out of her room. - will consider sitter or tele sitter if gets worse Code Status: full code 
  
DVT Prophylaxis: scd's GI Prophylaxis: not indicated 
  
Baseline: home w/family , drives at home, not on oxygen at home,lives with  Disposition: TBD Subjective: Chief Complaint / Reason for Physician Visit: F/U resp failure/SOB, CHF, ESRD, Anemia, ? GI bleed \"I am much better\". Discussed with RN events overnight. Review of Systems: 
Symptom Y/N Comments  Symptom Y/N Comments Fever/Chills n   Chest Pain n   
Poor Appetite n   Edema y Cough n   Abdominal Pain n   
Sputum n   Joint Pain SOB/MONTANEZ y improved  Pruritis/Rash Nausea/vomit n   Tolerating PT/OT y Diarrhea n   Tolerating Diet Constipation    Other Could NOT obtain due to:   
 
Objective: VITALS:  
Last 24hrs VS reviewed since prior progress note. Most recent are: 
Patient Vitals for the past 24 hrs: 
 Temp Pulse Resp BP SpO2  
04/23/19 1543 98.4 °F (36.9 °C) 71 20 (!) 131/39 94 % 04/23/19 1103 98.1 °F (36.7 °C) 85 18 (!) 149/36 100 % 04/23/19 0755 98.3 °F (36.8 °C) 84 18 139/43 91 % 04/23/19 0459 97.9 °F (36.6 °C)      
04/23/19 0353 (!) 101 °F (38.3 °C) 100 16 138/46 90 % 04/22/19 2252 99.8 °F (37.7 °C) (!) 102 16 136/49 98 % 04/22/19 2134  100  (!) 153/35   
04/22/19 2000  96  (!) 134/39   
04/22/19 1945 98.1 °F (36.7 °C) 89 16 138/46 98 % 04/22/19 1930  93  138/46   
04/22/19 1915  92  139/42   
04/22/19 1900  92  151/44   
04/22/19 1845  91  169/51   
04/22/19 1830  91  169/53   
04/22/19 1829 98.4 °F (36.9 °C) 89 16 169/53 98 % 04/22/19 1816  93  162/57   
04/22/19 1815  89  177/49   
04/22/19 1800  89 16 162/49 98 % 04/22/19 1746  86 16 165/43 98 % 04/22/19 1745  86  165/43   
04/22/19 1732 98 °F (36.7 °C) 86 16 171/42 98 % Intake/Output Summary (Last 24 hours) at 4/23/2019 1730 Last data filed at 4/23/2019 1103 Gross per 24 hour Intake 480 ml Output 2310 ml Net -1830 ml PHYSICAL EXAM: 
General: Ill appearing. Alert, cooperative, no acute distres  
EENT:  EOMI. Anicteric sclerae. Dry mucous membrane, [place] Resp:  Clear in apex with decreased breath sounds at bases,  No accessory muscle use CV:  Regular  rhythm,  1+ edema GI:  Soft, Non distended, Non tender.  +Bowel sounds Neurologic:  Alert and oriented X 3, normal speech, Psych:   Fair insight. Not anxious nor agitated Skin:  No rashes. No jaundice Reviewed most current lab test results and cultures  YES Reviewed most current radiology test results   YES Review and summation of old records today    NO Reviewed patient's current orders and MAR    YES 
PMH/ reviewed - no change compared to H&P 
________________________________________________________________________ Care Plan discussed with: 
  Comments Patient y Family  y  at bedside RN y   
Care Manager y Consultant  y Dr Dayton Mortimer  
                 y 1915 Araceli Lebron team rounds were held today with , nursing, pharmacist and clinical coordinator. Patient's plan of care was discussed; medications were reviewed and discharge planning was addressed. ________________________________________________________________________ Total NON critical care TIME:  40 Minutes Total CRITICAL CARE TIME Spent:   Minutes non procedure based Comments >50% of visit spent in counseling and coordination of care    
________________________________________________________________________ Hyunsun Krystal Austin, NP Procedures: see electronic medical records for all procedures/Xrays and details which were not copied into this note but were reviewed prior to creation of Plan. LABS: 
I reviewed today's most current labs and imaging studies. Pertinent labs include: 
Recent Labs  
  04/23/19 9957 04/22/19 
5441 04/21/19 
5257 WBC 12.0* 13.4* 10.9 HGB 9.6* 7.5* 7.6* HCT 32.7* 26.2* 26.3*  
 267 224 Recent Labs  
  04/23/19 
8484 04/22/19 
0434 04/21/19 
5844  134* 134* K 4.2 4.5 3.9  104 102 CO2 25 22 21 GLU 95 91 84 BUN 31* 62* 42* CREA 4.29* 6.06* 4.88* CA 8.8 8.3* 8.4* Signed: Carol Nichols, SAEED

## 2019-04-23 NOTE — PROGRESS NOTES
Progress Note 4/23/2019 8:40 AM 
NAME: Joesph Fisher MRN:  [id number] Admit Diagnosis: Sepsis ([address] [A41.9] HCAP (healthcare-associated pneumonia) [J18.9] Anemia [D64.9] Hypoxia [R09.02] GI bleed [K92.2] Assessment:   
  
Acute Respiratory failure with Hypoxia. Volume overload with pulmonary edema Pneumonia Elevated BNP / normal Troponin ESRD on Dialysis. Malignant Hypertension. Pneumonia Elevated WBC  / Febrile. Anemia. Hx of Pulmonary embolism. S/p IVC filter. Chronic Eliquis for the hx of PE. Hx of inta abdominal bleeding Hx of ovarian CA Hx of perforated Colon with colon resection and colostomy, s/p reversal of colostomy. Left Ventricular hypertrophy with normal systolic function. No hx of heart disease. Echo reviewed. Normal LV function. Heavy MAC. Moderately calcified Aortic Valve leaflets with normal motion. Max gradient 18 mmHg . No Mean gradient recorded by the tech . C/W  Aortic valve sclerosis /  Mild Stenosis. 4.23. Transfused and Hgb up to 9 today. Plans noted for colonoscopy tomorrow. Cardiac status has been stable. She remains off the Eliquis. Was on that for the hx of PE , not a cardiac issue   
   
  
            Plan:    
  
 
 
- Holding eliquis due to GI bleed, add aspirin 81mg daily for now 
-Continue  metoprolol 25mg bid 
- Cont HD for volume Likely proceed with GI evaluation once euvolemic. 
  
  
 []           High complexity decision making was performed 
  
 
 
 
 
Subjective:  
 
Joesph Fisher denies chest pain, +dyspnea. Discussed with RN events overnight. Review of Systems: 
 
Symptom Y/N Comments  Symptom Y/N Comments Fever/Chills N   Chest Pain N Poor Appetite N   Edema N   
Cough N   Abdominal Pain N Sputum N   Joint Pain N   
SOB/MONTANEZ Y   Pruritis/Rash N   
Nausea/vomit N   Tolerating PT/OT Y Diarrhea N   Tolerating Diet Y Constipation N   Other Could NOT obtain due to:   
 
Objective:  
  
Physical Exam: 
 
Last 24hrs VS reviewed since prior progress note. Most recent are: 
 
Visit Vitals /40 Pulse 72 Temp 98.4 °F (36.9 °C) Resp 20 Wt 53.2 kg (117 lb 4.6 oz) SpO2 94% BMI 17.32 kg/m² Intake/Output Summary (Last 24 hours) at 4/23/2019 1840 Last data filed at 4/23/2019 1103 Gross per 24 hour Intake 480 ml Output 2310 ml Net -1830 ml General Appearance: Well developed, well nourished, alert & oriented x 3,  
 no acute distress. Ears/Nose/Mouth/Throat: Hearing grossly normal. 
Neck: Supple. Chest: Lungs clear to auscultation bilaterally. Cardiovascular: Regular rate and rhythm, S1S2 normal, III/VI BEENA. Abdomen: Soft, non-tender, bowel sounds are active. Extremities: No edema bilaterally. Skin: Warm and dry. PMH/ reviewed - no change compared to H&P Data Review Telemetry: normal sinus rhythm Lab Data Personally Reviewed: 
 
Recent Labs  
  04/23/19 
3915 04/22/19 
7922 WBC 12.0* 13.4* HGB 9.6* 7.5* HCT 32.7* 26.2*  
 267 No results for input(s): INR, PTP, APTT in the last 72 hours. No lab exists for component: INREXT, INREXT Recent Labs  
  04/23/19 
0442 04/22/19 
0434 04/21/19 
3104  134* 134* K 4.2 4.5 3.9  104 102 CO2 25 22 21 BUN 31* 62* 42* CREA 4.29* 6.06* 4.88* GLU 95 91 84 CA 8.8 8.3* 8.4* No results for input(s): CPK, CKNDX, TROIQ in the last 72 hours. No lab exists for component: CPKMB Lab Results Component Value Date/Time Cholesterol, total 116 03/30/2017 02:27 AM  
 HDL Cholesterol 55 03/30/2017 02:27 AM  
 LDL, calculated 31.2 03/30/2017 02:27 AM  
 Triglyceride 149 03/30/2017 02:27 AM  
 CHOL/HDL Ratio 2.1 03/30/2017 02:27 AM  
 
 
No results for input(s): SGOT, GPT, AP, TBIL, TP, ALB, GLOB, GGT, AML, LPSE in the last 72 hours. No lab exists for component: AMYP, HLPSE No results for input(s): PH, PCO2, PO2 in the last 72 hours. Medications Personally Reviewed: 
 
Current Facility-Administered Medications Medication Dose Route Frequency  levoFLOXacin (LEVAQUIN) tablet 500 mg  500 mg Oral Q48H  
 metoprolol tartrate (LOPRESSOR) tablet 12.5 mg  12.5 mg Oral BID  linezolid in dextrose 5% (ZYVOX) IVPB premix in D5W 600 mg  600 mg IntraVENous Q12H  
 epoetin roverto-epbx (RETACRIT) injection 20,000 Units  20,000 Units SubCUTAneous Q MON, WED & FRI  albuterol-ipratropium (DUO-NEB) 2.5 MG-0.5 MG/3 ML  3 mL Nebulization Q4H PRN  
 aspirin chewable tablet 81 mg  81 mg Oral DAILY  0.9% sodium chloride infusion 250 mL  250 mL IntraVENous PRN  
 amLODIPine (NORVASC) tablet 10 mg  10 mg Oral DAILY  atorvastatin (LIPITOR) tablet 20 mg  20 mg Oral QHS  B complex-vitaminC-folic acid (NEPHROCAP) cap  1 Cap Oral DAILY  buPROPion (WELLBUTRIN) tablet 100 mg  100 mg Oral QHS  escitalopram oxalate (LEXAPRO) tablet 10 mg  10 mg Oral QPM  
 gabapentin (NEURONTIN) capsule 100 mg  100 mg Oral TID  hydrALAZINE (APRESOLINE) tablet 100 mg  100 mg Oral TID  ferrous sulfate tablet 325 mg  325 mg Oral DAILY  lactobac ac& pc-s.therm-b.anim (TY Q/RISAQUAD)  1 Cap Oral DAILY  losartan (COZAAR) tablet 100 mg  100 mg Oral DAILY  memantine (NAMENDA) tablet 10 mg  10 mg Oral QHS  sevelamer carbonate (RENVELA) tab 800 mg  800 mg Oral PRN  
 sevelamer carbonate (RENVELA) tab 4,000 mg  4,000 mg Oral TID WITH MEALS  sodium chloride (NS) flush 5-40 mL  5-40 mL IntraVENous Q8H  
 sodium chloride (NS) flush 5-40 mL  5-40 mL IntraVENous PRN  
 acetaminophen (TYLENOL) tablet 650 mg  650 mg Oral Q4H PRN  
 naloxone (NARCAN) injection 0.4 mg  0.4 mg IntraVENous PRN  
 morphine injection 2 mg  2 mg IntraVENous Q4H PRN  
 guaiFENesin ER (MUCINEX) tablet 600 mg  600 mg Oral Q12H  
 albuterol-ipratropium (DUO-NEB) 2.5 MG-0.5 MG/3 ML  3 mL Nebulization Q1H PRN  
  albumin human 25% (BUMINATE) solution 25 g  25 g IntraVENous DIALYSIS PRPATRICIO Anaya MD

## 2019-04-23 NOTE — PROGRESS NOTES
Nephrology Progress Note Patrice Marion  
 
[email or web address]                  Phone - [phone] Patient: Lilia Sees Date- 4/23/2019 Admit Date: 4/19/2019 YOB: 1945 CC: Follow up for esrd Subjective: Interval History: On 4/20: For isolated UF today. For PRBCs today as well. Feeling better. Sob improving. bp stable. No cp, n/v/d. On 4/22:  Pt says her SOB is gone, but she still has a cough. She remains on nasal O2. On 4/23: the patient is feeling well and denies any complaints except for some discomfort in her abd (or left hip??) that comes and goes. She was transfused 1 unit PRBC's yesterday. ROS:- as above, plus: no HEENT complaints. No fever/chills. No chest pain or palpitations. No abd pain. No N/V.  ++left hip pain which she says started with the ECHO the other night. No skin rashes/lesions. Assessment: 
· esrd - [facility] · Anemia. In part secondary to renal failure, but pt may also have GI blood loss. Her Hb was 10.6 on 10/6/19. It was 7.2 on 4/20. The patient has been transfused 1 unit PRBC's. · RESP DISTRESS in part due to volume overload. Fluid removal in the outpatient unit has been limited by cramps. In the future we may need to do an occasional UF to prevent volume overload. · Pulmonary edema, chf 
· Malignant hypertension · [place] Plan: · Dialysis and labs tomorrow. Pull fluid as tolerated · Cont current BP meds Physical Exam:  
GEN: elderly woman in no distress NECK- Supple, no mass RESP: lungs mostly clear; normal resp effort CVS: RRR; no gallop or rub ABDO: soft , non tender, No mass EXT: left arm avf + NEURO: normal speech, non focal; no tremor Psych: normal affect and mood Care Plan discussed with: pt and her  Objective:  
Patient Vitals for the past 24 hrs: 
 Temp Pulse Resp BP SpO2 04/23/19 0755 98.3 °F (36.8 °C) 84 18 139/43 91 % 04/23/19 0459 97.9 °F (36.6 °C)      
04/23/19 0353 (!) 101 °F (38.3 °C) 100 16 138/46 90 % 04/22/19 2252 99.8 °F (37.7 °C) (!) 102 16 136/49 98 % 04/22/19 2134  100  (!) 153/35   
04/22/19 2000  96  (!) 134/39   
04/22/19 1945 98.1 °F (36.7 °C) 89 16 138/46 98 % 04/22/19 1930  93  138/46   
04/22/19 1915  92  139/42   
04/22/19 1900  92  151/44   
04/22/19 1845  91  169/51   
04/22/19 1830  91  169/53   
04/22/19 1829 98.4 °F (36.9 °C) 89 16 169/53 98 % 04/22/19 1816  93  162/57   
04/22/19 1815  89  177/49   
04/22/19 1800  89 16 162/49 98 % 04/22/19 1746  86 16 165/43 98 % 04/22/19 1745  86  165/43   
04/22/19 1732 98 °F (36.7 °C) 86 16 171/42 98 % 04/22/19 1730  84  170/46   
04/22/19 1717 97.9 °F (36.6 °C) 82 16 152/45 98 % 04/22/19 1715  83  152/45   
04/22/19 1700  79  161/51   
04/22/19 1645 98.3 °F (36.8 °C) 79 16 168/49 98 % Last 3 Recorded Weights in this Encounter 04/19/19 1015 04/22/19 0505 04/23/19 5084 Weight: 60.1 kg (132 lb 7.9 oz) 55.6 kg (122 lb 9.2 oz) 53.2 kg (117 lb 4.6 oz) 04/21 1901 - 04/23 0700 In: 950 [I.V.:600] Out: 2310 Chart reviewed. Pertinent Notes reviewed. Medication list  reviewed Current Facility-Administered Medications Medication  peg 3350-electrolytes (COLYTE) 4000 mL  epoetin roverto-epbx (RETACRIT) injection 20,000 Units  albuterol-ipratropium (DUO-NEB) 2.5 MG-0.5 MG/3 ML  
 aspirin chewable tablet 81 mg  
 metoprolol tartrate (LOPRESSOR) tablet 25 mg  
 0.9% sodium chloride infusion 250 mL  amLODIPine (NORVASC) tablet 10 mg  
 atorvastatin (LIPITOR) tablet 20 mg  B complex-vitaminC-folic acid (NEPHROCAP) cap  buPROPion [facility]) tablet 100 mg  
 escitalopram oxalate (LEXAPRO) tablet 10 mg  
 gabapentin (NEURONTIN) capsule 100 mg  hydrALAZINE (APRESOLINE) tablet 100 mg  ferrous sulfate tablet 325 mg  
  lactobac ac& pc-s.therm-b.anim (TY Q/RISAQUAD)  losartan (COZAAR) tablet 100 mg  
 memantine (NAMENDA) tablet 10 mg  
 sevelamer carbonate (RENVELA) tab 800 mg  
 sevelamer carbonate (RENVELA) tab 4,000 mg  
 sodium chloride (NS) flush 5-40 mL  sodium chloride (NS) flush 5-40 mL  acetaminophen (TYLENOL) tablet 650 mg  
 naloxone (NARCAN) injection 0.4 mg  
 morphine injection 2 mg  
 guaiFENesin ER (MUCINEX) tablet 600 mg  
 albuterol-ipratropium (DUO-NEB) 2.5 MG-0.5 MG/3 ML  
 albumin human 25% (BUMINATE) solution 25 g  levoFLOXacin (LEVAQUIN) 500 mg in D5W IVPB Data Review : 
Recent Labs  
  04/23/19 
4126 04/22/19 
0434 04/21/19 
0508 04/20/19 
1803 WBC 12.0* 13.4* 10.9  --   
HGB 9.6* 7.5* 7.6* 8.1*  
 267 224  --   
ANEU 9.3*  --   --   --   
 134* 134*  --   
K 4.2 4.5 3.9  --   
GLU 95 91 84  --   
BUN 31* 62* 42*  --   
CREA 4.29* 6.06* 4.88*  --   
CA 8.8 8.3* 8.4*  --   
 
Lab Results Component Value Date/Time Culture result: NO GROWTH 4 DAYS 04/19/2019 12:23 PM  
 Culture result:  03/28/2019 02:12 PM  
  NO ROUTINE ENTERIC PATHOGENS ISOLATED INCLUDING SALMONELLA, SHIGELLA, YERSINIA, VIBRIO OR SHIGA TOXIN PRODUCING E. COLI Culture result: NO GROWTH 6 DAYS 03/27/2019 06:54 PM  
 Culture result: NO GROWTH 6 DAYS 03/27/2019 06:54 PM  
 
No results found for: SDES No results for input(s): FE, TIBC, PSAT, FERR in the last 72 hours. Lab Results Component Value Date/Time Sodium,urine random 17 12/15/2015 10:02 PM  
 Creatinine, urine 42.60 03/07/2016 02:36 PM  
 Creatinine, urine 41.80 03/07/2016 02:36 PM  
 
Dulce Umaña MD 
[facility] 
 www. [email or web address] [employer] [address] [place], [address] Phone - [phone] Fax - [phone]

## 2019-04-23 NOTE — PROGRESS NOTES
Problem: Mobility Impaired (Adult and Pediatric) Goal: *Acute Goals and Plan of Care (Insert Text) Description Physical Therapy Goals Initiated 4/22/2019 1. Patient will transfer from bed to chair and chair to bed with independence using the least restrictive device within 7 day(s). 2.  Patient will perform sit to stand with independence within 7 day(s). 3.  Patient will ambulate with independence for 300 feet with the least restrictive device within 7 day(s). 4.  Patient will ascend/descend 3 stairs with 1 handrail(s) with supervision/set-up within 7 day(s). Note: PHYSICAL THERAPY TREATMENT Patient: Katie Mcintyre (85 y.o. female) Date: 4/23/2019 Diagnosis: Sepsis ([address] [A41.9] HCAP (healthcare-associated pneumonia) [J18.9] Anemia [D64.9] Hypoxia [R09.02] GI bleed [K92.2] Procedure(s) (LRB): 
COLONOSCOPY (N/A) Precautions:  falls Chart, physical therapy assessment, plan of care and goals were reviewed. ASSESSMENT: pt is unsteady at this time and needs RW until she gets stronger, does well with bed mob and toilet transfers, good motivation, did well with ther-ex, vc's for safety and proper RW use. Progression toward goals: 
?    Improving appropriately and progressing toward goals ? Improving slowly and progressing toward goals ? Not making progress toward goals and plan of care will be adjusted PLAN: 
Patient continues to benefit from skilled intervention to address the above impairments. Continue treatment per established plan of care. Discharge Recommendations:  Home Health PT for increased strength and balance Further Equipment Recommendations for Discharge:  has rollator OBJECTIVE DATA SUMMARY:  
Critical Behavior: 
Neurologic State: Alert(A&O x4 w/periods of confusion) Orientation Level: Oriented X4 Cognition: Appropriate for age attention/concentration, Follows commands, Memory loss Safety/Judgement: Awareness of environment, Good awareness of safety precautions Functional Mobility Training: 
Bed Mobility: 
Rolling: Independent Supine to Sit: Independent Scooting: Independent Level of Assistance: Independent Interventions: Verbal cues Transfers: 
Sit to Stand: Stand-by assistance Stand to Sit: Stand-by assistance Bed to Chair: Contact guard assistance Interventions: Tactile cues; Verbal cues Level of Assistance: Contact guard assistance Balance: 
Sitting: Intact; Without support Standing: Intact; With support Standing - Static: Good;Constant support Standing - Dynamic : Fair;Constant support Ambulation/Gait Training: 
Distance (ft): 200 Feet (ft) Assistive Device: Walker, rolling;Gait belt Ambulation - Level of Assistance: Contact guard assistance Gait Abnormalities: Decreased step clearance Right Side Weight Bearing: Full Left Side Weight Bearing: Full Base of Support: Narrowed Speed/Rita: Pace decreased (<100 feet/min) Step Length: Left shortened;Right shortened Therapeutic Exercises:  
sitting EXERCISE Sets Reps Active Active Assist  
Passive Comments Ankle pumps 1 10 ? ? ? bilat Heel raises 1 10 ? ? ? \" Toe tap 1 10 ? ? ? \" Knee ext 1 10 ? ? ? \" Hip flex 1 10 ? ? ? \"  
 
Pain: 
Pain Scale 1: Numeric (0 - 10) Pain Intensity 1: 0 Activity Tolerance: fair After treatment:  
?    Patient left in no apparent distress sitting up in chair ? Patient left in no apparent distress in bed 
? Call bell left within reach ? Nursing notified ? Caregiver present ? Bed alarm activated COMMUNICATION/COLLABORATION:  
The patient?s plan of care was discussed with: Registered Nurse Alida Camacho PTA Time Calculation: 25 mins

## 2019-04-23 NOTE — PROGRESS NOTES
Gastroenterology Progress Note Chandni Romo PA-C covering for Dr. Keely Burnette 
 
4/23/2019 Admit Date: 4/19/2019 Subjective: Follow up for: anemia; blood [place] Her hgb is better at 9.6.  is at bedside. Current Facility-Administered Medications Medication Dose Route Frequency  peg 3350-electrolytes (COLYTE) 4000 mL  2,000 mL Oral ONCE  
 epoetin roverto-epbx (RETACRIT) injection 20,000 Units  20,000 Units SubCUTAneous Q MON, WED & FRI  albuterol-ipratropium (DUO-NEB) 2.5 MG-0.5 MG/3 ML  3 mL Nebulization Q4H PRN  
 aspirin chewable tablet 81 mg  81 mg Oral DAILY  metoprolol tartrate (LOPRESSOR) tablet 25 mg  25 mg Oral BID  
 0.9% sodium chloride infusion 250 mL  250 mL IntraVENous PRN  
 amLODIPine (NORVASC) tablet 10 mg  10 mg Oral DAILY  atorvastatin (LIPITOR) tablet 20 mg  20 mg Oral QHS  B complex-vitaminC-folic acid (NEPHROCAP) cap  1 Cap Oral DAILY  buPROPion (WELLBUTRIN) tablet 100 mg  100 mg Oral QHS  escitalopram oxalate (LEXAPRO) tablet 10 mg  10 mg Oral QPM  
 gabapentin (NEURONTIN) capsule 100 mg  100 mg Oral TID  hydrALAZINE (APRESOLINE) tablet 100 mg  100 mg Oral TID  ferrous sulfate tablet 325 mg  325 mg Oral DAILY  lactobac ac& pc-s.therm-b.anim (TY Q/RISAQUAD)  1 Cap Oral DAILY  losartan (COZAAR) tablet 100 mg  100 mg Oral DAILY  memantine (NAMENDA) tablet 10 mg  10 mg Oral QHS  sevelamer carbonate (RENVELA) tab 800 mg  800 mg Oral PRN  
 sevelamer carbonate (RENVELA) tab 4,000 mg  4,000 mg Oral TID WITH MEALS  sodium chloride (NS) flush 5-40 mL  5-40 mL IntraVENous Q8H  
 sodium chloride (NS) flush 5-40 mL  5-40 mL IntraVENous PRN  
 acetaminophen (TYLENOL) tablet 650 mg  650 mg Oral Q4H PRN  
 naloxone (NARCAN) injection 0.4 mg  0.4 mg IntraVENous PRN  
 morphine injection 2 mg  2 mg IntraVENous Q4H PRN  
 guaiFENesin ER (MUCINEX) tablet 600 mg  600 mg Oral Q12H  albuterol-ipratropium (DUO-NEB) 2.5 MG-0.5 MG/3 ML  3 mL Nebulization Q1H PRN  
 albumin human 25% (BUMINATE) solution 25 g  25 g IntraVENous DIALYSIS PRN  
 levoFLOXacin (LEVAQUIN) 500 mg in D5W IVPB  500 mg IntraVENous Q48H Objective:  
 
Blood pressure 139/43, pulse 84, temperature 98.3 °F (36.8 °C), resp. rate 18, weight 53.2 kg (117 lb 4.6 oz), SpO2 91 %. No intake/output data recorded. 04/21 1901 - 04/23 0700 In: 950 [I.V.:600] Out: 2310 Physical Examination:  
 
 
General:AAO x 3, on NC O2 
HEENT:  EOMI, sclera anicteric Chest:   Dec BS at bases Heart: S1, S2, RRR 
GI: Soft, ND, normal bowel sounds Data Review Recent Results (from the past 24 hour(s)) CBC WITH AUTOMATED DIFF Collection Time: 04/23/19  6:20 AM  
Result Value Ref Range WBC 12.0 (H) 3.6 - 11.0 K/uL  
 RBC 3.57 (L) 3.80 - 5.20 M/uL HGB 9.6 (L) 11.5 - 16.0 g/dL HCT 32.7 (L) 35.0 - 47.0 % MCV 91.6 80.0 - 99.0 FL  
 MCH 26.9 26.0 - 34.0 PG  
 MCHC 29.4 (L) 30.0 - 36.5 g/dL  
 RDW 16.7 (H) 11.5 - 14.5 % PLATELET 378 293 - 262 K/uL MPV 9.5 8.9 - 12.9 FL  
 NRBC 0.0 0  WBC ABSOLUTE NRBC 0.00 0.00 - 0.01 K/uL NEUTROPHILS 76 (H) 32 - 75 % LYMPHOCYTES 8 (L) 12 - 49 % MONOCYTES 14 (H) 5 - 13 % EOSINOPHILS 1 0 - 7 % BASOPHILS 0 0 - 1 % IMMATURE GRANULOCYTES 1 (H) 0.0 - 0.5 % ABS. NEUTROPHILS 9.3 (H) 1.8 - 8.0 K/UL  
 ABS. LYMPHOCYTES 0.9 0.8 - 3.5 K/UL  
 ABS. MONOCYTES 1.7 (H) 0.0 - 1.0 K/UL  
 ABS. EOSINOPHILS 0.1 0.0 - 0.4 K/UL  
 ABS. BASOPHILS 0.0 0.0 - 0.1 K/UL  
 ABS. IMM. GRANS. 0.1 (H) 0.00 - 0.04 K/UL  
 DF AUTOMATED METABOLIC PANEL, BASIC Collection Time: 04/23/19  6:20 AM  
Result Value Ref Range Sodium 136 136 - 145 mmol/L Potassium 4.2 3.5 - 5.1 mmol/L Chloride 107 97 - 108 mmol/L  
 CO2 25 21 - 32 mmol/L Anion gap 4 (L) 5 - 15 mmol/L Glucose 95 65 - 100 mg/dL BUN 31 (H) 6 - 20 MG/DL  Creatinine 4.29 (H) 0.55 - 1.02 MG/DL  
 BUN/Creatinine ratio 7 (L) 12 - 20 GFR est AA 12 (L) >60 ml/min/1.73m2 GFR est non-AA 10 (L) >60 ml/min/1.73m2 Calcium 8.8 8.5 - 10.1 MG/DL Recent Labs  
  04/23/19 
6832 04/22/19 
0434 WBC 12.0* 13.4* HGB 9.6* 7.5* HCT 32.7* 26.2*  
 267 Recent Labs  
  04/23/19 
7925 04/22/19 
0434 04/21/19 
5669  134* 134* K 4.2 4.5 3.9  104 102 CO2 25 22 21 BUN 31* 62* 42* CREA 4.29* 6.06* 4.88* GLU 95 91 84 CA 8.8 8.3* 8.4* No results for input(s): SGOT, GPT, AP, TBIL, TP, ALB, GLOB, GGT, AML, LPSE in the last 72 hours. No lab exists for component: AMYP, HLPSE No results for input(s): INR, PTP, APTT in the last 72 hours. No lab exists for component: INREXT, INREXT No results for input(s): FE, TIBC, PSAT, FERR in the last 72 hours. Lab Results Component Value Date/Time Folate 18.9 03/27/2019 04:45 PM  
  
No results for input(s): PH, PCO2, PO2 in the last 72 hours. No results for input(s): CPK, CKNDX, TROIQ in the last 72 hours. No lab exists for component: CPKMB Lab Results Component Value Date/Time Cholesterol, total 116 03/30/2017 02:27 AM  
 HDL Cholesterol 55 03/30/2017 02:27 AM  
 LDL, calculated 31.2 03/30/2017 02:27 AM  
 Triglyceride 149 03/30/2017 02:27 AM  
 CHOL/HDL Ratio 2.1 03/30/2017 02:27 AM  
 
No components found for: [place] Lab Results Component Value Date/Time  Color YELLOW/STRAW 03/29/2017 05:30 PM  
 Appearance CLOUDY (A) 03/29/2017 05:30 PM  
 Specific gravity 1.020 03/29/2017 05:30 PM  
 Specific gravity 1.010 03/04/2016 09:00 PM  
 pH (UA) 7.0 03/29/2017 05:30 PM  
 Protein 100 (A) 03/29/2017 05:30 PM  
 Glucose NEGATIVE  03/29/2017 05:30 PM  
 Ketone NEGATIVE  03/29/2017 05:30 PM  
 Bilirubin NEGATIVE  03/29/2017 05:30 PM  
 Urobilinogen 0.2 03/29/2017 05:30 PM  
 Nitrites NEGATIVE  03/29/2017 05:30 PM  
 Leukocyte Esterase LARGE (A) 03/29/2017 05:30 PM  
 Epithelial cells FEW 03/29/2017 05:30 PM  
 Bacteria 4+ (A) 03/29/2017 05:30 PM  
 WBC 20-50 03/29/2017 05:30 PM  
 RBC  03/29/2017 05:30 PM  
 
XR Results (most recent): 
Results from Hospital Encounter encounter on 04/19/19 XR CHEST PORT Narrative history: Follow-up edema and pneumonia COMPARISON: 4/19/2019 FINDINGS: 
 
Frontal chest radiograph submitted for review. Decreased patchy left perihilar and right basilar consolidation with persistent 
diffuse interstitial opacities. Probable tiny effusions. No pneumothorax. No new 
lung abnormality. Stable heart size Impression IMPRESSION: 
 
Decreased patchy left perihilar and right basilar consolidation with persistent 
diffuse interstitial opacities. Probable tiny effusions ROS: -CP, SOB, Dysuria, palpitations, cough. Assessment: 
 
Active Problems: 
  Sepsis ([address] (7/28/2015) GI bleed (3/27/2019) Anemia (4/19/2019) Hypoxia (4/19/2019) HCAP (healthcare-associated pneumonia) (4/19/2019) Dyspnea (4/19/2019) Acute respiratory failure with hypoxia ([address] (4/20/2019) Plan/Discussion: · We will prep her for a colonoscopy tomorrow. Prep was not given/ordered. I spoke wi th  and RN. Prep and diet orders left in chart. · She was on for a colonoscopy today but will plan tomorrow because of above (Dr Nilo Davalos or me) · Cardiology following. Holding Eliquis, on 81 mg of ASA . Signed By: Luke Beal MD 
 
4/23/2019

## 2019-04-23 NOTE — PROGRESS NOTES
PCU SHIFT NURSING NOTE Bedside shift change report given to Radha Benites (oncoming nurse) by Ann Marie Antonio (offgoing nurse). Report included the following information SBAR, Kardex, Intake/Output, MAR and Recent Results. Shift Summary: 0755  MEWS 1  Pt in bed resting  No complaints at this time  On 7L High flow NC  NSR on monitor  NPO for colonoscopy today  A&O, periods of confusion   Pt insists on wearing briefs  RN explained risk of skin breakdown if soiled  Pt understands however states she wears them at home [facility] NP at bedside  Will hold asa d/t ? GIB  Pt was not prepped for colonoscopy yesterday, so will put on clear liquid diet and prep   
0955  Pt states she will only need 2400mg Renvela with meals as she is only on clear liquids  Administered three 800mg tabs 21   Pt  states they cannot find one of pt bilateral hearing aids  PCT located during linen change and returned to   Pt up in [address] [facility] 1103  MEWS 1  Continue to monitor [phone]  Blood culture sent to lab [phone]  MEWS 1   Pt in recliner,  at bedside [address] with Dr Sha López re:  Pt systolic BP today has been low  She will adjust metoprolol dose   Verbal order to hold 4pm dose of hydralazine 1900  Report given to [facility] Admission Date 4/19/2019 Admission Diagnosis Sepsis ([facility] [address] [A41.9] HCAP (healthcare-associated pneumonia) [J18.9] Anemia [D64.9] Hypoxia [R09.02] GI bleed [K92.2] Consults IP CONSULT TO GASTROENTEROLOGY 
IP CONSULT TO NEPHROLOGY 
IP CONSULT TO CARDIOLOGY 
IP CONSULT TO PULMONOLOGY Consults []PT []OT []Speech  
[]Case Management  
  
[] Palliative Cardiac Monitoring Order []Yes []No  
 
IV drips []Yes Drip:                            Dose: 
Drip:                            Dose: 
Drip:                            Dose:  
[]No  
 
GI Prophylaxis []Yes []No  
 
 
 
DVT Prophylaxis SCDs:  Sequential Compression Device: Bilateral  
     
 Fadi stockings: [] Medication []Contraindicated []None Activity Level Activity Level: Bed Rest   
 Activity Assistance: Partial (one person) Purposeful Rounding every 1-2 hour? []Yes Orta Score  Total Score: 4 Bed Alarm (If score 3 or >) []Yes  
[] Refused (See signed refusal form in chart) Yinka Score  Yinka Score: 14 Yinka Score (if score 14 or less) []PMT consult  
[]Wound Care consult []Specialty bed  
[] Nutrition consult Needs prior to discharge:  
Home O2 required:   
[]Yes []No  
 If yes, how much O2 required? Other:  
 Last Bowel Movement: Last Bowel Movement Date: 04/21/19 Influenza Vaccine Received Flu Vaccine for Current Season (usually Sept-March): Yes Pneumonia Vaccine Diet Active Orders Diet DIET GI LITE (POST SURGICAL) LDAs Peripheral IV 04/22/19 Right Hand (Active) Site Assessment Clean, dry, & intact 4/22/2019  9:34 PM  
Phlebitis Assessment 0 4/22/2019  9:34 PM  
Infiltration Assessment 0 4/22/2019  9:34 PM  
Dressing Status Clean, dry, & intact 4/22/2019  9:34 PM  
Dressing Type Tape;Transparent 4/22/2019  9:34 PM  
Hub Color/Line Status Yellow; Flushed 4/22/2019  9:34 PM  
Action Taken Catheter repositioned 4/22/2019  9:34 PM  
Alcohol Cap Used No 4/22/2019  9:34 PM  
      
External Female Catheter 04/19/19 (Active) Site Assessment Clean, dry, & intact 4/22/2019  9:34 PM  
Repositioned Yes 4/22/2019  9:34 PM  
Perineal Care Yes 4/22/2019  9:34 PM  
Wick Changed No 4/22/2019  9:34 PM  
Suction Canister/Tubing Changed No 4/22/2019  9:34 PM  
Urine Output (mL) 200 ml 4/21/2019  5:38 AM  
            
Urinary Catheter Intake & Output Date 04/22/19 0700 - 04/23/19 0659 04/23/19 0700 - 04/24/19 9640 Shift 0700-1859 1900-0659 24 Hour Total 0700-1859 1900-0659 24 Hour Total  
INTAKE  
I.V.(mL/kg/hr)  0(0) 0(0) Volume (0.9% sodium chloride infusion 250 mL)  0 0 Blood 350  350 Volume (TRANSFUSE PACKED RBC'S) 350  350 Shift Total(mL/kg) 350(6.3) 0(0) 350(6.6) OUTPUT Dialysis  2310 2310 NET Fluid Removed (mL)  2310 2310 Shift Total(mL/kg)  4473(94.5) 0588(41.5)  -2724 -1960 Weight (kg) 55.6 53.2 53.2 53.2 53.2 53.2 Readmission Risk Assessment Tool Score High Risk   
      
 23 Total Score 3 Has Seen PCP in Last 6 Months (Yes=3, No=0) 2 . Living with Significant Other. Assisted Living. LTAC. SNF. or  
Rehab  
 4 IP Visits Last 12 Months (1-3=4, 4=9, >4=11) 5 Pt. Coverage (Medicare=5 , Medicaid, or Self-Pay=4) 9 Charlson Comorbidity Score (Age + Comorbid Conditions) Criteria that do not apply:  
 Patient Length of Stay (>5 days = 3) Expected Length of Stay - - - Actual Length of Stay 4

## 2019-04-24 NOTE — PERIOP NOTES
Anesthesia reports 300mg Propofol, 60mg Lidocaine and 350mL NS given during procedure. Received report from anesthesia staff on vital signs and status of patient.

## 2019-04-24 NOTE — ANESTHESIA PREPROCEDURE EVALUATION
Anesthetic History No history of anesthetic complications Review of Systems / Medical History Patient summary reviewed, nursing notes reviewed and pertinent labs reviewed Pulmonary Smoker (1/4 ppd) Comments: PE in 2015 Neuro/Psych Comments: Neuropathy, feet Cardiovascular Hypertension Hyperlipidemia Exercise tolerance: >4 METS Comments: 4-19-19 ECHO:  56-60% EF, mod left pleural effusion 4--2019 EKG:  ST, LAE, LVH  
GI/Hepatic/Renal 
  
GERD: well controlled Renal disease (last dialysis this am): dialysis Comments: GI Bleed, l unit PRBCs rec'd, Hgb 8.8 Endo/Other Cancer (ovarian, 2013, sugery and chemo. No radiation) Other Findings Comments: Hx of Hyperkalemia (4.3) this am, hypomagnesium Neuropathy of bilat feet Physical Exam 
 
Airway Mallampati: I 
TM Distance: < 4 cm Neck ROM: normal range of motion Mouth opening: Normal 
 
 Cardiovascular Rhythm: regular Rate: normal 
 
 
Pertinent negatives: No murmur Dental 
 
Dentition: Upper partial plate Pulmonary Breath sounds clear to auscultation Abdominal 
GI exam deferred Other Findings Anesthetic Plan ASA: 4 Anesthesia type: total IV anesthesia Induction: Intravenous Anesthetic plan and risks discussed with: Patient Hgb 8.8 after 1 unit PRBCs,  K 4.3 this am

## 2019-04-24 NOTE — PROGRESS NOTES
PCU SHIFT NURSING NOTE Bedside shift change report given to Shahram Ureña (oncoming nurse) by Mei Meek (offgoing nurse). Report included the following information SBAR, Kardex, Intake/Output, MAR and Recent Results. Shift Summary: 0727  MEWS 1  Pt in bed resting  No complaints of pain  Currently on 1LO2 NC  Multiple watery BMs overnight in preparation for colonoscopy scheduled for 1300  A&O x 4  Up with 1 assist  Dialysis RN at bedside 0830  Per Dr Roma Parham, hold all AM meds until after colonoscopy  
0900  Pt put on RA, anesthesia at bedside evaluating pt   
1145  MEWS 1   Dialysis completed  1500mls off 
1300  Pt left unit for endoscopy 1450  Pt returned to unit  No complaints at this time 1507  MEWS 1  VSS 
1900  Report given to [facility] Admission Date 4/19/2019 Admission Diagnosis Sepsis ([address] [A41.9] HCAP (healthcare-associated pneumonia) [J18.9] Anemia [D64.9] Hypoxia [R09.02] GI bleed [K92.2] Consults IP CONSULT TO GASTROENTEROLOGY 
IP CONSULT TO NEPHROLOGY 
IP CONSULT TO CARDIOLOGY 
IP CONSULT TO PULMONOLOGY Consults []PT []OT []Speech  
[]Case Management  
  
[] Palliative Cardiac Monitoring Order []Yes []No  
 
IV drips []Yes Drip:                            Dose: 
Drip:                            Dose: 
Drip:                            Dose:  
[]No  
 
GI Prophylaxis []Yes []No  
 
 
 
DVT Prophylaxis SCDs:  Sequential Compression Device: Bilateral  
     
 Fadi stockings:     
  
[] Medication []Contraindicated []None Activity Level Activity Level: Up with Assistance Activity Assistance: Partial (one person) Purposeful Rounding every 1-2 hour? []Yes Orta Score  Total Score: 4 Bed Alarm (If score 3 or >) []Yes  
[] Refused (See signed refusal form in chart) Yinka Score  Yinka Score: 16 Yinka Score (if score 14 or less) []PMT consult  
[]Wound Care consult []Specialty bed  
[] Nutrition consult Needs prior to discharge:  
Home O2 required:   
[]Yes []No  
 If yes, how much O2 required? Other:  
 Last Bowel Movement: Last Bowel Movement Date: 04/24/19 Influenza Vaccine Received Flu Vaccine for Current Season (usually Sept-March): Yes Pneumonia Vaccine Diet Active Orders Diet DIET NPO With Lisa and Wes LDAs Peripheral IV 04/22/19 Right Hand (Active) Site Assessment Clean, dry, & intact 4/24/2019  4:00 AM  
Phlebitis Assessment 0 4/24/2019  4:00 AM  
Infiltration Assessment 0 4/24/2019  4:00 AM  
Dressing Status Clean, dry, & intact 4/24/2019  4:00 AM  
Dressing Type Tape;Transparent 4/24/2019  4:00 AM  
Hub Color/Line Status Yellow; Flushed;Capped 4/24/2019  4:00 AM  
Action Taken Other (comment) 4/24/2019  4:00 AM  
Alcohol Cap Used Yes 4/24/2019  4:00 AM  
      
External Female Catheter 04/19/19 (Active) Site Assessment Clean, dry, & intact 4/22/2019  9:34 PM  
Repositioned Yes 4/22/2019  9:34 PM  
Perineal Care Yes 4/22/2019  9:34 PM  
Wick Changed No 4/22/2019  9:34 PM  
Suction Canister/Tubing Changed No 4/22/2019  9:34 PM  
Urine Output (mL) 200 ml 4/21/2019  5:38 AM  
            
Urinary Catheter Intake & Output Date 04/23/19 0700 - 04/24/19 0659 04/24/19 0700 - 04/25/19 6161 Shift 7365-1336 2164-9192 24 Hour Total 8988-3854 1535-1539 24 Hour Total  
INTAKE  
P.O. 1200  1200     
  P. O. 1200  1200 Shift Total(mL/kg) 1200(22.6)  1200(22.6) OUTPUT Stool Stool Occurrence(s)  4 x 4 x Shift Total(mL/kg) NET 1200  1200 Weight (kg) 53.2 53 53 53 53 53 Readmission Risk Assessment Tool Score High Risk   
      
 23 Total Score 3 Has Seen PCP in Last 6 Months (Yes=3, No=0) 2 . Living with Significant Other. Assisted Living. LTAC. SNF. or  
Rehab  
 4 IP Visits Last 12 Months (1-3=4, 4=9, >4=11) 5 Pt. Coverage (Medicare=5 , Medicaid, or Self-Pay=4) 9 Charlson Comorbidity Score (Age + Comorbid Conditions) Criteria that do not apply:  
 Patient Length of Stay (>5 days = 3) Expected Length of Stay - - - Actual Length of Stay 5

## 2019-04-24 NOTE — PERIOP NOTES
TRANSFER - OUT REPORT: 
 
Verbal report given to Jane(name) on [facility]  being transferred to [facility](unit) for routine progression of care Report consisted of patients Situation, Background, Assessment and  
Recommendations(SBAR). Information from the following report(s) Procedure Summary was reviewed with the receiving nurse. Lines:  
Peripheral IV 04/22/19 Right Hand (Active) Site Assessment Clean, dry, & intact 4/24/2019  1:04 PM  
Phlebitis Assessment 0 4/24/2019  1:04 PM  
Infiltration Assessment 0 4/24/2019  1:04 PM  
Dressing Status Clean, dry, & intact 4/24/2019  1:04 PM  
Dressing Type Transparent;Tape 4/24/2019  1:04 PM  
Hub Color/Line Status Yellow 4/24/2019  1:04 PM  
Action Taken Other (comment) 4/24/2019  4:00 AM  
Alcohol Cap Used Yes 4/24/2019  4:00 AM  
  
 
Opportunity for questions and clarification was provided.    
 
Patient transported with:

## 2019-04-24 NOTE — PROGRESS NOTES
Bedside and Verbal shift change report given to Tara Delacruz RN  (oncoming nurse) by Abram Collado (offgoing nurse). Report included the following information SBAR, Kardex, ED Summary, Intake/Output, MAR, Recent Results, Med Rec Status and Cardiac Rhythm NSR. Patient had several BM's through out the night. None which were clear. 0500. Weaning O2 through out the night. (See flowsheet for details.) Room air. Patient went as low as 89% when sleeping. Encouraged patient to deep breath and went up to 98%.

## 2019-04-24 NOTE — H&P
Pre-endoscopy H and P The patient was seen and examined in the room/pre-op holding area. The airway was assessed and documented. The problem list, past medical history, and medications were reviewed. Patient Active Problem List  
Diagnosis Code  Abdominal pain R10.9  E-coli UTI N39.0, B96.20  Continuous severe abdominal pain R10.9  Enteritis K52.9  Adrenal hemorrhage (HCC) E27.49  Chronic renal insufficiency, stage V ([facility]) N18.5  Sepsis ([address] A41.9  Acute pancreatitis K85.90  Protein malnutrition ([address] E46  Small bowel perforation ([facility]) K63.1  Acute renal failure with lesion of tubular necrosis ([facility]) N17.0  Anemia of renal disease N18.9, D63.1  SIRS (systemic inflammatory response syndrome) ([facility]) R65.10  
 HTN (hypertension) I10  
 History of peritonitis Z87.19  
 Physical debility R53.81  Chronic anticoagulation Z79.01  
 History of pulmonary embolism Z86.711  
 History of ovarian cancer Z85.43  
 Pericardial effusion I31.3  Diarrhea R19.7  Moderate protein-calorie malnutrition (HCC) E44.0  Pericarditis with effusion I31.9  Hypertension, uncontrolled I10  
 ALAYNA (acute kidney injury) ([address] N17.9  Acute on chronic renal failure (HCC) N17.9, N18.9  Generalized rash R21  
 Heme positive stool R19.5  Electrolyte abnormality E87.8  Bilateral carotid artery stenosis I65.23  
 Acute renal failure (ARF) ([facility]) N17.9  Abnormal MRI of head R93.0  Stenosis of both internal carotid arteries I65.23  
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  
 Acute encephalopathy G93.40  Stenosis of left carotid artery without cerebral infarction I65.22  
 Disturbance of memory R41.3  Anxiety F41.9  Pneumonia J18.9  Thrombocytopenia ([address] D69.6  Hypertension I10  
 GI bleed K92.2  
 ESRD (end stage renal disease) ([address] N18.6  Fever R50.9  Anemia D64.9  Hypoxia R09.02  
 HCAP (healthcare-associated pneumonia) J18.9  Dyspnea R06.00  
 Acute respiratory failure with hypoxia (HCC) J96.01 Social History Socioeconomic History  Marital status:  Spouse name: Not on file  Number of children: Not on file  Years of education: Not on file  Highest education level: Not on file Occupational History  Not on file Social Needs  Financial resource strain: Not on file  Food insecurity:  
  Worry: Not on file Inability: Not on file  Transportation needs:  
  Medical: Not on file Non-medical: Not on file Tobacco Use  Smoking status: Current Every Day Smoker Packs/day: 1.00 Last attempt to quit: 2012 Years since quittin.2  Smokeless tobacco: Never Used Substance and Sexual Activity  Alcohol use: No  
 Drug use: Yes Types: Prescription, OTC  Sexual activity: Never Lifestyle  Physical activity:  
  Days per week: Not on file Minutes per session: Not on file  Stress: Not on file Relationships  Social connections:  
  Talks on phone: Not on file Gets together: Not on file Attends [employer]ism service: Not on file Active member of club or organization: Not on file Attends meetings of clubs or organizations: Not on file Relationship status: Not on file  Intimate partner violence:  
  Fear of current or ex partner: Not on file Emotionally abused: Not on file Physically abused: Not on file Forced sexual activity: Not on file Other Topics Concern  Not on file Social History Narrative  Not on file Past Medical History:  
Diagnosis Date  Chronic kidney disease Stage 5 (16 BUN 79, Creatnine 4.53, K 6) Dr. Harrison Tavera [phone]  GERD (gastroesophageal reflux disease)   
 well controlled with Rx   
 High cholesterol  Hyperkalemia  Hypertension  Hypomagnesemia   
 on daily Magnesium  Neuropathy bilateral feet  Ovarian cancer ([facility] [address] 2013 Hysterectomy with chemo, no radiation:  Dr. Sharon Holloway  Thromboembolus ([facility] [address] 9/2015  
 blood clot in lung 9/2015  [facility]  
 in kidney \"many years ago\" Prior to Admission Medications Prescriptions Last Dose Informant Patient Reported? Taking? ELIQUIS 2.5 mg tablet 4/19/2019 at Unknown time Significant Other No Yes Sig: TAKE 1 TABLET TWICE A DAY TO PREVENT BLOOD CLOTS  
IRON, CARBONYL PO 4/19/2019 at Unknown time Significant Other Yes Yes Sig: Take 280 mg by mouth daily. L. acidoph & paracasei- S therm- Bifido (TY-Q/RISAQUAD) 8 billion cell cap cap 4/19/2019 at Unknown time Significant Other Yes Yes Sig: Take 1 Cap by mouth daily. acetaminophen (TYLENOL) 500 mg tablet 4/19/2019 at Unknown time Significant Other Yes Yes Sig: Take 1,000 mg by mouth every six (6) hours as needed for Pain. amLODIPine (NORVASC) 10 mg tablet 4/19/2019 at Unknown time Significant Other Yes Yes Sig: Take 10 mg by mouth daily. atorvastatin (LIPITOR) 20 mg tablet 4/18/2019 at Unknown time Significant Other No Yes Sig: Take 1 Tab by mouth nightly. b complex-vitamin c-folic acid (NEPHROCAPS) 1 mg capsule 4/19/2019 at Unknown time Significant Other Yes Yes Sig: Take 1 Cap by mouth every Monday, Wednesday, Friday. buPROPion (WELLBUTRIN) 100 mg tablet 4/18/2019 at Unknown time Significant Other Yes Yes Sig: Take 100 mg by mouth nightly. carboxymethylcellulose sodium (REFRESH LIQUIGEL) 1 % dlgl ophthalmic solution 4/18/2019 at Unknown time Significant Other Yes Yes Sig: Administer 1 Drop to both eyes daily as needed. diphenoxylate-atropine (LOMOTIL) 2.5-0.025 mg per tablet 4/17/2019 at Unknown time Significant Other No Yes Sig: Take 1 Tab by mouth four (4) times daily as needed for Diarrhea. Max Daily Amount: 4 Tabs. escitalopram oxalate (LEXAPRO) 10 mg tablet 4/18/2019 at Unknown time Significant Other No Yes Sig: TAKE 1 TABLET AT BEDTIME TO HELP PREVENT ANXIETY  
famotidine (PEPCID) 20 mg tablet 4/18/2019 at Unknown time Significant Other No Yes Sig: TAKE 1 TABLET BY MOUTH EVERY DAY  
gabapentin (NEURONTIN) 100 mg capsule 4/19/2019 at Unknown time Significant Other No Yes Sig: TAKE 1 CAPSULE BY MOUTH TWICE A DAY FOR NERVE PAIN  
hydrALAZINE (APRESOLINE) 100 mg tablet 4/19/2019 at Unknown time Significant Other Yes Yes Sig: Take 100 mg by mouth three (3) times daily. losartan (COZAAR) 100 mg tablet 4/18/2019 at Unknown time Significant Other No Yes Sig: TAKE 1 TABLET EVERY MORNING Patient taking differently: TAKE 1 TABLET EVERY night  
memantine (NAMENDA) 10 mg tablet 4/18/2019 at Unknown time Significant Other Yes Yes Sig: Take 10 mg by mouth nightly. ondansetron (ZOFRAN ODT) 4 mg disintegrating tablet 4/19/2019 at Unknown time Significant Other No Yes Sig: TAKE 1 TABLET EVERY 8 HOURS IF NEEDED FOR NAUSEA  
oxybutynin (DITROPAN) 5 mg tablet 4/19/2019 at Unknown time Significant Other No Yes Sig: TAKE 1 TABLET BY MOUTH TWICE A DAY  
polyethylene glycol (MIRALAX) 17 gram/dose powder 4/19/2019 at Unknown time Significant Other Yes Yes Sig: Take 17 g by mouth daily. pramipexole (MIRAPEX) 0.25 mg tablet 4/19/2019 at Unknown time Significant Other Yes Yes Sig: TAKE 1 TABLET BEFORE EACH DIALYSIS  MWF  
sevelamer carbonate (RENVELA) 800 mg tab tab 4/19/2019 at Unknown time Significant Other Yes Yes Sig: Take 4,000 mg by mouth three (3) times daily (with meals). 5 with meals and 1 with snacks  
trolamine salicylate (ASPERCREME EX) 4/18/2019 at Unknown time Significant Other Yes Yes Sig: by Apply Externally route daily. Facility-Administered Medications: None The review of systems is:  Negative  for shortness of breath or chest pain The heart, and mental status were satisfactory for the administration of deep sedation and for the procedure. I discussed with the patient the objectives, risks, consequences and alternatives to the procedure.    
 
Rafi Felix MD 
4/24/2019 
1:02 PM

## 2019-04-24 NOTE — DIALYSIS
HD TRANSFER - OUT REPORT: 
 
Verbal report given to Mariana Villagomezamina  on Christofer Ventura for ordered procedure Report consisted of patient's Situation, Background, Assessment and  
Recommendations(SBAR). Information from the following report(s) SBAR was reviewed with the receiving nurse. Method:  $$ Method: Hemodialysis (04/24/19 0830) Fluid Removed  NET Fluid Removed (mL): 1500 ml (04/24/19 1129) Patient response to treatment:  Stable End Time  Hemodialysis End Time: 1130 (04/24/19 1129) If not documented, dialysis nurse to update post-dialysis row in HD/Filtration flowsheet Medications /Volume expansion agents or Fluid boluses administered during treatment? no 
 
Post-dialysis medication administration due?  yes Remind nurse to administer post-HD medication upon return to unit. Graft hemostasis? yes Line heparinization? no 
 
 
Opportunity for questions and clarification was provided.

## 2019-04-24 NOTE — DIALYSIS
[facility] Dialysis Team [place]  [phone] Vitals   Pre   Post   Assessment   Pre   Post    
Temp  Temp: 99.2 °F (37.3 °C) (04/24/19 0830)  98.2 LOC  A&Ox3 A&Ox3 HR   Pulse (Heart Rate): 81 (04/24/19 0830) 85 Lungs   Clear/Diminished  Clear/Diminished B/P   BP: (!) 136/36 (04/24/19 0830) 132/34 Cardiac   Sinus Sinus Resp   Resp Rate: 18 (04/24/19 0830) 18 Skin   Warm, Dry and intact  Warm, dry and intact Pain level  Pain Intensity 1: 0 (04/24/19 0727) 0 Edema  Trace Trace Orders: Duration:   Start:    0830 End:    1130 Total:   3 Dialyzer:   Dialyzer/Set Up Inspection: Kevin Horn (04/24/19 0830) K Bath:   Dialysate K (mEq/L): 2 (04/24/19 0830) Ca Bath:   Dialysate CA (mEq/L): 2.5 (04/24/19 0830) Na/Bicarb:   Dialysate NA (mEq/L): 138 (04/24/19 0830) Target Fluid Removal:   Goal/Amount of Fluid to Remove (mL): 2000 mL (04/22/19 1645) Access Type & Location:   RAYNA Graft; no redness/tenderness/signs of infection. Entire site cleansed with 1 alcohol wipe, then each insertion site cleansed with 4 alcohol wipes per P&P. Graft accessed with 15G needles with no complications. Labs Obtained/Reviewed Critical Results Called   Date when labs were drawn- 
Hgb-   
HGB Date Value Ref Range Status 04/23/2019 9.6 (L) 11.5 - 16.0 g/dL Final  
 
K-   
Potassium Date Value Ref Range Status 04/23/2019 4.2 3.5 - 5.1 mmol/L Final  
 
Ca-  
Calcium Date Value Ref Range Status 04/23/2019 8.8 8.5 - 10.1 MG/DL Final  
 
Bun-  
BUN Date Value Ref Range Status 04/23/2019 31 (H) 6 - 20 MG/DL Final  
  Comment:  
  INVESTIGATED PER DELTA CHECK PROTOCOL Creat-  
Creatinine Date Value Ref Range Status 04/23/2019 4.29 (H) 0.55 - 1.02 MG/DL Final  
  Comment:  
  INVESTIGATED PER DELTA CHECK PROTOCOL Medications/ Blood Products Given Name   Dose   Route and Time Venofer  100mg Dialysis;0936 Blood Volume Processed (BVP):    77 Net Fluid Removed:  1500 Comments Time Out Done: 0919 Primary Nurse Rpt Pre: Shasta Armendariz Primary Nurse Rpt Post:Jane Newby Pt Education: Rotating insertion site Care Plan:Continue current HD plan Tx Summary: 
0830-Treatment initiated per MD orders 0840-Marce Settles in to see patient 0845-Patient resting. Access/Lines visible and secure. 0900-Patient resting. Access/Lines visible and secure. 0915-Patient resting. Access/Lines visible and secure. 0930-Patient resting. Access/Lines visible and secure; UFR reduced due to 20 point drop in BP. 
0945-Patient resting. Access/Lines visible and secure. 1000-Patient resting. Access/Lines visible and secure. 1015-Patient resting. Access/Lines visible and secure. 1030-Patient resting. Access/Lines visible and secure. 1045-Patient resting. Access/Lines visible and secure. 1100-Patient resting. Access/Lines visible and secure. 1115-Patient resting. Access/Lines visible and secure. 1130- Treatment completed without any further complications. Hemostasis achieved at both access sites. All dialysis related medications have been reviewed. Admiting Diagnosis:Respiratory Distress/SOB Pt's previous clinic-[facility] Consent signed - Informed Consent Verified: Yes (04/22/19 1222) [facility] Consent - Yes Hepatitis Status- HBsAg (-) 4/19/19 Machine #- Machine Number: [id number] (04/24/19 0830) Telemetry status-Remote Pre-dialysis wt. - Pre-Dialysis Weight: 60.1 kg (132 lb 7.9 oz) (04/20/19 1321)

## 2019-04-24 NOTE — PROCEDURES
Colonoscopy Procedure Note Yasmine Gin 1945 
[id number] Indications:  Please see below. Pre-operative Diagnosis: hematochezia Post-operative Diagnosis: multiple colon polyps, adhesions,ineternal hemorrhoids : Andi Garcia. Madison Gay MD 
 
Referring Provider: Lawyer Nadia MD 
 
Sedation:  MAC anesthesia Propofol Procedure Details: After detailed informed consent was obtained with all risks and benefits of procedure explained and preoperative exam completed, the patient was taken to the endoscopy suite and placed in the left lateral decubitus position. Upon sequential sedation as per above, a digital rectal exam was performed  And was normal.  The Olympus videocolonoscope  was inserted in the rectum and carefully advanced to the cecum, which was identified by the ileocecal valve. The quality of preparation was poor. The colonoscope was slowly withdrawn with careful evaluation between folds. Retroflexion in the rectum was performed. Findings: · Colon is not well prepped with gelatinous green material mixed with stool. · Cecal prep is poor. · Colon is redundant and is suggestive of adhesive pathology(pasing scope is not easy). · Numerous polyps from 6 mm to 1.8 cm in size are seen in the ascending colon/hepatic flexure are(6-7),transverse colon polyp (4-5 polyps), sigmoid colon (4-5 polyps) andrecto sigmoid polyps(3-4): All olyps were removed with a hot snare. Some lost in stool puddles. · Internal hemorrhoids seen. · There is no blood/or bleeding noted in the colon Therapies:  ~20 complete polypectomies were performed using hot snare  and the polyps were  retrieved Specimen:  Specimens were collected as described above and sent to pathology. Complications: None were encountered during the procedure. EBL:  None. Recommendations: 
 
 -Await pathology. 
-Return to floor. 
-Start antiplatelets in 3-4 day's time. -Complex, tortuous, with likely adhesions, colon with high risk of complication/s in the future. Consider risk/benefit ratio in the future before proceeding. Shane Walton MD 
4/24/2019  1:57 PM

## 2019-04-24 NOTE — PERIOP NOTES
Dentures returned to pt. Attempted to give report X 2 to floor nurse, no anwer. Will continue to try

## 2019-04-24 NOTE — PERIOP NOTES
TRANSFER - IN REPORT: 
 
Verbal report received from Jane(name) on [facility] [employer] [facility]  being received from 2262(unit) for ordered procedure Report consisted of patients Situation, Background, Assessment and  
Recommendations(SBAR). Information from the following report(s) SBAR was reviewed with the receiving nurse. Opportunity for questions and clarification was provided. Assessment completed upon patients arrival to unit and care assumed.

## 2019-04-24 NOTE — ANESTHESIA POSTPROCEDURE EVALUATION
Procedure(s): 
COLONOSCOPY 
ENDOSCOPIC POLYPECTOMY. total IV anesthesia Anesthesia Post Evaluation Patient location during evaluation: PACU Note status: Adequate. Level of consciousness: responsive to verbal stimuli and sleepy but conscious Pain management: satisfactory to patient Airway patency: patent Anesthetic complications: no 
Cardiovascular status: acceptable Respiratory status: acceptable Hydration status: acceptable Comments: +Post-Anesthesia Evaluation and Assessment Patient: Shruti Amos MRN: [id number]  SSN: [id number] YOB: 1945  Age: 75 y.o. Sex: female Cardiovascular Function/Vital Signs /44 (BP 1 Location: Right arm, BP Patient Position: At rest)   Pulse 97   Temp 36.8 °C (98.3 °F)   Resp 18   Wt 53 kg (116 lb 13.5 oz)   SpO2 96%   Breastfeeding? No   BMI 17.25 kg/m² Patient is status post Procedure(s): 
COLONOSCOPY 
ENDOSCOPIC POLYPECTOMY. Nausea/Vomiting: Controlled. Postoperative hydration reviewed and adequate. Pain: 
Pain Scale 1: Numeric (0 - 10) (04/24/19 1439) Pain Intensity 1: 0 (04/24/19 1439) Managed. Neurological Status: At baseline. Mental Status and Level of Consciousness: Arousable. Pulmonary Status:  
O2 Device: Room air (04/24/19 1507) Adequate oxygenation and airway patent. Complications related to anesthesia: None Post-anesthesia assessment completed. No concerns. Signed By: Roger Rios DO  
 4/24/2019 Post anesthesia nausea and vomiting:  controlled Vitals Value Taken Time /44 4/24/2019  3:07 PM  
Temp 36.8 °C (98.3 °F) 4/24/2019  3:07 PM  
Pulse 99 4/24/2019  3:20 PM  
Resp 18 4/24/2019  3:07 PM  
SpO2 94 % 4/24/2019  3:20 PM  
Vitals shown include unvalidated device data.

## 2019-04-24 NOTE — DIALYSIS
TRANSFER - IN REPORT: 
 
Verbal report received from Marquise Bird on [employer] Marshal for ordered procedure Report consisted of patients Situation, Background, Assessment and  
Recommendations(SBAR). Information from the following report(s) SBAR was reviewed with the receiving nurse. Opportunity for questions and clarification was provided. Assessment completed upon arrival and care assumed.

## 2019-04-24 NOTE — PROGRESS NOTES
Hospitalist Progress Note NAME: Yasmine Jaramillo :  1945 MRN:  [id number] Assessment / Plan: 
Acute resp failure with Hypoxia POA Secondary to HCAP and pulmonary edema Sepsis POA 
- weaned off from supplemental O2. RA O2 sat 96% CTA of chest: 1. No pulmonary embolism. 2. Acute bilateral airspace disease superimposed on chronic lung disease. 3. New pleural effusions. 4. Progressive adenopathy, with neoplasia not excluded 
- continue with IV Levo for 7 days which was started on admission. Added Linezolid IV which will complete 7 days also (allergic to Vanc) - Afebrile overnight WBC down to 14k Blood cx () no growth  
  blood cx from  no growth ESRD on HD MWF 
- pulmonary edema improved with HD 
  CXR (): Decreased patchy left perihilar and right basilar consolidation with persistent diffuse interstitial opacities. Probable tiny effusions - appreciate nephrology input; continue HD on M,W, F Chest Pain with elevated troponins POA Malignant hypertension Hyperlipidemia - appreciate cardiology input;  
  Echo: EF 55-60% Continue with ASA (hold due to GIB), Norvasc, losartan, BB, and hydrlazine - elevated troponin and BNP secondary to pulmonary edema 
  
Hx of iron deficient Anemia Anemia of chronic disease 
- continue with iron supplement Continue with epo GI bleed 
- received 2 units of PRBC for hgb 7.2 on admission Hgb 8.8 this am 
- appreciate GI input; colonscopy done on  revealed multiple colon polyps, adhesions, and internal hemorrhoids Holding Eliquis and ASA; will resume 3-4 days per GI Chronic PE/VTE  
- on Eliquis PTA now held due to GI bleed - s/p IVF filter Current Smoker  
- advise cessation. Nicotine patch when she is ready Dementia 
- continue namenda;  was very upset with worsening of confusion in the evening time.  Discussed about worsening mentation secondary to being hospitalization, excessive noise, and constant new people in and out of her room. - will consider sitter or tele sitter if gets worse Code Status: full code 
  
DVT Prophylaxis: scd's GI Prophylaxis: not indicated 
  
Baseline: home w/family , drives at home, not on oxygen at home,lives with  Disposition: TBD Subjective: Chief Complaint / Reason for Physician Visit: F/U resp failure/SOB, CHF, ESRD, Anemia, ? GI bleed \"I am much better\". Discussed with RN events overnight. Review of Systems: 
Symptom Y/N Comments  Symptom Y/N Comments Fever/Chills n   Chest Pain n   
Poor Appetite n   Edema y Cough n   Abdominal Pain n   
Sputum n   Joint Pain SOB/MONTANEZ n   Pruritis/Rash Nausea/vomit n   Tolerating PT/OT y Diarrhea n   Tolerating Diet Constipation    Other Could NOT obtain due to:   
 
Objective: VITALS:  
Last 24hrs VS reviewed since prior progress note. Most recent are: 
Patient Vitals for the past 24 hrs: 
 Temp Pulse Resp BP SpO2  
04/24/19 1507 98.3 °F (36.8 °C) 97 18 128/44 96 % 04/24/19 1439  95 20 117/46 97 % 04/24/19 1432  89 12 115/46 94 % 04/24/19 1430  92 22 106/41 95 % 04/24/19 1424  92 23 122/49 94 % 04/24/19 1419  90 19 113/42 94 % 04/24/19 1414 98.1 °F (36.7 °C) 92 13 (!) 123/35 97 % 04/24/19 1408  90 27 118/44 98 % 04/24/19 1404  90 21 107/42 100 % 04/24/19 1400  92 18 111/47 100 % 04/24/19 1302  98 18  96 % 04/24/19 1145 98.2 °F (36.8 °C) 88 18 (!) 132/34 98 % 04/24/19 1129  88  (!) 103/35 100 % 04/24/19 1115  89  (!) 111/35 99 % 04/24/19 1102  94  99/70 100 % 04/24/19 1045  90  (!) 113/38 98 % 04/24/19 1031  93   99 % 04/24/19 1030  93  112/43 99 % 04/24/19 1027  92   98 % 04/24/19 1025  90   97 % 04/24/19 1024  92   97 % 04/24/19 1021  90   97 % 04/24/19 1020  92   98 % 04/24/19 1018  90   99 % 04/24/19 1016  89   98 % 04/24/19 1015  92  111/41 97 % 04/24/19 1013  90   96 % 04/24/19 1011  93   97 % 04/24/19 1009  89   97 % 04/24/19 1007  88   98 % 04/24/19 1006  88   98 % 04/24/19 1004  89   96 % 04/24/19 1003  91   97 % 04/24/19 1001  89   97 % 04/24/19 1000  90  (!) 108/39 96 % 04/24/19 0945  87  (!) 110/36 96 % 04/24/19 0936  88  (!) 116/39 98 % 04/24/19 0930  88  105/41 98 % 04/24/19 0915  87  (!) 125/38 96 % 04/24/19 0900  87  133/43 97 % 04/24/19 0845  82  141/43 100 % 04/24/19 0830 99.2 °F (37.3 °C) 81 18 (!) 136/36 99 % 04/24/19 0727 98.2 °F (36.8 °C) 80 18 (!) 143/33 100 % 04/24/19 0500     95 % 04/24/19 0350 98.2 °F (36.8 °C) 86 16 127/40 100 % 04/23/19 2313 97.8 °F (36.6 °C) 81 16 131/48 100 % 04/23/19 2000     99 % 04/23/19 1953 98.2 °F (36.8 °C) 98 18 (!) 142/33 95 % 04/23/19 1755    138/40  Intake/Output Summary (Last 24 hours) at 4/24/2019 1543 Last data filed at 4/24/2019 1356 Gross per 24 hour Intake 1070 ml Output 1500 ml Net -430 ml PHYSICAL EXAM: 
General: Ill appearing. Alert, cooperative, no acute distres  
EENT:  EOMI. Anicteric sclerae. Dry mucous membrane, [place] Resp:  Clear in apex with decreased breath sounds at bases,  No accessory muscle use CV:  Regular  rhythm,  1+ edema GI:  Soft, Non distended, Non tender.  +Bowel sounds Neurologic:  Alert and oriented X 3, normal speech, Psych:   Fair insight. Not anxious nor agitated Skin:  No rashes. No jaundice Reviewed most current lab test results and cultures  YES Reviewed most current radiology test results   YES Review and summation of old records today    NO Reviewed patient's current orders and MAR    YES 
PMH/ reviewed - no change compared to H&P 
________________________________________________________________________ Care Plan discussed with: 
  Comments Patient y Family  y  at bedside BRIE damon   
 Care Manager y Consultant     
                 y Multidiciplinary team rounds were held today with , nursing, pharmacist and clinical coordinator. Patient's plan of care was discussed; medications were reviewed and discharge planning was addressed. ________________________________________________________________________ Total NON critical care TIME:  25 Minutes Total CRITICAL CARE TIME Spent:   Minutes non procedure based Comments >50% of visit spent in counseling and coordination of care    
________________________________________________________________________ Betty Allen NP Procedures: see electronic medical records for all procedures/Xrays and details which were not copied into this note but were reviewed prior to creation of Plan. LABS: 
I reviewed today's most current labs and imaging studies. Pertinent labs include: 
Recent Labs  
  04/24/19 
0838 04/23/19 
0620 04/22/19 
0434 WBC 14.1* 12.0* 13.4* HGB 8.8* 9.6* 7.5* HCT 30.4* 32.7* 26.2*  
 292 267 Recent Labs  
  04/24/19 
7126 04/23/19 
8820 04/22/19 
0434  136 134* K 4.3 4.2 4.5  
 107 104 CO2 23 25 22 GLU 94 95 91 BUN 58* 31* 62* CREA 5.95* 4.29* 6.06* CA 9.0 8.8 8.3* PHOS 4.7  --   --   
ALB 2.5*  --   --   
 
 
Signed: Betty Allen NP

## 2019-04-24 NOTE — PROGRESS NOTES
Nephrology Progress Note Patrice Marion  
 
[facility]. [facility]                  Phone - [phone] Patient: Brittany Cooley Date- 4/24/2019 Admit Date: 4/19/2019 YOB: 1945 CC: Follow up for ESRD Subjective: Interval History:  
-  SEEN ON HD Sob improved Bp stable No fever Plan for c- scope noted ROS:- as above Assessment:  
· ESRD - mwf - [facility] [place] · Anemia of ckd · RESP DISTRESS -Pulmonary edema, chf 
· Malignant hypertension · Anemia · Sec. Krystle Taylor Plan:  
· Seen on hd today · Remove 2 kg · Hold bp meds this am 
· epogen and venofer mwf Physical Exam:  
GEN:  NAD NECK:  Supple, no thyromegaly RESP: CTA  b/l, no  wheezing, CVS: RRR,S1,S2 ABDO:  soft , non tender, No mass NEURO: non focal, normal speech EXT: left arm avf + Care Plan discussed with: pt, nurse,  Objective:  
Patient Vitals for the past 24 hrs: 
 Temp Pulse Resp BP SpO2  
04/24/19 0727 98.2 °F (36.8 °C) 80 18 (!) 143/33 100 % 04/24/19 0500     95 % 04/24/19 0350 98.2 °F (36.8 °C) 86 16 127/40 100 % 04/23/19 2313 97.8 °F (36.6 °C) 81 16 131/48 100 % 04/23/19 2000     99 % 04/23/19 1953 98.2 °F (36.8 °C) 98 18 (!) 142/33 95 % 04/23/19 1755    138/40   
04/23/19 1543 98.4 °F (36.9 °C) 71 20 (!) 131/39 94 % 04/23/19 1103 98.1 °F (36.7 °C) 85 18 (!) 149/36 100 % Last 3 Recorded Weights in this Encounter 04/22/19 0505 04/23/19 3194 04/24/19 6484 Weight: 55.6 kg (122 lb 9.2 oz) 53.2 kg (117 lb 4.6 oz) 53 kg (116 lb 13.5 oz) 04/22 1901 - 04/24 0700 In: 1200 [P.O.:1200] Out: 2310 Chart reviewed. Pertinent Notes reviewed. Medication list  reviewed Current Facility-Administered Medications Medication  levoFLOXacin (LEVAQUIN) tablet 500 mg  
 metoprolol tartrate (LOPRESSOR) tablet 12.5 mg  
 linezolid in dextrose 5% (ZYVOX) IVPB premix in D5W 600 mg  
  epoetin roverto-epbx (RETACRIT) injection 20,000 Units  albuterol-ipratropium (DUO-NEB) 2.5 MG-0.5 MG/3 ML  
 aspirin chewable tablet 81 mg  
 0.9% sodium chloride infusion 250 mL  amLODIPine (NORVASC) tablet 10 mg  
 atorvastatin (LIPITOR) tablet 20 mg  B complex-vitaminC-folic acid (NEPHROCAP) cap  buPROPion [facility]) tablet 100 mg  
 escitalopram oxalate (LEXAPRO) tablet 10 mg  
 gabapentin (NEURONTIN) capsule 100 mg  hydrALAZINE (APRESOLINE) tablet 100 mg  ferrous sulfate tablet 325 mg  
 lactobac ac& pc-s.therm-b.anim (TY Q/RISAQUAD)  losartan (COZAAR) tablet 100 mg  
 memantine (NAMENDA) tablet 10 mg  
 sevelamer carbonate (RENVELA) tab 800 mg  
 sevelamer carbonate (RENVELA) tab 4,000 mg  
 sodium chloride (NS) flush 5-40 mL  sodium chloride (NS) flush 5-40 mL  acetaminophen (TYLENOL) tablet 650 mg  
 naloxone (NARCAN) injection 0.4 mg  
 morphine injection 2 mg  
 guaiFENesin ER (MUCINEX) tablet 600 mg  
 albuterol-ipratropium (DUO-NEB) 2.5 MG-0.5 MG/3 ML  
 albumin human 25% (BUMINATE) solution 25 g Data Review : 
Recent Labs  
  04/23/19 
7987 04/22/19 
0434 WBC 12.0* 13.4* HGB 9.6* 7.5*  267 ANEU 9.3*  --   
 134* K 4.2 4.5 GLU 95 91 BUN 31* 62* CREA 4.29* 6.06* CA 8.8 8.3* Lab Results Component Value Date/Time Culture result: NO GROWTH AFTER 19 HOURS 04/23/2019 12:21 PM  
 Culture result: NO GROWTH 5 DAYS 04/19/2019 12:23 PM  
 Culture result:  03/28/2019 02:12 PM  
  NO ROUTINE ENTERIC PATHOGENS ISOLATED INCLUDING SALMONELLA, SHIGELLA, YERSINIA, VIBRIO OR SHIGA TOXIN PRODUCING E. COLI No results found for: SDES No results for input(s): FE, TIBC, PSAT, FERR in the last 72 hours. Lab Results Component Value Date/Time  Sodium,urine random 17 12/15/2015 10:02 PM  
 Creatinine, urine 42.60 03/07/2016 02:36 PM  
 Creatinine, urine 41.80 03/07/2016 02:36 PM  
 
Feliciano Kirby MD 
 [facility] 
 [email or web address] [place] / [employer] [address] [place], [address] Phone - [phone] Fax - [phone]

## 2019-04-24 NOTE — ROUTINE PROCESS
Brittany Cooley 1945 
[id number] Situation: 
Verbal report received from: Pricila Barahona RN Procedure: Procedure(s): 
COLONOSCOPY 
ENDOSCOPIC POLYPECTOMY Background: 
 
Preoperative diagnosis: gi bleed Postoperative diagnosis: polyps and hemorrhoids :  Dr. Nazia Dukes 
Assistant(s): Endoscopy RN-1: Merrill Wharton RN Endoscopy RN-2: Gorge Nj RN Specimens:  
ID Type Source Tests Collected by Time Destination 1 : ascending colon polyps X 3 (Dr. Nazia Dukes aware that all polyps were not retrieved) Preservative Colon, Ascending  Abelino Borjas MD 4/24/2019 1336 Pathology 2 : transverse colon polyps X2 Preservative Colon, Transverse  Abelino Borjas MD 4/24/2019 1338 Pathology 3 : sigmoid polyps Preservative Sigmoid  Abelino Borjas MD 4/24/2019 1352 Pathology 4 : recto-sigmoid polyps Preservative   Abelino Borjas MD 4/24/2019 1352 Pathology H. Pylori  no Assessment: 
Intra-procedure medications Anesthesia gave intra-procedure sedation and medications, see anesthesia flow sheet Intravenous fluids: NS@ Horris Harms Vital signs stable Abdominal assessment: round and soft Recommendation: 
Discharge patient per MD order [phone]. Family or Friend Permission to share finding with family or friend

## 2019-04-25 NOTE — PROGRESS NOTES
Bedside and Verbal shift change report given to Kal Lockwood RN (oncoming nurse) by Nae Perez (offgoing nurse). Report included the following information SBAR, Kardex, Intake/Output, MAR, Recent Results, Med Rec Status and Cardiac Rhythm NSR.  
 
0530. After giving Zyvox, patient stated that she had right ear pain. Checked behind the right ear and there were fresh scabs. Patient stated they were from Tuesday morning when ear pulse ox was placed. Asked if she wanted any pain medication and patient denied any intervention. 0600. Patient has been answering A&O questions correctly but now appears to have difficulty with situational awareness and decision making. In addition comparison of ED EKG with current telemetry appears to show possible ST depression or worsening of ST depression. Notified Rima Bush RN with rapid response team who came to assess patient. Recommended EKG for MD to review this AM. Patient in no distress, no complaints of pain, will continue to monitor. 3522. Cony Rebolledo RN and Emerson Ramirez RN reviewed EKG. Recommendation to notify MD of new findings. 0720. Provided update during MD rounds. Showed new EKG findings. Cardiology to be advised.

## 2019-04-25 NOTE — PROGRESS NOTES
Nephrology Progress Note Patrice Marion  
 
[facility]. [facility]                  Phone - [phone] Patient: Lorenzo Saucedo Date- 4/25/2019 Admit Date: 4/19/2019 YOB: 1945 CC: Follow up for ESRD Subjective: Interval History: -  S/p hd yesterday 
bp stable. She didn't get norvasc and losartan yesterday No c/o sob, No c/o chest pain, No c/o nausea or vomiting No c/o  fever. ROS:- as above Assessment:  
· ESRD- mwf - allegra [place] · Anemia of ckd · RESP DISTRESS -Pulmonary edema, chf 
· Malignant hypertension · Anemia · Gi bleed- s/p colonoscopy · Michael Menezes Plan: · No dialysis today · Continue hd mwf · Continue losartan · Stop norvasc to avoid hypotension · Continue epogen and venofer mwf Physical Exam:  
GEN:  nad NECK:  Supple, no thyromegaly RESP: CLEAR B/L,  no  wheezing, CVS: RRR,S1,S2 ABDO:  Soft, non tender, No mass NEURO: non focal, normal speech EXT: left arm avf + Care Plan discussed with: pt,  Objective:  
Patient Vitals for the past 24 hrs: 
 Temp Pulse Resp BP SpO2  
04/25/19 0725 98.5 °F (36.9 °C) 81 17 146/58 97 % 04/25/19 0414 99.3 °F (37.4 °C) 92 16 133/46 94 % 04/24/19 2302 99.2 °F (37.3 °C) 88 18 129/40 92 % 04/24/19 2000     95 % 04/24/19 1929 99.3 °F (37.4 °C) 78 20 152/41 96 % 04/24/19 1713  97  (!) 136/30 93 % 04/24/19 1507 98.3 °F (36.8 °C) 97 18 128/44 96 % 04/24/19 1439  95 20 117/46 97 % 04/24/19 1432  89 12 115/46 94 % 04/24/19 1430  92 22 106/41 95 % 04/24/19 1424  92 23 122/49 94 % 04/24/19 1419  90 19 113/42 94 % 04/24/19 1414 98.1 °F (36.7 °C) 92 13 (!) 123/35 97 % 04/24/19 1408  90 27 118/44 98 % 04/24/19 1404  90 21 107/42 100 % 04/24/19 1400  92 18 111/47 100 % 04/24/19 1302  98 18  96 % 04/24/19 1145 98.2 °F (36.8 °C) 88 18 (!) 132/34 98 % 04/24/19 1129  88  (!) 103/35 100 % 04/24/19 1115  89  (!) 111/35 99 % 04/24/19 1102  94  99/70 100 % 04/24/19 1045  90  (!) 113/38 98 % 04/24/19 1031  93   99 % 04/24/19 1030  93  112/43 99 % 04/24/19 1027  92   98 % 04/24/19 1025  90   97 % 04/24/19 1024  92   97 % 04/24/19 1021  90   97 % 04/24/19 1020  92   98 % 04/24/19 1018  90   99 % 04/24/19 1016  89   98 % 04/24/19 1015  92  111/41 97 % 04/24/19 1013  90   96 % 04/24/19 1011  93   97 % 04/24/19 1009  89   97 % 04/24/19 1007  88   98 % 04/24/19 1006  88   98 % 04/24/19 1004  89   96 % 04/24/19 1003  91   97 % 04/24/19 1001  89   97 % 04/24/19 1000  90  (!) 108/39 96 % 04/24/19 0945  87  (!) 110/36 96 % 04/24/19 0936  88  (!) 116/39 98 % 04/24/19 0930  88  105/41 98 % 04/24/19 0915  87  (!) 125/38 96 % Last 3 Recorded Weights in this Encounter 04/23/19 0403 04/24/19 6792 04/25/19 1262 Weight: 53.2 kg (117 lb 4.6 oz) 53 kg (116 lb 13.5 oz) 51.3 kg (113 lb 1.5 oz) 04/23 1901 - 04/25 0700 In: 350 [I.V.:350] Out: 1500 Chart reviewed. Pertinent Notes reviewed. Medication list  reviewed Current Facility-Administered Medications Medication  sodium chloride (NS) flush 5-40 mL  sodium chloride (NS) flush 5-40 mL  levoFLOXacin (LEVAQUIN) tablet 500 mg  
 metoprolol tartrate (LOPRESSOR) tablet 12.5 mg  
 linezolid in dextrose 5% (ZYVOX) IVPB premix in D5W 600 mg  epoetin roverto-epbx (RETACRIT) injection 20,000 Units  albuterol-ipratropium (DUO-NEB) 2.5 MG-0.5 MG/3 ML  
 aspirin chewable tablet 81 mg  
 0.9% sodium chloride infusion 250 mL  atorvastatin (LIPITOR) tablet 20 mg  B complex-vitaminC-folic acid (NEPHROCAP) cap  buPROPion [facility]) tablet 100 mg  
 escitalopram oxalate (LEXAPRO) tablet 10 mg  
 gabapentin (NEURONTIN) capsule 100 mg  
 lactobac ac& pc-s.therm-b.anim (TY Q/RISAQUAD)  losartan (COZAAR) tablet 100 mg  
 memantine (NAMENDA) tablet 10 mg  
 sevelamer carbonate (RENVELA) tab 800 mg  
 sevelamer carbonate (RENVELA) tab 4,000 mg  
 sodium chloride (NS) flush 5-40 mL  sodium chloride (NS) flush 5-40 mL  acetaminophen (TYLENOL) tablet 650 mg  
 naloxone (NARCAN) injection 0.4 mg  
 morphine injection 2 mg  
 guaiFENesin ER (MUCINEX) tablet 600 mg  
 albuterol-ipratropium (DUO-NEB) 2.5 MG-0.5 MG/3 ML  
 albumin human 25% (BUMINATE) solution 25 g Data Review : 
Recent Labs  
  04/25/19 
0402 04/24/19 
8712 04/23/19 
5319 WBC 14.1* 14.1* 12.0* HGB 9.2* 8.8* 9.6*  284 292 ANEU 10.3*  --  9.3* NA  --  141 136 K  --  4.3 4.2 GLU  --  94 95 BUN  --  58* 31* CREA  --  5.95* 4.29* CA  --  9.0 8.8 PHOS  --  4.7  --   
 
Lab Results Component Value Date/Time Culture result: NO GROWTH 2 DAYS 04/23/2019 12:21 PM  
 Culture result: NO GROWTH 5 DAYS 04/19/2019 12:23 PM  
 Culture result:  03/28/2019 02:12 PM  
  NO ROUTINE ENTERIC PATHOGENS ISOLATED INCLUDING SALMONELLA, SHIGELLA, YERSINIA, VIBRIO OR SHIGA TOXIN PRODUCING E. COLI No results found for: SDES No results for input(s): FE, TIBC, PSAT, FERR in the last 72 hours. Lab Results Component Value Date/Time Sodium,urine random 17 12/15/2015 10:02 PM  
 Creatinine, urine 42.60 03/07/2016 02:36 PM  
 Creatinine, urine 41.80 03/07/2016 02:36 PM  
 
Shyla Calloway MD 
[facility] 
 www. [facility] [employer] [address] [place], [address] Phone - [phone] Fax - [phone]

## 2019-04-25 NOTE — DISCHARGE SUMMARY
Hospitalist Discharge Summary Patient ID: 
Shanika Green [id number] 
79 y.o. 
1945 PCP on record: Rica Swift MD 
 
Admit date: 4/19/2019 Discharge date and time: 4/25/2019 DISCHARGE DIAGNOSIS: 
Acute resp failure with Hypoxia POA Secondary to HCAP and pulmonary edema Sepsis POA 
ESRD on HD MWF Chest Pain with elevated troponins POA Malignant hypertension Hyperlipidemia Hx of iron deficient Anemia Anemia of chronic disease GI bleed Chronic PE/VTE  
Current Smoker  
Dementia CONSULTATIONS: 
IP CONSULT TO GASTROENTEROLOGY 
IP CONSULT TO NEPHROLOGY Excerpted HPI from H&P of Jerry Melchor MD: 
Lizy Latif is a 75 y.o.  female with pmhx of ESRD on HD MWF, PE, ovarian CA, hypertension , VTE who presents with worsening chest pain . Patient reports having chest pain/pressure for a couple of weeks but now worse especially when she rolls over to her right side. The pain is mid sternal and yesterday has associated bilateral arn numbness. She also has sob , subjective fever and chills and cough. She was recently discharged from [facility] 3/29/19. She also was treated for CAP in 3/29 with Levaquin. She has ESRD on HD and was dialyzed today.  
  
In the ER she was noted to be hypoxic sating 82% on room air which improved on 4L NC to 94%. She had CXR noted new opacities. EKG reviewed. Troponin 0.08. She also has fever T 101.7. She was started on Levaquin after blood cx were taken. Patient was noted to have wbc 12.8 and a drop in her Hgb 8.7. She has hx GI bleed and was scheduled for an appointment with Dr Ivory Fields 5/2/19. Patient is on Eliquis for PE/VTE she also has an IVC filter. 
  
Patient will be admitted for further evaluation. I requested a CTA chest pending prior to admission and GI consult.  
  
 
 
______________________________________________________________________ DISCHARGE SUMMARY/HOSPITAL COURSE:  for full details see H&P, daily progress notes, labs, consult notes. Acute resp failure with Hypoxia POA Secondary to HCAP and pulmonary edema Sepsis POA 
- weaned off from supplemental O2. RA O2 sat 96% CTA of chest: 1. No pulmonary embolism. 2. Acute bilateral airspace disease superimposed on chronic lung disease. 3. New pleural effusions. 4. Progressive adenopathy, with neoplasia not excluded - pt will complete total 7 day of abx with levo - Afebrile overnight WBC down to 14k Blood cx (4/19) no growth  
  blood cx from 4/23 no growth 
  
ESRD on HD MWF 
- pulmonary edema improved with HD 
  CXR (4/22): Decreased patchy left perihilar and right basilar consolidation with persistent diffuse interstitial opacities. Probable tiny effusions - appreciate nephrology input; continue HD on M,W, F 
  
Chest Pain with elevated troponins POA Malignant hypertension Hyperlipidemia - appreciate cardiology input;  
  Echo: EF 55-60% Continue with ASA (hold due to GIB), Norvasc, losartan, BB, and hydrlazine - elevated troponin and BNP secondary to pulmonary edema 
  
Hx of iron deficient Anemia Anemia of chronic disease 
- continue with iron supplement Continue with epo 
  
GI bleed 
- received 2 units of PRBC for hgb 7.2 on admission Hgb 8.8 this am 
- appreciate GI input; colonscopy done on 4/24 revealed multiple colon polyps, adhesions, and internal hemorrhoids ASA has been d/c'd. Resume Eliquis on 4/28/2019 
  
Chronic PE/VTE  
- resume on Eliquis on 4/28 
- s/p IVF filter  
  
Current Smoker  
- advise cessation. Nicotine patch when she is ready 
  
Dementia 
- continue namenda  
 
 
 
 
_______________________________________________________________________ Patient seen and examined by me on discharge day. Pertinent Findings: 
Gen:    Not in distress Chest: Clear lungs CVS:   Regular rhythm. No edema Abd:  Soft, not distended, not tender Neuro:  Alert,orient x 3, Kake 
 _______________________________________________________________________ DISCHARGE MEDICATIONS:  
Discharge Medication List as of 4/25/2019 11:41 AM  
  
START taking these medications Details  
guaiFENesin ER (MUCINEX) 600 mg ER tablet Take 1 Tab by mouth every twelve (12) hours. , Print, Disp-14 Tab, R-0  
  
metoprolol tartrate (LOPRESSOR) 25 mg tablet Take 0.5 Tabs by mouth two (2) times a day., Print, Disp-60 Tab, R-0  
  
  
CONTINUE these medications which have NOT CHANGED Details  
polyethylene glycol (MIRALAX) 17 gram/dose powder Take 17 g by mouth daily. , Historical Med  
  
trolamine salicylate (ASPERCREME EX) by Apply Externally route daily. , Historical Med  
  
carboxymethylcellulose sodium (REFRESH LIQUIGEL) 1 % dlgl ophthalmic solution Administer 1 Drop to both eyes daily as needed., Historical Med  
  
ondansetron (ZOFRAN ODT) 4 mg disintegrating tablet TAKE 1 TABLET EVERY 8 HOURS IF NEEDED FOR NAUSEA, NormalGeneric For:*ZOFRAN ODT 4MGDisp-30 Tab, R-3  
  
oxybutynin (DITROPAN) 5 mg tablet TAKE 1 TABLET BY MOUTH TWICE A DAY, NormalN O T I C E    PRESCRIPTION PREVIOUSLY AUTHORIZED BY DOCTOR:RENETTA CACERES [phone]) [id number] Tab, R-6  
  
buPROPion (WELLBUTRIN) 100 mg tablet Take 100 mg by mouth nightly., Historical Med  
  
memantine (NAMENDA) 10 mg tablet Take 10 mg by mouth nightly., Historical Med  
  
b complex-vitamin c-folic acid (NEPHROCAPS) 1 mg capsule Take 1 Cap by mouth every Monday, Wednesday, Friday., Historical Med  
  
diphenoxylate-atropine (LOMOTIL) 2.5-0.025 mg per tablet Take 1 Tab by mouth four (4) times daily as needed for Diarrhea.  Max Daily Amount: 4 Tabs., Print, Disp-60 Tab, R-0  
  
gabapentin (NEURONTIN) 100 mg capsule TAKE 1 CAPSULE BY MOUTH TWICE A DAY FOR NERVE PAIN, NormalGeneric For:NEURONTIN  100MG   N O T I C E    PRESCRIPTION PREVIOUSLY AUTHORIZED BY DOCTOR:MONSTER CORDOBA [phone]) [phone]-39 Cap, R-6  
  
 ELIQUIS 2.5 mg tablet TAKE 1 TABLET TWICE A DAY TO PREVENT BLOOD CLOTS, Normal, Disp-60 Tab, R-3  
  
losartan (COZAAR) 100 mg tablet TAKE 1 TABLET EVERY MORNING, NormalGeneric For:*COZAAR    100MGDisp-30 Tab, R-6  
  
pramipexole (MIRAPEX) 0.25 mg tablet TAKE 1 TABLET BEFORE EACH DIALYSIS  MWF, Historical Med, R-3  
  
sevelamer carbonate (RENVELA) 800 mg tab tab Take 4,000 mg by mouth three (3) times daily (with meals). 5 with meals and 1 with snacks, Historical Med  
  
escitalopram oxalate (LEXAPRO) 10 mg tablet TAKE 1 TABLET AT BEDTIME TO HELP PREVENT ANXIETY, NormalGeneric For:LEXAPRO   10MG   N O T I C E    PRESCRIPTION PREVIOUSLY AUTHORIZED BY DOCTOR:MONSTER CORDOBA [phone]) [phone]-72 Tab, R-11  
  
famotidine (PEPCID) 20 mg tablet TAKE 1 TABLET BY MOUTH EVERY DAY, NormalGeneric For:PEPCID 20MGDisp-30 Tab, R-11  
  
atorvastatin (LIPITOR) 20 mg tablet Take 1 Tab by mouth nightly. , Print, Disp-30 Tab, R-0  
  
L. acidoph & paracasei- S therm- Bifido (TY-Q/RISAQUAD) 8 billion cell cap cap Take 1 Cap by mouth daily. , Historical Med  
  
IRON, CARBONYL PO Take 280 mg by mouth daily. , Historical Med  
  
acetaminophen (TYLENOL) 500 mg tablet Take 1,000 mg by mouth every six (6) hours as needed for Pain., Historical Med  
  
  
STOP taking these medications  
  
 amLODIPine (NORVASC) 10 mg tablet Comments:  
Reason for Stopping:   
   
 sevelamer (RENAGEL) 800 mg tablet Comments:  
Reason for Stopping:   
   
 hydrALAZINE (APRESOLINE) 100 mg tablet Comments:  
Reason for Stopping:   
   
 amLODIPine (NORVASC) 10 mg tablet Comments:  
Reason for Stopping: My Recommended Diet, Activity, Wound Care, and follow-up labs are listed in the patient's Discharge Insturctions which I have personally completed and reviewed. _______________________________________________________________________ DISPOSITION:   
Home with Family:   
Home with HH/PT/OT/RN: y  
GINNY/:   
WILLOW:   
OTHER:   
 
 
 Condition at Discharge:  Stable 
_______________________________________________________________________ Follow up with: PCP : Roxana Rice MD 
Follow-up Information Follow up With Specialties Details Why Contact Info Roxana Rice MD Internal Medicine Go on 5/3/2019 For scheduled appointment at 10:45AM  [facility] [address] 
[phone] Disha Leija MD Cardiology Go to The nurse will contact you with appointment date and time  [address] 
[phone] Tu Chávez MD Gastroenterology Schedule an appointment as soon as possible for a visit 2-3 weeks [place] [address] [address]. 
[phone] Total time in minutes spent coordinating this discharge (includes going over instructions, follow-up, prescriptions, and preparing report for sign off to her PCP) :  35 minutes Signed: 
Bonnie Herman NP

## 2019-04-25 NOTE — PROGRESS NOTES
PCU SHIFT NURSING NOTE Bedside and Verbal shift change report given to Daxa Ferrera RN (oncoming nurse) by Ken Gordillo RN and Joey Butler RN (offgoing nurse). Report included the following information SBAR, ED Summary, Procedure Summary, Intake/Output, MAR, Recent Results and Cardiac Rhythm NSR. Shift Summary:  
Received report and assumed care of patient. /58 (BP 1 Location: Right arm, BP Patient Position: At rest)   Pulse 81   Temp 98.5 °F (36.9 °C)   Resp 17   Wt 51.3 kg (113 lb 1.5 oz)   SpO2 97%   Breastfeeding? No   BMI 16.70 kg/m² A/Ox4 Patient resting comfortably with no complaint of pain. Joints feel stiff but no pain. NSR on monitor. Oxygen sats greater than 95 on room air. Patient moves all extremities and walks independently with one assist standby. Discharge to home is plan for the day. 1200 AVS reviewed with patient and patient's . Medications reviewed. Specified to patient that she absolutely should not be taking aspirin and eliquis simultaneously and to follow up with PCP about blood thinning medications that are appropriate moving forward. PIV and monitor removed. Patient requested to stay for lunch. I administered the Renvela at 1230 with lunch tray. 1345 Patient left for discharge with  and personal belongings.

## 2019-04-25 NOTE — FACE TO FACE
Face to Face Order for [address] [facility] Pt Name  Michelle Philippe Date of Birth 1945 Age  75 y.o. Medical Record Number  [id number] Room Number  2262/01 Admit date:  4/19/2019 Date of Face to Face:  4/25/2019 Admission Diagnosis:  Sepsis ([facility] Diagnoses Present on Admission:  GI bleed  Sepsis ([facility]  Anemia  Hypoxia  HCAP (healthcare-associated pneumonia)  Dyspnea  Acute respiratory failure with hypoxia (HCC)  History of pulmonary embolism Past Medical History:  
Diagnosis Date  Chronic kidney disease Stage 5 (7/18/16 BUN 79, Creatnine 4.53, K 6) Dr. Sergio Moss [phone]  GERD (gastroesophageal reflux disease)   
 well controlled with Rx   
 High cholesterol  Hyperkalemia  Hypertension  Hypomagnesemia   
 on daily Magnesium  Neuropathy   
 bilateral feet  Ovarian cancer ([facility] 2013 Hysterectomy with chemo, no radiation:  Dr. Willow Webster  [facility] 9/2015  
 blood clot in lung 9/2015  Thromboembolus ([facility] 2004  
 in kidney \"many years ago\" Visit Vitals /58 (BP 1 Location: Right arm, BP Patient Position: At rest) Pulse 81 Temp 98.5 °F (36.9 °C) Resp 17 Wt 51.3 kg (113 lb 1.5 oz) SpO2 97% Breastfeeding? No  
BMI 16.70 kg/m² Allergies Allergen Reactions  Citrus And Derivatives Anaphylaxis Patient and her  reported  Penicillin G Anaphylaxis  Orange Juice Not Reported This Time  Sulfa (Sulfonamide Antibiotics) Other (comments) \"Dont remember\"  Vancomycin Hives ? I certify that the patient needs home health care as prescribed below and I will not be following this patient in the Community. ? I also certify that the patient is homebound as evidenced by 
 
? Patient with increased shortness of breath with all exertional activity limiting ambulation outside the home.  Patient with strength deficits limiting the ability to carry portable O2 for distances outside the home without the assistance of a caregiver. ? Requires considerable and taxing effort to leave the home   
? and also as noted in various sections of this medical record. ? Dr. Clinton Turner MD  will be responsible for signing the Plan of Care and will follow/coordinate ongoing care. ? Initial Orders for Care: 
? skilled nursing care:  skilled observation/assessment, patient education ? physical therapy: strengthening, stretching/ROM, transfer training, gait/stair training and balance training 
? occupational therapy:  ADL safety (ie. cooking, bathing, dressing), ROM and pt/caregiver education ? Report to Colletta Dawson, MD 
 
? I certify that I am taking care of the patient today (Please see hospital Discharge Records for details of clinical issues pertaining to this order). ________________________________________________________________________ HyunSun Phoenix Dickey, ELSY, FNP-C, APRN-BC 
(electronically signed; no signature required ) Hospitalist, [facility] [address] [facility] [address], [address] [place] [address] [address] [facility] Tel: [phone]

## 2019-04-25 NOTE — DISCHARGE INSTRUCTIONS
Patient Discharge Instructions     Pt Name  Yasmine Jaramillo   Date of Birth 1945   Age  75 y.o. Medical Record Number  [id number]   PCP Lawyer Nadia MD    Admit date:  4/19/2019 @    [place] [address]    Room Number  2262/01   Date of Discharge 4/25/2019     Admission Diagnoses:     Sepsis ([facility]          Allergies   Allergen Reactions    Citrus And Derivatives Anaphylaxis     Patient and her  reported    Penicillin G Anaphylaxis    Orange Juice Not Reported This Time    Sulfa (Sulfonamide Antibiotics) Other (comments)     \"Dont remember\"    Vancomycin Hives        You were admitted to [address] for  Sepsis ([facility]    YOUR OTHER MEDICAL DIAGNOSES INCLUDE (BUT NOT LIMITED TO ):  Present on Admission:   GI bleed   Sepsis ([facility]   Anemia   Hypoxia   HCAP (healthcare-associated pneumonia)   Dyspnea   Acute respiratory failure with hypoxia (HCC)   History of pulmonary embolism      DIET:  Renal Diet     Recommended activity: Activity as tolerated  Follow up : Follow-up Information     Follow up With Specialties Details Why Contact Info    Lawyer Nadia MD Internal Medicine Schedule an appointment as soon as possible for a visit hospital follow up visit [address] [facility]  [address] [facility] [address]      Kan Hammer MD Cardiology Schedule an appointment as soon as possible for a visit 2 weeks [address]  [address]  [phone]      Raj Crenshaw MD Gastroenterology Schedule an appointment as soon as possible for a visit 2-3 weeks [address]  [address]  [phone]            START [address] [place] 4/28/2019    · It is important that you take the medication exactly as they are prescribed. · Keep your medication in the bottles provided by the pharmacist and keep a list of the medication names, dosages, and times to be taken in your wallet. · Do not take other medications without consulting your doctor. ADDITIONAL INFORMATION: If you experience any of the following symptoms or have any health problem not listed below, then please call your primary care physician or return to the emergency room if you cannot get hold of your doctor: Fever, chills, nausea, vomiting, diarrhea, change in mentation, falling, bleeding, shortness of breath. I understand that if any problems occur once I am discharged, I am supposed to call my Primary care physician for further care or seek help in the Emergency Department at the nearest Healthcare facility. I have had an opportunity to discuss my clinical issues with my doctor and nursing staff. I understand and acknowledge receipt of the above instructions.                                                                                                                                            Physician's or R.N.'s Signature                                                            Date/Time                                                                                                                                              Patient or Representative Signature                                                 Date/Time

## 2019-04-25 NOTE — PROGRESS NOTES
CM met with pt to conduct room visit to discuss [address] plans post d/c. Pt agreed to [address] recommendations and stated that she was willing to [facility] it a try. \" CM asked the pt if she had any preferred providers for [address] and the pt indicated that she has used [address] in the past and wanted to move forward with them this time around as well. CM informed pt of the [facility] document and explained it's purpose. PT signed FOC and it was placed in her chart. CM sent referral to [facility], [facility] and it's currently pending approval. 
 
CM will continue to follow pt as she transitions towards d/c. UPDATE 10:58 am 
 
Pt was accepted to [facility] post d/c. CM has completed the needs of the pt at this time. KAYLEE Ambriz Intern [phone]

## 2019-04-25 NOTE — PROGRESS NOTES
Medicare pt has received, reviewed, and signed 2nd IM letter informing them of their right to appeal the discharge. Signed copy has been placed on pt bedside chart. Jef Beatty [phone]

## 2019-04-25 NOTE — PROGRESS NOTES
Gastroenterology Progress Note Sung Rodriguez PA-C covering for Dr. Gerald Rivero 
 
4/25/2019 Admit Date: 4/19/2019 Subjective: Follow up for: anemia; blood MT No new GI issues. S/p complex colonoscopy with polypectomies. Hgb is stable. No more bleeding. Current Facility-Administered Medications Medication Dose Route Frequency  sodium chloride (NS) flush 5-40 mL  5-40 mL IntraVENous Q8H  
 sodium chloride (NS) flush 5-40 mL  5-40 mL IntraVENous PRN  
 levoFLOXacin (LEVAQUIN) tablet 500 mg  500 mg Oral Q48H  
 metoprolol tartrate (LOPRESSOR) tablet 12.5 mg  12.5 mg Oral BID  linezolid in dextrose 5% (ZYVOX) IVPB premix in D5W 600 mg  600 mg IntraVENous Q12H  
 epoetin roverto-epbx (RETACRIT) injection 20,000 Units  20,000 Units SubCUTAneous Q MON, WED & FRI  albuterol-ipratropium (DUO-NEB) 2.5 MG-0.5 MG/3 ML  3 mL Nebulization Q4H PRN  
 aspirin chewable tablet 81 mg  81 mg Oral DAILY  0.9% sodium chloride infusion 250 mL  250 mL IntraVENous PRN  
 amLODIPine (NORVASC) tablet 10 mg  10 mg Oral DAILY  atorvastatin (LIPITOR) tablet 20 mg  20 mg Oral QHS  B complex-vitaminC-folic acid (NEPHROCAP) cap  1 Cap Oral DAILY  buPROPion (WELLBUTRIN) tablet 100 mg  100 mg Oral QHS  escitalopram oxalate (LEXAPRO) tablet 10 mg  10 mg Oral QPM  
 gabapentin (NEURONTIN) capsule 100 mg  100 mg Oral TID  lactobac ac& pc-s.therm-b.anim (TY Q/RISAQUAD)  1 Cap Oral DAILY  losartan (COZAAR) tablet 100 mg  100 mg Oral DAILY  memantine (NAMENDA) tablet 10 mg  10 mg Oral QHS  sevelamer carbonate (RENVELA) tab 800 mg  800 mg Oral PRN  
 sevelamer carbonate (RENVELA) tab 4,000 mg  4,000 mg Oral TID WITH MEALS  sodium chloride (NS) flush 5-40 mL  5-40 mL IntraVENous Q8H  
 sodium chloride (NS) flush 5-40 mL  5-40 mL IntraVENous PRN  
 acetaminophen (TYLENOL) tablet 650 mg  650 mg Oral Q4H PRN  
 naloxone (NARCAN) injection 0.4 mg  0.4 mg IntraVENous PRN  
  morphine injection 2 mg  2 mg IntraVENous Q4H PRN  
 guaiFENesin ER (MUCINEX) tablet 600 mg  600 mg Oral Q12H  
 albuterol-ipratropium (DUO-NEB) 2.5 MG-0.5 MG/3 ML  3 mL Nebulization Q1H PRN  
 albumin human 25% (BUMINATE) solution 25 g  25 g IntraVENous DIALYSIS PRN Objective:  
 
Blood pressure 133/46, pulse 92, temperature 99.3 °F (37.4 °C), resp. rate 16, weight 51.3 kg (113 lb 1.5 oz), SpO2 94 %, not currently breastfeeding. No intake/output data recorded. 04/23 1901 - 04/25 0700 In: 350 [I.V.:350] Out: 1500 Physical Examination:  
 
 
General:AAO x 3, on NC O2 
HEENT:  EOMI, sclera anicteric Chest:   Dec BS at bases Heart: S1, S2, RRR 
GI: Soft, ND, normal bowel sounds Data Review Recent Results (from the past 24 hour(s)) RENAL FUNCTION PANEL Collection Time: 04/24/19  8:38 AM  
Result Value Ref Range Sodium 141 136 - 145 mmol/L Potassium 4.3 3.5 - 5.1 mmol/L Chloride 105 97 - 108 mmol/L  
 CO2 23 21 - 32 mmol/L Anion gap 13 5 - 15 mmol/L Glucose 94 65 - 100 mg/dL BUN 58 (H) 6 - 20 MG/DL Creatinine 5.95 (H) 0.55 - 1.02 MG/DL  
 BUN/Creatinine ratio 10 (L) 12 - 20 GFR est AA 8 (L) >60 ml/min/1.73m2 GFR est non-AA 7 (L) >60 ml/min/1.73m2 Calcium 9.0 8.5 - 10.1 MG/DL Phosphorus 4.7 2.6 - 4.7 MG/DL Albumin 2.5 (L) 3.5 - 5.0 g/dL CBC W/O DIFF Collection Time: 04/24/19  8:38 AM  
Result Value Ref Range WBC 14.1 (H) 3.6 - 11.0 K/uL  
 RBC 3.33 (L) 3.80 - 5.20 M/uL HGB 8.8 (L) 11.5 - 16.0 g/dL HCT 30.4 (L) 35.0 - 47.0 % MCV 91.3 80.0 - 99.0 FL  
 MCH 26.4 26.0 - 34.0 PG  
 MCHC 28.9 (L) 30.0 - 36.5 g/dL  
 RDW 16.9 (H) 11.5 - 14.5 % PLATELET 078 033 - 477 K/uL MPV 9.6 8.9 - 12.9 FL  
 NRBC 0.0 0  WBC ABSOLUTE NRBC 0.00 0.00 - 0.01 K/uL CBC WITH AUTOMATED DIFF Collection Time: 04/25/19  4:02 AM  
Result Value Ref Range WBC 14.1 (H) 3.6 - 11.0 K/uL  
 RBC 3.46 (L) 3.80 - 5.20 M/uL HGB 9.2 (L) 11.5 - 16.0 g/dL HCT 31.6 (L) 35.0 - 47.0 % MCV 91.3 80.0 - 99.0 FL  
 MCH 26.6 26.0 - 34.0 PG  
 MCHC 29.1 (L) 30.0 - 36.5 g/dL  
 RDW 16.5 (H) 11.5 - 14.5 % PLATELET 442 174 - 772 K/uL MPV 9.3 8.9 - 12.9 FL  
 NRBC 0.0 0  WBC ABSOLUTE NRBC 0.00 0.00 - 0.01 K/uL NEUTROPHILS 73 32 - 75 % LYMPHOCYTES 9 (L) 12 - 49 % MONOCYTES 16 (H) 5 - 13 % EOSINOPHILS 1 0 - 7 % BASOPHILS 0 0 - 1 % IMMATURE GRANULOCYTES 1 (H) 0.0 - 0.5 % ABS. NEUTROPHILS 10.3 (H) 1.8 - 8.0 K/UL  
 ABS. LYMPHOCYTES 1.3 0.8 - 3.5 K/UL  
 ABS. MONOCYTES 2.3 (H) 0.0 - 1.0 K/UL  
 ABS. EOSINOPHILS 0.1 0.0 - 0.4 K/UL  
 ABS. BASOPHILS 0.1 0.0 - 0.1 K/UL  
 ABS. IMM. GRANS. 0.1 (H) 0.00 - 0.04 K/UL  
 DF AUTOMATED    
EKG, 12 LEAD, INITIAL Collection Time: 04/25/19  6:09 AM  
Result Value Ref Range Ventricular Rate 79 BPM  
 Atrial Rate 79 BPM  
 P-R Interval 158 ms QRS Duration 108 ms Q-T Interval 418 ms QTC Calculation (Bezet) 479 ms Calculated P Axis 69 degrees Calculated R Axis 3 degrees Calculated T Axis 88 degrees Diagnosis Normal sinus rhythm Possible Left atrial enlargement Left ventricular hypertrophy T wave abnormality, consider lateral ischemia Prolonged QT When compared with ECG of 19-APR-2019 10:10, 
ST no longer depressed in Inferior leads Recent Labs  
  04/25/19 
0402 04/24/19 
4017 WBC 14.1* 14.1* HGB 9.2* 8.8* HCT 31.6* 30.4*  284 Recent Labs  
  04/24/19 
8087 04/23/19 
8106  136  
K 4.3 4.2  107 CO2 23 25 BUN 58* 31* CREA 5.95* 4.29* GLU 94 95  
CA 9.0 8.8 PHOS 4.7  --   
 
Recent Labs  
  04/24/19 
6048 ALB 2.5* No results for input(s): INR, PTP, APTT in the last 72 hours. No lab exists for component: INREXT, INREXT No results for input(s): FE, TIBC, PSAT, FERR in the last 72 hours. Lab Results Component Value Date/Time  Folate 18.9 03/27/2019 04:45 PM  
  
 No results for input(s): PH, PCO2, PO2 in the last 72 hours. No results for input(s): CPK, CKNDX, TROIQ in the last 72 hours. No lab exists for component: CPKMB Lab Results Component Value Date/Time Cholesterol, total 116 03/30/2017 02:27 AM  
 HDL Cholesterol 55 03/30/2017 02:27 AM  
 LDL, calculated 31.2 03/30/2017 02:27 AM  
 Triglyceride 149 03/30/2017 02:27 AM  
 CHOL/HDL Ratio 2.1 03/30/2017 02:27 AM  
 
No components found for: [place] Lab Results Component Value Date/Time Color YELLOW/STRAW 03/29/2017 05:30 PM  
 Appearance CLOUDY (A) 03/29/2017 05:30 PM  
 Specific gravity 1.020 03/29/2017 05:30 PM  
 Specific gravity 1.010 03/04/2016 09:00 PM  
 pH (UA) 7.0 03/29/2017 05:30 PM  
 Protein 100 (A) 03/29/2017 05:30 PM  
 Glucose NEGATIVE  03/29/2017 05:30 PM  
 Ketone NEGATIVE  03/29/2017 05:30 PM  
 Bilirubin NEGATIVE  03/29/2017 05:30 PM  
 Urobilinogen 0.2 03/29/2017 05:30 PM  
 Nitrites NEGATIVE  03/29/2017 05:30 PM  
 Leukocyte Esterase LARGE (A) 03/29/2017 05:30 PM  
 Epithelial cells FEW 03/29/2017 05:30 PM  
 Bacteria 4+ (A) 03/29/2017 05:30 PM  
 WBC 20-50 03/29/2017 05:30 PM  
 RBC  03/29/2017 05:30 PM  
 
XR Results (most recent): 
Results from Hospital Encounter encounter on 04/19/19 XR CHEST PORT Narrative history: Follow-up edema and pneumonia COMPARISON: 4/19/2019 FINDINGS: 
 
Frontal chest radiograph submitted for review. Decreased patchy left perihilar and right basilar consolidation with persistent 
diffuse interstitial opacities. Probable tiny effusions. No pneumothorax. No new 
lung abnormality. Stable heart size Impression IMPRESSION: 
 
Decreased patchy left perihilar and right basilar consolidation with persistent 
diffuse interstitial opacities. Probable tiny effusions ROS: -CP, SOB, Dysuria, palpitations, cough. Assessment: 
 
Active Problems: 
  Sepsis ([address] (7/28/2015) GI bleed (3/27/2019) Anemia (4/19/2019) Hypoxia (4/19/2019) HCAP (healthcare-associated pneumonia) (4/19/2019) Dyspnea (4/19/2019) Acute respiratory failure with hypoxia ([address] (4/20/2019) Plan/Discussion: · She has a very difficult anatomy secondary to previous surgeries and likely has adhesions. I removed all the polyps(visible;limited prep). Repeat  colonoscopy only if benefit outweighs risk. Await pathology results. · Can start Eliquis in 2 day's time. · D/w IM mid level provider. · Diet as tolerated. Signed By: Giacomo Julio MD 
 
4/25/2019

## 2019-04-26 NOTE — PROGRESS NOTES
Hospital Discharge Follow-Up Date/Time:  4/26/2019 2:09 PM 
 
Patient was admitted to [facility] on 4/19/19 and discharged on 4/25/19 for Sepsis. The physician discharge summary was available at the time of outreach. Patient was contacted within 1 business days of discharge. Top Challenges reviewed with the provider Sepsis secondary to acute resp failure r/t HCAP 
GI bleed - received 2 units PRBC 
colonscopy done on 4/24 revealed multiple colon polyps, adhesions, and internal hemorrhoids Chest pain w/ elevated troponins EF 55-60% Unable to reach for [employer] call. Method of communication with provider :chart routing Inpatient RRAT score:26 Was this a readmission? yes Patient stated reason for the readmission: Short of Breath Disease Specific:   Sepsis Sepsis Note Most recent vital signs:  
Vitals 4/27/2019 Blood Pressure 150/62 Pulse 76 Temp 98.2 Resp 18 Weight SpO2 95 BSA BMI Source of Infection:  Pneumonia Antibiotic prescribed at discharge: n/a Length of remaining treatment: n/a Are you taking your antibiotic as prescribed? n/a Infectious Disease Consult during admission: no  
 
 
Summary of patient's top problems: 1. Acute Resp Failure: with hypoxia, secondary to HCAP and pulmonary edema & sepsis. Blood cx (4/19) no growth.  blood cx from 4/23 no growth CTA of chest: 1. No pulmonary embolism. 2. Acute bilateral airspace disease superimposed on chronic lung disease. 3. New pleural effusions. 2. ESRD: pulmonary edema improved with HD. CXR (4/22): Decreased patchy left perihilar and right basilar consolidation with persistent diffuse interstitial opacities. Probable tiny effusions 3. Cardiac: chest pain with elevated troponins. Malignant HTN. HLD.  Echo: EF 55-60%. elevated troponin and BNP secondary to pulmonary edema 4. GI bleed: received 2 units PRBC for Hgb 7.2 on admission.  Hgb 8.8 at d/c. GI consulted, colonscopy done on 4/24 revealed multiple colon polyps, adhesions, and internal hemorrhoids ASA has been d/c'd. Rosaliae Eliquis on 4/28/2019 5. Social: No ACP on file. Current smoker. Tried Nicotine patches, patient not ready. PMH of dementia, on namenda. Home Health orders at discharge: PT, OT, SN Home Health company: [facility] Date of initial visit: 4/27/19 Durable Medical Equipment ordered/company: none Durable Medical Equipment received: n/a Barriers to care? ineffective coping, lack of knowledge about disease, utilization of services Advance Care Planning:  
Does patient have an Advance Directive:  not on file Medication(s):  
New Medications at Discharge:  
guaiFENesin ER (MUCINEX) 600 mg ER tablet Take 1 Tab by mouth every twelve (12) hours. , Print, Disp-14 Tab, R-0  
   
metoprolol tartrate (LOPRESSOR) 25 mg tablet Take 0.5 Tabs by mouth two (2) times a day., Print, Disp-60 Tab, R-0  
   
 
Changed Medications at Discharge: n/a Discontinued Medications at Discharge:  
 amLODIPine (NORVASC) 10 mg tablet Comments:  
Reason for Stopping:   
     
  sevelamer (RENAGEL) 800 mg tablet Comments:  
Reason for Stopping:   
     
  hydrALAZINE (APRESOLINE) 100 mg tablet Comments:  
Reason for Stopping:   
     
  amLODIPine (NORVASC) 10 mg tablet Comments:  
Reason for Stopping:   
 
 
Current Outpatient Medications Medication Sig  
 guaiFENesin ER (MUCINEX) 600 mg ER tablet Take 1 Tab by mouth every twelve (12) hours.  metoprolol tartrate (LOPRESSOR) 25 mg tablet Take 0.5 Tabs by mouth two (2) times a day.  polyethylene glycol (MIRALAX) 17 gram/dose powder Take 17 g by mouth daily.  trolamine salicylate (ASPERCREME EX) by Apply Externally route daily.  carboxymethylcellulose sodium (REFRESH LIQUIGEL) 1 % dlgl ophthalmic solution Administer 1 Drop to both eyes daily as needed.   
 ondansetron (ZOFRAN ODT) 4 mg disintegrating tablet TAKE 1 TABLET EVERY 8 HOURS IF NEEDED FOR NAUSEA  oxybutynin (DITROPAN) 5 mg tablet TAKE 1 TABLET BY MOUTH TWICE A DAY  buPROPion (WELLBUTRIN) 100 mg tablet Take 100 mg by mouth nightly.  memantine (NAMENDA) 10 mg tablet Take 10 mg by mouth nightly.  b complex-vitamin c-folic acid (NEPHROCAPS) 1 mg capsule Take 1 Cap by mouth every Monday, Wednesday, Friday.  diphenoxylate-atropine (LOMOTIL) 2.5-0.025 mg per tablet Take 1 Tab by mouth four (4) times daily as needed for Diarrhea. Max Daily Amount: 4 Tabs.  gabapentin (NEURONTIN) 100 mg capsule TAKE 1 CAPSULE BY MOUTH TWICE A DAY FOR NERVE PAIN  
 ELIQUIS 2.5 mg tablet TAKE 1 TABLET TWICE A DAY TO PREVENT BLOOD CLOTS  losartan (COZAAR) 100 mg tablet TAKE 1 TABLET EVERY MORNING (Patient taking differently: TAKE 1 TABLET EVERY night)  pramipexole (MIRAPEX) 0.25 mg tablet TAKE 1 TABLET BEFORE EACH DIALYSIS  MWF  
 sevelamer carbonate (RENVELA) 800 mg tab tab Take 4,000 mg by mouth three (3) times daily (with meals). 5 with meals and 1 with snacks  escitalopram oxalate (LEXAPRO) 10 mg tablet TAKE 1 TABLET AT BEDTIME TO HELP PREVENT ANXIETY  famotidine (PEPCID) 20 mg tablet TAKE 1 TABLET BY MOUTH EVERY DAY  atorvastatin (LIPITOR) 20 mg tablet Take 1 Tab by mouth nightly.  L. acidoph & paracasei- S therm- Bifido (TY-Q/RISAQUAD) 8 billion cell cap cap Take 1 Cap by mouth daily.  IRON, CARBONYL PO Take 280 mg by mouth daily.  acetaminophen (TYLENOL) 500 mg tablet Take 1,000 mg by mouth every six (6) hours as needed for Pain. No current facility-administered medications for this visit. There are no discontinued medications. [facility] follow up appointment(s):  
Future Appointments Date Time Provider Juliana Maryanne 5/3/2019 10:45 AM MD Rosario Barnett 87  
8/12/2019 11:15 AM MD Rosario Barnett 87 Goals  Supportive resources in place to maintain patient in the community (ie. Home Health, DME equipment, refer to, medication assistant plan, etc.)   
  04/01/19 Reviewed cardiac/renal diet with patient, patient verbalized that she understands and does not need info sent. Briefly discussed smoking cessation and patient not interested at this time, provided education and encouraged patient to think about it.  
 
- Dialysis M-W-F  
- Finish Abx 
- Cardiac/renal diet  
- H2H 4/2 
- Dr. Femi Flores 4/4 Patient verbalized understanding. NN to f/u 1 week. AR 
 
04/12/19 Spoke to patient today. Patient attended f/u appt with Dr. Femi Flores. Patient completed course of antibiotics. - Dialysis M-W-F  
- Finish Abx 
- Cardiac/renal diet - Immodium PRN 
- Dr. Winter Serrato 4/15 
- Dr. Loree Ngo (neuro) 4/15 
- GI 5/2 - F/u CXR 4 weeks Patient verbalized understanding of the above plan. NN to f/u 1 week. AR 
 
  
  Understands red flags post discharge. 04/01/19 Spoke to patient today. Reviewed red flag s/s with patient, nausea, vomiting, inability to pass urine/stool, SOB, mental status change, chest pain, fever, bleeding, blood in stool. Patient will contact MD/NN if red flag s/s arise. NN to f/u 1 week. AR 
 
04/12/19 Spoke to patient. Patient c/o diarrhea at her appt with Dr. Pancho Cao, she states that it is improving. She is taking imodium PRN. Reminded patient of red flag s/s that warrant f/u and to monitor diarrhea and to contact MD if s/s worsen or imodium does not provide relief. Patient verbalized understanding. NN to f/u 1 week. AR 
  
  
  
 
04/30/19 Unable to reach patient for [employer] call. Patient was discharged on 4/25/19 from [id number] [facility]. Multiple voice messages left, get in touch letter mailed to patients home address. Spoke to [address] and asked to let the next nurse/therapist seeing patient to let the patient know that I have been attempting to contact her. NN to f/u 2 weeks if patient does not contact NN in meantime.  AR

## 2019-04-26 NOTE — LETTER
[facility] [phone] My name is Rogelio Love. I am the care manager on Dr. Maryan Snyder team. I call patients who were recently discharged from the hospital or emergency department. We are committed to providing you excellent care. I have been unable to reach you. Please contact me at [phone] regarding your recent hospital visit. We appreciate the confidence youve shown by selecting us to provide your healthcare needs and look forward to hearing from you soon. I will also update your contact information at the time of your call. Sincerely, Rogelio Love, RN, MSN, CRRN, CNL

## 2019-05-03 NOTE — PROGRESS NOTES
Chief Complaint Patient presents with  Pain Upper Quadrant Right side  Follow-up   1 month follow up CAP

## 2019-05-14 NOTE — TELEPHONE ENCOUNTER
Spoke with patient's  Horris Heimlich he is on patient's HIPPA . Dr. Subha Hernandez wanted me to let them know that urine culture final report  no growth.    Verbalized understanding.

## 2019-05-16 NOTE — Clinical Note
Patient continues to complain about urinary odor,  states that urine today looked like a  \"greyish\" color. U/A and culture sent last week.

## 2019-05-17 NOTE — PROGRESS NOTES
Goals  Supportive resources in place to maintain patient in the community (ie. Home Health, DME equipment, refer to, medication assistant plan, etc.)   
  04/01/19 Reviewed cardiac/renal diet with patient, patient verbalized that she understands and does not need info sent. Briefly discussed smoking cessation and patient not interested at this time, provided education and encouraged patient to think about it.  
 
- Dialysis M-W-F  
- Finish Abx 
- Cardiac/renal diet  
- H2H 4/2 
- Dr. Sandeep De Oliveira 4/4 Patient verbalized understanding. NN to f/u 1 week. AR 
 
04/12/19 Spoke to patient today. Patient attended f/u appt with Dr. Sandeep De Oliveira. Patient completed course of antibiotics. - Dialysis M-W-F  
- Finish Abx 
- Cardiac/renal diet - Immodium PRN 
- Dr. Kirsten Phelps 4/15 
- Dr. Luma Galeano (neuro) 4/15 
- GI 5/2 - F/u CXR 4 weeks Patient verbalized understanding of the above plan. NN to f/u 1 week. AR 
 
04/29/19 Voice messages left for patient to return call.  
 
- Dr. Sandeep De Oliveira 5/3 1045 
- Dr. Amina Graves (cardiology) 
- Dr. Yoni Conroy (Gi) 2-3 weeks - Follow up CXR 4 weeks 
- Dialysis W-[facility] 
 
05/21/19 NN was able to successfully reach patient today. Patient did attend appt with Dr. Sandeep De Oliveira on 5/3. Patient reported that she saw Dr. Yoni Conroy a couple weeks ago, patient reported that Dr. Yoni Conroy is going to keep patient under observation as he does not have want to do surgical intervention at this time due to risk for further damages. Patient has not seen cardiology yet, the appt is scheduled for June, patient is unsure of exact day. - Dialysis M-W-[facility] 
 
NN routing message to Dr. Sandeep De Oliveira regarding f/u CXR and will let patient know at next call. NN to f/u 3 days. AR 
  
  Understands red flags post discharge. 04/01/19 Spoke to patient today.  Reviewed red flag s/s with patient, nausea, vomiting, inability to pass urine/stool, SOB, mental status change, chest pain, fever, bleeding, blood in stool. Patient will contact MD/NN if red flag s/s arise. NN to f/u 1 week. AR 
 
04/12/19 Spoke to patient. Patient c/o diarrhea at her appt with Dr. Barbara Olmstead, she states that it is improving. She is taking imodium PRN. Reminded patient of red flag s/s that warrant f/u and to monitor diarrhea and to contact MD if s/s worsen or imodium does not provide relief. Patient verbalized understanding. NN to f/u 1 week. AR 
 
05/21/19 NN was successfully able to reach patient today. Patient reported that her BP was 199/57. NN spoke to Dr. Wilber Sam and Dr. Wilber Sam wanted to know if dialysis had mentioned patients high BP. NN spoke to patient again and patient stated that she did have dialysis yesterday, she was unable to recall her BP and stated that dialysis did not say anything regarding her BP. NN reached out to [facility] and spoke to [facility] who stated that patients BP yesterday at dialysis was 162/61, 161/64 and the highest was 174/69. Informed Dr. Wilber Sam of the above information from [facility] and no new orders were given, patient is to continue to monitor per Dr. Wilber Sam. NN contacted patient and advised her of Dr. Wilber Sam recommendation. Patient rechecked BP while on the phone and it was 197/61. Advised family if top number goes above 200 to contact MD/[facility]. Also advised family to check BP morning and evening, before and after medications and keep log of BP to review with NN in 3 days. patient reports no other red flag s/s, advised patient to contact office with Red flag s/s including, blurred vision, headache, dizziness,  Fainting, etc. Patient verbalized understanding. NN to f/u 3 days.  AR

## 2019-05-20 NOTE — TELEPHONE ENCOUNTER
Contreras//[facility] states she's calling back to advise Andrey/nurse that she was given incorrect information on patient taking her medication this morning. Patient did not take her Amlodipine this morning. Patient takes this medication at night so Patient was instructed to take Amlodipine & Metoprolol now & check Blood pressure in 30 mins. Please call if any other questions.  Thank you

## 2019-05-24 NOTE — PROGRESS NOTES
Goals  Supportive resources in place to maintain patient in the community (ie. Home Health, DME equipment, refer to, medication assistant plan, etc.)   
  04/01/19 Reviewed cardiac/renal diet with patient, patient verbalized that she understands and does not need info sent. Briefly discussed smoking cessation and patient not interested at this time, provided education and encouraged patient to think about it.  
 
- Dialysis M-W-F  
- Finish Abx 
- Cardiac/renal diet  
- H2H 4/2 
- Dr. Catrachito Yañez 4/4 Patient verbalized understanding. NN to f/u 1 week. AR 
 
04/12/19 Spoke to patient today. Patient attended f/u appt with Dr. Catrachito Yañez. Patient completed course of antibiotics. - Dialysis M-W-F  
- Finish Abx 
- Cardiac/renal diet - Immodium PRN 
- Dr. Gabe Braun 4/15 
- Dr. Jose R Pandya (neuro) 4/15 
- GI 5/2 - F/u CXR 4 weeks Patient verbalized understanding of the above plan. NN to f/u 1 week. AR 
 
04/29/19 Voice messages left for patient to return call.  
 
- Dr. Catrachito Yañez 5/3 1045 
- Dr. Favian Branham (cardiology) 
- Dr. Sergio Pitts (Gi) 2-3 weeks - Follow up CXR 4 weeks 
- Dialysis [facility] 
 
05/21/19 NN was able to successfully reach patient today. Patient did attend appt with Dr. Catrachito Yañez on 5/3. Patient reported that she saw Dr. Sergio Pitts a couple weeks ago, patient reported that Dr. Sergio Pitts is going to keep patient under observation as he does not have want to do surgical intervention at this time due to risk for further damages. Patient has not seen cardiology yet, the appt is scheduled for June, patient is unsure of exact day. - Dialysis -W-F  
[facility] 
 
NN routing message to Dr. Catrachito Yañez regarding f/u CXR and will let patient know at next call. NN to f/u 3 days. AR 
  
  Understands red flags post discharge. 04/01/19 Spoke to patient today.  Reviewed red flag s/s with patient, nausea, vomiting, inability to pass urine/stool, SOB, mental status change, chest pain, fever, bleeding, blood in stool. Patient will contact MD/NN if red flag s/s arise. NN to f/u 1 week. AR 
 
04/12/19 Spoke to patient. Patient c/o diarrhea at her appt with Dr. Danice Collet, she states that it is improving. She is taking imodium PRN. Reminded patient of red flag s/s that warrant f/u and to monitor diarrhea and to contact MD if s/s worsen or imodium does not provide relief. Patient verbalized understanding. NN to f/u 1 week. AR 
 
05/21/19 NN was successfully able to reach patient today. Patient reported that her BP was 199/57. NN spoke to Dr. Pat Au and Dr. Pat Au wanted to know if dialysis had mentioned patients high BP. NN spoke to patient again and patient stated that she did have dialysis yesterday, she was unable to recall her BP and stated that dialysis did not say anything regarding her BP. NN reached out to [facility] and spoke to [facility] who stated that patients BP yesterday at dialysis was 162/61, 161/64 and the highest was 174/69. Informed Dr. Pat Au of the above information from [facility] and no new orders were given, patient is to continue to monitor per Dr. Pat Au. NN contacted patient and advised her of Dr. Pat Au recommendation. Patient rechecked BP while on the phone and it was 197/61. Advised family if top number goes above 200 to [facility]. Also advised family to check BP morning and evening, before and after medications and keep log of BP to review with NN in 3 days. patient reports no other red flag s/s, advised patient to contact office with Red flag s/s including, blurred vision, headache, dizziness,  Fainting, etc. Patient verbalized understanding. NN to f/u 3 days. AR 
 
05/24/19 
1154: NN spoke to patients , Alessio Barba, Verified on hipaa. He stated that patient was resting. He stated that [facility] nurse just called and spoke to Dr. Pat Au regarding patients BP and mental status. Dr. Pat Au advised patient to go to ER.  At time of call patients  stated that Mirna Zuluaga is refusing to go to the ER we have a graduation to go to tonight that she does not want to miss. \" NN strongly advised patients  to have patient be seen in the ER. Patients  continued to refuse. Patients  stated they would accept [facility] visit tomorrow. 1200: NN spoke to Dr. Sierra Thompson in office, advised patient of the above. No new orders given. 1210: NN contacted [facility] on behalf of patient to schedule appt for tomorrow as requested by patients . 1258: NN spoke to patients , informed him that NN reached out to [facility] and they will be contacting pt/family to set time to see patient tomorrow. Advised & strongly encouraged patient to be seen in ED however patient and family continue to refuse. NN to f/u on Tuesday, next business day that office is open.  AR

## 2019-05-29 NOTE — TELEPHONE ENCOUNTER
Called patient and informed patient the biopsy results were not back as now. Patient verbalized understanding.   Received call from Webb at [facility]. Webb states that pt's BP is 200/64. Pt took 0.5 tab 25mg metoprolol and 5mg amlodipine. [place] states pt asymptomatic. [place] informed per Dr. Zack Torres to have pt take the second 0.5 tab of metoprolol and an additional 5mg amlodipine. [place] verbalized understanding of information discussed w/ no further questions at this time.

## 2019-06-03 NOTE — PROGRESS NOTES
Patient has graduated from the Transitions of Care Coordination  program on 06/03/19. Patient's symptoms are stable at this time. Patient/family has the ability to self-manage. Care management goals have been completed at this time. No further nurse navigator follow up scheduled. Goals Addressed                 This Visit's Progress     COMPLETED: Supportive resources in place to maintain patient in the community (ie. Home Health, DME equipment, refer to, medication assistant plan, etc.)        04/01/19    Reviewed cardiac/renal diet with patient, patient verbalized that she understands and does not need info sent. Briefly discussed smoking cessation and patient not interested at this time, provided education and encouraged patient to think about it.     - Dialysis M-W-F   - Finish Abx  - Cardiac/renal diet   - H2H 4/2  - Dr. Femi Flores 4/4    Patient verbalized understanding. NN to f/u 1 week. AR    04/12/19  Spoke to patient today. Patient attended f/u appt with Dr. Femi Flores. Patient completed course of antibiotics. - Dialysis M-W-F   - Finish Abx  - Cardiac/renal diet   - Immodium PRN  - Dr. Winter Serrato 4/15  - Dr. Loree Ngo (neuro) 4/15  - GI 5/2  - F/u CXR 4 weeks    Patient verbalized understanding of the above plan. NN to f/u 1 week. AR    04/29/19  Voice messages left for patient to return call.     - Dr. Femi Flores 5/3 1045  - Dr. Nidhi Burnette (cardiology)  - Dr. Sayra Huggins (Gi) 2-3 weeks   - Follow up CXR 4 weeks  - Dialysis M-W-F - [facility]    05/21/19  NN was able to successfully reach patient today. Patient did attend appt with Dr. Femi Flores on 5/3. Patient reported that she saw Dr. Sayra Huggins a couple weeks ago, patient reported that Dr. Sayra Huggins is going to keep patient under observation as he does not have want to do surgical intervention at this time due to risk for further damages. Patient has not seen cardiology yet, the appt is scheduled for June, patient is unsure of exact day.      - Dialysis M-W-F   - [facility]    NN routing message to Dr. David Ash regarding f/u CXR and will let patient know at next call. NN to f/u 3 days. Claudia Cao    06/03/19  Spoke to patient and her  today. Patient was seen by Sammy Rose on 5/25, patient is currently being treated for a UTI, and will finished her abx on 6/6. Last BP reading was 166/54,  reports that BP has slightly improved since last call. He reports that patient is doing ok. She was d/c from PT but continues with SN at this time. NN routed message to Dr. David Ash regarding if he wanted [facility] to collect f/u UA. Patient/family report no further needs from NN at this time. AR       COMPLETED: Understands red flags post discharge. 04/01/19    Spoke to patient today. Reviewed red flag s/s with patient, nausea, vomiting, inability to pass urine/stool, SOB, mental status change, chest pain, fever, bleeding, blood in stool. Patient will contact MD/NN if red flag s/s arise. NN to f/u 1 week. AR    04/12/19  Spoke to patient. Patient c/o diarrhea at her appt with Dr. Alta Car, she states that it is improving. She is taking imodium PRN. Reminded patient of red flag s/s that warrant f/u and to monitor diarrhea and to contact MD if s/s worsen or imodium does not provide relief. Patient verbalized understanding. NN to f/u 1 week. AR    05/21/19  NN was successfully able to reach patient today. Patient reported that her BP was 199/57. NN spoke to Dr. David Ash and Dr. David Ash wanted to know if dialysis had mentioned patients high BP. NN spoke to patient again and patient stated that she did have dialysis yesterday, she was unable to recall her BP and stated that dialysis did not say anything regarding her BP. NN reached out to [facility] and spoke to [facility] who stated that patients BP yesterday at dialysis was 162/61, 161/64 and the highest was 174/69. Informed Dr. David Ash of the above information from [facility] and no new orders were given, patient is to continue to monitor per Dr. David Ash.  NN contacted patient and advised her of Dr. Beryle Levee recommendation. Patient rechecked BP while on the phone and it was 197/61. Advised family if top number goes above 200 to contact MD/[facility]. Also advised family to check BP morning and evening, before and after medications and keep log of BP to review with NN in 3 days. patient reports no other red flag s/s, advised patient to contact office with Red flag s/s including, blurred vision, headache, dizziness,  Fainting, etc. Patient verbalized understanding. NN to f/u 3 days. AR    05/24/19  1154: NN spoke to patients , Nellie Romo, Verified on hipaa. He stated that patient was resting. He stated that [facility] nurse just called and spoke to Dr. Beryle Levee regarding patients BP and mental status. Dr. Beryle Levee advised patient to go to ER. At time of call patients  stated that Dario Sneed is refusing to go to the ER we have a graduation to go to tonight that she does not want to miss. \" NN strongly advised patients  to have patient be seen in the ER. Patients  continued to refuse. Patients  stated they would accept [facility] visit tomorrow. 1200: NN spoke to Dr. Beryle Levee in office, advised patient of the above. No new orders given. 1210: NN contacted Sammy Rose on behalf of patient to schedule appt for tomorrow as requested by patients . 1258: NN spoke to patients , informed him that NN reached out to Sammy Rose and they will be contacting pt/family to set time to see patient tomorrow. Advised & strongly encouraged patient to be seen in ED however patient and family continue to refuse. NN to f/u on Tuesday, next business day that office is open. [address]    06/03/19  Spoke to patient and patients . Both stated no red flag s/s. Patient is at baseline. Patient/family have no further questions for NN at this time. AR            Pt has nurse navigator's contact information for any further questions, concerns, or needs.   Patients upcoming visits: Future Appointments   Date Time Provider Juliana Maryanne   6/4/2019 10:00 AM Tiny Phoenix LPN [facility]   6/11/2019 To Be Determined Gavino, [address] [facility]   6/11/2019  2:45 PM MD Rosario Fisher 87   6/18/2019 To Be Determined Tiffanie Erickson, [address] [facility]   8/12/2019 11:15 AM MD Rosario Fisher 87

## 2019-06-18 NOTE — TELEPHONE ENCOUNTER
Patient had to reschedule July 11th appt with Dr. Nae Matos. Next available appointment offered was in September. Another provider was offered and that was refused by spouse. Spouse wants an appointment sooner for wife to see only Dr. Nae Matos.

## 2019-06-18 NOTE — TELEPHONE ENCOUNTER
Attempted to return Vinny's call. Notified Vinny via [employer], no sooner appointments available for Dr. Sammi Lau. Pt to call back for cancellations.

## 2019-06-26 PROBLEM — R41.82 ALTERED MENTAL STATUS: Status: ACTIVE | Noted: 2019-01-01

## 2019-06-26 NOTE — ED NOTES
Patient being transported upstairs to her room via stretcher at this time.  ambulating with them upstairs.

## 2019-06-26 NOTE — ED PROVIDER NOTES
EMERGENCY DEPARTMENT HISTORY AND PHYSICAL EXAM 
 
 
Date: 6/26/2019 Patient Name: Hermes Tatum History of Presenting Illness Chief Complaint Patient presents with  Hypertension History Provided By: Patient and Patient's  HPI: Hermes Tatum, 75 y.o. female  presents to the ED with cc of altered mental status. Patient went to dialysis this morning and did complete her treatment. She was sent to the emergency department due to high blood pressure readings and altered mental status. The patient appears sleepy however is easily awakened and alert. She states she has no complaints. She states she is not any more tired than usual.  She is not having any headache. No visual symptoms. She does not have any chest pain or shortness of breath. She states she is not vomiting or have any diarrhea. Her  at bedside states that her mental status is definitely changed. She is more confused. Appears more tired and lethargic. There are no other complaints, changes, or physical findings at this time. PCP: Lewis Roach MD 
 
No current facility-administered medications on file prior to encounter. Current Outpatient Medications on File Prior to Encounter Medication Sig Dispense Refill  metoprolol tartrate (LOPRESSOR) 25 mg tablet TAKE 1/2 TABLET TWICE A DAY 60 Tab 11  
 famotidine (PEPCID) 20 mg tablet TAKE 1 TABLET BY MOUTH EVERY DAY 30 Tab 11  
 amLODIPine (NORVASC) 5 mg tablet Take 1 Tab by mouth daily. 30 Tab 5  
 aspirin delayed-release 81 mg tablet Take 81 mg by mouth daily.  polyethylene glycol (MIRALAX) 17 gram/dose powder Take 17 g by mouth daily.  trolamine salicylate (ASPERCREME EX) by Apply Externally route daily.  carboxymethylcellulose sodium (REFRESH LIQUIGEL) 1 % dlgl ophthalmic solution Administer 1 Drop to both eyes daily as needed for Other (dry eyes ).     
 ondansetron (ZOFRAN ODT) 4 mg disintegrating tablet TAKE 1 TABLET EVERY 8 HOURS IF NEEDED FOR NAUSEA 30 Tab 3  
 oxybutynin (DITROPAN) 5 mg tablet TAKE 1 TABLET BY MOUTH TWICE A DAY 60 Tab 6  
 buPROPion (WELLBUTRIN) 100 mg tablet Take 100 mg by mouth nightly.  memantine (NAMENDA) 10 mg tablet Take 10 mg by mouth nightly.  b complex-vitamin c-folic acid (NEPHROCAPS) 1 mg capsule Take 1 Cap by mouth every Monday, Wednesday, Friday.  diphenoxylate-atropine (LOMOTIL) 2.5-0.025 mg per tablet Take 1 Tab by mouth four (4) times daily as needed for Diarrhea. Max Daily Amount: 4 Tabs. 60 Tab 0  
 gabapentin (NEURONTIN) 100 mg capsule TAKE 1 CAPSULE BY MOUTH TWICE A DAY FOR NERVE PAIN 60 Cap 6  
 losartan (COZAAR) 100 mg tablet TAKE 1 TABLET EVERY MORNING (Patient taking differently: TAKE 1 TABLET EVERY night) 30 Tab 6  pramipexole (MIRAPEX) 0.25 mg tablet TAKE 1 TABLET BEFORE EACH DIALYSIS  MWF  3  
 sevelamer carbonate (RENVELA) 800 mg tab tab Take 4,000 mg by mouth three (3) times daily (with meals). 5 with meals and 1 with snacks  escitalopram oxalate (LEXAPRO) 10 mg tablet TAKE 1 TABLET AT BEDTIME TO HELP PREVENT ANXIETY 30 Tab 11  
 atorvastatin (LIPITOR) 20 mg tablet Take 1 Tab by mouth nightly. 30 Tab 0  
 L. acidoph & paracasei- S therm- Bifido (TY-Q/RISAQUAD) 8 billion cell cap cap Take 1 Cap by mouth daily.  IRON, CARBONYL PO Take 280 mg by mouth daily.  acetaminophen (TYLENOL) 500 mg tablet Take 1,000 mg by mouth every six (6) hours as needed for Pain. Past History Past Medical History: 
Past Medical History:  
Diagnosis Date  Chronic kidney disease Stage 5 (7/18/16 BUN 79, Creatnine 4.53, K 6) Dr. Daquan Purcell [phone]  GERD (gastroesophageal reflux disease)   
 well controlled with Rx   
 High cholesterol  Hyperkalemia  Hypertension  Hypomagnesemia   
 on daily Magnesium  Neuropathy   
 bilateral feet  Ovarian cancer ([facility] [address] 2013 Hysterectomy with chemo, no radiation:  Dr. Javed Whittington  Thromboembolus ([facility] [address] 2015  
 blood clot in lung 2015  Thromboembolus ([facility] [address] 2004  
 in kidney \"many years ago\" Past Surgical History: 
Past Surgical History:  
Procedure Laterality Date  ABDOMEN SURGERY PROC UNLISTED  7/31/15 Lap exploratory small bowel obstruction  (ICU)  ABDOMEN SURGERY PROC UNLISTED  8/2/15 Abdmonial washout with wound vac (ICU)  ABDOMEN SURGERY PROC UNLISTED  8/18/15 Abdominal washout fascial closure (ICU)  ABDOMEN SURGERY PROC UNLISTED  11/11/15 Lap takedown of ileostomy  COLONOSCOPY N/A 2016 [facility] N/A 2019 COLONOSCOPY performed by Gurpreet Singh MD at [facility] ENDOSCOPY  
 HX APPENDECTOMY  approx 2005  HX CASI AND BSO    
 due to ovarian cancer  HX TONSILLECTOMY  age 10 Family History: 
Family History Problem Relation Age of Onset  Other Mother   
     peptic ulcer  Alzheimer Father Social History: 
Social History Tobacco Use  Smoking status: Current Every Day Smoker Packs/day: 1.00 Last attempt to quit: 2012 Years since quittin.4  Smokeless tobacco: Never Used Substance Use Topics  Alcohol use: No  
 Drug use: Yes Types: Prescription, OTC Allergies: Allergies Allergen Reactions  Citrus And Derivatives Anaphylaxis Patient and her  reported  Penicillin G Anaphylaxis  Sulfa (Sulfonamide Antibiotics) Anaphylaxis  Vancomycin Hives  [place] Juice Rash Review of Systems Review of Systems Constitutional: Positive for fatigue. Negative for chills and fever. HENT: Negative for congestion, ear pain, rhinorrhea, sore throat and trouble swallowing. Eyes: Negative for visual disturbance. Respiratory: Negative for cough, chest tightness and shortness of breath. Cardiovascular: Negative for chest pain and palpitations. Gastrointestinal: Negative for abdominal pain, blood in stool, constipation, diarrhea, nausea and vomiting. Genitourinary: Negative for decreased urine volume, difficulty urinating, dysuria and frequency. Musculoskeletal: Negative for back pain and neck pain. Skin: Negative for color change and rash. Neurological: Negative for dizziness, weakness, light-headedness and headaches. Psychiatric/Behavioral: Positive for confusion. Physical Exam  
Physical Exam  
Constitutional: She is oriented to person, place, and time. She appears well-developed and well-nourished. She does not appear ill. No distress. HENT:  
Mouth/Throat: Oropharynx is clear and moist.  
Eyes: Conjunctivae are normal.  
Neck: Neck supple. Cardiovascular: Normal rate and regular rhythm. Pulmonary/Chest: Effort normal and breath sounds normal. No accessory muscle usage. No respiratory distress. Abdominal: Soft. She exhibits no distension. There is no tenderness. Musculoskeletal:  
     Arms: 
Lymphadenopathy:  
  She has no cervical adenopathy. Neurological: She is alert and oriented to person, place, and time. She has normal strength. No cranial nerve deficit or sensory deficit. Skin: Skin is warm and dry. Nursing note and vitals reviewed. Diagnostic Study Results Labs - Recent Results (from the past 24 hour(s)) SAMPLES BEING HELD Collection Time: 06/26/19 11:03 AM  
Result Value Ref Range SAMPLES BEING HELD [facility]   
 COMMENT Add-on orders for these samples will be processed based on acceptable specimen integrity and analyte stability, which may vary by analyte. URINALYSIS W/ RFLX MICROSCOPIC Collection Time: 06/26/19 12:02 PM  
Result Value Ref Range Color YELLOW/STRAW Appearance CLOUDY (A) CLEAR Specific gravity 1.015 1.003 - 1.030    
 pH (UA) 7.5 5.0 - 8.0 Protein 300 (A) NEG mg/dL Glucose 100 (A) NEG mg/dL Ketone NEGATIVE  NEG mg/dL Bilirubin NEGATIVE  NEG Blood LARGE (A) NEG Urobilinogen 0.2 0.2 - 1.0 EU/dL Nitrites NEGATIVE  NEG Leukocyte Esterase NEGATIVE  NEG    
 WBC 0-4 0 - 4 /hpf  
 RBC >100 (H) 0 - 5 /hpf Epithelial cells FEW FEW /lpf Bacteria NEGATIVE  NEG /hpf Hyaline cast 0-2 0 - 5 /lpf METABOLIC PANEL, COMPREHENSIVE Collection Time: 06/26/19 12:25 PM  
Result Value Ref Range Sodium 138 136 - 145 mmol/L Potassium 4.8 3.5 - 5.1 mmol/L Chloride 103 97 - 108 mmol/L  
 CO2 29 21 - 32 mmol/L Anion gap 6 5 - 15 mmol/L Glucose 91 65 - 100 mg/dL BUN 23 (H) 6 - 20 MG/DL Creatinine 2.42 (H) 0.55 - 1.02 MG/DL  
 BUN/Creatinine ratio 10 (L) 12 - 20 GFR est AA 24 (L) >60 ml/min/1.73m2 GFR est non-AA 20 (L) >60 ml/min/1.73m2 Calcium 8.3 (L) 8.5 - 10.1 MG/DL Bilirubin, total 0.3 0.2 - 1.0 MG/DL  
 ALT (SGPT) 22 12 - 78 U/L  
 AST (SGOT) 32 15 - 37 U/L Alk. phosphatase 108 45 - 117 U/L Protein, total 7.0 6.4 - 8.2 g/dL Albumin 2.7 (L) 3.5 - 5.0 g/dL Globulin 4.3 (H) 2.0 - 4.0 g/dL A-G Ratio 0.6 (L) 1.1 - 2.2 MAGNESIUM Collection Time: 06/26/19 12:25 PM  
Result Value Ref Range Magnesium 2.0 1.6 - 2.4 mg/dL TROPONIN I Collection Time: 06/26/19 12:25 PM  
Result Value Ref Range Troponin-I, Qt. <0.05 <0.05 ng/mL CBC W/O DIFF Collection Time: 06/26/19 12:25 PM  
Result Value Ref Range WBC 5.8 3.6 - 11.0 K/uL  
 RBC 3.71 (L) 3.80 - 5.20 M/uL HGB 9.5 (L) 11.5 - 16.0 g/dL HCT 34.0 (L) 35.0 - 47.0 % MCV 91.6 80.0 - 99.0 FL  
 MCH 25.6 (L) 26.0 - 34.0 PG  
 MCHC 27.9 (L) 30.0 - 36.5 g/dL  
 RDW 17.1 (H) 11.5 - 14.5 % PLATELET 879 (L) 153 - 400 K/uL MPV 10.2 8.9 - 12.9 FL  
 NRBC 0.0 0  WBC ABSOLUTE NRBC 0.00 0.00 - 0.01 K/uL Radiologic Studies -  
XR CHEST PORT Final Result IMPRESSION:  
  
No acute process.  Stable mild chronic diffuse interstitial lung markings and  
 numerous scattered bilateral calcified granuloma. CT HEAD WO CONT Final Result IMPRESSION: No acute intracranial hemorrhage, mass or infarct. CT Results  (Last 48 hours) 06/26/19 1209  CT HEAD WO CONT Final result Impression:  IMPRESSION: No acute intracranial hemorrhage, mass or infarct. Narrative:  INDICATION: Decreased alertness, hypertension. Altered mental status. Exam: Noncontrast CT of the brain is performed with 5 mm collimation. CT dose reduction was achieved with the use of the standardized protocol  
tailored for this examination and automatic exposure control for dose  
modulation. Direct comparison is made to prior CT dated March 2017. FINDINGS: There is mild diffuse cortical atrophy. There is no acute intracranial  
hemorrhage, mass, mass effect or herniation. Ventricular system is normal. The  
gray-white matter differentiation is well-preserved. The mastoid air cells are  
well pneumatized. The visualized paranasal sinuses are normal.  
   
  
  
 
CXR Results  (Last 48 hours) 06/26/19 1233  XR CHEST PORT Final result Impression:  IMPRESSION:  
   
No acute process. Stable mild chronic diffuse interstitial lung markings and  
numerous scattered bilateral calcified granuloma. Narrative:  EXAM:  XR CHEST PORT INDICATION: Hypertensive emergency. COMPARISON: 4/22/2019 TECHNIQUE: Portable AP upright chest view at 1218 hours FINDINGS: Cardiac monitoring wires overlie the thorax. The cardiomediastinal  
contours are stable. The pulmonary vasculature is within normal limits. There are stable mild chronic diffuse interstitial lung markings and numerous  
scattered bilateral calcified granuloma. There is no new focal airspace opacity,  
pleural effusion, or pneumothorax. The bones and upper abdomen are stable. Medical Decision Making I am the first provider for this patient. I reviewed the vital signs, available nursing notes, past medical history, past surgical history, family history and social history. Vital Signs-Reviewed the patient's vital signs. Patient Vitals for the past 24 hrs: 
 Temp Pulse Resp BP SpO2  
06/26/19 1429 97.8 °F (36.6 °C) 62 16 163/68 98 %  
06/26/19 1347 97.9 °F (36.6 °C) (!) 56 16 (!) 225/74 98 %  
06/26/19 1315  (!) 57 14 (!) 228/59 97 % 06/26/19 1245  (!) 58 14 (!) 206/73 96 %  
06/26/19 1145  60 20 183/69 99 % 06/26/19 1137 97.8 °F (36.6 °C) (!) 59 16 191/64 98 %  
06/26/19 1115  64 14 190/69 98 %  
06/26/19 1045    (!) 211/66 98 %  
06/26/19 1032 97.7 °F (36.5 °C) 64 16 (!) 203/132 97 % EKG interpretation: (Preliminary) Rhythm: normal sinus rhythm; and regular . Rate (approx.): 70; Axis: normal; AR interval: normal; QRS interval: normal ; ST/T wave: normal; Other findings: left ventricular hypertrophy. Records Reviewed: Nursing Notes, Old Medical Records and Ambulance Run Sheet Provider Notes (Medical Decision Making):  
Patient with a history of hypertension presents with elevated blood pressure readings and altered mental status. Her altered mental status is likely due to hypertensive encephalopathy. She did have dialysis this morning and continued to be hypertensive after treatment. Apparently responded to clonidine given at dialysis. The patient does not recognize that she is altered but her  does state that she is more confused. Her labs are unremarkable. She does produce urine and does not have a UTI. CT is negative for any intracranial hemorrhage. She responds very well to hydralazine. Will admit to hospitalist and nephrology to consult. ED Course:  
Initial assessment performed.  The patients presenting problems have been discussed, and they are in agreement with the care plan formulated and outlined with them. I have encouraged them to ask questions as they arise throughout their visit. Orders Placed This Encounter  CT HEAD WO CONT  XR CHEST PORT  
 SAMPLES BEING HELD  
 URINALYSIS W/ RFLX MICROSCOPIC  COMPREHENSIVE METABOLIC PANEL  
 MAGNESIUM  
 TROPONIN I  
 CBC W/O DIFF  
 STRAIGHT CATHETER (NURSING) ONE TIME STAT  
 NOTIFY PROVIDER: SPECIFY Notify provider on pt's arrival to floor ONE TIME STAT  
 OUT OF BED WITH ASSISTANCE  
[address] [address]  FULL CODE  
 IP CONSULT TO NEPHROLOGY  EKG, 12 LEAD, INITIAL  
 SALINE LOCK IV ONE TIME STAT  hydrALAZINE (APRESOLINE) 20 mg/mL injection 10 mg  
 acetaminophen (TYLENOL) tablet 650 mg  IP CONSULT TO HOSPITALIST Critical Care Time:  
0 Disposition: 
Admit Diagnosis Clinical Impression: 1. Hypertensive encephalopathy 2. ESRD on dialysis [facility]) This note will not be viewable in [address]

## 2019-06-26 NOTE — ED NOTES
TRANSFER - OUT REPORT: 
 
Verbal report given to Cassidy(name) on Caren Packer  being transferred to [facility] (unit) for routine progression of care Report consisted of patients Situation, Background, Assessment and  
Recommendations(SBAR). Information from the following report(s) SBAR, Kardex, ED Summary, [facility] and Recent Results was reviewed with the receiving nurse. Lines:  
Peripheral IV 06/26/19 Right Antecubital (Active) Site Assessment Clean, dry, & intact 6/26/2019 11:58 AM  
Phlebitis Assessment 0 6/26/2019 11:58 AM  
Infiltration Assessment 0 6/26/2019 11:58 AM  
Dressing Status Clean, dry, & intact 6/26/2019 11:58 AM  
Dressing Type Transparent 6/26/2019 11:58 AM  
Hub Color/Line Status Patent; Flushed 6/26/2019 11:58 AM  
Action Taken Blood drawn 6/26/2019 11:58 AM  
  
 
Opportunity for questions and clarification was provided.

## 2019-06-26 NOTE — PROGRESS NOTES
Reason for Admission:   Hypertensive emergency, ESRD on dialysis RRAT Score:     23 high risk Resources/supports as identified by patient/family:   Family, currently using [facility] nursing Top Challenges facing patient (as identified by patient/family and CM): Finances/Medication cost?      No challenges reported Transportation?  family Support system or lack thereof?  family Living arrangements? Lives with , 2 story house, 3 steps to enter Self-care/ADLs/Cognition? Independent with ADLs, patient currently alert and oriented Current Advanced Directive/Advance Care Plan:  none Plan for utilizing home health:    TBD, currently using [facility] nursing Transition of Care Plan: 1.  Patient in ED bed waiting for inpatient admission 2. Patient will need 2nd IM letter at discharge 3. Patient prefers to discharge home if possible with any ordered therapy/assistance and follow up appointments Patient is a 75year old female admitted 3/26 for hypertensive emergency. Patient alert and oriented, resting in bed with  present in room. Demographic information verified and correct. Patient lives with , no home oxygen, no DME (has a stair lift at home), is currently using [facility] nursing for vital signs checks. Patient uses [facility]. Patient states she is independent with ADLs but does not drive. Patient's  can transport at discharge Patient is a MWF dialysis patient at [facility] with a chair time of 6:00am.  Patient was at dialysis today before being brought to the ER. Care Management Interventions PCP Verified by CM: Vannessa Tang MD) Mode of Transport at Discharge: Other (see comment)(pt's  can transport at [place]) Transition of Care Consult (CM Consult): Discharge Planning Discharge Durable Medical Equipment: No 
Physical Therapy Consult: No 
Occupational Therapy Consult: No 
Speech Therapy Consult: No 
Current Support Network: Lives with Spouse, Own Home, Relative's Home(2 story house, 3 steps to enter) Confirm Follow Up Transport: Family Plan discussed with Pt/Family/Caregiver: Yes Discharge Location Discharge Placement: [facility] Johnny Wilson RN[facility] B[facility] Management [phone]

## 2019-06-26 NOTE — PROGRESS NOTES
TRANSFER - IN REPORT: 
 
Verbal report received from [address] [place] [address],[address], RN(name) on Katheryn Born  being received from ED(unit) for routine progression of care Report consisted of patients Situation, Background, Assessment and  
Recommendations(SBAR). Information from the following report(s) SBAR, Kardex, ED Summary, [facility] and Cardiac Rhythm (NSR) was reviewed with the receiving nurse. Opportunity for questions and clarification was provided. Assessment completed upon patients arrival to unit and care assumed. Bedside and Verbal shift change report GIVEN TO Keely Puga RN. Report included the following information SBAR, Kardex, ED Summary, Procedure Summary, Intake/Output, MAR, Recent Results and Cardiac Rhythm (NSR). [facility] NURSING NOTE Admission Date 6/26/2019 Admission Diagnosis Altered mental status [R41.82] Consults IP CONSULT TO NEPHROLOGY 
IP CONSULT TO NEPHROLOGY 
IP CONSULT TO HOSPITALIST Cardiac Monitoring [x] Yes [] No  
  
Purposeful Hourly Rounding [x] Yes   
Monique Score Total Score: 2 Monique score 3 or > [x] Bed Alarm [] Avasys [] 1:1 sitter [] Patient refused (Signed refusal form in chart) Yinka Score Yinka score 14 or < [] PMT consult [] Wound Care consult  
 []  Specialty bed  [] Nutrition consult Influenza Vaccine Oxygen needs? [x] Room air Oxygen @  []1L    []2L    []3L   []4L    []5L   []6L via NC Chronic home O2 use? [] Yes [x] No 
Perform O2 challenge test and document in progress note using smartphrase (.Homeoxygen) Last bowel movement Urinary Catheter LDAs Peripheral IV 06/26/19 Right Antecubital (Active) Site Assessment Clean, dry, & intact 6/26/2019 11:58 AM  
Phlebitis Assessment 0 6/26/2019 11:58 AM  
Infiltration Assessment 0 6/26/2019 11:58 AM  
Dressing Status Clean, dry, & intact 6/26/2019 11:58 AM  
Dressing Type Transparent 6/26/2019 11:58 AM  
 Hub Color/Line Status Patent; Flushed 6/26/2019 11:58 AM  
Action Taken Blood drawn 6/26/2019 11:58 AM  
                  
  
Readmission Risk Assessment Tool Score High Risk   
      
 23 Total Score 3 Has Seen PCP in Last 6 Months (Yes=3, No=0) 2 . Living with Significant Other. Assisted Living. LTAC. SNF. or  
Rehab  
 4 IP Visits Last 12 Months (1-3=4, 4=9, >4=11) 5 Pt. Coverage (Medicare=5 , Medicaid, or Self-Pay=4) 9 Charlson Comorbidity Score (Age + Comorbid Conditions) Criteria that do not apply:  
 Patient Length of Stay (>5 days = 3) Expected Length of Stay - - - Actual Length of Stay 0

## 2019-06-26 NOTE — PROGRESS NOTES
[email or web address]                  Phone - [phone] Renal Progress Note Subjective:  
Patient: Emeterio Brewer                    YOB: 1945 Date- 6/26/2019 Reason for visit: ESRF Admission Date: 6/26/2019 IMPRESSION: 
 
End-stage renal failure on a MWF schedule at [facility]. Malignant hypertension. BP was 255/102 in the dialysis unit at the end of her treatment. She was given 0.2 mg clonidine then c/o chest pain. Her meds at home include metoprolol and losartan 100 mg every day. She HAD been on hydralazine 100 mg tid and Norvasc 10 mg every day, but these were apparently d/c'd during her hospitalization in 4/19. We had since restarted Norvasc 5 mg every day in the outpatient dialysis unit. Dementia which has become apparent over the past 4-6 months. Tobacco abuse which continues. PLAN: 
 
Agree with restarting hydralazine. May need to increase this back to her old dose. Continue Norvasc, losartan, and metoprolol. Dialysis already done today. Due for next HD on Friday. Above d/w the patient. History: 
 
The patient has ESRF on a MWF schedule and malignant hypertension. She completed dialysis today but was noted to have a high BP at the end. She was also confused and agitated. She tells me now that she had a HA but says she did not have any SOB, chest pain or other complaints. However, the dialysis nurses reported c/o chest pain which is why they sent her to the ER. Her BP had been 255/102. The patient says she still has a slight HA but denies any other complaints. No SOB, no chest pain. No abd pain. No N/V.  ++chronic neuropathic pain in her legs. Review of Systems A comprehensive review of systems was negative except for that written in the HPI. However, the patient is not a reliable historian. Objective:  
 
Visit Vitals /70 Pulse 66 Temp 97.9 °F (36.6 °C) Resp 16 Ht 5' 9\" (1.753 m) Wt 52.6 kg (116 lb) SpO2 97% BMI 17.13 kg/m² Temp (24hrs), Av.8 °F (36.6 °C), Min:97.7 °F (36.5 °C), Max:97.9 °F (36.6 °C) No intake/output data recorded. No intake/output data recorded. Physical Exam: 
General:  alert, cooperative, no distress Eye:  conjunctivae/corneas clear. EOM's intact. Neurologic:  Alert and appropriate but does demonstrate a mild memory deficit (not new); normal speech Neck:  supple; no JVD Lungs:  clear to auscultation bilaterally Heart:  regular rate and rhythm Skin:  no rash or jaundice Abdomen:  soft, non-tender. No masses,  no organomegaly Extremities: no edema Data Review:  
 
Recent Labs  
  19 
1225 WBC 5.8 HGB 9.5*   
K 4.8  
 CO2 29 BUN 23* CREA 2.42* CA 8.3* ALB 2.7* MG 2.0 Assessment:  
 
Active Problems: 
  Altered mental status (2019) Chart reviewed. Pertinent Notes Reviewed. Medications list Personally Reviewed. Noel Esquivel, [address] [facility] [address] [facility] [place] [address], [address] [place], [address] [place] Phone - [phone]    Fax - [phone] [email or web address] [facility] [address] [facility], [address] [facility] Phone - [phone]  Fax - [phone] [email or web address]

## 2019-06-26 NOTE — H&P
Hospitalist Admission NoteNAME: Yasmin North :  1945 MRN:  [id number] Date/Time:  2019 3:36 PM 
 
Patient PCP: Yazan Serrato MD 
________________________________________________________________________ My assessment of this patient's clinical condition and my plan of care is as follows. Assessment / Plan: 
 
1) AMS: Resolved,  mentioned she looks at her baseline at this time. CT head no acute findings 2) hypertensive urgency: /87. Patient on Metoprolol 12.5 BID, Norvasc 5 daily, losartan 100 daily . She has only taken the metoprolol today. So will give her other home  meds. Because she mentioned that the BP has been in the 170+ in the lasts 2-3 weeks I will also add hydralzine 50 po q8. Adjust meds as needed. telemetry 3) ESRD: Dialysis today: Will consult nephrology for F/U in case patient stays [facility] tomorrow 4) Hyperlipidemia: Continue home meds 5) Neuropathy: Continue home meds Code Status: full Surrogate Decision Maker: DVT Prophylaxis: yes GI Prophylaxis: not indicated Baseline: lives at home with  Subjective: CHIEF COMPLAINT: AMS and elevated BP HISTORY OF PRESENT ILLNESS:    
Ramon Bowen is a 75 y.o. Female PMH ESRD dialysis M,W,F, HTN,neuropathy, who presents form dialysis center because after dialysis her  noticed some change in her mental status and also because elevated BP. No CP,SOB,Palpitations or any other symptom We were asked to admit for work up and evaluation of the above problems. Past Medical History:  
Diagnosis Date  Chronic kidney disease Stage 5 (16 BUN 79, Creatnine 4.53, K 6) Dr. Tone Paez [phone]  GERD (gastroesophageal reflux disease)   
 well controlled with Rx   
 High cholesterol  Hyperkalemia  Hypertension  Hypomagnesemia   
 on daily Magnesium  Neuropathy   
 bilateral feet  Ovarian cancer ([facility] [address]  Hysterectomy with chemo, no radiation:  Dr. Angella Saavedra  Thromboembolus ([facility] [address] 2015  
 blood clot in lung 2015  Thromboembolus ([facility] [address] 2004  
 in kidney \"many years ago\" Past Surgical History:  
Procedure Laterality Date  ABDOMEN SURGERY PROC UNLISTED  7/31/15 Lap exploratory small bowel obstruction  (ICU)  ABDOMEN SURGERY PROC UNLISTED  8/2/15 Abdmonial washout with wound vac (ICU)  ABDOMEN SURGERY PROC UNLISTED  8/18/15 Abdominal washout fascial closure (ICU)  ABDOMEN SURGERY PROC UNLISTED  11/11/15 Lap takedown of ileostomy  COLONOSCOPY N/A 2016 COLONOSCOPY performed by Sasha Cervantes MD at [place] [address] COLONOSCOPY N/A 2019 COLONOSCOPY performed by Nader Muniz MD at \[facility]\"" ENDOSCOPY  
 HX APPENDECTOMY  approx 2005  HX CASI AND BSO  2013  
 due to ovarian cancer  HX TONSILLECTOMY  age 10 Social History Tobacco Use  Smoking status: Current Every Day Smoker Packs/day: 1.00 Last attempt to quit: 2012 Years since quittin.4  Smokeless tobacco: Never Used Substance Use Topics  Alcohol use: No  
  
 
Family History Problem Relation Age of Onset  Other Mother   
     peptic ulcer  Alzheimer Father Allergies Allergen Reactions  Citrus And Derivatives Anaphylaxis Patient and her  reported  Penicillin G Anaphylaxis  Sulfa (Sulfonamide Antibiotics) Anaphylaxis  Vancomycin Hives  Orange Juice Rash Prior to Admission medications Medication Sig Start Date End Date Taking? Authorizing Provider  
metoprolol tartrate (LOPRESSOR) 25 mg tablet TAKE 1/2 TABLET TWICE A DAY 19   Theresa Reno MD  
famotidine (PEPCID) 20 mg tablet TAKE 1 TABLET BY MOUTH EVERY DAY 19   Theresa Reno MD  
amLODIPine (NORVASC) 5 mg tablet Take 1 Tab by mouth daily. 19   Theresa Reno MD  
aspirin delayed-release 81 mg tablet Take 81 mg by mouth daily. Provider, Historical  
polyethylene glycol (MIRALAX) 17 gram/dose powder Take 17 g by mouth daily. Other, MD Jess  
trolamine salicylate (ASPERCREME EX) by Apply Externally route daily. Provider, Historical  
carboxymethylcellulose sodium (REFRESH LIQUIGEL) 1 % dlgl ophthalmic solution Administer 1 Drop to both eyes daily as needed for Other (dry eyes ). Provider, Historical  
ondansetron (ZOFRAN ODT) 4 mg disintegrating tablet TAKE 1 TABLET EVERY 8 HOURS IF NEEDED FOR NAUSEA 4/2/19   Ramila Hopping B III, DO  
oxybutynin (DITROPAN) 5 mg tablet TAKE 1 TABLET BY MOUTH TWICE A DAY 3/27/19   Manuela Murdock MD  
buPROPion [facility]) 100 mg tablet Take 100 mg by mouth nightly. Provider, Historical  
memantine (NAMENDA) 10 mg tablet Take 10 mg by mouth nightly. Provider, Historical  
b complex-vitamin c-folic acid (NEPHROCAPS) 1 mg capsule Take 1 Cap by mouth every Monday, Wednesday, Friday. Provider, Historical  
diphenoxylate-atropine (LOMOTIL) 2.5-0.025 mg per tablet Take 1 Tab by mouth four (4) times daily as needed for Diarrhea. Max Daily Amount: 4 Tabs. 3/25/19   Manuela Murdock MD  
gabapentin (NEURONTIN) 100 mg capsule TAKE 1 CAPSULE BY MOUTH TWICE A DAY FOR NERVE PAIN 1/16/19   Manuela Murdock MD  
losartan (COZAAR) 100 mg tablet TAKE 1 TABLET EVERY MORNING Patient taking differently: TAKE 1 TABLET EVERY night 12/17/18   Manuela Murdock MD  
pramipexole (MIRAPEX) 0.25 mg tablet TAKE 1 TABLET BEFORE EACH DIALYSIS  MWF 7/15/18   Provider, Historical  
sevelamer carbonate (RENVELA) 800 mg tab tab Take 4,000 mg by mouth three (3) times daily (with meals). 5 with meals and 1 with snacks 8/16/18   Provider, Historical  
escitalopram oxalate (LEXAPRO) 10 mg tablet TAKE 1 TABLET AT BEDTIME TO HELP PREVENT ANXIETY 8/20/18   Manuela Murdock MD  
atorvastatin (LIPITOR) 20 mg tablet Take 1 Tab by mouth nightly.  3/31/17   Noel Reyes MD  
 L. acidoph & paracasei- S therm- Bifido (TY-Q/RISAQUAD) 8 billion cell cap cap Take 1 Cap by mouth daily. Provider, Historical  
IRON, CARBONYL PO Take 280 mg by mouth daily. Provider, Historical  
acetaminophen (TYLENOL) 500 mg tablet Take 1,000 mg by mouth every six (6) hours as needed for Pain. Provider, Historical  
 
 
REVIEW OF SYSTEMS:    
I am not able to complete the review of systems because: The patient is intubated and sedated The patient has altered mental status due to his acute medical problems The patient has baseline aphasia from prior stroke(s) The patient has baseline dementia and is not reliable historian The patient is in acute medical distress and unable to provide information Total of 12 systems reviewed as follows:   
   POSITIVE= underlined text  Negative = text not underlined General:  fever, chills, sweats, generalized weakness, weight loss/gain,  
   loss of appetite Eyes:    blurred vision, eye pain, loss of vision, double vision ENT:    rhinorrhea, pharyngitis Respiratory:   cough, sputum production, SOB, MONTANEZ, wheezing, pleuritic pain  
Cardiology:   chest pain, palpitations, orthopnea, PND, edema, syncope Gastrointestinal:  abdominal pain , N/V, diarrhea, dysphagia, constipation, bleeding Genitourinary:  frequency, urgency, dysuria, hematuria, incontinence Muskuloskeletal :  arthralgia, myalgia, back pain Hematology:  easy bruising, nose or gum bleeding, lymphadenopathy Dermatological: rash, ulceration, pruritis, color change / jaundice Endocrine:   hot flashes or polydipsia Neurological:  headache, dizziness, confusion, focal weakness, paresthesia, Speech difficulties, memory loss, gait difficulty Psychological: Feelings of anxiety, depression, agitation Objective: VITALS:   
Visit Vitals /61 Pulse 65 Temp 97.8 °F (36.6 °C) Resp 17 Ht 5' 9\" (1.753 m) Wt 52.6 kg (116 lb) SpO2 98% BMI 17.13 kg/m² PHYSICAL EXAM: 
 
General:    Alert, cooperative, no distress, appears stated age. HEENT: Atraumatic, anicteric sclerae, pink conjunctivae No oral ulcers, mucosa moist, throat clear, dentition fair Neck:  Supple, symmetrical,  thyroid: non tender Lungs:   Decreased breath sounds. Clear to auscultation bilaterally. No Wheezing or Rhonchi. No rales. Chest wall:  No tenderness  No Accessory muscle use. Heart:   Regular  rhythm,  No  murmur   No edema Abdomen:   Soft, non-tender. Not distended. Bowel sounds normal 
Extremities: Dialysis fistula L arm, looks ok. No cyanosis. No clubbing,   
  Skin turgor normal, Capillary refill normal, Radial dial pulse 2+ Skin:     Not pale. Not Jaundiced  No rashes Psych:  Good insight. Not depressed. Not anxious or agitated. Neurologic: No facial asymmetry. No aphasia or slurred speech. Symmetrical strength, Sensation grossly intact. Alert and oriented X 3.  
 
_______________________________________________________________________ Care Plan discussed with: 
  Comments Patient x Family  x   
RN Care Manager Consultant:     
_______________________________________________________________________ Expected  Disposition:  
Home with Family x HH/PT/OT/RN   
SNF/LTC   
WILLOW   
________________________________________________________________________ TOTAL TIME:  35 Minutes Critical Care Provided     Minutes non procedure based Comments Reviewed previous records  
>50% of visit spent in counseling and coordination of care  Discussion with patient and/or family and questions answered 
  
 
________________________________________________________________________ Signed: Siomara Melendez MD 
 
Procedures: see electronic medical records for all procedures/Xrays and details which were not copied into this note but were reviewed prior to creation of Plan.  
 
LAB DATA REVIEWED:   
 Recent Results (from the past 24 hour(s)) SAMPLES BEING HELD Collection Time: 06/26/19 11:03 AM  
Result Value Ref Range SAMPLES BEING HELD LAV PST   
 COMMENT Add-on orders for these samples will be processed based on acceptable specimen integrity and analyte stability, which may vary by analyte. URINALYSIS W/ RFLX MICROSCOPIC Collection Time: 06/26/19 12:02 PM  
Result Value Ref Range Color YELLOW/STRAW Appearance CLOUDY (A) CLEAR Specific gravity 1.015 1.003 - 1.030    
 pH (UA) 7.5 5.0 - 8.0 Protein 300 (A) NEG mg/dL Glucose 100 (A) NEG mg/dL Ketone NEGATIVE  NEG mg/dL Bilirubin NEGATIVE  NEG Blood LARGE (A) NEG Urobilinogen 0.2 0.2 - 1.0 EU/dL Nitrites NEGATIVE  NEG Leukocyte Esterase NEGATIVE  NEG    
 WBC 0-4 0 - 4 /hpf  
 RBC >100 (H) 0 - 5 /hpf Epithelial cells FEW FEW /lpf Bacteria NEGATIVE  NEG /hpf Hyaline cast 0-2 0 - 5 /lpf METABOLIC PANEL, COMPREHENSIVE Collection Time: 06/26/19 12:25 PM  
Result Value Ref Range Sodium 138 136 - 145 mmol/L Potassium 4.8 3.5 - 5.1 mmol/L Chloride 103 97 - 108 mmol/L  
 CO2 29 21 - 32 mmol/L Anion gap 6 5 - 15 mmol/L Glucose 91 65 - 100 mg/dL BUN 23 (H) 6 - 20 MG/DL Creatinine 2.42 (H) 0.55 - 1.02 MG/DL  
 BUN/Creatinine ratio 10 (L) 12 - 20 GFR est AA 24 (L) >60 ml/min/1.73m2 GFR est non-AA 20 (L) >60 ml/min/1.73m2 Calcium 8.3 (L) 8.5 - 10.1 MG/DL Bilirubin, total 0.3 0.2 - 1.0 MG/DL  
 ALT (SGPT) 22 12 - 78 U/L  
 AST (SGOT) 32 15 - 37 U/L Alk. phosphatase 108 45 - 117 U/L Protein, total 7.0 6.4 - 8.2 g/dL Albumin 2.7 (L) 3.5 - 5.0 g/dL Globulin 4.3 (H) 2.0 - 4.0 g/dL A-G Ratio 0.6 (L) 1.1 - 2.2 MAGNESIUM Collection Time: 06/26/19 12:25 PM  
Result Value Ref Range Magnesium 2.0 1.6 - 2.4 mg/dL TROPONIN I Collection Time: 06/26/19 12:25 PM  
Result Value Ref Range Troponin-I, Qt. <0.05 <0.05 ng/mL CBC W/O DIFF  
 Collection Time: 06/26/19 12:25 PM  
Result Value Ref Range WBC 5.8 3.6 - 11.0 K/uL  
 RBC 3.71 (L) 3.80 - 5.20 M/uL HGB 9.5 (L) 11.5 - 16.0 g/dL HCT 34.0 (L) 35.0 - 47.0 % MCV 91.6 80.0 - 99.0 FL  
 MCH 25.6 (L) 26.0 - 34.0 PG  
 MCHC 27.9 (L) 30.0 - 36.5 g/dL  
 RDW 17.1 (H) 11.5 - 14.5 % PLATELET 407 (L) 146 - 400 K/uL MPV 10.2 8.9 - 12.9 FL  
 NRBC 0.0 0  WBC ABSOLUTE NRBC 0.00 0.00 - 0.01 K/uL

## 2019-06-26 NOTE — CONSULTS
[id number] [facility]  CHL:[id number]   :1945 Pt seen and examined. Note to follow. [place], [address] [facility] [place] [employer] [facility][1] [address] [place] [address], [address] [facility] Phone - [phone] Fax - [phone] [address], [address] [facility] Phone - [phone] Fax - [phone]    
www. [facility]

## 2019-06-26 NOTE — PROGRESS NOTES
Problem: Falls - Risk of 
Goal: *Absence of Falls Description Document Naveed Bethea Fall Risk and appropriate interventions in the flowsheet. Outcome: Progressing Towards Goal 
  
Problem: Patient Education: Go to Patient Education Activity Goal: Patient/Family Education Outcome: Progressing Towards Goal

## 2019-06-26 NOTE — ED NOTES
Attempted to call report but the nurse was unavailable to take report stating that the patient was just placed. Advised to call back if she had time or questions and the patient would be sent up in 15 minutes. She stated that she would call back.

## 2019-06-26 NOTE — ED TRIAGE NOTES
EMS picked her up from [facility] for hypertension and altered mental status. Blood sugar was 102. Dialysis was completed. Patient given Clonidine 0.2mg at [facility]. Patient is alert and oriented but staff at [address] said that she isn't acting herself.

## 2019-06-27 NOTE — PROGRESS NOTES
Problem: Mobility Impaired (Adult and Pediatric) Goal: *Acute Goals and Plan of Care (Insert Text) Description Physical Therapy Goals Initiated 6/27/2019 1. Patient will move from supine to sit and sit to supine  in bed with independence within 7 day(s). 2.  Patient will transfer from bed to chair and chair to bed with independence using the least restrictive device within 7 day(s). 3.  Patient will perform sit to stand with independence within 7 day(s). 4.  Patient will ambulate with independence for 300 feet with the least restrictive device within 7 day(s). 5.  Patient will ascend/descend 4 stairs with 1 handrail(s) with independence within 7 day(s). Outcome: Progressing Towards Goal 
 
PHYSICAL THERAPY EVALUATION Patient: Renetta Castro (2 y.o. female) Date: 6/27/2019 Primary Diagnosis: Altered mental status [R41.82] Precautions:   Fall ASSESSMENT : 
Based on the objective data described below, the patient presents with generalized weakness, decreased activity tolerance, impaired balance and altered gait. Pt was received in supine and cleared by nursing to mobilize. She performed all mobility with overall CGA level. Pt had some word finding difficulty and   reported there is still a difference in baseline cognition. Ambulated into the lazo and noted some path deviations and mildly unsteady. She was returned to the room and left sitting up in the chair at the end of the session. Recommending HHPT at discharge. Patient will benefit from skilled intervention to address the above impairments. Patient?s rehabilitation potential is considered to be Good Factors which may influence rehabilitation potential include:  
? None noted ? Mental ability/status ? Medical condition ? Home/family situation and support systems ? Safety awareness 
? Pain tolerance/management 
? Other: PLAN : 
 Recommendations and Planned Interventions: 
?           Bed Mobility Training             ? Neuromuscular Re-Education ? Transfer Training                   ? Orthotic/Prosthetic Training 
? Gait Training                         ? Modalities ? Therapeutic Exercises           ? Edema Management/Control ? Therapeutic Activities            ? Patient and Family Training/Education ? Other (comment): Frequency/Duration: Patient will be followed by physical therapy  3 times a week to address goals. Discharge Recommendations: Home Health Further Equipment Recommendations for Discharge: none SUBJECTIVE:  
Patient stated ?i really liked my therapist last time when they came to the house. ? OBJECTIVE DATA SUMMARY:  
HISTORY:   
Past Medical History:  
Diagnosis Date Chronic kidney disease Stage 5 (7/18/16 BUN 79, Creatnine 4.53, K 6) Dr. Trina Craig [phone] GERD (gastroesophageal reflux disease)   
 well controlled with Rx High cholesterol Hyperkalemia Hypertension Hypomagnesemia   
 on daily Magnesium Neuropathy   
 bilateral feet Ovarian cancer [facility]) 2013 Hysterectomy with chemo, no radiation:  Dr. Jeffery Fisher Thromboembolus ([address] 9/2015  
 blood clot in lung 9/2015 Thromboembolus ([address] 2004  
 in kidney \"many years ago\" Past Surgical History:  
Procedure Laterality Date ABDOMEN SURGERY PROC UNLISTED  7/31/15 Lap exploratory small bowel obstruction  (ICU) ABDOMEN SURGERY PROC UNLISTED  8/2/15 Abdmonial washout with wound vac (ICU) ABDOMEN SURGERY PROC UNLISTED  8/18/15 Abdominal washout fascial closure (ICU) ABDOMEN SURGERY PROC UNLISTED  11/11/15 Lap takedown of ileostomy COLONOSCOPY N/A 8/1/2016 COLONOSCOPY performed by Shawnee Courtney MD at [facility] AMBULATORY OR  
 COLONOSCOPY N/A 4/24/2019 COLONOSCOPY performed by Meghan Lewis MD at [facility] ENDOSCOPY HX APPENDECTOMY  approx 2005 HX CASI AND BSO  2013  
 due to ovarian cancer HX TONSILLECTOMY  age 10 Prior Level of Function/Home Situation: pt lives with  and has not been using AD. Has fallen out of bed because she is more confused when getting up early in the morning. Personal factors and/or comorbidities impacting plan of care: cognition Home Situation Home Environment: Private residence One/Two Story Residence: Two story Living Alone: No 
Support Systems: Family member(s) Patient Expects to be Discharged to[de-identified] Private residence Current DME Used/Available at Home: agustin Sage EXAMINATION/PRESENTATION/DECISION MAKING:  
Critical Behavior: 
 Confused but oriented to place, person and situation Hearing: Auditory Auditory Impairment: Hard of hearing, bilateral 
Skin:  intact Edema: none Range Of Motion: 
AROM: Within functional limits PROM: Within functional limits Strength:   
Strength: Generally decreased, functional 
  
  
  
  
  
  
Tone & Sensation:  
Tone: Normal 
  
  
  
  
Sensation: Intact Coordination: 
Coordination: Within functional limits Vision:  
  
Functional Mobility: 
Bed Mobility: 
Rolling: Supervision Supine to Sit: Supervision Scooting: Supervision Transfers: 
Sit to Stand: Stand-by assistance Stand to Sit: Stand-by assistance Balance:  
Sitting: Intact Standing: Impaired Standing - Static: Good Standing - Dynamic : Fair Ambulation/Gait Training: 
Distance (ft): 200 Feet (ft) Assistive Device: Gait belt Ambulation - Level of Assistance: Contact guard assistance Gait Abnormalities: Decreased step clearance;Shuffling gait; Path deviations Base of Support: Widened Speed/Rita: Pace decreased (<100 feet/min); Shuffled Step Length: Left shortened;Right shortened Functional Measure: 
Barthel Index: 
Bathin Bladder: 10 Bowels: 10 
 Groomin Dressing: 10 Feeding: 10 Mobility: 10 Stairs: 5 Toilet Use: 10 Transfer (Bed to Chair and Back): 10 Total: 80/100 Percentage of impairment  
0% 1-19% 20-39% 40-59% 60-79% 80-99% 100% Barthel Score 0-100 100 99-80 79-60 59-40 20-39 1-19 
 0 The Barthel ADL Index: Guidelines 1. The index should be used as a record of what a patient does, not as a record of what a patient could do. 2. The main aim is to establish degree of independence from any help, physical or verbal, however minor and for whatever reason. 3. The need for supervision renders the patient not independent. 4. A patient's performance should be established using the best available evidence. Asking the patient, friends/relatives and nurses are the usual sources, but direct observation and common sense are also important. However direct testing is not needed. 5. Usually the patient's performance over the preceding 24-48 hours is important, but occasionally longer periods will be relevant. 6. Middle categories imply that the patient supplies over 50 per cent of the effort. 7. Use of aids to be independent is allowed. Austin Bhardwaj., Barthel, DHudsonW. ([id number]). Functional evaluation: the Barthel Index. [address] [facility] (14)2. Luis Armando Aggarwal mike GISELL Benton, Debbie Townsend., Son Gasca., [place], [address] [place] [address] (). Measuring the change indisability after inpatient rehabilitation; comparison of the responsiveness of the Barthel Index and Functional [place] Measure. Journal of Neurology, Neurosurgery, and Psychiatry, 66(4), 498-324. Natalia Cardoso N.J.A, SOLIS Espinoza, & Gonzales Sanchez, MHudsonA. (2004.) Assessment of post-stroke quality of life in cost-effectiveness studies: The usefulness of the Barthel Index and the EuroQoL-5D. [facility], 73, [phone] Physical Therapy Evaluation Charge Determination History Examination Presentation Decision-Making MEDIUM  Complexity : 1-2 comorbidities / personal factors will impact the outcome/ POC  LOW Complexity : 1-2 Standardized tests and measures addressing body structure, function, activity limitation and / or participation in recreation  MEDIUM Complexity : Evolving with changing characteristics  Other outcome measures barthel  LOW Based on the above components, the patient evaluation is determined to be of the following complexity level: LOW Pain: 
Pain Scale 1: Numeric (0 - 10) Pain Intensity 1: 0 Pain Location 1: Head 
Pain Orientation 1: Anterior Pain Description 1: Aching Pain Intervention(s) 1: Medication (see MAR) Activity Tolerance: WFL, BP improved Please refer to the flowsheet for vital signs taken during this treatment. After treatment:  
?         Patient left in no apparent distress sitting up in chair ? Patient left in no apparent distress in bed 
? Call bell left within reach ? Nursing notified ? Caregiver present ? Bed alarm activated COMMUNICATION/EDUCATION:  
The patient?s plan of care was discussed with: Registered Nurse. ?         Fall prevention education was provided and the patient/caregiver indicated understanding. ? Patient/family have participated as able in goal setting and plan of care. ?         Patient/family agree to work toward stated goals and plan of care. ?         Patient understands intent and goals of therapy, but is neutral about his/her participation. ? Patient is unable to participate in goal setting and plan of care. Thank you for this referral. 
Jabier Sands, PT, DPT Time Calculation: 22 mins

## 2019-06-27 NOTE — CONSULTS
[id number] [facility]:[id number]   :1945 Pt seen. Patient Active Problem List  
Diagnosis Code  Abdominal pain R10.9  E-coli UTI N39.0, B96.20  Continuous severe abdominal pain R10.9  Enteritis K52.9  Adrenal hemorrhage (HCC) E27.49  Chronic renal insufficiency, stage V (HCC) N18.5  Sepsis ([address] A41.9  Acute pancreatitis K85.90  Protein malnutrition ([address] E46  Small bowel perforation ([facility]) K63.1  Acute renal failure with lesion of tubular necrosis ([facility]) N17.0  Anemia of renal disease N18.9, D63.1  SIRS (systemic inflammatory response syndrome) ([facility]) R65.10  
 HTN (hypertension) I10  
 History of peritonitis Z87.19  
 Physical debility R53.81  Chronic anticoagulation Z79.01  
 History of pulmonary embolism Z86.711  
 History of ovarian cancer Z85.43  
 Pericardial effusion I31.3  Diarrhea R19.7  Moderate protein-calorie malnutrition ([facility]) E44.0  Pericarditis with effusion I31.9  Hypertension, uncontrolled I10  
 ALAYNA (acute kidney injury) ([address] N17.9  Acute on chronic renal failure (HCC) N17.9, N18.9  Generalized rash R21  
 Heme positive stool R19.5  Electrolyte abnormality E87.8  Bilateral carotid artery stenosis I65.23  
 Acute renal failure (ARF) ([facility]) N17.9  Abnormal MRI of head R93.0  Stenosis of both internal carotid arteries I65.23  
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  
 Acute encephalopathy G93.40  Stenosis of left carotid artery without cerebral infarction I65.22  
 Disturbance of memory R41.3  Anxiety F41.9  Pneumonia J18.9  Thrombocytopenia ([address] D69.6  Hypertension I10  
 GI bleed K92.2  
 ESRD (end stage renal disease) ([address] N18.6  Fever R50.9  Anemia D64.9  Hypoxia R09.02  
  HCAP (healthcare-associated pneumonia) J18.9  Dyspnea R06.00  
 Acute respiratory failure with hypoxia (HCC) J96.01  
 Altered mental status R41.82 Missy Khan, [address] [facility] [employer] [address] [place], [address] [facility] Phone - [phone] Fax - [phone] Yasir Cooley [address] [place], [place] [address], [address] [facility] Phone - [phone] Fax - [phone]    
www. [email or web address]

## 2019-06-27 NOTE — PROGRESS NOTES
Initial Nutrition Assessment: 
 
INTERVENTIONS/RECOMMENDATIONS:  
· Consider low Na diet vs full cardiac restrictions. May need to add K+ restrictions if K+ becomes elevated beyond normal limits · Trial of Nepro (vanilla) ASSESSMENT:  
Chart reviewed, medically noted for ESRD-HD, malignant HTN and PMH shown below. Assessing pt due to low BMI. Pt reports she lost ~18 lbs during a 4 day hospitalization back in April. Since then she reports eating well and has a good appetite. Her physical appearance is thin. She agreed to try nepro to help meet nutrition needs. K+ was 5.1 today, may need K+ restrictions. Past Medical History:  
Diagnosis Date  Chronic kidney disease Stage 5 (7/18/16 BUN 79, Creatnine 4.53, K 6) Dr. Adal Ramos [phone]  GERD (gastroesophageal reflux disease)   
 well controlled with Rx   
 High cholesterol  Hyperkalemia  Hypertension  Hypomagnesemia   
 on daily Magnesium  Neuropathy   
 bilateral feet  Ovarian cancer ([facility] 2013 Hysterectomy with chemo, no radiation:  Dr. Mario Young  Thromboembolus ([facility] 9/2015  
 blood clot in lung 9/2015  Thromboembolus ([facility] 2004  
 in kidney \"many years ago\" Diet Order: Cardiac 
% Eaten:  No data found. Pertinent Medications: [x]Reviewed: pepcid, renvela, Pertinent Labs: [x]Reviewed: K+ 5.1, Food Allergies: []NKFA  [x]Other (citrus fruits) Last BM: PTA Edema:   n/a     []RUE   []LUE   []RLE   []LLE Pressure Injury:  n/a    [] Stage I   [] Stage II   [] Stage III   [] Stage IV Wt Readings from Last 30 Encounters:  
06/27/19 53.5 kg (118 lb) 05/03/19 54.9 kg (121 lb) 04/25/19 51.3 kg (113 lb 1.5 oz) 04/04/19 60.3 kg (133 lb)  
03/29/19 59.4 kg (131 lb)  
03/25/19 61.2 kg (135 lb)  
03/15/19 60.3 kg (133 lb) 02/12/19 62.1 kg (137 lb)  
08/27/18 62.6 kg (138 lb)  
08/21/18 61.6 kg (135 lb 12.8 oz) 07/10/18 60.9 kg (134 lb 3.2 oz) 02/05/18 67.1 kg (148 lb) 01/25/18 67.1 kg (148 lb)  
07/14/17 63.5 kg (140 lb) 05/01/17 60.8 kg (134 lb) 04/06/17 61.1 kg (134 lb 9.6 oz)  
03/29/17 61.1 kg (134 lb 11.2 oz) 01/16/17 60.3 kg (133 lb)  
11/11/16 60.3 kg (133 lb)  
09/15/16 60.3 kg (133 lb) 08/01/16 57.7 kg (127 lb 2 oz)  
07/25/16 59 kg (130 lb) 04/29/16 57.2 kg (126 lb)  
03/11/16 65.8 kg (145 lb)  
01/28/16 70.9 kg (156 lb 4.9 oz) 12/25/15 66.6 kg (146 lb 14.4 oz) 12/11/15 66.7 kg (147 lb) 12/10/15 66.7 kg (147 lb)  
11/30/15 70.3 kg (155 lb)  
11/16/15 80.8 kg (178 lb 2.1 oz) Anthropometrics:  
Height: 5' 9\" (175.3 cm) Weight: 53.5 kg (118 lb) IBW (%IBW):   ( ) UBW (%UBW):   (  %) Last Weight Metrics: 
Weight Loss Metrics 6/27/2019 5/3/2019 4/25/2019 4/4/2019 3/29/2019 3/27/2019 3/25/2019 Today's Wt 118 lb 121 lb 113 lb 1.5 oz 133 lb 131 lb - 135 lb BMI 17.43 kg/m2 17.87 kg/m2 16.7 kg/m2 19.64 kg/m2 - 19.35 kg/m2 20.53 kg/m2 BMI: Body mass index is 17.43 kg/m². This BMI is indicative of: 
 [x]Underweight    []Normal    []Overweight    [] Obesity   [] Extreme Obesity (BMI>40) Estimated Nutrition Needs (Based on):  
1450 Kcals/day(BMR: 1100 x 1.3) , 70 g(1.3 g/kg) Protein Carbohydrate: At Least 130 g/day  Fluids: 1450 mL/day (1ml/kcal) or per primary team 
 
NUTRITION DIAGNOSES:  
Problem:  Underweight Etiology: related to recent weight loss Signs/Symptoms: as evidenced by BMI of 17.4 NUTRITION INTERVENTIONS: 
Meals/Snacks: General/healthful diet   Supplements: Commercial supplement GOAL:  
consume >50% of meals in 3-5 days LEARNING NEEDS (Diet, Food/Nutrient-Drug Interaction):  
 [x] None Identified 
 [] Identified and Education Provided/Documented 
 [] Identified and Pt declined/was not appropriate Cultureal, Cheondoism, OR Ethnic Dietary Needs:  
 [x] None Identified 
 [] Identified and Addressed 
 
 [x] Interdisciplinary Care Plan Reviewed/Documented [x] Discharge Planning:  Renal friendly diet MONITORING /EVALUATION:  
  
Food/Nutrient Intake Outcomes: Total energy intake Physical Signs/Symptoms Outcomes: Weight/weight change, Electrolyte and renal profile, Glucose profile, GI 
 
NUTRITION RISK:  
 [] High              [x] Moderate           []  Low  []  Minimal/Uncompromised PT SEEN FOR:  
 []  MD Consult: []Calorie Count []Diabetic Diet Education []Diet Education []Electrolyte Management []General Nutrition Management and Supplements []Management of Tube Feeding []TPN Recommendations []  RN Referral:  []MST score >=2 
   []Enteral/Parenteral Nutrition PTA []Pregnant: Gestational DM or Multigestation 
   []Pressure Ulcer/Wound Care needs [x]  Low BMI 
[]  LOS Referral  
 
 
Padma Peres RDN Pager [phone] Weekend Pager [phone]

## 2019-06-27 NOTE — PROGRESS NOTES
Problem: Falls - Risk of 
Goal: *Absence of Falls Description Document [place] Fall Risk and appropriate interventions in the flowsheet. Outcome: Progressing Towards Goal 
  
Problem: Pressure Injury - Risk of 
Goal: *Prevention of pressure injury Description Document Yinka Scale and appropriate interventions in the flowsheet. Outcome: Progressing Towards Goal 
  
Problem: Hypertension Goal: *Blood pressure within specified parameters Outcome: Not Progressing Towards Goal 
- hydralazine increased to 75mg

## 2019-06-27 NOTE — PROGRESS NOTES
Hospitalist Progress Note NAME: Raza Kennedy :  1945 MRN:  [id number] Assessment / Plan: AMS with underlining dementia 
-mentation appears to be at baseline today. However  is requesting a neuro consult as pt's memory is worst daily. -head CT neg.  UA neg 
-neuro consult as per family's request 
 
Hypertensive urgency, resolved 
-cont home meds with increasing hydralazine to 75mg for better bp control 
-PRN hydralazine ESRD 
-nephrology consulted Hyperlipidemia: Continue home meds 
  
 Neuropathy: Continue home meds 
  
  
Code Status: full Surrogate Decision Maker: 
  
DVT Prophylaxis: yes GI Prophylaxis: not indicated 
  
Baseline: lives at home with  Underweight Body mass index is 17.43 kg/m². Subjective: Chief Complaint / Reason for Physician Visit Pt seen up in chair. BP high this AM, improved with PRN hydralazine. Mentation at baseline however pt's  reported pt has been more forgetful lately. Discussed with RN events overnight. Review of Systems: 
Symptom Y/N Comments  Symptom Y/N Comments Fever/Chills n   Chest Pain n   
Poor Appetite    Edema Cough    Abdominal Pain Sputum    Joint Pain SOB/MONTANEZ n   Pruritis/Rash Nausea/vomit    Tolerating PT/OT Diarrhea    Tolerating Diet Constipation    Other Could NOT obtain due to:   
 
Objective: VITALS:  
Last 24hrs VS reviewed since prior progress note. Most recent are: 
Patient Vitals for the past 24 hrs: 
 Temp Pulse Resp BP SpO2  
19 1200  70  147/56   
19 1043 98.7 °F (37.1 °C) 65 22 189/63 92 %  
19 0817 98 °F (36.7 °C) 72 22 179/52 97 % 19 0630    166/76   
19 0454    186/56   
19 0330    188/54   
19 0252 98.2 °F (36.8 °C) 65 16 183/54 98 %  
19 2335 98.1 °F (36.7 °C) 63 16 169/58 98 %  
19 2140    168/50   
19 1927 98.2 °F (36.8 °C) 65 16 167/44 96 % 06/26/19 1637 97.9 °F (36.6 °C) 66 16 170/70 97 % 06/26/19 1545  64 15 182/74 99 % 06/26/19 1541  61  195/61   
06/26/19 1515  65 17 186/61 98 %  
06/26/19 1500  65 13 183/58 97 % 06/26/19 1445  65 23 173/58 97 % 06/26/19 1429 97.8 °F (36.6 °C) 62 16 163/68 98 %  
06/26/19 1424  68 19 163/68 99 % 06/26/19 1347 97.9 °F (36.6 °C) (!) 56 16 (!) 225/74 98 % Intake/Output Summary (Last 24 hours) at 6/27/2019 1335 Last data filed at 6/27/2019 0630 Gross per 24 hour Intake 200 ml Output 200 ml Net 0 ml PHYSICAL EXAM: 
General: WD, WN. Alert, cooperative, no acute distress   
EENT:  EOMI. Anicteric sclerae. MMM Resp:  CTA bilaterally, no wheezing or rales. No accessory muscle use CV:  Regular  rhythm,  No edema GI:  Soft, Non distended, Non tender.  +Bowel sounds Neurologic:  Alert and oriented X 3, normal speech, Psych:   Not anxious nor agitated Skin:  No rashes. No jaundice Reviewed most current lab test results and cultures  YES Reviewed most current radiology test results   YES Review and summation of old records today    NO Reviewed patient's current orders and MAR    YES 
PMH/ reviewed - no change compared to H&P 
________________________________________________________________________ Care Plan discussed with: 
  Comments Patient x Family  x   
RN x Care Manager x Consultant  x   
                 x Multidiciplinary team rounds were held today with , nursing, pharmacist and clinical coordinator. Patient's plan of care was discussed; medications were reviewed and discharge planning was addressed. ________________________________________________________________________ Total NON critical care TIME:  35   Minutes Total CRITICAL CARE TIME Spent:   Minutes non procedure based Comments >50% of visit spent in counseling and coordination of care    
________________________________________________________________________ Maria Dolores Connell MD  
 
Procedures: see electronic medical records for all procedures/Xrays and details which were not copied into this note but were reviewed prior to creation of Plan. LABS: 
I reviewed today's most current labs and imaging studies. Pertinent labs include: 
Recent Labs  
  06/27/19 0347 06/26/19 
1225 WBC 6.8 5.8 HGB 13.4 9.5* HCT 47.2* 34.0*  
* 108* Recent Labs  
  06/27/19 0347 06/26/19 
1225  138  
K 5.1 4.8  
 103 CO2 24 29 GLU 85 91 BUN 41* 23* CREA 3.50* 2.42* CA 8.8 8.3*  
MG  --  2.0 ALB  --  2.7* TBILI  --  0.3 SGOT  --  32 ALT  --  22 Signed: Maria Dolores Connell MD

## 2019-06-27 NOTE — CONSULTS
IP CONSULT TO NEUROLOGY Consult performed by: Roxie Orta MD 
Consult ordered by: Supriya Kaiser MD 
 
 
 
   
NEUROLOGY CONSULT 
NAME Amy Conte AGE 75 y.o. MRN [id number]  1945 REQUESTING PHYSICIAN: Supriya Kaiser MD   
 
CHIEF COMPLAINT:  [facility] This is a 75 y.o. female with a medical history of ESRD and dementia who is being treated by Dr. Hung Joseph. She and her  are alarmed over the rate of progression of her memory loss. She has had no recent changes in medications. In the past she has declined Aricept because of concerns that her father had. He also had suffered of dementia. She is now open to trying an acetylcholinesterase inhibitor. We discussed different options including Aricept, galantamine and Razadyne. We settled on galantamine. ASSESSMENT AND PLAN 1. Altered mental status Multifactorial 
Will get  b12 and tsh 2. ESRD on dialysis [facility]) Continue dialysis 3. Accelerated hyperension 
continue hydralazine ,losartan and norvasc 4. Dementia Neuropsychological testing deemed her early onset dementia. Continue memantine Start galantamine Follow-up with Dr. Hung Joseph after being discharged from the hospital. 
 
 
ALLERGIES: 
Citrus and derivatives; Penicillin g; Sulfa (sulfonamide antibiotics); Vancomycin; and Orange juice Current Facility-Administered Medications Medication Dose Route Frequency  hydrALAZINE (APRESOLINE) 20 mg/mL injection 20 mg  20 mg IntraVENous Q6H PRN  
 hydrALAZINE (APRESOLINE) tablet 75 mg  75 mg Oral TID  aspirin delayed-release tablet 81 mg  81 mg Oral DAILY  atorvastatin (LIPITOR) tablet 20 mg  20 mg Oral QHS  buPROPion (WELLBUTRIN) tablet 100 mg  100 mg Oral QHS  escitalopram oxalate (LEXAPRO) tablet 10 mg  10 mg Oral QPM  
 famotidine (PEPCID) tablet 20 mg  20 mg Oral DAILY  gabapentin (NEURONTIN) capsule 100 mg  100 mg Oral BID  losartan (COZAAR) tablet 100 mg  100 mg Oral DAILY  memantine (NAMENDA) tablet 10 mg  10 mg Oral QHS  metoprolol tartrate (LOPRESSOR) tablet 12.5 mg  12.5 mg Oral BID  ondansetron (ZOFRAN ODT) tablet 4 mg  4 mg Oral Q6H PRN  pramipexole (MIRAPEX) tablet 0.25 mg  0.25 mg Oral 3 times weekly  sevelamer carbonate (RENVELA) tab 4,000 mg  4,000 mg Oral TID WITH MEALS  sodium chloride (NS) flush 5-40 mL  5-40 mL IntraVENous Q8H  
 sodium chloride (NS) flush 5-40 mL  5-40 mL IntraVENous PRN  
 heparin (porcine) injection 5,000 Units  5,000 Units SubCUTAneous Q8H  
 amLODIPine (NORVASC) tablet 5 mg  5 mg Oral DAILY  oxybutynin chloride XL (DITROPAN XL) tablet 10 mg  10 mg Oral DAILY  acetaminophen (TYLENOL) tablet 650 mg  650 mg Oral Q6H PRN Past Medical History:  
Diagnosis Date  Chronic kidney disease Stage 5 (16 BUN 79, Creatnine 4.53, K 6) Dr. Alona Johansen [phone]  GERD (gastroesophageal reflux disease)   
 well controlled with Rx   
 High cholesterol  Hyperkalemia  Hypertension  Hypomagnesemia   
 on daily Magnesium  Neuropathy   
 bilateral feet  Ovarian cancer ([facility]  Hysterectomy with chemo, no radiation:  Dr. Gifty Jackson  Thromboembolus ([facility] 2015  
 blood clot in lung 2015  Thromboembolus ([facility] 2004  
 in kidney \"many years ago\" Social History Tobacco Use  Smoking status: Current Every Day Smoker Packs/day: 1.00 Last attempt to quit: 2012 Years since quittin.4  Smokeless tobacco: Never Used Substance Use Topics  Alcohol use: No  
 
 
Family History Problem Relation Age of Onset  Other Mother   
     peptic ulcer  Alzheimer Father Review of Systems Constitutional: Negative for chills and fever. HENT: Negative for ear pain. Eyes: Negative for pain and discharge. Respiratory: Negative for cough and hemoptysis. Cardiovascular: Negative for chest pain and claudication. Gastrointestinal: Negative for constipation and diarrhea. Genitourinary: Negative for flank pain and hematuria. Musculoskeletal: Positive for myalgias. Negative for back pain. Skin: Negative for itching and rash. Neurological: Negative for headaches. Endo/Heme/Allergies: Negative for environmental allergies. Does not bruise/bleed easily. Psychiatric/Behavioral: Positive for memory loss. Negative for depression and hallucinations. Visit Vitals /60 (BP 1 Location: Right arm, BP Patient Position: At rest) Pulse 70 Temp 97.9 °F (36.6 °C) Resp 20 Ht 5' 9\" (1.753 m) Wt 118 lb (53.5 kg) SpO2 100% BMI 17.43 kg/m² General: Well developed, well nourished. Patient in no apparent distress Head: Normocephalic, atraumatic, anicteric sclera Neck Normal ROM, No thyromegally Lungs:  Clear to auscultation bilaterally, No wheezes or rubs Cardiac: Regular rate and rhythm with no murmurs. Abd: Bowel sounds were audible. No tenderness on palpation Ext: No pedal edema Skin: Supple no rash NeurologicExam: 
Mental Status: Alert and oriented to person place Speech: Fluent no aphasia or dysarthria. Cranial Nerves:  II - XII Intact Motor:  Full and symmetric strength of upper and lower proximal and distal muscles. Normal bulk and tone. Reflexes:   Deep tendon reflexes 2+/4 and symmetric. Sensory:   Symmetric and intact with no perceived deficits modalities involving small or large fibers. Gait:  deferred Tremor:   No tremor noted. Cerebellar:  Coordination intact. Neurovascular: No carotid bruits. No JVD REVIEWED IMAGING: 
 
MRI : 
Results from Hospital Encounter encounter on 03/29/17 MRA BRAIN WO CONT Narrative INDICATION: Acute ataxia with dizziness and difficulty word finding COMPARISON:  CT head 3/29/2017 TECHNIQUE:  MR imaging of the brain was performed with sagittal T1, axial T1, 
 T2, FLAIR, GRE, DWI/ADC. 3-D time-of-flight MRA of the brain was performed. Multiplanar reconstructions were obtained. Ashlee Andrews FINDINGS: 
 
MR brain: The ventricles are midline without hydrocephalus. There is no acute intra or extra-axial fluid collection. There is increased soft 
tissue fullness and nodularity in the inferior and posterior aspect of the right 
thalamus (series 8, image 13). There is questionable increased T2/FLAIR signal 
in this region. No effusion or restricted diffusion abnormality. Other mild to 
moderate bilateral subcortical and periventricular increased T2/flair signal 
abnormalities are nonspecific but may represent changes of chronic small vessel 
ischemic disease. No evidence for acute infarction. The major intracranial vascular flow-voids are patent. No abnormal signal in the mastoid air cells or visualized paranasal sinuses. Visualized soft tissues unremarkable. MRA HEAD: 
 
Right cavernous internal carotid artery: Patent Left cavernous internal carotid artery: Patent Anterior cerebral arteries: Patent Anterior communicating artery: Patent Right middle cerebral artery: Patent Left middle cerebral artery: Patent Posterior communicating arteries: Left is patent. Right is hypoplastic Posterior cerebral arteries: Patent Basilar artery: Patent Distal vertebral arteries: Patent No evidence for intracranial aneurysm or hemodynamically significant stenosis. MRA may be insensitive for aneurysms measuring less than 3 mm. Impression IMPRESSION: 
 
No evidence for acute infarction. Increased soft tissue fullness with nodularity and minimal increased signal in 
the inferior and posterior aspect of the right thalamus. Finding is nonspecific. Would recommend postcontrast imaging to exclude low-grade glioma or other 
pathology. Unremarkable MRA of the brain 23X CT: 
Results from Hospital Encounter encounter on 06/26/19 CT HEAD WO CONT Narrative INDICATION: Decreased alertness, hypertension. Altered mental status. Exam: Noncontrast CT of the brain is performed with 5 mm collimation. CT dose reduction was achieved with the use of the standardized protocol 
tailored for this examination and automatic exposure control for dose 
modulation. Direct comparison is made to prior CT dated March 2017. FINDINGS: There is mild diffuse cortical atrophy. There is no acute intracranial 
hemorrhage, mass, mass effect or herniation. Ventricular system is normal. The 
gray-white matter differentiation is well-preserved. The mastoid air cells are 
well pneumatized. The visualized paranasal sinuses are normal. 
  
 Impression IMPRESSION: No acute intracranial hemorrhage, mass or infarct. REVIEWED LABS: 
Lab Results Component Value Date/Time WBC 6.8 06/27/2019 03:47 AM  
 HCT 47.2 (H) 06/27/2019 03:47 AM  
 HGB 13.4 06/27/2019 03:47 AM  
 PLATELET 691 (L) 84/93/6217 03:47 AM  
 
Lab Results Component Value Date/Time Sodium 136 06/27/2019 03:47 AM  
 Potassium 5.1 06/27/2019 03:47 AM  
 Chloride 104 06/27/2019 03:47 AM  
 CO2 24 06/27/2019 03:47 AM  
 Glucose 85 06/27/2019 03:47 AM  
 BUN 41 (H) 06/27/2019 03:47 AM  
 Creatinine 3.50 (H) 06/27/2019 03:47 AM  
 Calcium 8.8 06/27/2019 03:47 AM  
 
Lab Results Component Value Date/Time Vitamin B12 363 03/27/2019 04:45 PM  
 Folate 18.9 03/27/2019 04:45 PM  
 
Lab Results Component Value Date/Time LDL, calculated 31.2 03/30/2017 02:27 AM  
 
Lab Results Component Value Date/Time  Hemoglobin A1c 5.0 03/30/2017 02:27 AM

## 2019-06-27 NOTE — PROGRESS NOTES
0700: Received bedside report from [facility], off going nurse. Assumed care of patient. 1210: Held labetalol, BP now 147/56 after PRN hydralazine. Recieved verbal order from Dr. Nancy Chavez to hold once time dose of labetalol. 1400: Received verbal order from Dr. Nancy Chavez to place a neuro consult per familys request for progressive AMS. 1900: Bedside shift change report GIVEN TO BRIE Quezada. Report included the following information SBAR, Kardex, Intake/Output, MAR, Recent Results and Cardiac Rhythm NSR.  
 
 
 
SIGNIFICANT CHANGES DURING SHIFT:   
 
 
CONCERNS TO ADDRESS WITH MD:   
 
 
 
 
[facility] NURSING NOTE Admission Date 6/26/2019 Admission Diagnosis Altered mental status [R41.82] Consults IP CONSULT TO NEPHROLOGY 
IP CONSULT TO NEPHROLOGY 
IP CONSULT TO NEUROLOGY 
IP CONSULT TO HOSPITALIST Cardiac Monitoring [x] Yes [] No  
  
Purposeful Hourly Rounding [x] Yes   
Monique Score Total Score: 3 Monique score 3 or > [x] Bed Alarm [] Avasys [] 1:1 sitter [] Patient refused (Signed refusal form in chart) Yinka Score Yinka Score: 18 Yinka score 14 or < [] PMT consult [] Wound Care consult  
 []  Specialty bed  [] Nutrition consult Influenza Vaccine Received Flu Vaccine for Current Season (usually Sept-March): Not Flu Season Oxygen needs? [x] Room air Oxygen @  []1L    []2L    []3L   []4L    []5L   []6L via NC Chronic home O2 use? [] Yes [] No 
Perform O2 challenge test and document in progress note using smartphrase (.Homeoxygen) Last bowel movement Urinary Catheter LDAs Peripheral IV 06/26/19 Right Antecubital (Active) Site Assessment Clean, dry, & intact 6/27/2019  8:00 AM  
Phlebitis Assessment 0 6/27/2019  8:00 AM  
Infiltration Assessment 0 6/27/2019  8:00 AM  
Dressing Status Clean, dry, & intact 6/27/2019  8:00 AM  
Dressing Type Transparent 6/27/2019  8:00 AM  
Hub Color/Line Status Pink;Flushed;Patent 6/27/2019  8:00 AM  
 Action Taken Blood drawn 6/26/2019 11:58 AM  
                  
  
Readmission Risk Assessment Tool Score High Risk   
      
 21 Total Score 3 Has Seen PCP in Last 6 Months (Yes=3, No=0)  
 4 IP Visits Last 12 Months (1-3=4, 4=9, >4=11) 5 Pt. Coverage (Medicare=5 , Medicaid, or Self-Pay=4) 9 Charlson Comorbidity Score (Age + Comorbid Conditions) Criteria that do not apply:  
 . Living with Significant Other. Assisted Living. LTAC. SNF. or  
Rehab Patient Length of Stay (>5 days = 3) Expected Length of Stay - - - Actual Length of Stay 0

## 2019-06-27 NOTE — PROGRESS NOTES
Problem: Falls - Risk of 
Goal: *Absence of Falls Description Document Rogelio Wade Fall Risk and appropriate interventions in the flowsheet. Outcome: Progressing Towards Goal 
  
Problem: Patient Education: Go to Patient Education Activity Goal: Patient/Family Education Outcome: Progressing Towards Goal 
  
Problem: Pressure Injury - Risk of 
Goal: *Prevention of pressure injury Description Document Yinka Scale and appropriate interventions in the flowsheet. Outcome: Progressing Towards Goal 
  
Problem: Patient Education: Go to Patient Education Activity Goal: Patient/Family Education Outcome: Progressing Towards Goal 
  
Problem: Altered Thought Process (Adult/Pediatric) Goal: *STG: Participates in treatment plan Outcome: Progressing Towards Goal 
Goal: *STG: Remains safe in hospital 
Outcome: Progressing Towards Goal 
Goal: *STG: Seeks staff when feelings of anxiety and fear arise Outcome: Progressing Towards Goal 
Goal: *STG: Complies with medication therapy Outcome: Progressing Towards Goal 
Goal: *STG: Attends activities and groups Outcome: Progressing Towards Goal 
Goal: *STG: Decreased delusional thinking Outcome: Progressing Towards Goal 
Goal: *STG: Decreased hallucinations Outcome: Progressing Towards Goal 
Goal: *STG: Absence of lethality Outcome: Progressing Towards Goal 
Goal: *STG: Demonstrates ability to understand and use improved judgment in daily activities and relationships Outcome: Progressing Towards Goal 
Goal: *LTG: Returns to baseline functioning Outcome: Progressing Towards Goal 
Goal: Interventions Outcome: Progressing Towards Goal 
  
Problem: Patient Education: Go to Patient Education Activity Goal: Patient/Family Education Outcome: Progressing Towards Goal 
  
Problem: Hypertension Goal: *Blood pressure within specified parameters Outcome: Progressing Towards Goal 
Goal: *Fluid volume balance Outcome: Progressing Towards Goal 
Goal: *Labs within defined limits Outcome: Progressing Towards Goal 
  
Problem: Patient Education: Go to Patient Education Activity Goal: Patient/Family Education Outcome: Progressing Towards Goal

## 2019-06-27 NOTE — INTERDISCIPLINARY ROUNDS
[facility] INTERDISCIPLINARY ROUNDS Cardiopulmonary Care Interdisciplinary Rounds were held today to discuss patient's plan of care and outcomes. The following members were present: NP/Physician, Pharmacy, Nursing and Case Management. Expected Length of Stay:  - - - PLAN OF CARE:  
Continue current treatment plan 
Continue to monitor BP

## 2019-06-27 NOTE — PROGRESS NOTES
Nephrology Progress Note Patrice Marion  
 
[facility]. [facility]                  Phone - [phone] Patient: Stephanie Kowalski Date- 6/27/2019 Admit Date: 6/26/2019 YOB: 1945 CC: Follow up for esrd Subjective: Interval History: -  Bp still high No c/o sob, No c/o chest pain, No c/o nausea or vomiting No c/o  fever. S/p hd yesterday ROS:- as above Assessment:  
· esrd- [facility] - allegra matthew · Malignant hypertension · Anemia of ckd · [facility] Cand[facility] De La Fuente · Tobacco abuse · AM 
 
 Plan: · Adjust bp meds · No hd today · Hd tomorrow Physical Exam:  
GEN: NAD NECK- Supple, no mass RESP: Clear b/l, no wheezing, No accessory muscle use CVS: RRR,S1,S2 ABDO: soft , non tender, No mass EXT: No Edema NEURO: normal speech, non focal 
Upper arm avf + Care Plan discussed with: patient Objective:  
Visit Vitals /52 (BP 1 Location: Right arm, BP Patient Position: At rest) Pulse 72 Temp 98 °F (36.7 °C) Resp 22 Ht 5' 9\" (1.753 m) Wt 53.5 kg (118 lb) SpO2 97% BMI 17.43 kg/m² Last 3 Recorded Weights in this Encounter 06/26/19 1032 06/27/19 0435 Weight: 52.6 kg (116 lb) 53.5 kg (118 lb)  
 
06/25 1901 - 06/27 0700 In: 200 [P.O.:200] Out: 200 [Urine:200] Chart reviewed. Pertinent Notes reviewed. Medication list  reviewed Current Facility-Administered Medications Medication  hydrALAZINE (APRESOLINE) 20 mg/mL injection 10 mg  
 aspirin delayed-release tablet 81 mg  
 atorvastatin (LIPITOR) tablet 20 mg  
 buPROPion (WELLBUTRIN) tablet 100 mg  
 escitalopram oxalate (LEXAPRO) tablet 10 mg  
 famotidine (PEPCID) tablet 20 mg  
 gabapentin (NEURONTIN) capsule 100 mg  
 losartan (COZAAR) tablet 100 mg  
 memantine (NAMENDA) tablet 10 mg  
 metoprolol tartrate (LOPRESSOR) tablet 12.5 mg  
 ondansetron (ZOFRAN ODT) tablet 4 mg  pramipexole (MIRAPEX) tablet 0.25 mg  
  sevelamer carbonate (RENVELA) tab 4,000 mg  
 sodium chloride (NS) flush 5-40 mL  sodium chloride (NS) flush 5-40 mL  heparin (porcine) injection 5,000 Units  hydrALAZINE (APRESOLINE) tablet 50 mg  
 amLODIPine (NORVASC) tablet 5 mg  oxybutynin chloride XL (DITROPAN XL) tablet 10 mg  
 acetaminophen (TYLENOL) tablet 650 mg Data Review : 
Recent Labs  
  06/27/19 
0347 06/26/19 
1225 WBC 6.8 5.8 HGB 13.4 9.5* * 108*  138  
K 5.1 4.8  
GLU 85 91 BUN 41* 23* CREA 3.50* 2.42* ALT  --  22 SGOT  --  32  
TBILI  --  0.3 AP  --  108 CA 8.8 8.3*  
MG  --  2.0 Lab Results Component Value Date/Time Culture result: NO GROWTH 6 DAYS 04/23/2019 12:21 PM  
 Culture result: NO GROWTH 5 DAYS 04/19/2019 12:23 PM  
 Culture result:  03/28/2019 02:12 PM  
  NO ROUTINE ENTERIC PATHOGENS ISOLATED INCLUDING SALMONELLA, SHIGELLA, YERSINIA, VIBRIO OR SHIGA TOXIN PRODUCING E. COLI No results found for: SDES No results for input(s): FE, TIBC, PSAT, FERR in the last 72 hours. Lab Results Component Value Date/Time Sodium,urine random 17 12/15/2015 10:02 PM  
 Creatinine, urine 42.60 03/07/2016 02:36 PM  
 Creatinine, urine 41.80 03/07/2016 02:36 PM  
 
Tali Aguilar MD 
[facility] 
 www. [facility] [employer] [address] [place], [address] Phone - [phone] Fax - [phone]

## 2019-06-27 NOTE — PROGRESS NOTES
. 
Bedside and Verbal shift change report GIVEN TO , RN. Report included the following information Kardex. SIGNIFICANT CHANGES DURING SHIFT:   
 
 
CONCERNS TO ADDRESS WITH MD:  4840 Blood pressure elevated 183/54   pt in bed asleep ,asymptomatic when awakened for vitals 0330  Rechecked again for accuracy remains elevated 188/54 Hospitalist paged and hydralazine IV prn was ordered and I will give Awaiting Wound care consult 1360 Mimi Santana NURSING NOTE Admission Date 6/26/2019 Admission Diagnosis Altered mental status [R41.82] Consults IP CONSULT TO NEPHROLOGY 
IP CONSULT TO NEPHROLOGY 
IP CONSULT TO HOSPITALIST Cardiac Monitoring [x] Yes [] No  
  
Purposeful Hourly Rounding [x] Yes   
Monique Score Total Score: 3 Monique score 3 or > [x] Bed Alarm [] Avasys [] 1:1 sitter [] Patient refused (Signed refusal form in chart) Yinka Score Yinka Score: 18 Yinka score 14 or < [] PMT consult [] Wound Care consult  
 []  Specialty bed  [] Nutrition consult Influenza Vaccine Received Flu Vaccine for Current Season (usually Sept-March): Not Flu Season Oxygen needs? [] Room air Oxygen @  []1L    []2L    []3L   []4L    []5L   []6L via NC Chronic home O2 use? [] Yes [] No 
Perform O2 challenge test and document in progress note using [employer] (.Homeoxygen) Last bowel movement Urinary Catheter LDAs Peripheral IV 06/26/19 Right Antecubital (Active) Site Assessment Clean, dry, & intact 6/26/2019  7:27 PM  
Phlebitis Assessment 0 6/26/2019  7:27 PM  
Infiltration Assessment 0 6/26/2019  7:27 PM  
Dressing Status Clean, dry, & intact 6/26/2019  7:27 PM  
Dressing Type Tape;Transparent 6/26/2019  7:27 PM  
Hub Color/Line Status Pink;Capped;Flushed;Patent 6/26/2019  7:27 PM  
Action Taken Blood drawn 6/26/2019 11:58 AM  
                  
  
Readmission Risk Assessment Tool Score High Risk   
      
 21 Total Score 3 Has Seen PCP in Last 6 Months (Yes=3, No=0)  
 4 IP Visits Last 12 Months (1-3=4, 4=9, >4=11) 5 Pt. Coverage (Medicare=5 , Medicaid, or Self-Pay=4) 9 Charlson Comorbidity Score (Age + Comorbid Conditions) Criteria that do not apply:  
 . Living with Significant Other. Assisted Living. LTAC. SNF. or  
Rehab Patient Length of Stay (>5 days = 3) Expected Length of Stay - - - Actual Length of Stay 0

## 2019-06-28 NOTE — PROGRESS NOTES
Spiritual Care Assessment/Progress Note [address] 
 
 
NAME: Shawn Snyder      MRN: [id number] AGE: 75 y.o. SEX: female Druze Affiliation: No Yarsanism Language: English  
 
6/28/2019 Spiritual Assessment begun in [facility] CARDIOPULMONARY CARE through conversation with: 
  
    [x]Patient        [x] Family    [] Friend(s) Reason for Consult: Initial/Spiritual assessment, critical care Spiritual beliefs: (Please include comment if needed) [x] Identifies with a alejandro tradition:     
   [] Supported by a alejandro community:        
   [] Claims no spiritual orientation:       
   [] Seeking spiritual identity:            
   [] Adheres to an individual form of spirituality:       
   [] Not able to assess:                   
 
    
Identified resources for coping:  
   [] Prayer                           
   [] Music                  [] Guided Imagery [x] Family/friends                 [] Pet visits [] Devotional reading                         [] Unknown 
   [] Other:                               
 
 
Interventions offered during this visit: (See comments for more details) Patient Interventions: Affirmation of alejandro Family/Friend(s): Affirmation of alejandro, Affirmation of emotions/emotional suffering, Iconic (affirming the presence of God/Higher Power), Prayer (assurance of) Plan of Care: 
 
 [x] Support spiritual and/or cultural needs   
 [] Support AMD and/or advance care planning process    
 [] Support grieving process 
 [] Coordinate Rites and/or Rituals  
 [] Coordination with community clergy 
 [x] No spiritual needs identified at this time 
 [] Detailed Plan of Care below (See Comments)  [] Make referral to Music Therapy 
[] Make referral to Pet Therapy    
[] Make referral to Addiction services 
[] Make referral to [facility] 
[] Make referral to Spiritual Care Partner 
[] No future visits requested [x] Follow up visits as needed Comments: The patient was resting in bed smiling and looking pleasant. The spouse of the patient was in the recliner at bedside. I explained the purpose of the visit. The patient did not speak until nearly the end of the conversation and asked what did I miss? The spouse responded to my questions that were conversation starters. The spouse was not too forthcoming at engaging in conversation. The spouse responded that they are from and live here in [place]. Rev. Karyna Mancini EdD MDiv  For [place] Page [phone])

## 2019-06-28 NOTE — PHYSICIAN ADVISORY
Letter of Status Determination:  
Recommend hospitalization status upgraded from OBSERVATION  to INPATIENT  Status Pt Name:  Geoff Lacey MR#  
FARHEEN # [id number] / 
[id number] Payor: Fernandez Rocha / Plan: [address] / Product Type: Medicare /   
MIKE#  [id number] Room and SHELDON PARRA PRAVEEN [facility]  2218/01  @ [facility] Hospitalization date  6/26/2019 10:28 AM  
Current Attending Physician  Sarika Hall MD  
Principal diagnosis  Altered mental status [R41.82] Clinicals  75 y.o. y.o  female hospitalized with above diagnosis This pt was noted to have had persistent mentation change, It is also noted that the pt suffers from multiple chronic illnesses including ESRD/HD, HTN, Anemia of CKD etc.  
 
Due to appropriate and necessary medical care, this pt's hospitalization has now exceeded two midnights . [place] criteria Does  NOT apply STATUS DETERMINATION  This patient is at above high risk of deterioration based on documented presenting clinical data, comorbid conditions, high risk of adverse events and current acute care course. Ms. Geoff Lacey now meets Inpatient Admission status criteria in accordance with CMS regulation Section 43 .3. Specifically, due to medical necessity the patient's stay now exceeds Two Midnights. It is our recommendation that this patient's hospitalization status should be upgraded from  OBSERVATION to INPATIENT status. The final decision of the patient's hospitalization status depends on the attending physician's judgment Additional comments Payor: Fernandez Rocha / Plan: [address] / Product Type: Medicare /   
  
 
Seth Ortez MD MPH FAC Cell: [phone] Physician Advisor [address] [phone] [facility]  
   
Cell  [phone]   
 
 
[phone] Shashank [place]

## 2019-06-28 NOTE — DIALYSIS
[facility] Dialysis Team [place]  [phone] Vitals   Pre   Post   Assessment   Pre   Post    
Temp  Temp: 97.8 °F (36.6 °C) (06/28/19 1445)  97.6 LOC  A/O x 3 A/O x 3 HR   65 77 Lungs   Clear, denies SOB  clear B/P   190/72 126/52 Cardiac   HRR, monitored remotely  HRR, remote monitoring Resp   18 18 Skin   WDI  WDI Pain level  15/10 for headache. Primary RN notified 3/10 for headache Edema  gen puffiness None noted Orders: Duration:   Start:    1450 End:    1800 Total:   3'10\" Dialyzer:   Dialyzer/Set Up Inspection: Revaclear([id number]/ [id number]) (06/28/19 1445) K Bath:   Dialysate K (mEq/L): 2 (06/28/19 1445) Ca Bath:   Dialysate CA (mEq/L): 3.0 (06/28/19 1445) Na/Bicarb:   Dialysate NA (mEq/L): 140 (06/28/19 1445) Target Fluid Removal:   2500 Access Type & Location:   RAYNA-AVG:  +T/B, no redness or drainage noted. Cannulated w/ 15g x 1\" needles x 2.  +flashes/ aspir/ NS flushes. Secured well. Labs Obtained/Reviewed Critical Results Called   Date when labs were drawn- 
Hgb-   
HGB Date Value Ref Range Status 06/27/2019 13.4 11.5 - 16.0 g/dL Final  
  Comment:  
  INVESTIGATED PER DELTA CHECK PROTOCOL  
 
K-   
Potassium Date Value Ref Range Status 06/28/2019 5.3 (H) 3.5 - 5.1 mmol/L Final  
 
Ca-  
Calcium Date Value Ref Range Status 06/28/2019 8.7 8.5 - 10.1 MG/DL Final  
 
Bun-  
BUN Date Value Ref Range Status 06/28/2019 58 (H) 6 - 20 MG/DL Final  
 
Creat-  
Creatinine Date Value Ref Range Status 06/28/2019 4.72 (H) 0.55 - 1.02 MG/DL Final  
  Comment:  
  INVESTIGATED PER DELTA CHECK PROTOCOL Medications/ Blood Products Given Name   Dose   Route and Time All meds reviewed. No HD meds ordered or given. Blood Volume Processed (BVP):    62 Net Fluid Removed:  1912 Comments Time Out Done:  Yes, SERINAB Primary Nurse Rpt Pre:  Maryjane Obregon RN 
Primary Nurse Rpt Post:  Maryjane Obregon RN 
 Pt Education:  Discussed procedure and access Care Plan:  Continue to do HD txs as ordered Tx Summary:  Had rough start w/ nausea and vomiting and terrible headache. Given meds by primary RN which helped. At end, blood in circuit returned w/ 300ml NS. Cannulas removed x 2. Pressure held to each site x 7 mins till no bleeding noted. Drsgs applied and T/B noted. Admiting Diagnosis: 
Pt's previous clinic- 
Consent signed - Informed Consent Verified: Yes (06/28/19 1445) Rajesh Consent - verified Hepatitis Status- Hep B Ag neg 6/12/19 Machine #- Machine Number: ([id number]/ [id number]) (06/28/19 1445) Telemetry status- monitored remotely Pre-dialysis wt. - Pre-Dialysis Weight: (on stretcher, unable to weigh) (06/28/19 1445)

## 2019-06-28 NOTE — PROGRESS NOTES
Problem: Mobility Impaired (Adult and Pediatric) Goal: *Acute Goals and Plan of Care (Insert Text) Description Physical Therapy Goals Initiated 6/27/2019 1. Patient will move from supine to sit and sit to supine  in bed with independence within 7 day(s). 2.  Patient will transfer from bed to chair and chair to bed with independence using the least restrictive device within 7 day(s). 3.  Patient will perform sit to stand with independence within 7 day(s). 4.  Patient will ambulate with independence for 300 feet with the least restrictive device within 7 day(s). 5.  Patient will ascend/descend 4 stairs with 1 handrail(s) with independence within 7 day(s). Outcome: Progressing Towards Goal 
 PHYSICAL THERAPY TREATMENT Patient: Dre Cunningham (32 y.o. female) Date: 6/28/2019 Diagnosis: Altered mental status [R41.82] <principal problem not specified> Precautions: Fall Chart, physical therapy assessment, plan of care and goals were reviewed. ASSESSMENT: 
Pt cleared by nurse to mobilize. Pt received in bed supine with  in room. Pt agreeable to therapy. Pt performed all bed mobility at mod I. Pt performed sit to stand transfer at supervision. Pt ambulated 250ft with no device at CGA/SBA. Pt with path deviations at times but pt able to self correct. Pt educated on being more aware of instability with turns to prevent falling. Pt with understanding. Pt educated on walking with nursing throughout the day to improve activity tolerance. Pt left up in recliner with all needs met. Pt will benefit from HHPT to improve strength and balance for safe mobility. Progression toward goals: 
?    Improving appropriately and progressing toward goals ? Improving slowly and progressing toward goals ? Not making progress toward goals and plan of care will be adjusted PLAN: 
Patient continues to benefit from skilled intervention to address the above impairments. Continue treatment per established plan of care. Discharge Recommendations:  Home Health Further Equipment Recommendations for Discharge:  none SUBJECTIVE:  
Patient stated ? I'm feeling okay today. ? OBJECTIVE DATA SUMMARY:  
Critical Behavior: 
  
  
  
  
Functional Mobility Training: 
Bed Mobility: 
Rolling: Modified independent Supine to Sit: Modified independent Scooting: Modified independent Transfers: 
Sit to Stand: Supervision Stand to Sit: Supervision Balance: 
Sitting: Intact Standing: Impaired Standing - Static: Good Standing - Dynamic : Fair;Good Ambulation/Gait Training: 
Distance (ft): 250 Feet (ft) Assistive Device: Gait belt Ambulation - Level of Assistance: Contact guard assistance;Stand-by assistance Gait Abnormalities: Decreased step clearance; Path deviations Base of Support: Narrowed Speed/Rita: Pace decreased (<100 feet/min) Step Length: Left shortened;Right shortened Pain: 
Pain Scale 1: Numeric (0 - 10) Pain Intensity 1: 0 Activity Tolerance:  
Pt moving well with awareness of instability at times and able to correct. After treatment:  
?    Patient left in no apparent distress sitting up in chair ? Patient left in no apparent distress in bed 
? Call bell left within reach ? Nursing notified ? Caregiver present ? Bed alarm activated COMMUNICATION/COLLABORATION:  
The patient?s plan of care was discussed with: Registered Nurse Priyank Piña PTA Time Calculation: 11 mins

## 2019-06-28 NOTE — PROGRESS NOTES
Bedside shift change report GIVEN TO BRIE Weems. Report included the following information SBAR, Kardex, MAR and Recent Results. SIGNIFICANT CHANGES DURING SHIFT:   
 
 
CONCERNS TO ADDRESS WITH MD:   
 
 
 
 
[facility] NURSING NOTE Admission Date 6/26/2019 Admission Diagnosis Altered mental status [R41.82] Consults IP CONSULT TO NEPHROLOGY 
IP CONSULT TO NEPHROLOGY 
IP CONSULT TO NEUROLOGY 
IP CONSULT TO HOSPITALIST Cardiac Monitoring [x] Yes [] No  
  
Purposeful Hourly Rounding [x] Yes   
Monique Score Total Score: 3 Monique score 3 or > [x] Bed Alarm [] Avasys [] 1:1 sitter [] Patient refused (Signed refusal form in chart) Yinka Score Yinka Score: 19 Yinka score 14 or < [] PMT consult [] Wound Care consult  
 []  Specialty bed  [] Nutrition consult Influenza Vaccine Received Flu Vaccine for Current Season (usually Sept-March): Not Flu Season Oxygen needs? [x] Room air Oxygen @  []1L    []2L    []3L   []4L    []5L   []6L via NC Chronic home O2 use? [] Yes [] No 
Perform O2 challenge test and document in progress note using smartphrase (.Homeoxygen) Last bowel movement Urinary Catheter LDAs Peripheral IV 06/26/19 Right Antecubital (Active) Site Assessment Clean, dry, & intact 6/28/2019  3:00 AM  
Phlebitis Assessment 0 6/28/2019  3:00 AM  
Infiltration Assessment 0 6/28/2019  3:00 AM  
Dressing Status Clean, dry, & intact 6/28/2019  3:00 AM  
Dressing Type Tape;Transparent 6/28/2019  3:00 AM  
Hub Color/Line Status Pink 6/28/2019  3:00 AM  
Action Taken Blood drawn 6/26/2019 11:58 AM  
                  
  
Readmission Risk Assessment Tool Score High Risk   
      
 21 Total Score 3 Has Seen PCP in Last 6 Months (Yes=3, No=0)  
 4 IP Visits Last 12 Months (1-3=4, 4=9, >4=11) 5 Pt. Coverage (Medicare=5 , Medicaid, or Self-Pay=4) 9 Charlson Comorbidity Score (Age + Comorbid Conditions) Criteria that do not apply:  
 . Living with Significant Other. Assisted Living. LTAC. SNF. or  
Rehab Patient Length of Stay (>5 days = 3) Expected Length of Stay - - - Actual Length of Stay 0

## 2019-06-28 NOTE — PROGRESS NOTES
Hospitalist Progress Note NAME: Katheryn Born :  1945 MRN:  [id number] Assessment / Plan: AMS with underlining dementia 
-B12, TSH 4.96, check FT4.   
-head CT neg.  UA neg 
-Neuropsychological testing deemed her early onset dementia. -started on donepezil (galantamine contraindicated with renal failure) -neuro consult as per family's request 
 
Hypertensive urgency, resolved 
-increase norvasc to 10mg daily, cont' hydralazine at 75mg 
-PRN hydralazine ESRD Hyperkalemia  
-nephrology consulted, for HD today Hyperlipidemia, Continue home meds 
  
 Neuropathy: Continue home meds 
  
  
Code Status: full Surrogate Decision Maker: pt's   
DVT Prophylaxis: yes GI Prophylaxis: not indicated  
Baseline: lives at home with  Underweight Body mass index is 16.83 kg/m². Subjective: Chief Complaint / Reason for Physician Visit Pt seen up in bed. BP remains high. Forgetful but AAOx3. Discussed with RN events overnight. Review of Systems: 
Symptom Y/N Comments  Symptom Y/N Comments Fever/Chills n   Chest Pain n   
Poor Appetite    Edema Cough    Abdominal Pain Sputum    Joint Pain SOB/MONTANEZ n   Pruritis/Rash Nausea/vomit    Tolerating PT/OT Diarrhea    Tolerating Diet Constipation    Other Could NOT obtain due to:   
 
Objective: VITALS:  
Last 24hrs VS reviewed since prior progress note. Most recent are: 
Patient Vitals for the past 24 hrs: 
 Temp Pulse Resp BP SpO2  
19 1058 98.5 °F (36.9 °C) 67 18 180/52 98 %  
19 0841 98.2 °F (36.8 °C) 71 18 189/56 94 % 19 0637  72  167/75   
19 0530    170/50   
19 0407 98 °F (36.7 °C) 69 20 179/60 98 %  
19 0149    175/55   
19 0050  69  190/53   
19 2343  67  166/57   
19 2235 98.3 °F (36.8 °C) 72 18 182/65 100 % 19 2031  79  182/57   
19 1946 98.1 °F (36.7 °C) 80 18 178/63 97 % 06/27/19 1735  73  161/50   
06/27/19 1502 97.9 °F (36.6 °C) 70 20 173/60 100 % Intake/Output Summary (Last 24 hours) at 6/28/2019 1339 Last data filed at 6/27/2019 2031 Gross per 24 hour Intake 380 ml Output 200 ml Net 180 ml PHYSICAL EXAM: 
General: WD, WN. Alert, cooperative, no acute distress   
EENT:  EOMI. Anicteric sclerae. MMM Resp:  CTA bilaterally, no wheezing or rales. No accessory muscle use CV:  Regular  rhythm,  No edema GI:  Soft, Non distended, Non tender.  +Bowel sounds Neurologic:  Alert and oriented X 3, normal speech Psych:   Not anxious nor agitated Skin:  No rashes. No jaundice Reviewed most current lab test results and cultures  YES Reviewed most current radiology test results   YES Review and summation of old records today    NO Reviewed patient's current orders and MAR    YES 
PMH/SH reviewed - no change compared to H&P 
________________________________________________________________________ Care Plan discussed with: 
  Comments Patient x Family RN x Care Manager x Consultant Multidiciplinary team rounds were held today with , nursing, pharmacist and clinical coordinator. Patient's plan of care was discussed; medications were reviewed and discharge planning was addressed. ________________________________________________________________________ Total NON critical care TIME:  35   Minutes Total CRITICAL CARE TIME Spent:   Minutes non procedure based Comments >50% of visit spent in counseling and coordination of care    
________________________________________________________________________ Kathryne Pallas, MD  
 
Procedures: see electronic medical records for all procedures/Xrays and details which were not copied into this note but were reviewed prior to creation of Plan. LABS: 
I reviewed today's most current labs and imaging studies. Pertinent labs include: 
Recent Labs 06/27/19 
0347 06/26/19 
1225 WBC 6.8 5.8 HGB 13.4 9.5* HCT 47.2* 34.0*  
* 108* Recent Labs  
  06/28/19 
0200 06/27/19 0347 06/26/19 
1225 * 136 138  
K 5.3* 5.1 4.8  
 104 103 CO2 21 24 29 GLU 88 85 91 BUN 58* 41* 23* CREA 4.72* 3.50* 2.42* CA 8.7 8.8 8.3*  
MG  --   --  2.0 ALB  --   --  2.7* TBILI  --   --  0.3 SGOT  --   --  32 ALT  --   --  22 Signed: Sheeba Rene MD

## 2019-06-28 NOTE — PROGRESS NOTES
Occupational Therapy Medical record reviewed. Nurse requests to hold OT due to pt on her way to dialysis. Will defer and continue to follow.

## 2019-06-28 NOTE — PROGRESS NOTES
Nephrology Progress Note Patrice Marion  
 
[facility]. [facility]                  Phone - [phone] Patient: Danyell Steven Date- 6/28/2019 Admit Date: 6/26/2019 YOB: 1945 CC: Follow up for ESRD Subjective: Interval History: -  BP remained high No C/O of chest pain,SOB, vomiting, abdominal pain,fever,chills ROS:- as above Assessment:  
· ESRD- [facility] - allegra matthew · Malignant hypertension · Anemia of ckd · Sec. Sahil Martin · Tobacco abuse · AM 
 
 Plan: · Remove 3.5 kg with Hd today · If bp remains high post hd- increase norvasc to 10 mg daily · Hold epogen Physical Exam:  
GEN: Nad NECK- Supple, no mass RESP: CLEAR b/l, no wheezing, CVS: RRR,S1,S2 ABDO: soft , non tender EXT: No Edema NEURO: normal speech, non focal 
Upper arm avf + Care Plan discussed with: patient,  Objective:  
Visit Vitals /56 (BP 1 Location: Right arm, BP Patient Position: At rest) Pulse 71 Temp 98.2 °F (36.8 °C) Resp 18 Ht 5' 9\" (1.753 m) Wt 51.7 kg (113 lb 15.7 oz) SpO2 94% BMI 16.83 kg/m² Last 3 Recorded Weights in this Encounter 06/26/19 1032 06/27/19 0435 06/28/19 0149 Weight: 52.6 kg (116 lb) 53.5 kg (118 lb) 51.7 kg (113 lb 15.7 oz) 06/26 1901 - 06/28 0700 In: 1220 [P.O.:1220] Out: 600 [Urine:600] Chart reviewed. Pertinent Notes reviewed. Medication list  reviewed Current Facility-Administered Medications Medication  hydrALAZINE (APRESOLINE) 20 mg/mL injection 20 mg  
 hydrALAZINE (APRESOLINE) tablet 75 mg  
 galantamine (RAZADYNE) tablet 4 mg  aspirin delayed-release tablet 81 mg  
 atorvastatin (LIPITOR) tablet 20 mg  
 buPROPion (WELLBUTRIN) tablet 100 mg  
 escitalopram oxalate (LEXAPRO) tablet 10 mg  
 famotidine (PEPCID) tablet 20 mg  
 gabapentin (NEURONTIN) capsule 100 mg  
 losartan (COZAAR) tablet 100 mg  
 memantine (NAMENDA) tablet 10 mg  
  metoprolol tartrate (LOPRESSOR) tablet 12.5 mg  
 ondansetron (ZOFRAN ODT) tablet 4 mg  pramipexole (MIRAPEX) tablet 0.25 mg  
 sevelamer carbonate (RENVELA) tab 4,000 mg  
 sodium chloride (NS) flush 5-40 mL  sodium chloride (NS) flush 5-40 mL  heparin (porcine) injection 5,000 Units  amLODIPine (NORVASC) tablet 5 mg  oxybutynin chloride XL (DITROPAN XL) tablet 10 mg  
 acetaminophen (TYLENOL) tablet 650 mg Data Review : 
Recent Labs  
  06/28/19 
0200 06/27/19 
0347 06/26/19 
1225 WBC  --  6.8 5.8 HGB  --  13.4 9.5* PLT  --  148* 108* * 136 138  
K 5.3* 5.1 4.8  
GLU 88 85 91 BUN 58* 41* 23* CREA 4.72* 3.50* 2.42* ALT  --   --  22 SGOT  --   --  32  
TBILI  --   --  0.3 AP  --   --  108 CA 8.7 8.8 8.3*  
MG  --   --  2.0 Lab Results Component Value Date/Time Culture result: NO GROWTH 6 DAYS 04/23/2019 12:21 PM  
 Culture result: NO GROWTH 5 DAYS 04/19/2019 12:23 PM  
 Culture result:  03/28/2019 02:12 PM  
  NO ROUTINE ENTERIC PATHOGENS ISOLATED INCLUDING SALMONELLA, SHIGELLA, YERSINIA, VIBRIO OR SHIGA TOXIN PRODUCING E. COLI No results found for: SDES No results for input(s): FE, TIBC, PSAT, FERR in the last 72 hours. Lab Results Component Value Date/Time Sodium,urine random 17 12/15/2015 10:02 PM  
 Creatinine, urine 42.60 03/07/2016 02:36 PM  
 Creatinine, urine 41.80 03/07/2016 02:36 PM  
 
Girish Miles MD 
[address] [facility] 
 www. [facility] [employer] [address] [place], [address] Phone - [phone] Fax - [phone]

## 2019-06-28 NOTE — PROGRESS NOTES
Problem: Falls - Risk of 
Goal: *Absence of Falls Description Document Martha Ulloa Fall Risk and appropriate interventions in the flowsheet. Outcome: Progressing Towards Goal 
  
Problem: Patient Education: Go to Patient Education Activity Goal: Patient/Family Education Outcome: Progressing Towards Goal 
  
Problem: Pressure Injury - Risk of 
Goal: *Prevention of pressure injury Description Document Yinka Scale and appropriate interventions in the flowsheet. Outcome: Progressing Towards Goal 
  
Problem: Altered Thought Process (Adult/Pediatric) Goal: *STG: Complies with medication therapy Outcome: Progressing Towards Goal 
  
Problem: Hypertension Goal: *Blood pressure within specified parameters Outcome: Progressing Towards Goal

## 2019-06-28 NOTE — PROGRESS NOTES
Problem: Falls - Risk of 
Goal: *Absence of Falls Description Document Elodia Dennison Fall Risk and appropriate interventions in the flowsheet.  
Outcome: Progressing Towards Goal

## 2019-06-28 NOTE — WOUND CARE
Wound care nurse consult from staff nurse \" mid sacrum- red, blanches; R anterior foot- scattered red, possible rash, c/o intermittent itching and pain\". Patient assessed and has no areas of concern to anterior feet or sacrum.  
 
Rom Meade RN, [employer]

## 2019-06-29 NOTE — PROGRESS NOTES
CM met with Pt to discuss D/C planning. PT recommending HH. Pt reported already receiving HH and is satisfied with [address] and desire to continue to received [facility] provided by [facility]. Referral sent, AVS updated. Pt will D/C home with family. Kavon Montes De Oca, LCSW Ext # [phone]

## 2019-06-29 NOTE — DISCHARGE SUMMARY
Discharge Summary Name: Maria C Jackson [id number] YOB: 1945 (Age: 75 y.o.) Date of Admission: 6/26/2019 Date of Discharge: 6/29/2019 Attending Physician: Yanick Gardner MD 
 
Discharge Diagnosis:  
Acute metabolic encephalopathy with underlining dementia Hypertensive urgency  
ESRD Hyperkalemia Hyperlipidemia  
Neuropathy Consultations: 
IP CONSULT TO NEPHROLOGY 
IP CONSULT TO NEPHROLOGY 
IP CONSULT TO NEUROLOGY 
IP CONSULT TO HOSPITALIST Brief Admission History/Reason for Admission Per Dex Graham MD:  
 Gallito Dennison is a 75 y.o. Female PMH ESRD dialysis M,W,F, HTN,neuropathy, who presents form dialysis center because after dialysis her  noticed some change in her mental status and also because elevated BP. No CP,SOB,Palpitations or any other symptom 
  
Brief Hospital Course by Main Problems:  
Acute metabolic encephalopathy with underlining dementia 
-B12, TSH 4.96, FT4 pending. Head CT neg.  UA neg. Neuropsychological testing deemed her early onset dementia. Mentation back to baseline. Pt started galantamine as per neurology. Will need to f/u with  Neuro outpt.   
  
Hypertensive urgency, resolved BP improved with increased dose of norvasc and hydralazine. 
  
ESRD Hyperkalemia, resolved Cont' outpt f/u with nephrology Hyperlipidemia, continue home meds 
  
Neuropathy, continue home meds 
  
Discharge Exam: 
Patient seen and examined by me on discharge day. Pertinent Findings: 
Visit Vitals /54 (BP 1 Location: Right arm, BP Patient Position: Sitting) Pulse 78 Temp 98 °F (36.7 °C) Resp 20 Ht 5' 9\" (1.753 m) Wt 50.5 kg (111 lb 4.8 oz) SpO2 95% BMI 16.44 kg/m² Gen:    Not in distress Chest: Clear lungs CVS:   Regular rhythm. No edema Abd:  Soft, not distended, not tender Discharge/Recent Laboratory Results: 
Recent Labs  
  06/29/19 
0411  06/26/19 
1225    < > 138  
K 5.0   < > 4.8    < > 103 CO2 28   < > 29 BUN 40*   < > 23* GLU 92   < > 91  
CA 9.3   < > 8.3*  
MG  --   --  2.0  
 < > = values in this interval not displayed. Recent Labs  
  06/27/19 
0347 HGB 13.4 HCT 47.2* WBC 6.8 * Discharge Medications: 
Current Discharge Medication List  
  
START taking these medications Details  
galantamine (RAZADYNE) 4 mg tablet Take 1 Tab by mouth two (2) times daily (with meals). Qty: 60 Tab, Refills: 0  
  
hydrALAZINE (APRESOLINE) 25 mg tablet Take 3 Tabs by mouth three (3) times daily. Qty: 90 Tab, Refills: 0 CONTINUE these medications which have CHANGED Details  
amLODIPine (NORVASC) 5 mg tablet Take 2 Tabs by mouth daily. Qty: 30 Tab, Refills: 0 CONTINUE these medications which have NOT CHANGED Details  
metoprolol tartrate (LOPRESSOR) 25 mg tablet TAKE 1/2 TABLET TWICE A DAY Qty: 60 Tab, Refills: 11 Comments: N O T I C E    PRESCRIPTION PREVIOUSLY AUTHORIZED BY DOCTOR:RANULFO FAUSTIN [phone]  
  
famotidine (PEPCID) 20 mg tablet TAKE 1 TABLET BY MOUTH EVERY DAY Qty: 30 Tab, Refills: 11 Comments: Generic For:PEPCID 20MG  
  
aspirin delayed-release 81 mg tablet Take 81 mg by mouth daily. polyethylene glycol (MIRALAX) 17 gram/dose powder Take 17 g by mouth daily. trolamine salicylate (ASPERCREME EX) by Apply Externally route daily. carboxymethylcellulose sodium (REFRESH LIQUIGEL) 1 % dlgl ophthalmic solution Administer 1 Drop to both eyes daily as needed for Other (dry eyes ). ondansetron (ZOFRAN ODT) 4 mg disintegrating tablet TAKE 1 TABLET EVERY 8 HOURS IF NEEDED FOR NAUSEA Qty: 30 Tab, Refills: 3 Comments: Generic For:*ZOFRAN ODT 4MG oxybutynin (DITROPAN) 5 mg tablet TAKE 1 TABLET BY MOUTH TWICE A DAY Qty: 60 Tab, Refills: 6 Comments: Brianna Matias I C E    PRESCRIPTION PREVIOUSLY AUTHORIZED BY DOCTOR:RENETTA CACERES [phone] buPROPion (WELLBUTRIN) 100 mg tablet Take 100 mg by mouth nightly. memantine (NAMENDA) 10 mg tablet Take 10 mg by mouth nightly. b complex-vitamin c-folic acid (NEPHROCAPS) 1 mg capsule Take 1 Cap by mouth every Monday, Wednesday, Friday. diphenoxylate-atropine (LOMOTIL) 2.5-0.025 mg per tablet Take 1 Tab by mouth four (4) times daily as needed for Diarrhea. Max Daily Amount: 4 Tabs. Qty: 60 Tab, Refills: 0 Associated Diagnoses: Diarrhea, unspecified type  
  
gabapentin (NEURONTIN) 100 mg capsule TAKE 1 CAPSULE BY MOUTH TWICE A DAY FOR NERVE PAIN Qty: 60 Cap, Refills: 6 Comments: Generic For:NEURONTIN  100MG   N O T I C E    PRESCRIPTION PREVIOUSLY AUTHORIZED BY DOCTOR:MONSTER CORDOBA [phone]  
  
losartan (COZAAR) 100 mg tablet TAKE 1 TABLET EVERY MORNING Qty: 30 Tab, Refills: 6 Comments: Generic For:*COZAAR    100MG Associated Diagnoses: Bilateral carotid artery stenosis; Stenosis of both internal carotid arteries; Cerebral microvascular disease; Disturbance of memory; Altered mental status, unspecified altered mental status type; Abnormal MRI of head  
  
pramipexole (MIRAPEX) 0.25 mg tablet TAKE 1 TABLET BEFORE EACH DIALYSIS  MWF Refills: 3 Associated Diagnoses: Bilateral carotid artery stenosis; Stenosis of both internal carotid arteries; Cerebral microvascular disease; Disturbance of memory; Altered mental status, unspecified altered mental status type; Abnormal MRI of head  
  
sevelamer carbonate (RENVELA) 800 mg tab tab Take 4,000 mg by mouth three (3) times daily (with meals). 5 with meals and 1 with snacks Associated Diagnoses: Bilateral carotid artery stenosis; Stenosis of both internal carotid arteries; Cerebral microvascular disease; Disturbance of memory; Altered mental status, unspecified altered mental status type; Abnormal MRI of head  
  
escitalopram oxalate (LEXAPRO) 10 mg tablet TAKE 1 TABLET AT BEDTIME TO HELP PREVENT ANXIETY Qty: 30 Tab, Refills: 11  
 Comments: Generic For:LEXAPRO   10MG   N O T I C E    PRESCRIPTION PREVIOUSLY AUTHORIZED BY DOCTOR:MONSTER CORDOBA [phone]  
  
atorvastatin (LIPITOR) 20 mg tablet Take 1 Tab by mouth nightly. Qty: 30 Tab, Refills: 0  
  
L. acidoph & paracasei- S therm- Bifido (TY-Q/RISAQUAD) 8 billion cell cap cap Take 1 Cap by mouth daily. IRON, CARBONYL PO Take 280 mg by mouth daily. acetaminophen (TYLENOL) 500 mg tablet Take 1,000 mg by mouth every six (6) hours as needed for Pain. Patient Follow Up Instructions: Activity: Activity as tolerated Diet: Renal Diet Wound Care: None needed DISPOSITION:   
Home with Family:   
Home with HH/PT/OT/RN:   
SNF/LTC:   
WILLOW:   
OTHER:   
 
 
 
Follow up with: PCP : Chip Klinefelter, MD 
Follow-up Information Follow up With Specialties Details Why Contact Info Chip Klinefelter, MD Internal Medicine In 3 days  [address] [facility] [address] [phone] Swati Pena MD Nephrology In 5 days  [facility] [address] [facility] [address] [phone] Gabriel Bhatt MD Neurology In 2 weeks  [address] [place] [address] [address] [facility] [address] [facility] [address] [phone] Total time in minutes spent coordinating this discharge (includes going over instructions, follow-up, prescriptions, and preparing report for sign off to her PCP) :  35 minutes

## 2019-06-29 NOTE — FACE TO FACE
Home Health Care Discharge Planning: [facility] Face to Face Encounter NAME: Shakir Bourgeois :  1945 MRN:  [id number] Primary Diagnosis: Hypertensive urgency ESRD Date of Face to Face:  2019 9:16 AM                               
Face to Face Encounter findings are related to primary reason for home care:   YES 
 
1. I certify that the patient needs intermittent skilled nursing care, physical therapy and/or speech therapy. I will not be following this patient in the Community and Dr. Connie Gilbert MD will be responsible for signing the [address] of Care. 2. Initial Orders for Care: Physical Therapy 3. I certify that this patient is homebound because of illness or injury, need the aid of supportive devices such as crutches, canes, wheelchairs, and walkers; the use of special transportation; or the assistance of another person in order to leave their place of residence. There exists a normal inability to leave home and leaving home requires a considerable and taxing effort. 4. I certify that this patient is under my care and that I had a Face-to-Face Encounter that meets the physician Face-to-Face Encounter requirements. Document the physical findings from the Face-to-Face Encounter that support the need for skilled services: Has new diagnosis that requires skilled nursing teaching and intervention  and Has new finding of weakness and altered mobility that requires skilled physical/occupational and/or speech therapy services for evaluation and interventions. Kayla Ballesteros MD 
Discharging Physician Office:  [phone] Fax:    [phone]

## 2019-06-29 NOTE — PROGRESS NOTES
Pt d/c home. I reviewed all d/c instructions, follow up appts, and medications. Pt stated understanding. Removed all IV/tele. Pt left with all personal belongings and Rxs.

## 2019-06-29 NOTE — PROGRESS NOTES
Bedside shift change report GIVEN TO BRIE Weems. Report included the following information SBAR, Kardex and Recent Results. SIGNIFICANT CHANGES DURING SHIFT:   
 
 
CONCERNS TO ADDRESS WITH MD:   
 
 
 
 
[facility] NURSING NOTE Admission Date 6/26/2019 Admission Diagnosis Altered mental status [R41.82] ALAYNA (acute kidney injury) ([address] [N17.9] Consults IP CONSULT TO NEPHROLOGY 
IP CONSULT TO NEPHROLOGY 
IP CONSULT TO NEUROLOGY 
IP CONSULT TO HOSPITALIST Cardiac Monitoring [x] Yes [] No  
  
Purposeful Hourly Rounding [x] Yes   
Monique Score Total Score: 3 Moinque score 3 or > [x] Bed Alarm [] Avasys [] 1:1 sitter [] Patient refused (Signed refusal form in chart) Yinka Score Yinka Score: 19 Yinka score 14 or < [] PMT consult [] Wound Care consult  
 []  Specialty bed  [] Nutrition consult Influenza Vaccine Received Flu Vaccine for Current Season (usually Sept-March): Not Flu Season Oxygen needs? [x] Room air Oxygen @  []1L    []2L    []3L   []4L    []5L   []6L via NC Chronic home O2 use? [] Yes [] No 
Perform O2 challenge test and document in progress note using smartphrase (.Homeoxygen) Last bowel movement Last Bowel Movement Date: 06/28/19 Urinary Catheter LDAs Peripheral IV 06/26/19 Right Antecubital (Active) Site Assessment Clean, dry, & intact 6/29/2019  3:00 AM  
Phlebitis Assessment 0 6/29/2019  3:00 AM  
Infiltration Assessment 0 6/29/2019  3:00 AM  
Dressing Status Clean, dry, & intact 6/29/2019  3:00 AM  
Dressing Type Tape;Transparent 6/29/2019  3:00 AM  
Hub Color/Line Status Pink 6/29/2019  3:00 AM  
Action Taken Blood drawn 6/26/2019 11:58 AM  
                  
  
Readmission Risk Assessment Tool Score High Risk   
      
 21 Total Score 3 Has Seen PCP in Last 6 Months (Yes=3, No=0)  
 4 IP Visits Last 12 Months (1-3=4, 4=9, >4=11) 5 Pt. Coverage (Medicare=5 , Medicaid, or Self-Pay=4) 9 Charlson Comorbidity Score (Age + Comorbid Conditions) Criteria that do not apply:  
 . Living with Significant Other. Assisted Living. LTAC. SNF. or  
Rehab Patient Length of Stay (>5 days = 3) Expected Length of Stay - - - Actual Length of Stay 1

## 2019-07-01 NOTE — PROGRESS NOTES
Hospital Discharge Follow-Up Date/Time: 7/1/19 12:16 PM 
 
Patient was admitted to [facility] on 6/26/19 and discharged on 6/29/19 for AMS/ALAYNA. The physician discharge summary was available at the time of outreach. Patient was contacted within 1 business days of discharge. Top Challenges reviewed with the provider Acute metabolic Encephalopathy with underlying dementia. Neuropsychological testing deemed her early onset dementia. Pt started galantamine per neurology. HTN urgency- BP reported as 255/102 at dialysis prior to presenting to ED. Started on increased dose of Norvasc and Hydralazine 75mg. During chart review, hospitalist stated \"-started on donepezil (galantamine contraindicated with renal failure)\" however patient was started on galantamine. (message sent to KENIA Whitaker to review as well) Social: new dx dementia, current smoker, no ACP on file, lives with  Method of communication with provider :face to face, chart routing, staff message (staff message sent to KENIA Whitaker as well to review medication concern of galantamine.) On 7/3/19 A Batool Rn spoke to KENIA Norton performed medication review and stated the following information. \"Galantamine 4 mg BID recently added by Dr. Aure Gatica (neurology) for early onset of dementia on 6/27/19. Use of galantamine is not recommended for severe renal impairment (CrCl < 9 ml/min). It is not dialyazable. I would recommend stopping this medication if possible. \" 
 
Do Zamora routed this conversation to Dr. Misty Villeda as well and Dr. Misty Villeda agreed with above. Spoke to KENIA Whitaker about discontinuing this medication and KENIA Whitaker stated she was going to send neuro, Dr. Osiel Patel and renal Dr. Dana Mejía a message regarding making this medication. NN advised patient husbands to make appt ASAP with neuro. Patients  declined for NN to schedule neuro or renal appt. NN sent \"patient call\" message to Dr. Abby Leigh and Dr. Maci Crespo regarding the above. Inpatient RRAT score: 21 Was this a readmission? no  
Patient stated reason for the readmission: n/a Nurse Navigator (NN) contacted the family by telephone to perform post hospital discharge assessment. Verified name and  with family as identifiers. Provided introduction to self, and explanation of the Nurse Navigator role. Reviewed discharge instructions and red flags with family who verbalized understanding. Family given an opportunity to ask questions and does not have any further questions or concerns at this time. The family agrees to contact the PCP office for questions related to their healthcare. NN provided contact information for future reference. Disease Specific:   N/A Summary of patient's top problems: 1. Acute metabolic Encephalopathy: with underlying dementia. Presented to ED post dialysis as patients  reported change in patients mental status. B12, TSH 4.96, FT4 pending. Head CT neg.  UA neg. Neuropsychological testing deemed her early onset dementia. Mentation back to baseline prior to discharge. Pt started galantamine as per neurology. ESRD: patient on HD, Mon, Wed, Fri. Renal consulted during admission (see notes on HTN below). Bun 40 and Cr 4.29 at discharge. Wound care consulted during admission for redness to sacrum/foot - patient was assessed by wound care nurse and no areas of concern per chart review. 2. HTN: Hypertensive urgency. BP was 186/87 on arrival to ED. Patient reported home BP had been 170+ at home in the last couple weeks. BP improved with increased dose of Norvasc and Hydralazine. 3. Social: No acp on file. Lives with . New dx of dementia. Patient is current smoker. Home Health orders at discharge: resumption of care Home Health company: [facility] Date of initial visit: 19 Durable Medical Equipment ordered/company: none Durable Medical Equipment received: n/a Barriers to care? ineffective coping, lack of knowledge about disease Advance Care Planning:  
Does patient have an Advance Directive:  patient declined education - patients  declined education - patient has early onset dementia. Medication(s):  
New Medications at Discharge:  
galantamine (RAZADYNE) 4 mg tablet Take 1 Tab by mouth two (2) times daily (with meals). Qty: 60 Tab, Refills: 0  
   
hydrALAZINE (APRESOLINE) 25 mg tablet Take 3 Tabs by mouth three (3) times daily. Qty: 90 Tab, Refills: 0  
   
 
Changed Medications at Discharge:  
amLODIPine (NORVASC) 5 mg tablet Take 2 Tabs by mouth daily. Qty: 30 Tab, Refills: 0 Discontinued Medications at Discharge: n/a Medication reconciliation was performed with family, who verbalizes understanding of administration of home medications. There were no barriers to obtaining medications identified at this time. Referral to Pharm D needed: no  
 
Current Outpatient Medications Medication Sig  
 atorvastatin (LIPITOR) 40 mg tablet Take 40 mg by mouth daily.  cetirizine (ZYRTEC) 10 mg tablet Take 10 mg by mouth daily.  amLODIPine (NORVASC) 5 mg tablet Take 2 Tabs by mouth daily.  galantamine (RAZADYNE) 4 mg tablet Take 1 Tab by mouth two (2) times daily (with meals).  hydrALAZINE (APRESOLINE) 25 mg tablet Take 3 Tabs by mouth three (3) times daily.  metoprolol tartrate (LOPRESSOR) 25 mg tablet TAKE 1/2 TABLET TWICE A DAY  famotidine (PEPCID) 20 mg tablet TAKE 1 TABLET BY MOUTH EVERY DAY  aspirin delayed-release 81 mg tablet Take 81 mg by mouth daily.  polyethylene glycol (MIRALAX) 17 gram/dose powder Take 17 g by mouth daily.  trolamine salicylate (ASPERCREME EX) by Apply Externally route daily.  carboxymethylcellulose sodium (REFRESH LIQUIGEL) 1 % dlgl ophthalmic solution Administer 1 Drop to both eyes daily as needed for Other (dry eyes ).  ondansetron (ZOFRAN ODT) 4 mg disintegrating tablet TAKE 1 TABLET EVERY 8 HOURS IF NEEDED FOR NAUSEA  oxybutynin (DITROPAN) 5 mg tablet TAKE 1 TABLET BY MOUTH TWICE A DAY  buPROPion (WELLBUTRIN) 100 mg tablet Take 100 mg by mouth nightly.  memantine (NAMENDA) 10 mg tablet Take 10 mg by mouth nightly.  b complex-vitamin c-folic acid (NEPHROCAPS) 1 mg capsule Take 1 Cap by mouth every Monday, Wednesday, Friday.  diphenoxylate-atropine (LOMOTIL) 2.5-0.025 mg per tablet Take 1 Tab by mouth four (4) times daily as needed for Diarrhea. Max Daily Amount: 4 Tabs.  gabapentin (NEURONTIN) 100 mg capsule TAKE 1 CAPSULE BY MOUTH TWICE A DAY FOR NERVE PAIN  
 losartan (COZAAR) 100 mg tablet TAKE 1 TABLET EVERY MORNING (Patient taking differently: TAKE 1 TABLET EVERY night)  pramipexole (MIRAPEX) 0.25 mg tablet TAKE 1 TABLET BEFORE EACH DIALYSIS  MWF  
 sevelamer carbonate (RENVELA) 800 mg tab tab Take 4,000 mg by mouth three (3) times daily (with meals). 5 with meals and 1 with snacks  escitalopram oxalate (LEXAPRO) 10 mg tablet TAKE 1 TABLET AT BEDTIME TO HELP PREVENT ANXIETY  atorvastatin (LIPITOR) 20 mg tablet Take 1 Tab by mouth nightly. (Patient taking differently: Take 40 mg by mouth nightly.)  L. acidoph & paracasei- S therm- Bifido (TY-Q/RISAQUAD) 8 billion cell cap cap Take 1 Cap by mouth daily.  IRON, CARBONYL PO Take 280 mg by mouth daily.  acetaminophen (TYLENOL) 500 mg tablet Take 1,000 mg by mouth every six (6) hours as needed for Pain. No current facility-administered medications for this visit. There are no discontinued medications. [facility] follow up appointment(s):  
Future Appointments Date Time Provider Juliana Madden 7/9/2019 To Be Determined Renzo Meyer, MINDY Jackson  
7/9/2019 To Be Determined Lynda Agustin LPN [facility]  
7/11/2019 To Be Determined Lynda Agustin LPN [facility]  
 7/15/2019 10:30 AM Taras Price MD [address]  
7/16/2019 To Be Determined Naomi Alvarez, PT [address]  
7/16/2019 To Be Determined Gavino, [address] [facility]  
7/18/2019 To Be Determined Edwinna [facility]  
7/23/2019 To Be Determined Naomi Alvarez, PT [address]  
7/23/2019 To Be Determined Gavino, [address] [facility]  
[facility]30/2019 [facility]  
8/6/2019 To Be Determined Edwinna [facility]  
8/12/2019 11:15 AM Kyree Silvestre MD [address]  
8/13/2019 To Be Determined Edwinna [address]  
8/20/2019 To Be Determined Edwinna [address]  
8/27/2019 To Be Determined Partha Donovan [facility]  
 (patient requested appt with Dr. Flor Vilchis on this day as patient as other appts that day as well) Non-[facility] follow up appointment(s): Patients  to schedule appt with Dr. Dulce Gutierrez and Dr. Alexis Rojas [facility]:  offered and patient declined - information provided as resource Goals  Attends follow-up appointments as directed. 7/1/19 
 
- Declined [facility] f/u - information provided as resource  
- Dr. Flor Vilchis 7/15 
- Dialysis Mon, Wed, Friday 
- Dr. Dulce Gutierrez (patients  to schedule, declined for NN to schedule) 
- Dr. Hi/Leo neuro. (patients  to schedule, declined for NN to schedule) 
- Dr. Lauri Huffman 8/12 Patient/family verbalized understanding of the above plan. Advised patients  that if he is not able to get renal or neuro appt within next 1-2 weeks to contact NN.  
  
  Understands red flags post discharge. 7/3/19 Spoke to patients  (verified on hipaa). Reviewed red flag s/s with patient, nausea, vomiting, inability to pass urine/stool, SOB, mental status change, chest pain, fever, Systolic BP over 880 or diastolic BP over 95. Patients  reported that her BP has improved since returning home. He reports this morning it was 132/87. Patient/family is to check BP 1-2x daily and keep log to take to all upcoming MD appts. Patients  reports that patient is not on fluid restriction. Galantamine 4 mg BID recently added by Dr. Kuldip Mahajan (neurology) for early onset of dementia on 6/27/19. NN sent patient call message to team to discuss changing this medication. (see progress note for details) 
  
Patient/family verbalized understanding and will contact MD/NN if red flag s/s arise. NN to f/u 1 week.  AR

## 2019-07-03 NOTE — CDMP QUERY
Pt admitted with HTN urgency. Encephalopathy noted as Metabolic and as HTN( Ed record) documented. Please further specify type of Encephalopathy in the medical record ? Hypertensive Encephalopathy ? Metabolic Encephalopathy ? Toxic Metabolic Encephalopathy ? Wernickes Encephalopathy 
? Other Encephalopathy 
? Other, please specify ? Clinically unable to determine The medical record reflects the following: 
   Risk Factors: presents with HTN Clinical Indicators: confusion noted, K levels 5.3-5.0; HTN noted. /132--206/73; confusion resolved with BP Treatment: CT head-normal; hydrazine. Thank you [place] Degree RN/CCDS 
[phone]

## 2019-07-08 NOTE — PROGRESS NOTES
I called the patient, left message since no answer on the phone, but she stopped the medication because of her renal impairment

## 2019-07-09 NOTE — TELEPHONE ENCOUNTER
Pharmacy Progress Note - Telephone Encounter    S/O: Ms. Jackson Glorai 75 y.o. female, referred by Dr. Mihaela Connelly MD, was contacted via an outbound telephone call to discuss her galantamine therapy today. Verified patients identifiers (name & ) per HIPAA policy. Spoke with patient's , Estrella Tripp.     - Reinforced recommendation for patient to stop galantamine therapy today. Continue w/ Namenda in the interim. - Pt has not scheduled a follow up appt w/ Dr. Hung Joseph. \"Too much going on at this time. \"    - Remind Pt ofTOC appt scheduled w/ Dr. Haley Garcia on 7/15.   - Estrella Tripp endorses understanding to the provided information. All questions were answered at this time.      Thank you,  Luna Nassar, PharmD, CDE

## 2019-07-16 NOTE — PROGRESS NOTES
Goals  Attends follow-up appointments as directed. 7/1/19 
 
[facility] information provided as resource  
- Dr. Taj Haile 7/15 
- Dialysis Mon, Wed, Friday 
- Dr. Patricia Boyd (patients  to schedule, declined for NN to schedule) 
- Dr. Hi/Leo, neuro. (patients  to schedule, declined for NN to schedule) 
- Dr. Doe 8/12 Patient/family verbalized understanding of the above plan. Advised patients  that if he is not able to get renal or neuro appt within next 1-2 weeks to contact NN.  
 
07/16/19 Unable to reach patient for [facility] call. Patient canceled [facility] appt on 7/15 with Dr. Taj Haile, patient also canceled appt on 8/12 with Dr. Doe. Patient is now scheduled to see Dr. Doe on 9/5/19. Unable to confirm if patient scheduled appt with renal and neuro. NN left voice messages for patient. NN to attempt to reach patient in 1 week. AR 
  
  Understands red flags post discharge. 7/3/19 Spoke to patients  (verified on hipaa). Reviewed red flag s/s with patient, nausea, vomiting, inability to pass urine/stool, SOB, mental status change, chest pain, fever, Systolic BP over 550 or diastolic BP over 95. Patients  reported that her BP has improved since returning home. He reports this morning it was 132/87. Patient/family is to check BP 1-2x daily and keep log to take to all upcoming MD appts. Patients  reports that patient is not on fluid restriction. Galantamine 4 mg BID recently added by Dr. Ryanne Amaya (neurology) for early onset of dementia on 6/27/19. NN sent patient call message to team to discuss changing this medication. (see progress note for details) 
  
Patient/family verbalized understanding and will contact MD/NN if red flag s/s arise. NN to f/u 1 week. AR 
 
07/16/19 
- Per Dr. Anh Painter patient should not be on Galantamine. Continue with Namenda - KENIA Bland contacted patient to advise of this on 7/9/19. - Unable to reach patient for f/u [facility] call

## 2019-07-29 NOTE — PROGRESS NOTES
Due to the inability to communicate with the patient episode resolved at this time. No further follow up scheduled by this NN. To this NN knowledge patient has not had any unplanned utilization at this time. Unable to perform post [facility] assessment at this time.  AR

## 2019-09-05 NOTE — PATIENT INSTRUCTIONS
Office Policies    Phone calls/patient messages:            Please allow up to 24 hours for someone in the office to contact you about your call or message. Be mindful your provider may be out of the office or your message may require further review. We encourage you to use [employer] for your messages as this is a faster, more efficient way to communicate with our office                         Medication Refills:            Prescription medications require 48-72 business hours to process. We encourage you to use [employer] for your refills. For controlled medications: Please allow 72 business hours to process. Certain medications may require you to  a written prescription at our office. NO narcotic/controlled medications will be prescribed after 4pm Monday through Friday or on weekends              Form/Paperwork Completion:            Please note a $25 fee may incur for all paperwork for completed by our providers. We ask that you allow 7-10 business days. Pre-payment is due prior to picking up/faxing the completed form. You may also download your forms to [employer] to have your doctor print off.

## 2019-09-05 NOTE — PROGRESS NOTES
HISTORY OF PRESENT ILLNESS  Renetta Castro is a 75 y.o. female. HPI   Here for hospital f/u and medicare wellness-----------------------here with her   Admitted to [facility] and HTN urgency a few months ago  norvasc increased to 10 mg every day and hydralazine added--increased hydralazine  to 100mg tid . bp has been ok since then per pt  Has dementia taking namenda. Galantamine was added but stopped since has ESRD    Had testing today for lactose intolerance and celiac dz-Dr Sofia Ray    Has had several episodes of pain in left chest occurs when at rest or moving around in bed for last few months. Denies increased MONTANEZ  Had outpt stress test with Dr Kary Lorenzana in the past        Name: Renetta Castro  [id number]  YOB: 1945 (Age: 75 y.o.)           Date of Admission: 6/26/2019  Date of Discharge: 6/29/2019  Attending Physician: Supriya Kaiser MD     Discharge Diagnosis:   Acute metabolic encephalopathy with underlining dementia  Hypertensive urgency   ESRD  Hyperkalemia  Hyperlipidemia   Neuropathy     Consultations:  IP CONSULT TO NEPHROLOGY  IP CONSULT TO NEPHROLOGY  IP CONSULT TO NEUROLOGY  IP CONSULT TO HOSPITALIST     Brief Admission History/Reason for Admission Per Cait Holbrook MD:    Jami Conte is a 75 y. o.  Female PMH ESRD dialysis M,W,F, HTN,neuropathy, who presents form dialysis center because after dialysis her  noticed some change in her mental status and also because elevated BP. No CP,SOB,Palpitations or any other symptom     Brief Hospital Course by Main Problems:   Acute metabolic encephalopathy with underlining dementia  -B12, TSH 4.96, FT4 pending. Head CT neg.  UA neg. Neuropsychological testing deemed her early onset dementia. Mentation back to baseline. Pt started galantamine as per neurology.   Will need to f/u with  Neuro outpt.       Hypertensive urgency, resolved  BP improved with increased dose of norvasc and hydralazine.     ESRD  Hyperkalemia, resolved  Cont' outpt f/u with nephrology     Hyperlipidemia, continue home meds     Neuropathy, continue home meds     Discharge Exam:  Patient seen and examined by me on discharge day. Pertinent Findings:  Visit Vitals  /54 (BP 1 Location: Right arm, BP Patient Position: Sitting)   Pulse 78   Temp 98 °F (36.7 °C)   Resp 20   Ht 5' 9\" (1.753 m)   Wt 50.5 kg (111 lb 4.8 oz)   SpO2 95%   BMI 16.44 kg/m²      Gen:    Not in distress  Chest:  Clear lungs  CVS:    Regular rhythm. No edema  Abd:     Soft, not distended, not tender     Discharge/Recent Laboratory Results:        Recent Labs     06/29/19  0411   06/26/19  1225      < > 138   K 5.0   < > 4.8      < > 103   CO2 28   < > 29   BUN 40*   < > 23*   GLU 92   < > 91   CA 9.3   < > 8.3*   MG  --   --  2.0    < > = values in this interval not displayed.          Recent Labs     06/27/19  0347   HGB 13.4   HCT 47.2*   WBC 6.8   *         Discharge Medications:       Current Discharge Medication List            START taking these medications     Details   galantamine (RAZADYNE) 4 mg tablet Take 1 Tab by mouth two (2) times daily (with meals). Qty: 60 Tab, Refills: 0       hydrALAZINE (APRESOLINE) 25 mg tablet Take 3 Tabs by mouth three (3) times daily. Qty: 90 Tab, Refills: 0                CONTINUE these medications which have CHANGED     Details   amLODIPine (NORVASC) 5 mg tablet Take 2 Tabs by mouth daily.   Qty: 30 Tab, Refills: 0                CONTINUE these medications which have NOT CHANGED     Details   metoprolol tartrate (LOPRESSOR) 25 mg tablet TAKE 1/2 TABLET TWICE A DAY  Qty: 60 Tab, Refills: 11     Comments: N O T I C E    PRESCRIPTION PREVIOUSLY AUTHORIZED BY DOCTOR:GUILLERMO OTTO, RANULFO ROMERO [phone]       famotidine (PEPCID) 20 mg tablet TAKE 1 TABLET BY MOUTH EVERY DAY  Qty: 30 Tab, Refills: 11     Comments: Generic For:PEPCID 20MG       aspirin delayed-release 81 mg tablet Take 81 mg by mouth daily.       polyethylene glycol (MIRALAX) 17 gram/dose powder Take 17 g by mouth daily.       trolamine salicylate (ASPERCREME EX) by Apply Externally route daily.       carboxymethylcellulose sodium (REFRESH LIQUIGEL) 1 % dlgl ophthalmic solution Administer 1 Drop to both eyes daily as needed for Other (dry eyes ).     ondansetron (ZOFRAN ODT) 4 mg disintegrating tablet TAKE 1 TABLET EVERY 8 HOURS IF NEEDED FOR NAUSEA  Qty: 30 Tab, Refills: 3     Comments: Generic For:*ZOFRAN ODT 4MG       oxybutynin (DITROPAN) 5 mg tablet TAKE 1 TABLET BY MOUTH TWICE A DAY  Qty: 60 Tab, Refills: 6     Comments: N O T I C E    PRESCRIPTION PREVIOUSLY AUTHORIZED BY DOCTOR:RENETTA CACERES [phone]       buPROPion (WELLBUTRIN) 100 mg tablet Take 100 mg by mouth nightly.       memantine (NAMENDA) 10 mg tablet Take 10 mg by mouth nightly.       b complex-vitamin c-folic acid (NEPHROCAPS) 1 mg capsule Take 1 Cap by mouth every Monday, Wednesday, Friday.       diphenoxylate-atropine (LOMOTIL) 2.5-0.025 mg per tablet Take 1 Tab by mouth four (4) times daily as needed for Diarrhea. Max Daily Amount: 4 Tabs. Qty: 60 Tab, Refills: 0     Associated Diagnoses: Diarrhea, unspecified type       gabapentin (NEURONTIN) 100 mg capsule TAKE 1 CAPSULE BY MOUTH TWICE A DAY FOR NERVE PAIN  Qty: 60 Cap, Refills: 6     Comments: Generic For:NEURONTIN  100MG   N O T I C E    PRESCRIPTION PREVIOUSLY AUTHORIZED BY DOCTOR:MONSTER CORDOBA [phone]       losartan (COZAAR) 100 mg tablet TAKE 1 TABLET EVERY MORNING  Qty: 30 Tab, Refills: 6     Comments: Generic For:*COZAAR    100MG  Associated Diagnoses: Bilateral carotid artery stenosis; Stenosis of both internal carotid arteries; Cerebral microvascular disease; Disturbance of memory; Altered mental status, unspecified altered mental status type; Abnormal MRI of head       pramipexole (MIRAPEX) 0.25 mg tablet TAKE 1 TABLET BEFORE EACH DIALYSIS  MWF  Refills: 3     Associated Diagnoses: Bilateral carotid artery stenosis;  Stenosis of both internal carotid arteries; Cerebral microvascular disease; Disturbance of memory; Altered mental status, unspecified altered mental status type; Abnormal MRI of head       sevelamer carbonate (RENVELA) 800 mg tab tab Take 4,000 mg by mouth three (3) times daily (with meals). 5 with meals and 1 with snacks     Associated Diagnoses: Bilateral carotid artery stenosis; Stenosis of both internal carotid arteries; Cerebral microvascular disease; Disturbance of memory; Altered mental status, unspecified altered mental status type; Abnormal MRI of head       escitalopram oxalate (LEXAPRO) 10 mg tablet TAKE 1 TABLET AT BEDTIME TO HELP PREVENT ANXIETY  Qty: 30 Tab, Refills: 11     Comments: Generic For:LEXAPRO   10MG   N O T I C E    PRESCRIPTION PREVIOUSLY AUTHORIZED BY DOCTOR:MONSTER CORDOBA [phone]       atorvastatin (LIPITOR) 20 mg tablet Take 1 Tab by mouth nightly. Qty: 30 Tab, Refills: 0       L. acidoph & paracasei- S therm- Bifido (TY-Q/RISAQUAD) 8 billion cell cap cap Take 1 Cap by mouth daily.       IRON, CARBONYL PO Take 280 mg by mouth daily.       acetaminophen (TYLENOL) 500 mg tablet Take 1,000 mg by mouth every six (6) hours as needed for Pain.                 Patient Follow Up Instructions:    Activity: Activity as tolerated  Diet: Renal Diet  Wound Care: None needed         Patient Active Problem List    Diagnosis Date Noted    Anemia of renal disease 08/21/2015     Priority: 1 - One    Acute renal failure with lesion of tubular necrosis ([facility] 08/03/2015     Priority: 1 - One    Chronic renal insufficiency, stage V ([facility] 07/28/2015     Priority: 1 - One    Generalized rash 01/22/2016     Priority: 2 - Two     Class: Present on Admission    Heme positive stool 01/22/2016     Priority: 2 - Two     Class: Present on Admission    Hypertension, uncontrolled 12/21/2015     Priority: 2 - Two    Diarrhea 12/17/2015     Priority: 2 - Two     Class: Present on Admission    Pericardial effusion 12/16/2015     Priority: 2 - Two     Class: Present on Admission    Protein malnutrition ([address] 07/30/2015     Priority: 2 - Two    Acute on chronic renal failure ([address] 01/22/2016     Priority: 3 - Three     Class: Acute    Moderate protein-calorie malnutrition ([address] 12/17/2015     Priority: 3 - Three     Class: Chronic    HTN (hypertension) 10/07/2015     Priority: 3 - Three     Class: Chronic    History of peritonitis 10/07/2015     Priority: 3 - Three     Class: Present on Admission    Physical debility 10/07/2015     Priority: 3 - Three     Class: Present on Admission    Chronic anticoagulation 10/07/2015     Priority: 3 - Three     Class: Chronic    Altered mental status 06/26/2019    Acute respiratory failure with hypoxia ([facility] 04/20/2019    Anemia 04/19/2019    Hypoxia 04/19/2019    HCAP (healthcare-associated pneumonia) 04/19/2019    Dyspnea 04/19/2019    Pneumonia 03/27/2019    Thrombocytopenia ([facility] 03/27/2019    Hypertension 03/27/2019    GI bleed 03/27/2019    ESRD (end stage renal disease) ([facility] 03/27/2019    Fever 03/27/2019    Anxiety 08/21/2018    Disturbance of memory 02/05/2018    Stenosis of left carotid artery without cerebral infarction 03/31/2017    Acute renal failure (ARF) ([address] 03/30/2017    Abnormal MRI of head 03/30/2017    Stenosis of both internal carotid arteries 03/30/2017    Cerebral microvascular disease 03/30/2017    Convulsion disorder ([address] 03/30/2017    Altered mental status, unspecified 03/30/2017    Acute encephalopathy 03/30/2017    Bilateral carotid artery stenosis 01/19/2017    Electrolyte abnormality 03/04/2016    ALAYNA (acute kidney injury) ([facility] 01/22/2016    Pericarditis with effusion 12/18/2015    History of ovarian cancer 12/16/2015     Class: Present on Admission    History of pulmonary embolism 11/16/2015    SIRS (systemic inflammatory response syndrome) ([address] 09/14/2015    Small bowel perforation ([address] 08/01/2015    Acute pancreatitis 07/29/2015    E-coli UTI 07/28/2015    Continuous severe abdominal pain 07/28/2015    Enteritis 07/28/2015    Adrenal hemorrhage ([facility] 07/28/2015    Sepsis ([facility] 07/28/2015    Abdominal pain 07/24/2015     Current Outpatient Medications   Medication Sig Dispense Refill    diphenoxylate-atropine (LOMOTIL) 2.5-0.025 mg per tablet TAKE 1 TABLET TWICE A DAY IF NEEDED FOR DIARRHEA OR CRAMPING 60 Tab 5    escitalopram oxalate (LEXAPRO) 10 mg tablet TAKE 1 TABLET AT BEDTIME TO HELP PREVENT ANXIETY 30 Tab 11    ondansetron (ZOFRAN ODT) 4 mg disintegrating tablet DISSOLVE 1 TABLET IN THE MOUTH EVERY 8 HOURS IF NEEDED FOR NAUSEA 30 Tab 3    gabapentin (NEURONTIN) 100 mg capsule TAKE 1 CAPSULE BY MOUTH TWICE A DAY FOR NERVE PAIN 60 Cap 6    losartan (COZAAR) 100 mg tablet TAKE 1 TABLET EVERY MORNING 30 Tab 6    trolamine salicylate (ASPERCREME EX) Apply 1 Film to affected area daily as needed for Pain. to joints      hydrALAZINE (APRESOLINE) 25 mg tablet TAKE 3 TABLETS THREE TIMES DAILY (Patient taking differently: 100 mg three (3) times daily.) 90 Tab 11    atorvastatin (LIPITOR) 40 mg tablet Take 40 mg by mouth daily.  cetirizine (ZYRTEC) 10 mg tablet Take 10 mg by mouth daily.  amLODIPine (NORVASC) 5 mg tablet Take 2 Tabs by mouth daily. 30 Tab 0    metoprolol tartrate (LOPRESSOR) 25 mg tablet TAKE 1/2 TABLET TWICE A DAY (Patient taking differently: TAKE 1/2 TABLET ORAL TWICE A DAY) 60 Tab 11    famotidine (PEPCID) 20 mg tablet TAKE 1 TABLET BY MOUTH EVERY DAY 30 Tab 11    aspirin delayed-release 81 mg tablet Take 81 mg by mouth daily.  polyethylene glycol (MIRALAX) 17 gram/dose powder Take 17 g by mouth daily.  carboxymethylcellulose sodium (REFRESH LIQUIGEL) 1 % dlgl ophthalmic solution Administer 1 Drop to both eyes daily as needed for Other (dry eyes ).       oxybutynin (DITROPAN) 5 mg tablet TAKE 1 TABLET BY MOUTH TWICE A DAY 60 Tab 6    buPROPion (WELLBUTRIN) 100 mg tablet Take 100 mg by mouth nightly.  memantine (NAMENDA) 10 mg tablet Take 10 mg by mouth nightly.  b complex-vitamin c-folic acid (NEPHROCAPS) 1 mg capsule Take 1 Cap by mouth every Monday, Wednesday, Friday.  pramipexole (MIRAPEX) 0.25 mg tablet TAKE 1 TABLET BEFORE EACH DIALYSIS  MWF  3    sevelamer carbonate (RENVELA) 800 mg tab tab Take 4,000 mg by mouth three (3) times daily (with meals).  atorvastatin (LIPITOR) 20 mg tablet Take 1 Tab by mouth nightly. (Patient taking differently: Take 40 mg by mouth nightly.) 30 Tab 0    L. acidoph & paracasei- S therm- Bifido (TY-Q/RISAQUAD) 8 billion cell cap cap Take 1 Cap by mouth daily.  IRON, CARBONYL PO Take 280 mg by mouth daily.  acetaminophen (TYLENOL) 500 mg tablet Take 1,000 mg by mouth every six (6) hours as needed for Pain.       galantamine (RAZADYNE) 4 mg tablet TAKE 1 TABLET TWICE A DAY WITH MEALS 60 Tab 0     Allergies   Allergen Reactions    Citrus And Derivatives Anaphylaxis     Patient and her  reported    Penicillin G Anaphylaxis    Sulfa (Sulfonamide Antibiotics) Anaphylaxis    Vancomycin Hives    Underwood Juice Rash      Lab Results   Component Value Date/Time    WBC 6.8 06/27/2019 03:47 AM    HGB 13.4 06/27/2019 03:47 AM    Hemoglobin (POC) 7.8 (L) 08/01/2016 09:56 AM    HCT 47.2 (H) 06/27/2019 03:47 AM    Hematocrit (POC) 23 (L) 08/01/2016 09:56 AM    PLATELET 298 (L) 39/73/5124 03:47 AM    MCV 89.7 06/27/2019 03:47 AM     Lab Results   Component Value Date/Time    GFR est non-AA 10 (L) 06/29/2019 04:11 AM    GFRNA, POC 11 (L) 08/01/2016 09:56 AM    GFR est AA 12 (L) 06/29/2019 04:11 AM    GFRAA, POC 13 (L) 08/01/2016 09:56 AM    Creatinine 4.29 (H) 06/29/2019 04:11 AM    Creatinine (POC) 4.1 (H) 08/01/2016 09:56 AM    BUN 40 (H) 06/29/2019 04:11 AM    BUN (POC) 65 (H) 08/01/2016 09:56 AM    Sodium 136 06/29/2019 04:11 AM    Sodium (POC) 138 08/01/2016 09:56 AM    Potassium 5.0 06/29/2019 04:11 AM Potassium (POC) 5.7 (H) 08/01/2016 09:56 AM    Chloride 100 06/29/2019 04:11 AM    Chloride (POC) 112 (H) 08/01/2016 09:56 AM    CO2 28 06/29/2019 04:11 AM    Magnesium 2.0 06/26/2019 12:25 PM    Phosphorus 4.7 04/24/2019 08:38 AM    PTH, Intact 484.6 (H) 03/07/2016 12:51 PM        ROS    Physical Exam   Constitutional: She appears well-developed and well-nourished. No distress. Appears stated age, tall, thin   Cardiovascular: Normal rate, regular rhythm and normal heart sounds. Exam reveals no gallop and no friction rub. No murmur heard. Pulmonary/Chest: Effort normal and breath sounds normal. No respiratory distress. She has no wheezes. Abdominal: Soft. Bowel sounds are normal. She exhibits no distension and no mass. There is no tenderness. There is no rebound and no guarding. Musculoskeletal: She exhibits no edema. Neurological: She is alert. Psychiatric: She has a normal mood and affect. Nursing note and vitals reviewed. ASSESSMENT and PLAN  Diagnoses and all orders for this visit:    1. Left-sided chest pain  -     REFERRAL TO CARDIOLOGY  -     AMB POC EKG ROUTINE W/ 12 LEADS, INTER & REP    2. Essential hypertension    3. ESRD (end stage renal disease) ([facility]    4. Memory loss    5. Menopause  -     DEXA BONE DENSITY STUDY AXIAL; Future    6. Encounter for immunization  -     INFLUENZA VACCINE INACTIVATED (IIV), SUBUNIT, ADJUVANTED, IM    7. Medicare annual wellness visit, subsequent       This is the Subsequent Medicare Annual Wellness Exam, performed 12 months or more after the Initial AWV or the last Subsequent AWV    I have reviewed the patient's medical history in detail and updated the computerized patient record.      History     Past Medical History:   Diagnosis Date    Chronic kidney disease     Stage 5 (7/18/16 BUN 79, Creatnine 4.53, K 6) Dr. Alonzo Flores [phone]    GERD (gastroesophageal reflux disease)     well controlled with Rx     High cholesterol     Hyperkalemia     Hypertension     Hypomagnesemia     on daily Magnesium    Neuropathy     bilateral feet    Ovarian cancer ([facility] 2013    Hysterectomy with chemo, no radiation:  Dr. [facility]) 9/2015    blood clot in lung 9/2015    Thromboembolus ([facility] 2004    in kidney \"many years ago\"      Past Surgical History:   Procedure Laterality Date    ABDOMEN SURGERY [address] UNLISTED  7/31/15    Lap exploratory small bowel obstruction  (ICU)    ABDOMEN SURGERY [address] UNLISTED  8/2/15    Abdmonial washout with wound vac (ICU)    ABDOMEN SURGERY PROC UNLISTED  8/18/15    Abdominal washout fascial closure (ICU)    ABDOMEN SURGERY PROC UNLISTED  11/11/15    Lap takedown of ileostomy     COLONOSCOPY N/A 8/1/2016    COLONOSCOPY performed by Chin Conroy MD at [address] COLONOSCOPY N/A 4/24/2019    COLONOS[facility] Briana Valero MD at [facility] ENDOSCOPY    HX APPENDECTOMY  approx 2005    HX CASI AND BSO  2013    due to ovarian cancer    HX TONSILLECTOMY  age 10     Current Outpatient Medications   Medication Sig Dispense Refill    diphenoxylate-atropine (LOMOTIL) 2.5-0.025 mg per tablet TAKE 1 TABLET TWICE A DAY IF NEEDED FOR DIARRHEA OR CRAMPING 60 Tab 5    escitalopram oxalate (LEXAPRO) 10 mg tablet TAKE 1 TABLET AT BEDTIME TO HELP PREVENT ANXIETY 30 Tab 11    ondansetron (ZOFRAN ODT) 4 mg disintegrating tablet DISSOLVE 1 TABLET IN THE MOUTH EVERY 8 HOURS IF NEEDED FOR NAUSEA 30 Tab 3    gabapentin (NEURONTIN) 100 mg capsule TAKE 1 CAPSULE BY MOUTH TWICE A DAY FOR NERVE PAIN 60 Cap 6    losartan (COZAAR) 100 mg tablet TAKE 1 TABLET EVERY MORNING 30 Tab 6    trolamine salicylate (ASPERCREME EX) Apply 1 Film to affected area daily as needed for Pain. to joints      hydrALAZINE (APRESOLINE) 25 mg tablet TAKE 3 TABLETS THREE TIMES DAILY (Patient taking differently: 100 mg three (3) times daily.) 90 Tab 11    atorvastatin (LIPITOR) 40 mg tablet Take 40 mg by mouth daily.       cetirizine (ZYRTEC) 10 mg tablet Take 10 mg by mouth daily.  amLODIPine (NORVASC) 5 mg tablet Take 2 Tabs by mouth daily. 30 Tab 0    metoprolol tartrate (LOPRESSOR) 25 mg tablet TAKE 1/2 TABLET TWICE A DAY (Patient taking differently: TAKE 1/2 TABLET ORAL TWICE A DAY) 60 Tab 11    famotidine (PEPCID) 20 mg tablet TAKE 1 TABLET BY MOUTH EVERY DAY 30 Tab 11    aspirin delayed-release 81 mg tablet Take 81 mg by mouth daily.  polyethylene glycol (MIRALAX) 17 gram/dose powder Take 17 g by mouth daily.  carboxymethylcellulose sodium (REFRESH LIQUIGEL) 1 % dlgl ophthalmic solution Administer 1 Drop to both eyes daily as needed for Other (dry eyes ).  oxybutynin (DITROPAN) 5 mg tablet TAKE 1 TABLET BY MOUTH TWICE A DAY 60 Tab 6    buPROPion (WELLBUTRIN) 100 mg tablet Take 100 mg by mouth nightly.  memantine (NAMENDA) 10 mg tablet Take 10 mg by mouth nightly.  b complex-vitamin c-folic acid (NEPHROCAPS) 1 mg capsule Take 1 Cap by mouth every Monday, Wednesday, Friday.  pramipexole (MIRAPEX) 0.25 mg tablet TAKE 1 TABLET BEFORE EACH DIALYSIS  MWF  3    sevelamer carbonate (RENVELA) 800 mg tab tab Take 4,000 mg by mouth three (3) times daily (with meals).  atorvastatin (LIPITOR) 20 mg tablet Take 1 Tab by mouth nightly. (Patient taking differently: Take 40 mg by mouth nightly.) 30 Tab 0    L. acidoph & paracasei- S therm- Bifido (TY-Q/RISAQUAD) 8 billion cell cap cap Take 1 Cap by mouth daily.  IRON, CARBONYL PO Take 280 mg by mouth daily.  acetaminophen (TYLENOL) 500 mg tablet Take 1,000 mg by mouth every six (6) hours as needed for Pain.       galantamine (RAZADYNE) 4 mg tablet TAKE 1 TABLET TWICE A DAY WITH MEALS 60 Tab 0     Allergies   Allergen Reactions    Citrus And Derivatives Anaphylaxis     Patient and her  reported    Penicillin G Anaphylaxis    Sulfa (Sulfonamide Antibiotics) Anaphylaxis    Vancomycin Hives    Placer Juice Rash     Family History   Problem Relation Age of Onset    Other Mother         peptic ulcer    Alzheimer Father      Social History     Tobacco Use    Smoking status: Current Every Day Smoker     Packs/day: 1.00     Last attempt to quit: 2012     Years since quittin.6    Smokeless tobacco: Never Used   Substance Use Topics    Alcohol use: No     Patient Active Problem List   Diagnosis Code    Abdominal pain R10.9    E-coli UTI N39.0, B96.20    Continuous severe abdominal pain R10.9    Enteritis K52.9    Adrenal hemorrhage ([facility]) E27.49    Chronic renal insufficiency, stage V ([facility]) N18.5    Sepsis ([facility]) A41.9    Acute pancreatitis K85.90    Protein malnutrition ([facility] E46    Small bowel perforation ([facility]) K63.1    Acute renal failure with lesion of tubular necrosis ([facility]) N17.0    Anemia of renal disease N18.9, D63.1    SIRS (systemic inflammatory response syndrome) ([facility]) R65.10    HTN (hypertension) I10    History of peritonitis Z87.19    Physical debility R53.81    Chronic anticoagulation Z79.01    History of pulmonary embolism Z86.711    History of ovarian cancer Z85.43    Pericardial effusion I31.3    Diarrhea R19.7    Moderate protein-calorie malnutrition ([facility]) E44.0    Pericarditis with effusion I31.9    Hypertension, uncontrolled I10    ALAYNA (acute kidney injury) ([facility] N17.9    Acute on chronic renal failure ([facility]) N17.9, N18.9    Generalized rash R21    Heme positive stool R19.5    Electrolyte abnormality E87.8    Bilateral carotid artery stenosis I65.23    Acute renal failure (ARF) ([facility]) N17.9    Abnormal MRI of head R93.0    Stenosis of both internal carotid arteries I65.23    Cerebral microvascular disease I67.9    Convulsion disorder ([facility]) R56.9    Altered mental status, unspecified R41.82    Acute encephalopathy G93.40    Stenosis of left carotid artery without cerebral infarction I65.22    Disturbance of memory R41.3    Anxiety F41.9    Pneumonia J18.9    Thrombocytopenia ([facility]) D69.6  Hypertension I10    GI bleed K92.2    ESRD (end stage renal disease) ([facility] N18.6    Fever R50.9    Anemia D64.9    Hypoxia R09.02    HCAP (healthcare-associated pneumonia) J18.9    Dyspnea R06.00    Acute respiratory failure with hypoxia ([facility]) J96.01    Altered mental status R41.82       Depression Risk Factor Screening:     3 most recent PHQ Screens 9/5/2019   Little interest or pleasure in doing things Not at all   Feeling down, depressed, irritable, or hopeless Not at all   Total Score PHQ 2 0   Trouble falling or staying asleep, or sleeping too much -   Feeling tired or having little energy -   Poor appetite, weight loss, or overeating -   Feeling bad about yourself - or that you are a failure or have let yourself or your family down -   Moving or speaking so slowly that other people could have noticed; or the opposite being so fidgety that others notice -   Thoughts of being better off dead, or hurting yourself in some way -   How difficult have these problems made it for you to do your work, take care of your home and get along with others -     Alcohol Risk Factor Screening: You do not drink alcohol or very rarely. Functional Ability and Level of Safety:   Hearing Loss  Hearing is good. Activities of Daily Living  The home contains: handrails and grab bars  Patient needs help with:  transportation, managing medications and managing money    Fall Risk  Fall Risk Assessment, last 12 mths 9/5/2019   Able to walk? Yes   Fall in past 12 months? No   Fall with injury?  -       Abuse Screen  Patient is not abused    Cognitive Screening   Evaluation of Cognitive Function:  Has your family/caregiver stated any concerns about your memory: yes  Abnormal    Patient Care Team   Patient Care Team:  Vinicius Romero MD as PCP - General (Internal Medicine)  Deejay Dao MD as Surgeon (General Surgery)  Erickson Andrade MD (Nephrology)    Assessment/Plan   Education and counseling provided:  Are appropriate based on today's review and evaluation  Influenza Vaccine-fluad today  Bone mass measurement (DEXA)-ordered  tdap and shingrix-recommended  Advised pt to bring in a copy of AMD forms    Diagnoses and all orders for this visit:    1. Left-sided chest pain  -     REFERRAL TO CARDIOLOGY    Atypical with risk factors  2. Essential hypertension   Reasonable control  3. ESRD (end stage renal disease) ([facility] [address]   Tolerating HD -Dr Brooklyn Ponce  4. Memory loss   sppears fairly stable on namenda  5.  Menopause   dexa ordered      Health Maintenance Due   Topic Date Due    DTaP/Tdap/Td series (1 - Tdap) 10/06/1965    Shingrix Vaccine Age 50> (1 of 2) 10/06/1994    GLAUCOMA SCREENING Q2Y  10/06/2009    Bone Densitometry (Dexa) Screening  10/06/2009    Influenza Age 9 to Adult  08/01/2019    MEDICARE YEARLY EXAM  08/22/2019

## 2019-09-05 NOTE — PROGRESS NOTES
Chief Complaint   Patient presents with    Transitions Of Care     high BP    Pain Upper Quadrant     left side

## 2019-12-11 ENCOUNTER — TELEPHONE (OUTPATIENT)
Dept: INTERNAL MEDICINE CLINIC | Age: 74
End: 2019-12-11

## 2019-12-11 NOTE — TELEPHONE ENCOUNTER
Returned page Tuesday dec 10 at 23:15. Spoke with . Pt had . Will send death certificate to dr Maggie Norton to be signed.

## 2021-12-02 NOTE — TELEPHONE ENCOUNTER
RX faxed to pt's pharmacy. Confirmation received.
BIBA- AMS as per family this morning, Hyperglycemia at home family states 480s  Bilat LE drainage noted. EMS , Right #20g, 250cc NS  HX Dementia, ESRD Dialysis T/Th/Sat Left AV fistula

## 2021-12-29 NOTE — TELEPHONE ENCOUNTER
HISTORY OF PRESENT ILLNESS  Moises Gonzalez is a 63 y.o. female. HPI   Follow up on chronic medical problems. Overall has been feeling well. Cardiovascular Review:  The patient has hypertension and hyperlipidemia. Diet and Lifestyle: generally follows a low fat low cholesterol diet, generally follows a low sodium diet, exercises sporadically, nonsmoker  Home BP Monitoring: is not measured at home. Pertinent ROS: taking medications as instructed, no medication side effects noted, no TIA's, no chest pain on exertion, no dyspnea on exertion, no swelling of ankles, no palpitations, no muscle aches or pain. Diabetes Mellitus:  She has diabetes mellitus type 1 dx at the age of 24. She has been on insulin since her diagnoses. She has retinopathy. She is adjusting well to her insulin pump. BS have been improved and ranging in the low to mid 100s. Has had regular contact with rep with insulin pump. Diabetic ROS - medication compliance: compliant most of the time, diabetic diet compliance: compliant most of the time, home glucose monitoring: is performed regularly with the [employer] system. She has  been exercising some with walking 2 to 3 times a week. Further diabetic ROS: no chest pain, dyspnea or TIA's, no numbness, has some  tingling in the extremities which has been stable. Vision has been stable. Patient Active Problem List   Diagnosis Code    Gastroesophageal reflux disease without esophagitis K21.9    History of hysterectomy for benign disease Z90.710    Diabetic retinopathy ([address] E11.319    Type 1 diabetes mellitus with retinopathy of right eye without macular edema ([address] E10.319    Encounter for long-term (current) use of insulin ([address] Z79.4    Encounter for medication monitoring Z51.81    Essential hypertension I10    Mixed hyperlipidemia E78.2    Chronic constipation K59.09    Severe obesity (BMI 35.0-39. 9) with comorbidity ([address] E66.01    Uncontrolled type 1 diabetes Noted. mellitus with retinopathy of both eyes ([facility] E10.319, E10.65    Type 2 diabetes with nephropathy ([facility]) E11.21       Current Outpatient Medications   Medication Sig Dispense Refill    ibuprofen (MOTRIN) 800 mg tablet TAKE 1 TABLET BY MOUTH TWICE DAILY AS NEEDED FOR PAIN 60 Tablet 1    alirocumab (Praluent Pen) 75 mg/mL injector pen 1 mL by SubCUTAneous route Once every 2 weeks. 2 mL 11    lancets (Accu-Chek Fastclix BitInstant) misc Use to check sugar 3 times daily to calibrate with insulin pump / sensor as directed. 1 Each 11    amLODIPine (NORVASC) 5 mg tablet Take 2 Tablets by mouth daily. 180 Tablet 3    insulin lispro (HUMALOG) 100 unit/mL injection Use with insulin pump for total daily dose of 150 units as directed. Use 10 units with breakfast, 10 units with lunch, and 12 units with dinner for bolus doses as directed. Dispense vials please. 1 Each 0    glucose blood VI test strips (Accu-Chek Guide test strips) strip Use to check blood sugar four times daily as directed. These strips needed for CGM linked to insulin pump. 200 Strip 11    FreeStyle Soo 14 Day Milroy misc USE EVERY 14 DAYS TO TEST BLOOD SUGAR 2 Each 5    glucose blood VI test strips (FreeStyle Precision Justice Strips) strip Use to check blood sugar with fingerstick when Saúl advises you to do so. 50 Strip 2    flash glucose sensor (FreeStyle Soo 2 Sensor) kit USE AS DIRECTED TO MONITOR BLOOD SUGAR MULTIPLE TIMES DAILY - CGM 2 Kit 11    fexofenadine (ALLEGRA) 180 mg tablet TAKE 1 TABLET BY MOUTH DAILY AS NEEDED FOR ALLERGIES OR RHINITIS 30 Tablet 6    pantoprazole (PROTONIX) 40 mg tablet TAKE 1 TABLET BY MOUTH ONCE DAILY 30 Tablet 11    lisinopriL (PRINIVIL, ZESTRIL) 40 mg tablet Take 1 Tablet by mouth daily.  90 Tablet 3    Ventolin HFA 90 mcg/actuation inhaler INHALE 2 PUFFS BY MOUTH EVERY 4 HOURS AS NEEDED FOR WHEEZING OR SHORTNESS OF BREATH 1 Inhaler 11    loratadine (Claritin) 10 mg tablet Take 10 mg by mouth daily as needed for Allergies.  cyanocobalamin (VITAMIN B12) 1,000 mcg/mL injection ADMINISTER 1 ML IN THE MUSCLE EVERY 30 DAYS 10 mL 1    atorvastatin (LIPITOR) 80 mg tablet TAKE 1 TABLET BY MOUTH DAILY 30 Tab 11    topiramate (TOPAMAX) 50 mg tablet TAKE 1 TABLET BY MOUTH TWICE DAILY 60 Tab 5    fluticasone propionate (FLONASE) 50 mcg/actuation nasal spray INSTILL 2 SPRAYS IN EACH NOSTRIL ONCE DAILY AS NEEDED FOR NASAL CONGESTION 1 Bottle 6    ezetimibe (ZETIA) 10 mg tablet TAKE 1 TABLET BY MOUTH DAILY 30 Tab 11    linaclotide (LINZESS) 290 mcg cap capsule Take 290 mcg by mouth Daily (before breakfast).          Allergies   Allergen Reactions    Lasix [Furosemide] Hives    Percocet [Oxycodone-Acetaminophen] Other (comments)     AMS         Past Medical History:   Diagnosis Date    Diabetes ([address]     Diabetic retinopathy ([facility] [address]     right eye     Gestational diabetes     Hypercholesterolemia     Hypertension     Type 1 diabetes ([facility] [address]          Past Surgical History:   Procedure Laterality Date    HX BREAST REDUCTION Bilateral     HX CATARACT REMOVAL  2018    left eye    HX  SECTION  1990    HX  SECTION  2000    HX COLONOSCOPY      HX HEENT  2016    right eye surgery- repaired blood vessel    HX HEENT  2017    left eye surgery- removal of scar tissue    HX HEENT  2018    left eye-laser    HX HEENT  10/23/2018    left eye injection    HX HEENT  2018    left eye intravitreal injection    HX HEENT  2019    left eye intravitreal injection    HX HYSTERECTOMY      partial         Family History   Problem Relation Age of Onset    Hypertension Mother     Diabetes Father     No Known Problems Brother     Suicide Brother     Asthma Daughter     Asthma Son     Psoriasis Son        Social History     Tobacco Use    Smoking status: Never Smoker    Smokeless tobacco: Never Used   Substance Use Topics    Alcohol use: No     Alcohol/week: 0.0 standard drinks Lab Results   Component Value Date/Time    WBC 6.4 07/02/2021 11:31 AM    HGB 12.0 07/02/2021 11:31 AM    HCT 40.2 07/02/2021 11:31 AM    PLATELET 031 54/20/3352 11:31 AM    MCV 83.9 07/02/2021 11:31 AM     Lab Results   Component Value Date/Time    Cholesterol, total 224 (H) 10/25/2021 11:50 AM    HDL Cholesterol 71 10/25/2021 11:50 AM    LDL, calculated 138.4 (H) 10/25/2021 11:50 AM    Triglyceride 73 10/25/2021 11:50 AM    CHOL/HDL Ratio 3.2 10/25/2021 11:50 AM     Lab Results   Component Value Date/Time    TSH 0.942 05/16/2017 03:47 PM      Lab Results   Component Value Date/Time    Sodium 140 10/25/2021 11:50 AM    Potassium 3.8 10/25/2021 11:50 AM    Chloride 109 (H) 10/25/2021 11:50 AM    CO2 27 10/25/2021 11:50 AM    Anion gap 4 (L) 10/25/2021 11:50 AM    Glucose 105 (H) 10/25/2021 11:50 AM    BUN 12 10/25/2021 11:50 AM    Creatinine 0.97 10/25/2021 11:50 AM    BUN/Creatinine ratio 12 10/25/2021 11:50 AM    GFR est AA >60 10/25/2021 11:50 AM    GFR est non-AA 60 (L) 10/25/2021 11:50 AM    Calcium 9.1 10/25/2021 11:50 AM    Bilirubin, total 0.3 10/25/2021 11:50 AM    ALT (SGPT) 34 10/25/2021 11:50 AM    Alk. phosphatase 116 10/25/2021 11:50 AM    Protein, total 6.9 10/25/2021 11:50 AM    Albumin 3.6 10/25/2021 11:50 AM    Globulin 3.3 10/25/2021 11:50 AM    A-G Ratio 1.1 10/25/2021 11:50 AM      Lab Results   Component Value Date/Time    Hemoglobin A1c 11.5 (H) 02/25/2010 08:25 AM    Hemoglobin A1c (POC) 7.9 10/25/2021 12:55 PM         Review of Systems   Constitutional: Negative for malaise/fatigue. HENT: Negative for congestion. Eyes: Negative for blurred vision. Respiratory: Negative for cough and shortness of breath. Cardiovascular: Negative for chest pain, palpitations and leg swelling. Gastrointestinal: Negative for abdominal pain, constipation and heartburn. Genitourinary: Negative for dysuria, frequency and urgency. Musculoskeletal: Negative for back pain and joint pain. Neurological: Negative for dizziness, tingling and headaches. Endo/Heme/Allergies: Negative for environmental allergies. Psychiatric/Behavioral: Negative for depression. The patient does not have insomnia. Physical Exam  Vitals and nursing note reviewed. Constitutional:       Appearance: Normal appearance. She is well-developed. Comments: /70   Pulse 86   Temp 98.6 °F (37 °C) (Oral)   Resp 16   Ht 5' 2\" (1.575 m)   Wt 204 lb 9.6 oz (92.8 kg)   LMP  (LMP Unknown)   SpO2 98%   BMI 37.42 kg/m²        HENT:      Right Ear: Tympanic membrane and ear canal normal.      Left Ear: Tympanic membrane and ear canal normal.      Nose: No mucosal edema. Neck:      Thyroid: No thyromegaly. Cardiovascular:      Rate and Rhythm: Normal rate and regular rhythm. Heart sounds: Normal heart sounds. Pulmonary:      Effort: Pulmonary effort is normal.      Breath sounds: Normal breath sounds. Abdominal:      General: Bowel sounds are normal.      Palpations: Abdomen is soft. There is no mass. Tenderness: There is no abdominal tenderness. Musculoskeletal:         General: Normal range of motion. Cervical back: Normal range of motion and neck supple. Right lower leg: No edema. Left lower leg: No edema. Lymphadenopathy:      Cervical: No cervical adenopathy. Skin:     General: Skin is warm and dry. Neurological:      General: No focal deficit present. Mental Status: She is alert and oriented to person, place, and time. Psychiatric:         Mood and Affect: Mood normal.         ASSESSMENT and PLAN  Diagnoses and all orders for this visit:    1. Type 1 diabetes mellitus with retinopathy of both eyes, macular edema presence unspecified, unspecified retinopathy severity (HCC)  BS per pump was 150 while in the office. Plan to repeat A1c at next OV.       2. Essential hypertension  Discussed sodium restriction, high k rich diet, maintaining ideal body weight and regular exercise program such as daily walking 30 min perday 4-5 times per week, as physiologic means to achieve blood pressure control. Medication compliance advised. 3. Hypercholesterolemia  Continue to monitor. Work on diet and exercise.  -     LIPID PANEL; Future    4. B12 deficiency  -     cyanocobalamin (VITAMIN B12) 1,000 mcg/mL injection; ADMINISTER 1 ML IN THE MUSCLE EVERY 30 DAYS  -     VITAMIN B12; Future    5. Encounter for long-term (current) use of insulin (HCC)//  6. Encounter for medication monitoring  -     METABOLIC PANEL, BASIC; Future    7. Non morbid obesity  I have reviewed/discussed the above normal BMI with the patient. I have recommended the following interventions: dietary management education, guidance, and counseling . Follow-up and Dispositions    · Return in about 2 months (around 2/28/2022). reviewed diet, exercise and weight control  cardiovascular risk and specific lipid/LDL goals reviewed  reviewed medications and side effects in detail  specific diabetic recommendations: low cholesterol diet, weight control and daily exercise discussed, foot care discussed and Podiatry visits discussed, annual eye examinations at Ophthalmology discussed and glycohemoglobin and other lab monitoring discussed     I have discussed diagnosis listed in this note with pt and/or family. I have discussed treatment plans and options and the risk/benefit analysis of those options, including safe use of medications and possible medication side effects. Through the use of shared decision making we have agreed to the above plan. The patient has received an after-visit summary and questions were answered concerning future plans and follow up. Advise pt of any urgent changes then to proceed to the ER.

## 2022-08-29 NOTE — PROGRESS NOTES
ADULT PROTOCOL: JET AEROSOL  REASSESSMENT Patient  Eduard Shaikh     75 y.o.   female     4/21/2019  4:10 PM 
 
Breath Sounds Pre Procedure: Right Breath Sounds: Diminished Left Breath Sounds: Diminished Breath Sounds Post Procedure: Right Breath Sounds: Diminished Left Breath Sounds: Diminished Breathing pattern: Pre procedure Breathing Pattern: Regular Post procedure Breathing Pattern: Regular Heart Rate: Pre procedure Pulse: 76 Post procedure Pulse: 77 Resp Rate: Pre procedure Respirations: 20 
         Post procedure Respirations: 20 
 
Oxygen: O2 Device: Hi flow nasal cannula  8 SpO2: Pre procedure SpO2: 97 % Post procedure SpO2: 98 % Nebulizer Therapy: Current medications Aerosolized Medications: DuoNeb Changed: no  
 
Problem List:  
Patient Active Problem List  
Diagnosis Code  Abdominal pain R10.9  E-coli UTI N39.0, B96.20  Continuous severe abdominal pain R10.9  Enteritis K52.9  Adrenal hemorrhage (HCC) E27.49  Chronic renal insufficiency, stage V (HCC) N18.5  Sepsis ([address] A41.9  Acute pancreatitis K85.90  Protein malnutrition ([address] E46  Small bowel perforation (HCC) K63.1  Acute renal failure with lesion of tubular necrosis (HCC) N17.0  Anemia of renal disease N18.9, D63.1  SIRS (systemic inflammatory response syndrome) ([facility] R65.10  
 HTN (hypertension) I10  
 History of peritonitis Z87.19  
 Physical debility R53.81  Chronic anticoagulation Z79.01  
 History of pulmonary embolism Z86.711  
 History of ovarian cancer Z85.43  
 Pericardial effusion I31.3  Diarrhea R19.7  Moderate protein-calorie malnutrition (HCC) E44.0  Pericarditis with effusion I31.9  Hypertension, uncontrolled I10  
 ALAYNA (acute kidney injury) ([facility] [address] N17.9  Acute on chronic renal failure (HCC) N17.9, N18.9  Generalized rash R21  
 Script(s) sent.    Thank you.     Heme positive stool R19.5  Electrolyte abnormality E87.8  Bilateral carotid artery stenosis I65.23  
 Acute renal failure (ARF) (HCC) N17.9  Abnormal MRI of head R93.0  Stenosis of both internal carotid arteries I65.23  
 Cerebral microvascular disease I67.9  Convulsion disorder ([facility] [address] R56.9  Altered mental status, unspecified R41.82  
 Acute encephalopathy G93.40  Stenosis of left carotid artery without cerebral infarction I65.22  
 Disturbance of memory R41.3  Anxiety F41.9  Pneumonia J18.9  Thrombocytopenia ([facility] [address] D69.6  Hypertension I10  
 GI bleed K92.2  
 ESRD (end stage renal disease) ([facility] [address] N18.6  Fever R50.9  Anemia D64.9  Hypoxia R09.02  
 HCAP (healthcare-associated pneumonia) J18.9  Dyspnea R06.00  
 Acute respiratory failure with hypoxia (HCC) J96.01 Respiratory Therapist: Anaid Rodriguez
